# Patient Record
Sex: MALE | Race: WHITE | NOT HISPANIC OR LATINO | Employment: OTHER | URBAN - METROPOLITAN AREA
[De-identification: names, ages, dates, MRNs, and addresses within clinical notes are randomized per-mention and may not be internally consistent; named-entity substitution may affect disease eponyms.]

---

## 2018-03-30 ENCOUNTER — CONVERSION ENCOUNTER (OUTPATIENT)
Dept: CARDIOLOGY | Facility: CLINIC | Age: 68
End: 2018-03-30

## 2018-03-30 ENCOUNTER — OFFICE VISIT CONVERTED (OUTPATIENT)
Dept: CARDIOLOGY | Facility: CLINIC | Age: 68
End: 2018-03-30
Attending: INTERNAL MEDICINE

## 2018-07-23 ENCOUNTER — CONVERSION ENCOUNTER (OUTPATIENT)
Dept: SURGERY | Facility: CLINIC | Age: 68
End: 2018-07-23

## 2018-07-23 ENCOUNTER — OFFICE VISIT CONVERTED (OUTPATIENT)
Dept: UROLOGY | Facility: CLINIC | Age: 68
End: 2018-07-23
Attending: UROLOGY

## 2018-10-19 ENCOUNTER — OFFICE VISIT CONVERTED (OUTPATIENT)
Dept: CARDIOLOGY | Facility: CLINIC | Age: 68
End: 2018-10-19
Attending: INTERNAL MEDICINE

## 2019-01-09 ENCOUNTER — OFFICE VISIT CONVERTED (OUTPATIENT)
Dept: OTHER | Facility: HOSPITAL | Age: 69
End: 2019-01-09
Attending: NURSE PRACTITIONER

## 2019-01-10 ENCOUNTER — HOSPITAL ENCOUNTER (OUTPATIENT)
Dept: LAB | Facility: HOSPITAL | Age: 69
Discharge: HOME | End: 2019-01-10
Attending: NURSE PRACTITIONER

## 2019-01-10 LAB
ALBUMIN SERPL-MCNC: 4.6 G/DL (ref 3.5–5)
ALBUMIN/GLOB SERPL: 1.8 {RATIO} (ref 1.4–2.6)
ALP SERPL-CCNC: 115 U/L (ref 56–155)
ALT SERPL-CCNC: 18 U/L (ref 10–40)
ANION GAP SERPL CALC-SCNC: 17 MMOL/L (ref 8–19)
AST SERPL-CCNC: 20 U/L (ref 15–50)
BASOPHILS # BLD AUTO: 0.01 10*3/UL (ref 0–0.2)
BASOPHILS NFR BLD AUTO: 0.24 % (ref 0–3)
BILIRUB SERPL-MCNC: 0.57 MG/DL (ref 0.2–1.3)
BUN SERPL-MCNC: 16 MG/DL (ref 5–25)
BUN/CREAT SERPL: 15 {RATIO} (ref 6–20)
CALCIUM SERPL-MCNC: 9.4 MG/DL (ref 8.7–10.4)
CHLORIDE SERPL-SCNC: 103 MMOL/L (ref 99–111)
CHOLEST SERPL-MCNC: 135 MG/DL (ref 107–200)
CHOLEST/HDLC SERPL: 2.4 {RATIO} (ref 3–6)
CONV CO2: 24 MMOL/L (ref 22–32)
CONV TOTAL PROTEIN: 7.2 G/DL (ref 6.3–8.2)
CREAT UR-MCNC: 1.08 MG/DL (ref 0.7–1.2)
EOSINOPHIL # BLD AUTO: 0.12 10*3/UL (ref 0–0.7)
EOSINOPHIL # BLD AUTO: 2.34 % (ref 0–7)
ERYTHROCYTE [DISTWIDTH] IN BLOOD BY AUTOMATED COUNT: 12.8 % (ref 11.5–14.5)
GFR SERPLBLD BASED ON 1.73 SQ M-ARVRAT: >60 ML/MIN/{1.73_M2}
GLOBULIN UR ELPH-MCNC: 2.6 G/DL (ref 2–3.5)
GLUCOSE SERPL-MCNC: 98 MG/DL (ref 70–99)
HBA1C MFR BLD: 13.2 G/DL (ref 14–18)
HCT VFR BLD AUTO: 39 % (ref 42–52)
HDLC SERPL-MCNC: 57 MG/DL (ref 40–60)
LDLC SERPL CALC-MCNC: 57 MG/DL (ref 70–100)
LYMPHOCYTES # BLD AUTO: 1.09 10*3/UL (ref 1–5)
MCH RBC QN AUTO: 31.3 PG (ref 27–31)
MCHC RBC AUTO-ENTMCNC: 33.7 G/DL (ref 33–37)
MCV RBC AUTO: 92.9 FL (ref 80–96)
MONOCYTES # BLD AUTO: 0.49 10*3/UL (ref 0.2–1.2)
MONOCYTES NFR BLD AUTO: 9.77 % (ref 3–10)
NEUTROPHILS # BLD AUTO: 3.27 10*3/UL (ref 2–8)
NEUTROPHILS NFR BLD AUTO: 65.7 % (ref 30–85)
NRBC BLD AUTO-RTO: 0 % (ref 0–0.01)
OSMOLALITY SERPL CALC.SUM OF ELEC: 289 MOSM/KG (ref 273–304)
PLATELET # BLD AUTO: 258 10*3/UL (ref 130–400)
PMV BLD AUTO: 7.2 FL (ref 7.4–10.4)
POTASSIUM SERPL-SCNC: 4.6 MMOL/L (ref 3.5–5.3)
RBC # BLD AUTO: 4.2 10*6/UL (ref 4.7–6.1)
SODIUM SERPL-SCNC: 139 MMOL/L (ref 135–147)
TRIGL SERPL-MCNC: 106 MG/DL (ref 40–150)
TSH SERPL-ACNC: 2.87 M[IU]/L (ref 0.27–4.2)
VARIANT LYMPHS NFR BLD MANUAL: 22 % (ref 20–45)
VLDLC SERPL-MCNC: 21 MG/DL (ref 5–37)
WBC # BLD AUTO: 4.98 10*3/UL (ref 4.8–10.8)

## 2019-02-06 ENCOUNTER — OFFICE VISIT CONVERTED (OUTPATIENT)
Dept: NEUROSURGERY | Facility: CLINIC | Age: 69
End: 2019-02-06
Attending: NEUROLOGICAL SURGERY

## 2019-03-07 ENCOUNTER — OFFICE VISIT CONVERTED (OUTPATIENT)
Dept: OTHER | Facility: HOSPITAL | Age: 69
End: 2019-03-07
Attending: NURSE PRACTITIONER

## 2019-03-18 ENCOUNTER — OFFICE VISIT CONVERTED (OUTPATIENT)
Dept: UROLOGY | Facility: CLINIC | Age: 69
End: 2019-03-18
Attending: UROLOGY

## 2019-03-18 ENCOUNTER — CONVERSION ENCOUNTER (OUTPATIENT)
Dept: SURGERY | Facility: CLINIC | Age: 69
End: 2019-03-18

## 2019-04-16 ENCOUNTER — CONVERSION ENCOUNTER (OUTPATIENT)
Dept: OTHER | Facility: HOSPITAL | Age: 69
End: 2019-04-16

## 2019-04-16 ENCOUNTER — OFFICE VISIT CONVERTED (OUTPATIENT)
Dept: OTHER | Facility: HOSPITAL | Age: 69
End: 2019-04-16
Attending: NURSE PRACTITIONER

## 2019-04-25 ENCOUNTER — OFFICE VISIT CONVERTED (OUTPATIENT)
Dept: CARDIOLOGY | Facility: CLINIC | Age: 69
End: 2019-04-25
Attending: INTERNAL MEDICINE

## 2019-05-03 ENCOUNTER — CONVERSION ENCOUNTER (OUTPATIENT)
Dept: SURGERY | Facility: CLINIC | Age: 69
End: 2019-05-03

## 2019-05-03 ENCOUNTER — OFFICE VISIT CONVERTED (OUTPATIENT)
Dept: SURGERY | Facility: CLINIC | Age: 69
End: 2019-05-03
Attending: SURGERY

## 2019-05-06 ENCOUNTER — HOSPITAL ENCOUNTER (OUTPATIENT)
Dept: OTHER | Facility: HOSPITAL | Age: 69
Discharge: HOME | End: 2019-05-06
Attending: NURSE PRACTITIONER

## 2019-05-06 ENCOUNTER — CONVERSION ENCOUNTER (OUTPATIENT)
Dept: OTHER | Facility: HOSPITAL | Age: 69
End: 2019-05-06

## 2019-05-06 ENCOUNTER — OFFICE VISIT CONVERTED (OUTPATIENT)
Dept: OTHER | Facility: HOSPITAL | Age: 69
End: 2019-05-06
Attending: NURSE PRACTITIONER

## 2019-05-06 LAB
ALBUMIN SERPL-MCNC: 4.6 G/DL (ref 3.5–5)
ALBUMIN/GLOB SERPL: 1.6 {RATIO} (ref 1.4–2.6)
ALP SERPL-CCNC: 104 U/L (ref 56–155)
ALT SERPL-CCNC: 19 U/L (ref 10–40)
AMYLASE SERPL-CCNC: 40 U/L (ref 30–150)
ANION GAP SERPL CALC-SCNC: 15 MMOL/L (ref 8–19)
AST SERPL-CCNC: 17 U/L (ref 15–50)
BILIRUB SERPL-MCNC: 0.61 MG/DL (ref 0.2–1.3)
BUN SERPL-MCNC: 13 MG/DL (ref 5–25)
BUN/CREAT SERPL: 13 {RATIO} (ref 6–20)
CALCIUM SERPL-MCNC: 9.2 MG/DL (ref 8.7–10.4)
CHLORIDE SERPL-SCNC: 102 MMOL/L (ref 99–111)
CONV CO2: 24 MMOL/L (ref 22–32)
CONV TOTAL PROTEIN: 7.4 G/DL (ref 6.3–8.2)
CREAT UR-MCNC: 1.03 MG/DL (ref 0.7–1.2)
GFR SERPLBLD BASED ON 1.73 SQ M-ARVRAT: >60 ML/MIN/{1.73_M2}
GLOBULIN UR ELPH-MCNC: 2.8 G/DL (ref 2–3.5)
GLUCOSE SERPL-MCNC: 99 MG/DL (ref 70–99)
LIPASE SERPL-CCNC: 19 U/L (ref 5–51)
OSMOLALITY SERPL CALC.SUM OF ELEC: 284 MOSM/KG (ref 273–304)
POTASSIUM SERPL-SCNC: 4.1 MMOL/L (ref 3.5–5.3)
SODIUM SERPL-SCNC: 137 MMOL/L (ref 135–147)

## 2019-05-07 LAB — H PYLORI IGM SER-ACNC: <9 UNITS (ref 0–8.9)

## 2019-05-09 ENCOUNTER — HOSPITAL ENCOUNTER (OUTPATIENT)
Dept: PERIOP | Facility: HOSPITAL | Age: 69
Setting detail: HOSPITAL OUTPATIENT SURGERY
Discharge: HOME | End: 2019-05-09
Attending: SURGERY

## 2019-05-15 ENCOUNTER — CONVERSION ENCOUNTER (OUTPATIENT)
Dept: OTHER | Facility: HOSPITAL | Age: 69
End: 2019-05-15

## 2019-05-15 ENCOUNTER — OFFICE VISIT CONVERTED (OUTPATIENT)
Dept: OTHER | Facility: HOSPITAL | Age: 69
End: 2019-05-15
Attending: NURSE PRACTITIONER

## 2019-05-24 ENCOUNTER — CONVERSION ENCOUNTER (OUTPATIENT)
Dept: SURGERY | Facility: CLINIC | Age: 69
End: 2019-05-24

## 2019-05-24 ENCOUNTER — OFFICE VISIT CONVERTED (OUTPATIENT)
Dept: SURGERY | Facility: CLINIC | Age: 69
End: 2019-05-24
Attending: SURGERY

## 2019-05-29 ENCOUNTER — OFFICE VISIT CONVERTED (OUTPATIENT)
Dept: OTHER | Facility: HOSPITAL | Age: 69
End: 2019-05-29
Attending: NURSE PRACTITIONER

## 2019-05-29 ENCOUNTER — CONVERSION ENCOUNTER (OUTPATIENT)
Dept: OTHER | Facility: HOSPITAL | Age: 69
End: 2019-05-29

## 2019-06-06 ENCOUNTER — CONVERSION ENCOUNTER (OUTPATIENT)
Dept: SURGERY | Facility: CLINIC | Age: 69
End: 2019-06-06

## 2019-06-18 ENCOUNTER — CONVERSION ENCOUNTER (OUTPATIENT)
Dept: GASTROENTEROLOGY | Facility: CLINIC | Age: 69
End: 2019-06-18

## 2019-06-18 ENCOUNTER — OFFICE VISIT CONVERTED (OUTPATIENT)
Dept: GASTROENTEROLOGY | Facility: CLINIC | Age: 69
End: 2019-06-18
Attending: PHYSICIAN ASSISTANT

## 2019-06-21 ENCOUNTER — OFFICE VISIT CONVERTED (OUTPATIENT)
Dept: SURGERY | Facility: CLINIC | Age: 69
End: 2019-06-21
Attending: SURGERY

## 2019-07-03 ENCOUNTER — HOSPITAL ENCOUNTER (OUTPATIENT)
Dept: GASTROENTEROLOGY | Facility: HOSPITAL | Age: 69
Setting detail: HOSPITAL OUTPATIENT SURGERY
Discharge: HOME | End: 2019-07-03
Attending: INTERNAL MEDICINE

## 2019-07-17 ENCOUNTER — HOSPITAL ENCOUNTER (OUTPATIENT)
Dept: PREADMISSION TESTING | Facility: HOSPITAL | Age: 69
Discharge: HOME | End: 2019-07-17
Attending: SURGERY

## 2019-07-24 ENCOUNTER — HOSPITAL ENCOUNTER (OUTPATIENT)
Dept: PERIOP | Facility: HOSPITAL | Age: 69
Setting detail: HOSPITAL OUTPATIENT SURGERY
Discharge: HOME | End: 2019-07-24
Attending: SURGERY

## 2019-08-01 ENCOUNTER — OFFICE VISIT CONVERTED (OUTPATIENT)
Dept: OTHER | Facility: HOSPITAL | Age: 69
End: 2019-08-01
Attending: NURSE PRACTITIONER

## 2019-08-01 ENCOUNTER — CONVERSION ENCOUNTER (OUTPATIENT)
Dept: OTHER | Facility: HOSPITAL | Age: 69
End: 2019-08-01

## 2019-08-06 ENCOUNTER — CONVERSION ENCOUNTER (OUTPATIENT)
Dept: SURGERY | Facility: CLINIC | Age: 69
End: 2019-08-06

## 2019-08-06 ENCOUNTER — OFFICE VISIT CONVERTED (OUTPATIENT)
Dept: SURGERY | Facility: CLINIC | Age: 69
End: 2019-08-06
Attending: SURGERY

## 2019-08-20 ENCOUNTER — OFFICE VISIT CONVERTED (OUTPATIENT)
Dept: SURGERY | Facility: CLINIC | Age: 69
End: 2019-08-20
Attending: SURGERY

## 2019-09-18 ENCOUNTER — OFFICE VISIT CONVERTED (OUTPATIENT)
Dept: UROLOGY | Facility: CLINIC | Age: 69
End: 2019-09-18
Attending: UROLOGY

## 2019-09-23 ENCOUNTER — HOSPITAL ENCOUNTER (OUTPATIENT)
Dept: LAB | Facility: HOSPITAL | Age: 69
Discharge: HOME | End: 2019-09-23
Attending: UROLOGY

## 2019-09-23 LAB — PSA SERPL-MCNC: 3.54 NG/ML (ref 0–4)

## 2019-10-23 ENCOUNTER — HOSPITAL ENCOUNTER (OUTPATIENT)
Dept: URGENT CARE | Facility: CLINIC | Age: 69
Discharge: HOME | End: 2019-10-23

## 2019-10-31 ENCOUNTER — HOSPITAL ENCOUNTER (OUTPATIENT)
Dept: LAB | Facility: HOSPITAL | Age: 69
Discharge: HOME | End: 2019-10-31
Attending: INTERNAL MEDICINE

## 2019-10-31 LAB
ALBUMIN SERPL-MCNC: 4.4 G/DL (ref 3.5–5)
ALBUMIN/GLOB SERPL: 1.6 {RATIO} (ref 1.4–2.6)
ALP SERPL-CCNC: 103 U/L (ref 56–155)
ALT SERPL-CCNC: 16 U/L (ref 10–40)
ANION GAP SERPL CALC-SCNC: 18 MMOL/L (ref 8–19)
AST SERPL-CCNC: 18 U/L (ref 15–50)
BILIRUB SERPL-MCNC: 0.43 MG/DL (ref 0.2–1.3)
BUN SERPL-MCNC: 15 MG/DL (ref 5–25)
BUN/CREAT SERPL: 16 {RATIO} (ref 6–20)
CALCIUM SERPL-MCNC: 9.7 MG/DL (ref 8.7–10.4)
CHLORIDE SERPL-SCNC: 105 MMOL/L (ref 99–111)
CHOLEST SERPL-MCNC: 123 MG/DL (ref 107–200)
CHOLEST/HDLC SERPL: 2.5 {RATIO} (ref 3–6)
CONV CO2: 23 MMOL/L (ref 22–32)
CONV TOTAL PROTEIN: 7.1 G/DL (ref 6.3–8.2)
CREAT UR-MCNC: 0.96 MG/DL (ref 0.7–1.2)
GFR SERPLBLD BASED ON 1.73 SQ M-ARVRAT: >60 ML/MIN/{1.73_M2}
GLOBULIN UR ELPH-MCNC: 2.7 G/DL (ref 2–3.5)
GLUCOSE SERPL-MCNC: 94 MG/DL (ref 70–99)
HDLC SERPL-MCNC: 50 MG/DL (ref 40–60)
LDLC SERPL CALC-MCNC: 56 MG/DL (ref 70–100)
OSMOLALITY SERPL CALC.SUM OF ELEC: 293 MOSM/KG (ref 273–304)
POTASSIUM SERPL-SCNC: 4.6 MMOL/L (ref 3.5–5.3)
SODIUM SERPL-SCNC: 141 MMOL/L (ref 135–147)
TRIGL SERPL-MCNC: 85 MG/DL (ref 40–150)
VLDLC SERPL-MCNC: 17 MG/DL (ref 5–37)

## 2019-11-13 ENCOUNTER — OFFICE VISIT CONVERTED (OUTPATIENT)
Dept: CARDIOLOGY | Facility: CLINIC | Age: 69
End: 2019-11-13
Attending: INTERNAL MEDICINE

## 2019-11-18 ENCOUNTER — CONVERSION ENCOUNTER (OUTPATIENT)
Dept: SURGERY | Facility: CLINIC | Age: 69
End: 2019-11-18

## 2019-11-18 ENCOUNTER — OFFICE VISIT CONVERTED (OUTPATIENT)
Dept: UROLOGY | Facility: CLINIC | Age: 69
End: 2019-11-18
Attending: UROLOGY

## 2019-12-18 ENCOUNTER — HOSPITAL ENCOUNTER (OUTPATIENT)
Dept: PREADMISSION TESTING | Facility: HOSPITAL | Age: 69
Discharge: HOME | End: 2019-12-18
Attending: UROLOGY

## 2020-01-09 ENCOUNTER — HOSPITAL ENCOUNTER (OUTPATIENT)
Dept: PREADMISSION TESTING | Facility: HOSPITAL | Age: 70
Discharge: HOME | End: 2020-01-09
Attending: UROLOGY

## 2020-01-15 ENCOUNTER — OFFICE VISIT CONVERTED (OUTPATIENT)
Dept: NEUROSURGERY | Facility: CLINIC | Age: 70
End: 2020-01-15
Attending: NEUROLOGICAL SURGERY

## 2020-01-15 ENCOUNTER — CONVERSION ENCOUNTER (OUTPATIENT)
Dept: NEUROLOGY | Facility: CLINIC | Age: 70
End: 2020-01-15

## 2020-01-16 ENCOUNTER — HOSPITAL ENCOUNTER (OUTPATIENT)
Dept: PERIOP | Facility: HOSPITAL | Age: 70
Setting detail: HOSPITAL OUTPATIENT SURGERY
Discharge: HOME | End: 2020-01-17
Attending: UROLOGY

## 2020-01-29 ENCOUNTER — CONVERSION ENCOUNTER (OUTPATIENT)
Dept: OTHER | Facility: HOSPITAL | Age: 70
End: 2020-01-29

## 2020-01-29 ENCOUNTER — HOSPITAL ENCOUNTER (OUTPATIENT)
Dept: OTHER | Facility: HOSPITAL | Age: 70
Discharge: HOME | End: 2020-01-29
Attending: NURSE PRACTITIONER

## 2020-01-29 ENCOUNTER — CONVERSION ENCOUNTER (OUTPATIENT)
Dept: SURGERY | Facility: CLINIC | Age: 70
End: 2020-01-29

## 2020-01-29 ENCOUNTER — OFFICE VISIT CONVERTED (OUTPATIENT)
Dept: OTHER | Facility: HOSPITAL | Age: 70
End: 2020-01-29
Attending: NURSE PRACTITIONER

## 2020-01-29 ENCOUNTER — OFFICE VISIT CONVERTED (OUTPATIENT)
Dept: UROLOGY | Facility: CLINIC | Age: 70
End: 2020-01-29
Attending: UROLOGY

## 2020-01-29 LAB
ALBUMIN SERPL-MCNC: 4.3 G/DL (ref 3.5–5)
ALBUMIN/GLOB SERPL: 1.6 {RATIO} (ref 1.4–2.6)
ALP SERPL-CCNC: 121 U/L (ref 56–155)
ALT SERPL-CCNC: 17 U/L (ref 10–40)
ANION GAP SERPL CALC-SCNC: 18 MMOL/L (ref 8–19)
AST SERPL-CCNC: 16 U/L (ref 15–50)
BASOPHILS # BLD AUTO: 0 10*3/UL (ref 0–0.2)
BASOPHILS NFR BLD AUTO: 0 % (ref 0–3)
BILIRUB SERPL-MCNC: 0.34 MG/DL (ref 0.2–1.3)
BUN SERPL-MCNC: 17 MG/DL (ref 5–25)
BUN/CREAT SERPL: 14 {RATIO} (ref 6–20)
CALCIUM SERPL-MCNC: 9.3 MG/DL (ref 8.7–10.4)
CHLORIDE SERPL-SCNC: 103 MMOL/L (ref 99–111)
CHOLEST SERPL-MCNC: 114 MG/DL (ref 107–200)
CHOLEST/HDLC SERPL: 2.7 {RATIO} (ref 3–6)
CONV ABS IMM GRAN: 0.02 10*3/UL (ref 0–0.2)
CONV CO2: 24 MMOL/L (ref 22–32)
CONV IMMATURE GRAN: 0.3 % (ref 0–1.8)
CONV TOTAL PROTEIN: 7 G/DL (ref 6.3–8.2)
CREAT UR-MCNC: 1.19 MG/DL (ref 0.7–1.2)
DEPRECATED RDW RBC AUTO: 49.7 FL (ref 35.1–43.9)
EOSINOPHIL # BLD AUTO: 0.12 10*3/UL (ref 0–0.7)
EOSINOPHIL # BLD AUTO: 2.1 % (ref 0–7)
ERYTHROCYTE [DISTWIDTH] IN BLOOD BY AUTOMATED COUNT: 14.2 % (ref 11.6–14.4)
GFR SERPLBLD BASED ON 1.73 SQ M-ARVRAT: >60 ML/MIN/{1.73_M2}
GLOBULIN UR ELPH-MCNC: 2.7 G/DL (ref 2–3.5)
GLUCOSE SERPL-MCNC: 107 MG/DL (ref 70–99)
HCT VFR BLD AUTO: 35.8 % (ref 42–52)
HDLC SERPL-MCNC: 43 MG/DL (ref 40–60)
HGB BLD-MCNC: 11.7 G/DL (ref 14–18)
LDLC SERPL CALC-MCNC: 49 MG/DL (ref 70–100)
LYMPHOCYTES # BLD AUTO: 0.95 10*3/UL (ref 1–5)
LYMPHOCYTES NFR BLD AUTO: 16.4 % (ref 20–45)
MCH RBC QN AUTO: 30.9 PG (ref 27–31)
MCHC RBC AUTO-ENTMCNC: 32.7 G/DL (ref 33–37)
MCV RBC AUTO: 94.5 FL (ref 80–96)
MONOCYTES # BLD AUTO: 0.52 10*3/UL (ref 0.2–1.2)
MONOCYTES NFR BLD AUTO: 9 % (ref 3–10)
NEUTROPHILS # BLD AUTO: 4.18 10*3/UL (ref 2–8)
NEUTROPHILS NFR BLD AUTO: 72.2 % (ref 30–85)
NRBC CBCN: 0 % (ref 0–0.7)
OSMOLALITY SERPL CALC.SUM OF ELEC: 292 MOSM/KG (ref 273–304)
PLATELET # BLD AUTO: 247 10*3/UL (ref 130–400)
PMV BLD AUTO: 9.9 FL (ref 9.4–12.4)
POTASSIUM SERPL-SCNC: 4.5 MMOL/L (ref 3.5–5.3)
RBC # BLD AUTO: 3.79 10*6/UL (ref 4.7–6.1)
SODIUM SERPL-SCNC: 140 MMOL/L (ref 135–147)
TRIGL SERPL-MCNC: 109 MG/DL (ref 40–150)
TSH SERPL-ACNC: 1.91 M[IU]/L (ref 0.27–4.2)
VLDLC SERPL-MCNC: 22 MG/DL (ref 5–37)
WBC # BLD AUTO: 5.79 10*3/UL (ref 4.8–10.8)

## 2020-01-31 LAB
CONV HEPATITIS C AB WITH REFLEX TO CONFIRMATION: 0.1 S/CO RATIO (ref 0–0.9)
CONV HEPATITIS COMMENT: NORMAL

## 2020-02-10 ENCOUNTER — HOSPITAL ENCOUNTER (OUTPATIENT)
Dept: LAB | Facility: HOSPITAL | Age: 70
Discharge: HOME | End: 2020-02-10
Attending: INTERNAL MEDICINE

## 2020-02-10 LAB
ALBUMIN SERPL-MCNC: 4.1 G/DL (ref 3.5–5)
ALBUMIN/GLOB SERPL: 1.5 {RATIO} (ref 1.4–2.6)
ALP SERPL-CCNC: 130 U/L (ref 56–155)
ALT SERPL-CCNC: 43 U/L (ref 10–40)
ANION GAP SERPL CALC-SCNC: 16 MMOL/L (ref 8–19)
AST SERPL-CCNC: 24 U/L (ref 15–50)
BILIRUB SERPL-MCNC: 0.46 MG/DL (ref 0.2–1.3)
BUN SERPL-MCNC: 15 MG/DL (ref 5–25)
BUN/CREAT SERPL: 13 {RATIO} (ref 6–20)
CALCIUM SERPL-MCNC: 9 MG/DL (ref 8.7–10.4)
CHLORIDE SERPL-SCNC: 100 MMOL/L (ref 99–111)
CONV CO2: 26 MMOL/L (ref 22–32)
CONV TOTAL PROTEIN: 6.8 G/DL (ref 6.3–8.2)
CREAT UR-MCNC: 1.17 MG/DL (ref 0.7–1.2)
GFR SERPLBLD BASED ON 1.73 SQ M-ARVRAT: >60 ML/MIN/{1.73_M2}
GLOBULIN UR ELPH-MCNC: 2.7 G/DL (ref 2–3.5)
GLUCOSE SERPL-MCNC: 94 MG/DL (ref 70–99)
OSMOLALITY SERPL CALC.SUM OF ELEC: 287 MOSM/KG (ref 273–304)
POTASSIUM SERPL-SCNC: 4.4 MMOL/L (ref 3.5–5.3)
SODIUM SERPL-SCNC: 138 MMOL/L (ref 135–147)

## 2020-02-17 ENCOUNTER — HOSPITAL ENCOUNTER (OUTPATIENT)
Dept: SURGERY | Facility: CLINIC | Age: 70
Discharge: HOME | End: 2020-02-17
Attending: UROLOGY

## 2020-02-17 ENCOUNTER — CONVERSION ENCOUNTER (OUTPATIENT)
Dept: SURGERY | Facility: CLINIC | Age: 70
End: 2020-02-17

## 2020-02-17 ENCOUNTER — OFFICE VISIT CONVERTED (OUTPATIENT)
Dept: UROLOGY | Facility: CLINIC | Age: 70
End: 2020-02-17
Attending: UROLOGY

## 2020-02-19 ENCOUNTER — CONVERSION ENCOUNTER (OUTPATIENT)
Dept: OTHER | Facility: HOSPITAL | Age: 70
End: 2020-02-19

## 2020-02-19 ENCOUNTER — OFFICE VISIT CONVERTED (OUTPATIENT)
Dept: OTHER | Facility: HOSPITAL | Age: 70
End: 2020-02-19
Attending: NURSE PRACTITIONER

## 2020-02-19 LAB — BACTERIA UR CULT: NORMAL

## 2020-03-05 ENCOUNTER — OFFICE VISIT CONVERTED (OUTPATIENT)
Dept: GASTROENTEROLOGY | Facility: CLINIC | Age: 70
End: 2020-03-05
Attending: NURSE PRACTITIONER

## 2020-04-29 ENCOUNTER — TELEPHONE CONVERTED (OUTPATIENT)
Dept: UROLOGY | Facility: CLINIC | Age: 70
End: 2020-04-29
Attending: UROLOGY

## 2020-05-18 ENCOUNTER — TELEPHONE CONVERTED (OUTPATIENT)
Dept: OTHER | Facility: HOSPITAL | Age: 70
End: 2020-05-18
Attending: NURSE PRACTITIONER

## 2020-06-04 ENCOUNTER — OFFICE VISIT CONVERTED (OUTPATIENT)
Dept: CARDIOLOGY | Facility: CLINIC | Age: 70
End: 2020-06-04
Attending: INTERNAL MEDICINE

## 2020-09-29 ENCOUNTER — HOSPITAL ENCOUNTER (OUTPATIENT)
Dept: LAB | Facility: HOSPITAL | Age: 70
Discharge: HOME | End: 2020-09-29
Attending: UROLOGY

## 2020-09-29 LAB — PSA SERPL-MCNC: 3.41 NG/ML (ref 0–4)

## 2020-10-12 ENCOUNTER — OFFICE VISIT CONVERTED (OUTPATIENT)
Dept: UROLOGY | Facility: CLINIC | Age: 70
End: 2020-10-12
Attending: UROLOGY

## 2020-10-12 ENCOUNTER — HOSPITAL ENCOUNTER (OUTPATIENT)
Dept: SURGERY | Facility: CLINIC | Age: 70
Discharge: HOME | End: 2020-10-12
Attending: UROLOGY

## 2020-10-12 ENCOUNTER — CONVERSION ENCOUNTER (OUTPATIENT)
Dept: SURGERY | Facility: CLINIC | Age: 70
End: 2020-10-12

## 2020-10-14 LAB — BACTERIA UR CULT: NORMAL

## 2020-12-04 ENCOUNTER — OFFICE VISIT CONVERTED (OUTPATIENT)
Dept: NEUROSURGERY | Facility: CLINIC | Age: 70
End: 2020-12-04
Attending: PHYSICIAN ASSISTANT

## 2020-12-04 ENCOUNTER — CONVERSION ENCOUNTER (OUTPATIENT)
Dept: NEUROLOGY | Facility: CLINIC | Age: 70
End: 2020-12-04

## 2020-12-23 ENCOUNTER — HOSPITAL ENCOUNTER (OUTPATIENT)
Dept: MRI IMAGING | Facility: HOSPITAL | Age: 70
Discharge: HOME | End: 2020-12-23
Attending: PHYSICIAN ASSISTANT

## 2021-01-09 ENCOUNTER — HOSPITAL ENCOUNTER (OUTPATIENT)
Dept: OTHER | Facility: HOSPITAL | Age: 71
Discharge: HOME | End: 2021-01-09
Attending: INTERNAL MEDICINE

## 2021-01-09 LAB
ALBUMIN SERPL-MCNC: 4.2 G/DL (ref 3.5–5)
ALBUMIN/GLOB SERPL: 1.8 {RATIO} (ref 1.4–2.6)
ALP SERPL-CCNC: 98 U/L (ref 56–155)
ALT SERPL-CCNC: 21 U/L (ref 10–40)
ANION GAP SERPL CALC-SCNC: 11 MMOL/L (ref 8–19)
AST SERPL-CCNC: 20 U/L (ref 15–50)
BILIRUB SERPL-MCNC: 0.97 MG/DL (ref 0.2–1.3)
BUN SERPL-MCNC: 14 MG/DL (ref 5–25)
BUN/CREAT SERPL: 13 {RATIO} (ref 6–20)
CALCIUM SERPL-MCNC: 9.2 MG/DL (ref 8.7–10.4)
CHLORIDE SERPL-SCNC: 104 MMOL/L (ref 99–111)
CHOLEST SERPL-MCNC: 117 MG/DL (ref 107–200)
CHOLEST/HDLC SERPL: 2 {RATIO} (ref 3–6)
CONV CO2: 27 MMOL/L (ref 22–32)
CONV TOTAL PROTEIN: 6.6 G/DL (ref 6.3–8.2)
CREAT UR-MCNC: 1.08 MG/DL (ref 0.7–1.2)
GFR SERPLBLD BASED ON 1.73 SQ M-ARVRAT: >60 ML/MIN/{1.73_M2}
GLOBULIN UR ELPH-MCNC: 2.4 G/DL (ref 2–3.5)
GLUCOSE SERPL-MCNC: 96 MG/DL (ref 70–99)
HDLC SERPL-MCNC: 60 MG/DL (ref 40–60)
LDLC SERPL CALC-MCNC: 39 MG/DL (ref 70–100)
OSMOLALITY SERPL CALC.SUM OF ELEC: 284 MOSM/KG (ref 273–304)
POTASSIUM SERPL-SCNC: 4.6 MMOL/L (ref 3.5–5.3)
SODIUM SERPL-SCNC: 137 MMOL/L (ref 135–147)
TRIGL SERPL-MCNC: 88 MG/DL (ref 40–150)
VLDLC SERPL-MCNC: 18 MG/DL (ref 5–37)

## 2021-01-13 ENCOUNTER — OFFICE VISIT CONVERTED (OUTPATIENT)
Dept: CARDIOLOGY | Facility: CLINIC | Age: 71
End: 2021-01-13
Attending: INTERNAL MEDICINE

## 2021-01-14 ENCOUNTER — OFFICE VISIT CONVERTED (OUTPATIENT)
Dept: GASTROENTEROLOGY | Facility: CLINIC | Age: 71
End: 2021-01-14
Attending: INTERNAL MEDICINE

## 2021-01-19 ENCOUNTER — OFFICE VISIT CONVERTED (OUTPATIENT)
Dept: NEUROSURGERY | Facility: CLINIC | Age: 71
End: 2021-01-19
Attending: PHYSICIAN ASSISTANT

## 2021-01-30 ENCOUNTER — HOSPITAL ENCOUNTER (OUTPATIENT)
Dept: URGENT CARE | Facility: CLINIC | Age: 71
Discharge: HOME | End: 2021-01-30
Attending: FAMILY MEDICINE

## 2021-02-24 ENCOUNTER — HOSPITAL ENCOUNTER (OUTPATIENT)
Dept: GASTROENTEROLOGY | Facility: HOSPITAL | Age: 71
Setting detail: HOSPITAL OUTPATIENT SURGERY
Discharge: HOME | End: 2021-02-24
Attending: INTERNAL MEDICINE

## 2021-04-12 ENCOUNTER — CONVERSION ENCOUNTER (OUTPATIENT)
Dept: SURGERY | Facility: CLINIC | Age: 71
End: 2021-04-12

## 2021-04-12 ENCOUNTER — OFFICE VISIT CONVERTED (OUTPATIENT)
Dept: UROLOGY | Facility: CLINIC | Age: 71
End: 2021-04-12
Attending: UROLOGY

## 2021-04-12 LAB
BILIRUB UR QL STRIP: NORMAL
COLOR UR: YELLOW
CONV BACTERIA IN URINE MICRO: 0
CONV CALCIUM OXALATE CRYSTALS /HPF IN URINE SEDIMENT BY MICROSCOPY: 0
CONV CLARITY OF URINE: CLEAR
CONV PROTEIN IN URINE BY AUTOMATED TEST STRIP: 30
CONV UROBILINOGEN IN URINE BY AUTOMATED TEST STRIP: 1
GLUCOSE UR QL: NEGATIVE
HGB UR QL STRIP: NORMAL
KETONES UR QL STRIP: 15
LEUKOCYTE ESTERASE UR QL STRIP: NEGATIVE
NITRITE UR QL STRIP: NEGATIVE
PH UR STRIP.AUTO: 6.5 [PH]
RBC #/AREA URNS HPF: 0 /[HPF]
RENAL EPI CELLS #/AREA URNS HPF: 0 /[HPF]
SP GR UR: 1.02
SQUAMOUS SPT QL MICRO: 0
WBC #/AREA URNS HPF: 0 /[HPF]

## 2021-05-12 NOTE — PROGRESS NOTES
Quick Note      Patient Name: Ari Olea   Patient ID: [id number]   Sex: Male   YOB: 1950    Primary Care Provider: Edith DEMARCO   Referring Provider: Edith DEMARCO    Visit Date: April 29, 2020    Provider: Mallorie Matt MD   Location: [facility]   Location Address: [address] [place] [address]  [id number]   Location Phone: [phone]          History Of Present Illness  TELEHEALTH TELEPHONE VISIT  Chief Complaint: BPH with obstruction   Ari Olea is a 69 year old /White male who is presenting for evaluation via telehealth telephone visit. Verbal consent obtained before beginning visit.   Provider spent 8 minutes with the patient during telehealth visit.   The following staff were present during this visit: Itzel Dalton and Mallorie Matt MD   Past Medical History/Overview of Patient Symptoms     The patient is a very pleasant 69-year-old male that has several urological problems.      He has had some problems with BPH symptoms. He is currently on Flomax and is stable with that.  He stopped finasteride in the past due to problems with erections. He does not want to start it back.      He has nocturia 3 times a night and a slower stream that now starts and stops.  He does not feel he empties all the way.      He underwent a button TURP on 1/16/20.  He has his Boyd catheter out and is doing well.  His stream is good.  Hematuria has resolved.      His PVR today is 0cc.  His urine still appears infected today.  He recently completed a course of Levaquin.      He is using sildenafil and is doing well on it. He takes 60mg and it works well for him.      His PSA in July 2018 was 2.4.  That was stable for him.  It was 3.5 in September 2019.           Assessment  · Benign enlargement of prostate     600.00/N40.0  · Decreased sexual ability     302.72/R68.82  · Other microscopic hematuria     599.72/R31.29  · Prostate Cancer  Screening     V76.44/Z12.5  · Cystitis     595.9/N30.90      Plan  · Orders  o Prostate specific antigen (PSA); total (Do Not use for Annual Screening) (36782) - 599.72/R31.29, 600.00/N40.0 - 10/01/2020  o Physican Telephone evaluation, 5-10 min (22661) - V76.44/Z12.5, 600.00/N40.0 - 04/29/2020  · Medications  o sildenafil (antihypertensive) 20 mg oral tablet   SIG: TAKE 3 TABLETS BY MOUTH AS NEEDED AS DIRECTED BY PHYSICIAN   DISP: (40) Tablet with 4 refills  Refilled on 04/29/2020     · Instructions  o Follow Up in 6 months  o PSA is now 3.5. PSA last year was 2.4. We will continue to monitor. Recheck in October 2020.   o He will continue on his sildenafil. It is working well.   o Plan Of Care:   o Chronic conditions reviewed and taken into consideration for today's treatment plan.  o Patient instructed to seek medical attention urgently for new or worsening symptoms.  o Patient was educated/instructed on their diagnosis, treatment and medications prior to discharge from the clinic today.  o Discussed Covid-19 precautions including, but not limited to, social distancing, avoid touching your face, and hand washing.             Electronically Signed by: Mallorie Matt MD -Author on April 29, 2020 01:32:06 PM

## 2021-05-13 NOTE — PROGRESS NOTES
Quick Note      Patient Name: Ari Olea   Patient ID: [id number]   Sex: Male   YOB: 1950    Primary Care Provider: Edith DEMARCO   Referring Provider: Edith DEMARCO    Visit Date: May 18, 2020    Provider: DAV Laurent   Location: [facility]   Location Address: [address] [place] [address]  [id number]   Location Phone: [phone]          History Of Present Illness  TELEHEALTH TELEPHONE VISIT  Chief Complaint: follow up   Ari Olea is a 69 year old /White male who is presenting for evaluation via telehealth telephone visit. Verbal consent obtained before beginning visit.   Provider spent 6 minutes with the patient during telehealth visit.   The following staff were present during this visit: DAV Jerome and Rosa Eisenberg CMA   Past Medical History/Overview of Patient Symptoms     *Patient consented to consult via telephone    Follow up on Anxiety, CAD, Hypotension, Depression. Patient on a daily baby aspirin, and Plavix for coronary artery disease.  He is on lisinopril for hypertension. Blood pressure has been good. Denies chest pain or SOA. He is on duloxetine for depression and anxiety, and it also does help with some of his chronic pain.  Symptoms are under good control.  He is on Protonix for acid reflux.     He is following with urology for BPH and ED.    Complains of chronic back pain, currently following with [facility] pain management. Is due for some more shots in his back.     Complains of left abdomen pain thinks related to recent hernia surgery, scheduled for colonoscopy next month.    Time In: 0941  Time Out: 0947           Assessment  · Abdominal pain     789.00/R10.9  · CAD (coronary artery disease)     414.00/I25.10  · Depression     296.31  · Hypertension     401.9/I10  · Low back pain     724.2/M54.5    Problems Reconciled  Plan  · Orders  o Physican Telephone evaluation, 5-10 min (35227) - -  05/18/2020  · Medications  o duloxetine 60 mg oral capsule,delayed release(DR/EC)   SIG: take 1 capsule (60 mg) by oral route once daily for 30 days   DISP: (30) capsules with 5 refills  Refilled on 05/18/2020     o lisinopril 5 mg oral tablet   SIG: Take 1 tablet by mouth every day   DISP: (30) tablets with 5 refills  Refilled on 05/18/2020     o Protonix 40 mg oral tablet,delayed release (DR/EC)   SIG: take 1 tablet by oral route daily for 30 days   DISP: (30) tablets with 5 refills  Refilled on 05/18/2020     o Medications have been Reconciled  o Transition of Care or Provider Policy  · Instructions  o Plan Of Care: Continue current plan of care, we will see the patient back in 3 months for routine lab work and follow-up. Advised to keep his appointment with pain management and general surgery to evaluate his back pain and lower quadrant pain. Patient voiced understanding and all questions answered.  o Chronic conditions reviewed and taken into consideration for today's treatment plan.  o Patient instructed to seek medical attention urgently for new or worsening symptoms.  o Patient was educated/instructed on their diagnosis, treatment and medications prior to discharge from the clinic today.  o Patient is taking medications as prescribed and doing well.   o Call the office with any concerns or questions.  o Discussed Covid-19 precautions including, but not limited to, social distancing, avoid touching your face, and hand washing.   · Disposition  o Follow Up in 3 months     We will see Ari wei in 3 months for routine follow-up.  If he has any issues are concerns encouraged to follow-up sooner.    EMR dragon/transcription disclaimer: Much of this encounter note is an electronic transcription/translation of spoken language to printed text.  Electronic translation of spoken language may permit erroneous, or at times nonsensical words or phrases to be inadvertently transcribed; although I have reviewed the note  for such errors, some may still exist.             Electronically Signed by: DAV Laurent -Author on May 18, 2020 10:51:23 AM

## 2021-05-13 NOTE — PROGRESS NOTES
Progress Note      Patient Name: Ari Olea   Patient ID: [id number]   Sex: Male   YOB: 1950    Primary Care Provider: Edith DEMARCO   Referring Provider: Edith DEMARCO    Visit Date: October 12, 2020    Provider: Ahsan Harmon MD   Location: [facility]   Location Address: [address] [place] [address]  [id number]   Location Phone: [phone]          Chief Complaint  · Follow Up Office Visit      History Of Present Illness     Mr. Olea came in today for evaluation. He is doing well but is occasionally having some bilateral groin pain. He recently was in the ER and he had a CT scan of the abdomen and pelvis that showed no evidence of hernia recurrence. Otherwise, his scans were normal.       Past Medical History  Anxiety; Arthritis; Bladder Disorder; BPH; CAD (coronary artery disease); Cervical spondylosis without myelopathy; Cervicalgia; Chest pain; Colitis; Degenerative Disc Disease ; Depression; Diverticulitis; Diverticulosis Of Colon; GERD (gastroesophageal reflux disease); Heart Attack; Heart Disease; Hemorrhoids; Hernia; High cholesterol; Hypertension; Irritable bowel syndrome; Mood disorder; Muscle cramps; Myocardial Infarction; Osteoarthritis; Prostate Disorder; S/P lumpectomy, right breast         Past Surgical History  Appendectomy; cardiac stents; Cholecystecomy; Colonoscopy; EGD; Excision of breast mass; Gallbladder; Hemorrhoidectomy; Inguinal Hernia Repair; TURP; umbilical hernia repair         Medication List  Aspir-81 81 mg oral tablet,delayed release (DR/EC); Bactrim -160 mg oral tablet; duloxetine 60 mg oral capsule,delayed release(DR/EC); lisinopril 5 mg oral tablet; Plavix 75 mg oral tablet; Protonix 40 mg oral tablet,delayed release (DR/EC); sildenafil (pulm.hypertension) 20 mg oral tablet; tamsulosin 0.4 mg oral capsule; tramadol 50 mg oral tablet; triamcinolone acetonide 0.5 % topical cream         Allergy List  NO KNOWN DRUG  "ALLERGIES         Family Medical History  Congestive Heart Failure; Family history of colon cancer; Kidney stones; Bladder calculus; Family history of Arthritis; Family history of stroke; Family history of heart disease         Social History  Alcohol (Never); Caffeine (Unknown); lives with spouse; ; Second hand smoke exposure (Never); Tobacco (Never); Working         Review of Systems  · Cardiovascular  o Denies  o : chest pain, irregular heart beats, rapid heart rate, chest pain on exertion, shortness of breath, lower extremity swelling  · Respiratory  o Denies  o : shortness of breath, wheezing, cough, wheezing, chronic cough, coughing up blood  · Gastrointestinal  o Denies  o : nausea, vomiting, diarrhea, chronic abdominal pain, reflux symptoms      Vitals  Date Time BP Position Site L\R Cuff Size HR RR TEMP (F) WT  HT  BMI kg/m2 BSA m2 O2 Sat FR L/min FiO2 HC       10/12/2020 01:13 PM       14  207lbs 0oz 6'  1\" 27.31 2.2             Physical Examination     Today on physical exam, he appears well. His umbilical hernia repair and both inguinal hernia repairs feel intact.           Assessment  · Postoperative Exam Following Surgery     V67.00       Intermittent groin pain in a gentleman who has had a bilateral robotic inguinal hernia. Those repairs appear intact on scan and on physical exam.       Plan  · Medications  o Medications have been Reconciled  o Transition of Care or Provider Policy     I have told Mr. Olea that, at this point, there does not appear to be anything that is going to require any type of additional surgery for. I have told him that he may intermittently have some discomfort in those areas but, at this point, he does not look to have a recurrence. I am going to see him back on an as needed basis.             Electronically Signed by: Edith Jennings-, -Author on October 13, 2020 01:07:00 PM  Electronically Co-signed by: Ahsan Harmon MD -Reviewer on October 19, 2020 " 04:28:10 PM

## 2021-05-13 NOTE — PROGRESS NOTES
"   Progress Note      Patient Name: Ari Olea   Patient ID: [id number]   Sex: Male   YOB: 1950    Primary Care Provider: Edith DEMARCO   Referring Provider: Edith DEMARCO    Visit Date: June 4, 2020    Provider: Mayank Sheehan MD   Location: [facility]   Location Address: [address] [place] [address], [facility]   [place], [place]  [id number]   Location Phone: [phone]          Chief Complaint  · Coronary artery disease       History Of Present Illness  REFERRING CARE PROVIDER: Edith DEMARCO   Ari Olea is a 69-year-old gentleman with a known history of coronary artery disease, prior stent, hypertension, and dyslipidemia who has been doing well. No chest pain, shortness of breath, or other complaints.   PAST MEDICAL HISTORY: Coronary artery disease with prior stent; hypertension; dyslipidemia.   FAMILY HISTORY: Positive for hypertension and heart disease. Negative for diabetes.   PSYCHOSOCIAL HISTORY: Positive for history of mood change or depression. The patient does not drink alcohol and does not use tobacco.   CURRENT MEDICATIONS: include Atorvastatin 80 mg daily; Tamsulosin 0.4 mg daily; Plavix 75 mg daily; Lisinopril 5 mg daily; Tramadol 50 mg p.r.n.; Sildenafil 20 mg p.r.n.; Pantoprazole 40 mg daily; Duloxetine 60 mg daily. The dosage and frequency of the medications were reviewed with the patient.       Review of Systems  · Cardiovascular  o Denies  o : palpitations (fast, fluttering, or skipping beats), swelling (feet, ankles, hands), shortness of breath while walking or lying flat, chest pain or angina pectoris   · Respiratory  o Denies  o : chronic or frequent cough, asthma or wheezing      Vitals  Date Time BP Position Site L\R Cuff Size HR RR TEMP (F) WT  HT  BMI kg/m2 BSA m2 O2 Sat        06/04/2020 10:55 /68 Sitting    62 - R   203lbs 0oz 6'  1\" 26.78 2.18           Physical Examination  · Constitutional  o Appearance  o : Awake, alert, in " no acute distress.   · Eyes  o Conjunctivae  o : Normal.  · Ears, Nose, Mouth and Throat  o Oral Cavity  o :   § Oral Mucosa  § : Normal.  · Neck  o Inspection/Palpation  o : No JVD. Good carotid upstroke. No thyromegaly.  · Respiratory  o Respiratory  o : Good respiratory effort. Clear to percussion and auscultation.  · Cardiovascular  o Heart  o :   § Auscultation of Heart  § : S1, S2 normal. Regular rate and rhythm without murmurs, gallops, or rubs.  o Peripheral Vascular System  o :   § Extremities  § : Good femoral and pedal pulses. No pedal edema.  · Gastrointestinal  o Abdominal Examination  o : Soft. No tenderness or masses felt. No hepatosplenomegaly. Abdominal aorta is not palpable.  · EKG  o Indications  o : Coronary artery disease.   o Results  o : Sinus rhythm, intraventricular conduction delay, borderline ST elevations.   o Comparison  o : No change from prior EKGs.           Assessment     ASSESSMENT AND PLAN:  1.  Coronary artery disease, previous stenting.  On chronic Plavix 75 mg once a day.   2.  Hyperlipidemia, on statin. Goal LDL of less than 70.   3.  Hypertension, controlled.       MD NOHEMY Ely/elena    This note was transcribed by Domi Chery.  elena/nohemy  The above service was transcribed by Domi Chery, and I attest to the accuracy of the note.  NOHEMY             Electronically Signed by: Kera Chery-, Other -Author on Nhi 10, 2020 02:00:29 PM  Electronically Co-signed by: Mayank Sheehan MD -Reviewer on June 18, 2020 05:04:53 PM

## 2021-05-13 NOTE — PROGRESS NOTES
Progress Note      Patient Name: Ari Olea   Patient ID: [id number]   Sex: Male   YOB: 1950    Primary Care Provider: Edith DEMARCO   Referring Provider: Edtih DEMARCO    Visit Date: December 4, 2020    Provider: Echo Hairston PA-C   Location: [facility]   Location Address: [address] [place] [address]  [id number]   Location Phone: [phone]          Chief Complaint     Patient is being seen today for neck and low back pain. No recent imaging.       History Of Present Illness     Here for worsening lbp and pain into the legs down to the feet at times.  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  Takes tramadol and LESB are no longer helping. MRI from two years ago showed advanced facet arthropathy with synovial cyst at L4/5 with moderate central canal stenosis.       Past Medical History  Anxiety; Arthritis; Bladder Disorder; BPH; CAD (coronary artery disease); Cervical spondylosis without myelopathy; Cervicalgia; Chest pain; Colitis; Degenerative Disc Disease ; Depression; Diverticulitis; Diverticulosis Of Colon; GERD (gastroesophageal reflux disease); Heart Attack; Heart Disease; Hemorrhoids; Hernia; High cholesterol; Hypertension; Irritable bowel syndrome; Mood disorder; Muscle cramps; Myocardial Infarction; Osteoarthritis; Prostate Disorder; S/P lumpectomy, right breast         Past Surgical History  Appendectomy; cardiac stents; Cholecystecomy; Colonoscopy; EGD; Excision of breast mass; Gallbladder; Hemorrhoidectomy; Inguinal Hernia Repair; TURP; umbilical hernia repair         Medication List  atorvastatin 80 mg oral tablet; hydroxyzine HCl 25 mg oral tablet; lisinopril 5 mg oral tablet; pantoprazole 40 mg oral tablet,delayed release (DR/EC); Plavix 75 mg oral tablet; sildenafil (pulm.hypertension) 20 mg oral tablet; tamsulosin 0.4 mg oral capsule; tramadol 50 mg oral tablet; triamcinolone acetonide  0.5 % topical cream         Allergy List  NO KNOWN DRUG ALLERGIES       Allergies Reconciled  Family Medical History  Congestive Heart Failure; Family history of colon cancer; Kidney stones; Bladder calculus; Family history of Arthritis; Family history of stroke; Family history of heart disease         Social History  Alcohol (Never); Caffeine (Unknown); lives with spouse; ; Second hand smoke exposure (Never); Tobacco (Never); Working         Immunizations  Name Date Admin   Influenza 11/01/2019   Influenza 10/01/2018   Qhibmnpib94 01/15/2015   Prevnar 13 09/01/2016         Review of Systems  · Constitutional  o Admits  o : chills  o Denies  o : excessive sweating, fatigue, fever, sycope/passing out, weight gain, weight loss  · Eyes  o Denies  o : changes in vision, blurry vision, double vision  · HENT  o Denies  o : loss of hearing, ringing in the ears, ear aches, sore throat, nasal congestion, sinus pain, nose bleeds, seasonal allergies  · Cardiovascular  o Denies  o : blood clots, swollen legs, anemia, easy burising or bleeding, transfusions  · Respiratory  o Denies  o : shortness of breath, dry cough, productive cough, pneumonia, COPD  · Gastrointestinal  o Denies  o : difficulty swallowing, reflux  · Genitourinary  o Denies  o : incontinence  · Neurologic  o Admits  o : loss of balance  o Denies  o : headache, seizure, stroke, tremor, falls, dizziness/vertigo, difficulty with sleep, numbness/tingling/paresthesia , difficulty with coordination, difficulty with dexterity, weakness  · Musculoskeletal  o Admits  o : neck stiffness/pain, joint pain, low back pain  o Denies  o : swollen lymph nodes, muscle aches, weakness, spasms, sciatica, pain radiating in arm, pain radiating in leg  · Endocrine  o Denies  o : diabetes, thyroid disorder  · Psychiatric  o Admits  o : anxiety, depression  · All Others Negative      Vitals  Date Time BP Position Site L\R Cuff Size HR RR TEMP (F) WT  HT  BMI kg/m2 BSA m2 O2 Sat  "FR L/min FiO2 HC       12/04/2020 03:40 PM        98 209lbs 7oz 6'  1\" 27.63 2.21             Physical Examination  · Constitutional  o Appearance  o : well-nourished, well developed, alert, in no acute distress  · Respiratory  o Respiratory Effort  o : breathing unlabored  · Cardiovascular  o Peripheral Vascular System  o :   § Extremities  § : no cyanosis, clubbing or edema; less than 2 second refill noted  · Neurologic  o Mental Status Examination  o :   § Orientation  § : grossly oriented to person, place and time  o Motor Examination  o :   § RLE Strength  § : strength normal  § RLE Motor Function  § : tone normal, no atrophy, no abnormal movements noted  § LLE Strength  § : strength normal  § LLE Motor Function  § : tone normal, no atrophy, no abnormal movements noted  o Reflexes  o :   § RLE  § : 1/4  § LLE  § : 1/4  o Sensation  o :   § Light Touch  § : sensation intact to light touch in extremities  · Psychiatric  o Mood and Affect  o : mood normal, affect appropriate          Assessment  · Cervicalgia     723.1/M54.2  · Cervical spondylosis without myelopathy     721.0/M47.812  · Lumbar spondylosis     721.3/M47.816  · Low back pain     724.2/M54.5  · Synovial cyst of lumbar facet joint     727.40/M71.38  L4/5 with stenosis but without neurogenic claudication      Plan  · Orders  o MRI lumbar spine wo contrast (85829) - 721.3/M47.816, 724.2/M54.5, 727.40/M71.38 - 12/04/2020  o XR lumbar spine, 2-3 bending views (65621) - 721.3/M47.816, 724.2/M54.5 - 12/04/2020  · Medications  o Medications have been Reconciled  o Transition of Care or Provider Policy  · Instructions  o I will order a MRI lumbar spine and f/u to discuss results along with bending xrays.             Electronically Signed by: GINO Chew-C -Author on December 4, 2020 04:04:56 PM  "

## 2021-05-14 VITALS — HEIGHT: 73 IN | TEMPERATURE: 98 F | BODY MASS INDEX: 27.76 KG/M2 | WEIGHT: 209.44 LBS

## 2021-05-14 VITALS
BODY MASS INDEX: 27.37 KG/M2 | DIASTOLIC BLOOD PRESSURE: 80 MMHG | SYSTOLIC BLOOD PRESSURE: 143 MMHG | WEIGHT: 206.5 LBS | HEIGHT: 73 IN

## 2021-05-14 VITALS
TEMPERATURE: 97.8 F | HEIGHT: 73 IN | SYSTOLIC BLOOD PRESSURE: 142 MMHG | WEIGHT: 210.44 LBS | BODY MASS INDEX: 27.89 KG/M2 | HEART RATE: 62 BPM | DIASTOLIC BLOOD PRESSURE: 68 MMHG

## 2021-05-14 VITALS
SYSTOLIC BLOOD PRESSURE: 132 MMHG | HEART RATE: 77 BPM | HEIGHT: 73 IN | OXYGEN SATURATION: 98 % | WEIGHT: 207.44 LBS | BODY MASS INDEX: 27.49 KG/M2 | RESPIRATION RATE: 12 BRPM | DIASTOLIC BLOOD PRESSURE: 62 MMHG

## 2021-05-14 VITALS — RESPIRATION RATE: 14 BRPM | WEIGHT: 207 LBS | HEIGHT: 73 IN | BODY MASS INDEX: 27.43 KG/M2

## 2021-05-14 VITALS
WEIGHT: 208 LBS | HEART RATE: 72 BPM | BODY MASS INDEX: 27.57 KG/M2 | DIASTOLIC BLOOD PRESSURE: 74 MMHG | SYSTOLIC BLOOD PRESSURE: 142 MMHG | HEIGHT: 73 IN

## 2021-05-14 VITALS
DIASTOLIC BLOOD PRESSURE: 55 MMHG | SYSTOLIC BLOOD PRESSURE: 114 MMHG | HEIGHT: 73 IN | WEIGHT: 204 LBS | BODY MASS INDEX: 27.04 KG/M2

## 2021-05-14 NOTE — PROGRESS NOTES
Progress Note      Patient Name: Ari Olea   Patient ID: [id number]   Sex: Male   YOB: 1950    Primary Care Provider: Edith DEMARCO    Visit Date: January 19, 2021    Provider: Echo Hairston PA-C   Location[place] [facility]   Location Address: [address] [place] [address]  [id number]   Location Phone: [phone]          Chief Complaint     Follow up with MRI/x-ray Lumbar spine done at [facility].       History Of Present Illness     MRI lumbar spine showed moderate to severe central canal stenosis at L4/5 secondary to facet hypertrophy and spondylolisthesis at L4/5 that is stable on lumbar bending xrays.  He has primarily lbp vs leg pain.  Some paresthesias in the feet.  He has moderate to severe central canal stenosis at C4/5 on MRI cervical spine from 2019 MRI.       Past Medical History  Anxiety; Arthritis; Bladder Disorder; BPH; CAD (coronary artery disease); Cervical spondylosis without myelopathy; Cervicalgia; Chest pain; Colitis; Degenerative Disc Disease ; Depression; Diverticulitis; Diverticulosis Of Colon; GERD (gastroesophageal reflux disease); Heart Attack; Heart Disease; Hemorrhoids; Hernia; High cholesterol; Hypertension; Irritable bowel syndrome; Mood disorder; Muscle cramps; Myocardial Infarction; Osteoarthritis; Prostate Disorder; S/P lumpectomy, right breast         Past Surgical History  Appendectomy; Cardiac Catherization; cardiac stents; Cholecystecomy; Colonoscopy; EGD; Excision of breast mass; Hemorrhoidectomy; Inguinal Hernia Repair; TURP; umbilical hernia repair         Medication List  atorvastatin 80 mg oral tablet; hydroxyzine HCl 25 mg oral tablet; lisinopril 5 mg oral tablet; pantoprazole 40 mg oral tablet,delayed release (DR/EC); Plavix 75 mg oral tablet; sildenafil (pulm.hypertension) 20 mg oral tablet; Suprep Bowel Prep Kit 17.5-3.13-1.6 gram oral recon soln; tamsulosin 0.4 mg oral capsule; tramadol 50 mg oral tablet;  triamcinolone acetonide 0.5 % topical cream         Allergy List  NO KNOWN DRUG ALLERGIES       Allergies Reconciled  Family Medical History  Congestive Heart Failure; Family history of colon cancer; Kidney stones; Bladder calculus; Family history of Arthritis; Family history of stroke; Family history of heart disease         Social History  Alcohol (Never); Caffeine (Unknown); lives with spouse; ; Second hand smoke exposure (Never); Tobacco (Never); Working         Immunizations  Name Date Admin   Influenza 11/01/2019   Influenza 10/01/2018   [id number] 01/15/2015   Prevnar 13 09/01/2016         Review of Systems  · Constitutional  o Denies  o : chills, excessive sweating, fatigue, fever, sycope/passing out, weight gain, weight loss  · Eyes  o Denies  o : changes in vision, blurry vision, double vision  · HENT  o Denies  o : loss of hearing, ringing in the ears, ear aches, sore throat, nasal congestion, sinus pain, nose bleeds, seasonal allergies  · Cardiovascular  o Denies  o : blood clots, swollen legs, anemia, easy burising or bleeding, transfusions  · Respiratory  o Denies  o : shortness of breath, dry cough, productive cough, pneumonia, COPD  · Gastrointestinal  o Denies  o : difficulty swallowing, reflux  · Genitourinary  o Denies  o : incontinence  · Neurologic  o Denies  o : headache, seizure, stroke, tremor, loss of balance, falls, dizziness/vertigo, difficulty with sleep, numbness/tingling/paresthesia , difficulty with coordination, difficulty with dexterity, weakness  · Musculoskeletal  o Admits  o : neck stiffness/pain, joint pain, low back pain  o Denies  o : swollen lymph nodes, muscle aches, weakness, spasms, sciatica, pain radiating in arm, pain radiating in leg  · Endocrine  o Denies  o : diabetes, thyroid disorder  · Psychiatric  o Admits  o : anxiety, depression  · All Others Negative      Vitals  Date Time BP Position Site L\R Cuff Size HR RR TEMP (F) WT  HT  BMI kg/m2 BSA m2 O2 Sat FR  "L/min FiO2 HC       01/19/2021 12:55 /68 Sitting    62 - R  97.8 210lbs 7oz 6'  1\" 27.76 2.22             Physical Examination     Gait and station are wnl.           Assessment  · Cervicalgia     723.1/M54.2  · Cervical spondylosis without myelopathy     721.0/M47.812  · Low back pain     724.2/M54.5  · Lumbar stenosis     724.02/M48.061      Plan  · Medications  o Medications have been Reconciled  o Transition of Care or Provider Policy  · Instructions  o Could consider lumbar RFA. On Plavix so cannot take NSAIDS. He does not want new MRI cervical spine at this point. If leg pain progresses could consider lumbar laminectomy because this surgery is not likely to reduce his chronic low back pain.             Electronically Signed by: Echo Hairston PA-C -Author on January 19, 2021 01:34:38 PM  "

## 2021-05-14 NOTE — PROGRESS NOTES
"   Progress Note      Patient Name: Ari Olea   Patient ID: [id number]   Sex: Male   YOB: 1950    Primary Care Provider: Edith DEMARCO    Visit Date: January 13, 2021    Provider: Mayank Sheehan MD   Location[place] [facility]   Location Address: [address] [place] [address], [address]   [place]  [id number]   Location Phone: [phone]          Chief Complaint     CAD.       History Of Present Illness  Ari Olea is a 70 year old /White male with a history of known coronary artery disease with prior stenting, hypertension and dyslipidemia. He has been doing well. He denies any ongoing chest pain or shortness of breath issues.   PAST MEDICAL HISTORY: Coronary artery disease with prior stent; hypertension; dyslipidemia.   PSYCHOSOCIAL HISTORY: The patient does not drink alcohol and does not use tobacco.   CURRENT MEDICATIONS: Pantoprazole 40 mg daily; Duloxetine 60 mg daily; Lisinopril 5 mg daily; hydroxyzine 10 mg daily; clopidogrel 75 mg daily; tamsulosin 4 mg daily; atorvastatin 80 mg daily.The dosage and frequency of the medications were reviewed with the patient.      ALLERGIES: No known drug allergies.       Review of Systems  · Cardiovascular  o Denies  o : palpitations (fast, fluttering, or skipping beats), swelling (feet, ankles, hands), shortness of breath while walking or lying flat, chest pain or angina pectoris   · Respiratory  o Denies  o : chronic or frequent cough      Vitals  Date Time BP Position Site L\R Cuff Size HR RR TEMP (F) WT  HT  BMI kg/m2 BSA m2 O2 Sat FR L/min FiO2 HC       01/13/2021 11:06 /74 Sitting    72 - R   208lbs 0oz 6'  1\" 27.44 2.2             Physical Examination  · Constitutional  o Appearance  o : Awake, alert, in no acute distress.   · Eyes  o Conjunctivae  o : Normal.  · Ears, Nose, Mouth and Throat  o Oral Cavity  o :   § Oral Mucosa  § : Normal.  · Neck  o Inspection/Palpation  o : No JVD. Good carotid upstroke. No " thyromegaly.  · Respiratory  o Respiratory  o : Good respiratory effort. Clear to percussion and auscultation.  · Cardiovascular  o Heart  o :   § Auscultation of Heart  § : S1, S2 normal. Regular rate and rhythm without murmurs, gallops, or rubs.  o Peripheral Vascular System  o :   § Extremities  § : Good femoral and pedal pulses. No pedal edema.  · Gastrointestinal  o Abdominal Examination  o : Soft. No tenderness or masses felt. No hepatosplenomegaly. Abdominal aorta is not palpable.  · Labs  o Labs  o : Creatinine is 1.08, LDL cholesterol was 39, HDL was 60, ALT was 21, AST 20.          Assessment     ASSESSMENT AND PLAN:   1.  Coronary artery disease with prior stenting, no angina on chronic Plavix 75 mg daily.  2.  Hypertension, controlled.  3.  Dyslipidemia, on statin, LDL goal.               Electronically Signed by: Karen Walker-, OT -Author on January 14, 2021 08:55:04 AM  Electronically Co-signed by: Mayank Sheehan MD -Reviewer on January 14, 2021 07:00:59 PM

## 2021-05-14 NOTE — PROGRESS NOTES
Progress Note      Patient Name: Ari Olea   Patient ID: [id number]   Sex: Male   YOB: 1950    Primary Care Provider: Edith DEMARCO    Visit Date: January 14, 2021    Provider: Nigel Haas MD   Location: [facility] [place]   Location Address: [address] [place] [address]  [id number]   Location Phone: [phone]          Chief Complaint     Follow-up abdominal pain       History Of Present Illness     70-year-old male who had inguinal hernia repair per Dr. Harmon about a year ago presents now with persistent left lower quadrant pain this pain has been ongoing and he attributes it to possible surgery however he was evaluated by Dr. Harmon and not felt to be surgically related.  The pain is described as a mild dull pain intermittently in the left lower quadrant.  He denies any change in bowel habits.  He has had CT scans on 3 occasions in the past year that showed no acute findings.  He has not had a colonoscopy in over 6 years.  He has a grandfather with colon cancer.  He was scheduled to undergo colonoscopy in March of last year but never had it performed.       Past Medical History  Anxiety; Arthritis; Bladder Disorder; BPH; CAD (coronary artery disease); Cervical spondylosis without myelopathy; Cervicalgia; Chest pain; Colitis; Degenerative Disc Disease ; Depression; Diverticulitis; Diverticulosis Of Colon; GERD (gastroesophageal reflux disease); Heart Attack; Heart Disease; Hemorrhoids; Hernia; High cholesterol; Hypertension; Irritable bowel syndrome; Mood disorder; Muscle cramps; Myocardial Infarction; Osteoarthritis; Prostate Disorder; S/P lumpectomy, right breast         Past Surgical History  Appendectomy; Cardiac Catherization; cardiac stents; Cholecystecomy; Colonoscopy; EGD; Excision of breast mass; Hemorrhoidectomy; Inguinal Hernia Repair; TURP; umbilical hernia repair         Medication List  atorvastatin 80 mg oral tablet; hydroxyzine HCl 25 mg  "oral tablet; lisinopril 5 mg oral tablet; pantoprazole 40 mg oral tablet,delayed release (DR/EC); Plavix 75 mg oral tablet; sildenafil (pulm.hypertension) 20 mg oral tablet; tamsulosin 0.4 mg oral capsule; tramadol 50 mg oral tablet; triamcinolone acetonide 0.5 % topical cream         Allergy List  NO KNOWN DRUG ALLERGIES       Allergies Reconciled  Family Medical History  Congestive Heart Failure; Family history of colon cancer; Kidney stones; Bladder calculus; Family history of Arthritis; Family history of stroke; Family history of heart disease         Social History  Alcohol (Never); Caffeine (Unknown); lives with spouse; ; Second hand smoke exposure (Never); Tobacco (Never); Working         Immunizations  Name Date Admin   Influenza 11/01/2019   Influenza 10/01/2018   [id number] 01/15/2015   Prevnar 13 09/01/2016         Review of Systems  · Constitutional  o Denies  o : chills, fever  · Cardiovascular  o Denies  o : exertional chest pain  · Respiratory  o Denies  o : shortness of breath  · Gastrointestinal  o Denies  o : nausea, vomiting, dysphagia  · Endocrine  o Denies  o : weight gain, weight loss      Vitals  Date Time BP Position Site L\R Cuff Size HR RR TEMP (F) WT  HT  BMI kg/m2 BSA m2 O2 Sat FR L/min FiO2 HC       01/14/2021 02:34 /62 Sitting    77 - R 12  207lbs 7oz 6'  1\" 27.37 2.2 98 %            Physical Examination  · Constitutional  o Appearance  o : Healthy-appearing, awake and alert in no acute distress  · Head and Face  o Head  o : Normocephalic with no worriesome skin lesions  · Eyes  o Vision  o :   § Visual Fields  § : eyes move symmetrical in all directions  o Sclerae  o : sclerae anicteric  · Neck  o Inspection/Palpation  o : Trachea is midline, no adenopathy  · Respiratory  o Respiratory Effort  o : Breathing is unlabored.  o Inspection of Chest  o : normal appearance  o Auscultation of Lungs  o : Chest is clear to auscultation bilaterally.  · Cardiovascular  o Heart  o : "   § Auscultation of Heart  § : no murmurs, rubs, or gallops  o Peripheral Vascular System  o :   § Extremities  § : no cyanosis, clubbing or edema;   · Gastrointestinal  o Abdominal Examination  o : Abdomen is soft, nontender to palpation, with normal active bowel sounds, no appreciable hepatosplenomegaly.          Assessment  · Abdominal Pain, LLQ     789.04/R10.32      Plan  · Orders  o Flexible Colonoscopy -Possible risks/complications, benefits, and alternatives to surgical or invasive procedure have been explained to patient and/or legal gaurdian. -Patient has been evaluated and can tolerate anethesia and/or sedation. Risk, benefits, and alternatives to anethesia and/or sedation have been explained to patient and/or legal gaurdian. (81875) - - 01/14/2021  o e-prescribe - at least one () - - 01/14/2021  · Medications  o Medications have been Reconciled  o Transition of Care or Provider Policy  · Instructions  o The patient has had a persistent left lower quadrant pain after having inguinal hernia repair about a year ago. CT imaging on 3 occasions has been unremarkable. He has not had a colonoscopy in over 6 years and therefore this will be scheduled in the next few weeks. I discussed risk and benefits and he is willing to proceed.            Electronically Signed by: Nigel Haas MD -Author on January 14, 2021 03:21:32 PM

## 2021-05-14 NOTE — PROGRESS NOTES
Progress Note      Patient Name: Ari Olea   Patient ID: [id number]   Sex: Male   YOB: 1950    Primary Care Provider: Edith DEMARCO    Visit Date: April 12, 2021    Provider: Mallorie Matt MD   [place] [facility]   Location Address: [address] [place] [address]  [id number]   Location Phone: [phone]          Chief Complaint  · Patient here for urological concerns      History Of Present Illness     The patient is a very pleasant 69-year-old male that has several urological problems.      He has also had some problems with BPH symptoms. He is currently on Flomax and is stable with that.  He stopped finasteride in the past due to problems with erections. He does not want to start it back.      He underwent a button TURP on 1/16/20.  His stream is good.  He gets up once at night.     His PVR today is 0cc.  His urine still appears infected today.  He recently completed a course of Levaquin.      He is using sildenafil and is doing well on it. He takes 60mg and it works well for him.      His PSA in July 2018 was 2.4.  That was stable for him.  It was 3.5 in September 2019.      He is not due for a PSA for about 6 months.       Past Medical History  Anxiety; Arthritis; Bladder disorder; BPH; CAD (coronary artery disease); Cervical spondylosis without myelopathy; Cervicalgia; Chest pain; Colitis; Degenerative Disc Disease ; Depression; Diverticulitis; Diverticulosis Of Colon; GERD (gastroesophageal reflux disease); Heart Attack; Heart Disease; Hemorrhoids; Hernia; High cholesterol; Hypertension; Irritable bowel syndrome; Mood disorder; Muscle cramps; Myocardial Infarction; Osteoarthritis; Prostate Disorder; S/P lumpectomy, right breast         Past Surgical History  Appendectomy; Cardiac Catherization; cardiac stents; Cholecystecomy; Colonoscopy; EGD; Excision of breast mass; Hemorrhoidectomy; Inguinal Hernia Repair; TURP; umbilical hernia repair  "        Medication List  atorvastatin 80 mg oral tablet; hydroxyzine HCl 25 mg oral tablet; lisinopril 5 mg oral tablet; pantoprazole 40 mg oral tablet,delayed release (DR/EC); Plavix 75 mg oral tablet; sildenafil (pulm.hypertension) 20 mg oral tablet; tamsulosin 0.4 mg oral capsule; tramadol 50 mg oral tablet; triamcinolone acetonide 0.5 % topical cream         Allergy List  NO KNOWN DRUG ALLERGIES         Family Medical History  Congestive Heart Failure; Family history of colon cancer; Kidney Stones; Bladder calculus; Family history of Arthritis; Family history of stroke; Family history of heart disease         Social History  Alcohol (Never); Caffeine (Unknown); lives with spouse; ; Second hand smoke exposure (Never); Tobacco (Never); Working         Immunizations  Name Date Admin   Influenza 11/01/2019   Influenza 10/01/2018   [id number] 01/15/2015   Prevnar 13 09/01/2016         Review of Systems  · Constitutional  o Denies  o : fatigue, fever, chills, night sweats      Vitals  Date Time BP Position Site L\R Cuff Size HR RR TEMP (F) WT  HT  BMI kg/m2 BSA m2 O2 Sat FR L/min FiO2 HC       04/12/2021 09:11 /80 Sitting       206lbs 8oz 6'  1\" 27.24 2.2             Physical Examination  · Constitutional  o Appearance  o : well-nourished, well developed, alert, in no acute distress  · Head and Face  o Head  o :   § Inspection  § : atraumatic, normocephalic  o Face  o :   § Inspection  § : no facial lesions  · Eyes  o Sclerae  o : sclerae white  · Ears, Nose, Mouth and Throat  o Ears  o :   § External Ears  § : appearance within normal limits, no lesions present  o Nose  o :   § External Nose  § : appearance normal  · Neck  o Inspection/Palpation  o : normal appearance, no masses or tenderness, trachea midline  · Respiratory  o Respiratory Effort  o : breathing unlabored  · Skin and Subcutaneous Tissue  o General Inspection  o : no rashes or lesions present, no lesions present, no areas of " discoloration  · Neurologic  o Mental Status Examination  o :   § Orientation  § : grossly oriented to person, place and time  § Speech/Language  § : communication ability within normal limits  o Gait and Station  o : normal gait, able to stand without difficulty  · Psychiatric  o Judgement and Insight  o : judgment and insight intact, judgement for everyday activities and social situations within normal limits, insight intact  o Mood and Affect  o : mood normal, affect appropriate          Results  · In-Office Procedures  o Lab procedure  § Automated dipstick urinalysis with microscopy (77705)   § Color Ur: Yellow   § Clarity Ur: Clear   § Glucose Ur Ql Strip: Negative   § Bilirub Ur Ql Strip: Small   § Ketones Ur Ql Strip: 15   § Sp Gr Ur Qn: 1.025   § Hgb Ur Ql Strip: Trace-intact   § pH Ur-LsCnc: 6.5   § Prot Ur Ql Strip: 30   § Urobilinogen Ur Strip-mCnc: 1.0   § Nitrite Ur Ql Strip: Negative   § WBC Est Ur Ql Strip: Negative   § RBC UrnS Qn HPF: 0   § WBC UrnS Qn HPF: 0   § Bacteria UrnS Qn HPF: 0   § Crystals UrnS Qn HPF: 0   § Epithelial Cells (non renal): 0 /HPF  § Epithelial Cells (renal): 0       Assessment  · Benign enlargement of prostate     600.00/N40.0  · Decreased sexual ability     302.72/R68.82  · Other microscopic hematuria     599.72/R31.29  · Prostate Cancer Screening     V76.44/Z12.5  · Cystitis     595.9/N30.90    Problems Reconciled  Plan  · Medications  o sildenafil 100 mg oral tablet   SIG: take 1 tablet (100 mg) by oral route once daily as needed approximately 1 hour before sexual activity   DISP: (30) Tablet with 5 refills  Adjusted on 04/12/2021     o sildenafil (pulm.hypertension) 20 mg oral tablet   SIG: TAKE 3 TABLETS BY MOUTH AS NEEDED AS DIRECTED BY PHYSICIAN   DISP: (40) Tablet with 4 refills  Discontinued on 04/12/2021     o Medications have been Reconciled  o Transition of Care or Provider Policy  · Instructions  o Continue Flomax for BPH.   o PSA Screening: PSA is now 3.5. PSA  last year was 2.4. We will continue to monitor. Recheck in 6 months.  o ED: He will continue on his sildenafil. It is working well. I gave him a script today for 100mg.             Electronically Signed by: Mallorie Matt MD -Author on April 12, 2021 10:18:46 AM

## 2021-05-15 VITALS — BODY MASS INDEX: 26.37 KG/M2 | WEIGHT: 199 LBS | RESPIRATION RATE: 14 BRPM | HEIGHT: 73 IN

## 2021-05-15 VITALS
HEART RATE: 58 BPM | SYSTOLIC BLOOD PRESSURE: 92 MMHG | OXYGEN SATURATION: 100 % | RESPIRATION RATE: 18 BRPM | DIASTOLIC BLOOD PRESSURE: 62 MMHG | HEIGHT: 73 IN | TEMPERATURE: 97.7 F | WEIGHT: 204 LBS | BODY MASS INDEX: 27.04 KG/M2

## 2021-05-15 VITALS
OXYGEN SATURATION: 99 % | HEART RATE: 67 BPM | TEMPERATURE: 97.9 F | BODY MASS INDEX: 25.58 KG/M2 | SYSTOLIC BLOOD PRESSURE: 104 MMHG | DIASTOLIC BLOOD PRESSURE: 62 MMHG | RESPIRATION RATE: 16 BRPM | WEIGHT: 193 LBS | HEIGHT: 73 IN

## 2021-05-15 VITALS
BODY MASS INDEX: 27.63 KG/M2 | SYSTOLIC BLOOD PRESSURE: 126 MMHG | DIASTOLIC BLOOD PRESSURE: 84 MMHG | HEIGHT: 73 IN | WEIGHT: 208.5 LBS

## 2021-05-15 VITALS
OXYGEN SATURATION: 99 % | HEIGHT: 73 IN | DIASTOLIC BLOOD PRESSURE: 72 MMHG | WEIGHT: 208.5 LBS | TEMPERATURE: 97.6 F | HEART RATE: 72 BPM | RESPIRATION RATE: 18 BRPM | BODY MASS INDEX: 27.63 KG/M2 | SYSTOLIC BLOOD PRESSURE: 118 MMHG

## 2021-05-15 VITALS
HEART RATE: 76 BPM | WEIGHT: 200.5 LBS | SYSTOLIC BLOOD PRESSURE: 90 MMHG | OXYGEN SATURATION: 99 % | DIASTOLIC BLOOD PRESSURE: 58 MMHG | HEIGHT: 73 IN | TEMPERATURE: 98.4 F | RESPIRATION RATE: 16 BRPM | BODY MASS INDEX: 26.57 KG/M2

## 2021-05-15 VITALS — RESPIRATION RATE: 14 BRPM | WEIGHT: 200 LBS | HEIGHT: 73 IN | BODY MASS INDEX: 26.51 KG/M2

## 2021-05-15 VITALS
OXYGEN SATURATION: 99 % | SYSTOLIC BLOOD PRESSURE: 102 MMHG | DIASTOLIC BLOOD PRESSURE: 54 MMHG | RESPIRATION RATE: 12 BRPM | HEIGHT: 73 IN | WEIGHT: 200.5 LBS | BODY MASS INDEX: 26.57 KG/M2 | HEART RATE: 65 BPM

## 2021-05-15 VITALS
WEIGHT: 212.25 LBS | BODY MASS INDEX: 28.13 KG/M2 | DIASTOLIC BLOOD PRESSURE: 72 MMHG | HEIGHT: 73 IN | SYSTOLIC BLOOD PRESSURE: 136 MMHG

## 2021-05-15 VITALS — RESPIRATION RATE: 16 BRPM | HEIGHT: 73 IN | WEIGHT: 200 LBS | BODY MASS INDEX: 26.51 KG/M2

## 2021-05-15 VITALS — RESPIRATION RATE: 16 BRPM | HEIGHT: 73 IN | BODY MASS INDEX: 25.63 KG/M2 | WEIGHT: 193.37 LBS

## 2021-05-15 VITALS — RESPIRATION RATE: 16 BRPM | BODY MASS INDEX: 26.37 KG/M2 | HEIGHT: 73 IN | WEIGHT: 199 LBS

## 2021-05-15 VITALS
WEIGHT: 203 LBS | DIASTOLIC BLOOD PRESSURE: 68 MMHG | HEART RATE: 62 BPM | SYSTOLIC BLOOD PRESSURE: 116 MMHG | HEIGHT: 73 IN | BODY MASS INDEX: 26.9 KG/M2

## 2021-05-15 VITALS
TEMPERATURE: 97.8 F | OXYGEN SATURATION: 98 % | HEIGHT: 73 IN | SYSTOLIC BLOOD PRESSURE: 130 MMHG | HEART RATE: 74 BPM | DIASTOLIC BLOOD PRESSURE: 72 MMHG | WEIGHT: 200 LBS | BODY MASS INDEX: 26.51 KG/M2 | RESPIRATION RATE: 18 BRPM

## 2021-05-15 VITALS
BODY MASS INDEX: 27.72 KG/M2 | TEMPERATURE: 98.2 F | HEIGHT: 73 IN | HEART RATE: 81 BPM | SYSTOLIC BLOOD PRESSURE: 88 MMHG | RESPIRATION RATE: 18 BRPM | WEIGHT: 209.12 LBS | OXYGEN SATURATION: 98 % | DIASTOLIC BLOOD PRESSURE: 62 MMHG

## 2021-05-15 VITALS
HEIGHT: 73 IN | BODY MASS INDEX: 27.36 KG/M2 | DIASTOLIC BLOOD PRESSURE: 72 MMHG | SYSTOLIC BLOOD PRESSURE: 114 MMHG | WEIGHT: 206.44 LBS

## 2021-05-15 VITALS
HEIGHT: 73 IN | BODY MASS INDEX: 27.23 KG/M2 | WEIGHT: 205.5 LBS | SYSTOLIC BLOOD PRESSURE: 110 MMHG | DIASTOLIC BLOOD PRESSURE: 64 MMHG

## 2021-05-15 VITALS
DIASTOLIC BLOOD PRESSURE: 81 MMHG | HEIGHT: 73 IN | SYSTOLIC BLOOD PRESSURE: 131 MMHG | BODY MASS INDEX: 27.17 KG/M2 | WEIGHT: 205 LBS

## 2021-05-15 VITALS
BODY MASS INDEX: 26.9 KG/M2 | DIASTOLIC BLOOD PRESSURE: 76 MMHG | SYSTOLIC BLOOD PRESSURE: 120 MMHG | HEIGHT: 73 IN | WEIGHT: 203 LBS | HEART RATE: 58 BPM

## 2021-05-15 VITALS
HEIGHT: 73 IN | SYSTOLIC BLOOD PRESSURE: 122 MMHG | BODY MASS INDEX: 27.43 KG/M2 | WEIGHT: 207 LBS | DIASTOLIC BLOOD PRESSURE: 52 MMHG

## 2021-05-15 VITALS
WEIGHT: 209.25 LBS | BODY MASS INDEX: 27.73 KG/M2 | HEIGHT: 73 IN | SYSTOLIC BLOOD PRESSURE: 102 MMHG | DIASTOLIC BLOOD PRESSURE: 60 MMHG

## 2021-05-15 VITALS
OXYGEN SATURATION: 98 % | DIASTOLIC BLOOD PRESSURE: 68 MMHG | SYSTOLIC BLOOD PRESSURE: 122 MMHG | WEIGHT: 200 LBS | HEIGHT: 73 IN | HEART RATE: 60 BPM | BODY MASS INDEX: 26.51 KG/M2 | TEMPERATURE: 97.9 F

## 2021-05-15 VITALS — RESPIRATION RATE: 14 BRPM | BODY MASS INDEX: 27.04 KG/M2 | WEIGHT: 204 LBS | HEIGHT: 73 IN

## 2021-05-15 VITALS
DIASTOLIC BLOOD PRESSURE: 70 MMHG | SYSTOLIC BLOOD PRESSURE: 118 MMHG | HEART RATE: 64 BPM | BODY MASS INDEX: 28.23 KG/M2 | WEIGHT: 213 LBS | HEIGHT: 73 IN

## 2021-05-16 VITALS
SYSTOLIC BLOOD PRESSURE: 138 MMHG | BODY MASS INDEX: 27.73 KG/M2 | HEIGHT: 73 IN | WEIGHT: 209.25 LBS | DIASTOLIC BLOOD PRESSURE: 72 MMHG

## 2021-05-16 VITALS
SYSTOLIC BLOOD PRESSURE: 122 MMHG | DIASTOLIC BLOOD PRESSURE: 80 MMHG | HEIGHT: 73 IN | RESPIRATION RATE: 18 BRPM | BODY MASS INDEX: 27.83 KG/M2 | WEIGHT: 210 LBS | TEMPERATURE: 97.7 F | OXYGEN SATURATION: 97 % | HEART RATE: 66 BPM

## 2021-05-16 VITALS
HEART RATE: 64 BPM | BODY MASS INDEX: 28.63 KG/M2 | SYSTOLIC BLOOD PRESSURE: 140 MMHG | HEIGHT: 73 IN | DIASTOLIC BLOOD PRESSURE: 82 MMHG | WEIGHT: 216 LBS

## 2021-05-16 VITALS
BODY MASS INDEX: 28.23 KG/M2 | WEIGHT: 213 LBS | HEIGHT: 73 IN | DIASTOLIC BLOOD PRESSURE: 84 MMHG | SYSTOLIC BLOOD PRESSURE: 140 MMHG | HEART RATE: 66 BPM

## 2021-05-16 VITALS
WEIGHT: 214 LBS | RESPIRATION RATE: 16 BRPM | HEART RATE: 67 BPM | SYSTOLIC BLOOD PRESSURE: 140 MMHG | OXYGEN SATURATION: 99 % | DIASTOLIC BLOOD PRESSURE: 82 MMHG | HEIGHT: 73 IN | BODY MASS INDEX: 28.36 KG/M2 | TEMPERATURE: 97.8 F

## 2021-05-16 VITALS
SYSTOLIC BLOOD PRESSURE: 127 MMHG | WEIGHT: 207.31 LBS | HEIGHT: 73 IN | DIASTOLIC BLOOD PRESSURE: 78 MMHG | BODY MASS INDEX: 27.47 KG/M2

## 2021-07-08 ENCOUNTER — APPOINTMENT (OUTPATIENT)
Dept: GENERAL RADIOLOGY | Facility: HOSPITAL | Age: 71
End: 2021-07-08

## 2021-07-08 ENCOUNTER — HOSPITAL ENCOUNTER (EMERGENCY)
Facility: HOSPITAL | Age: 71
Discharge: HOME | End: 2021-07-08
Attending: EMERGENCY MEDICINE | Admitting: EMERGENCY MEDICINE

## 2021-07-08 VITALS
RESPIRATION RATE: 18 BRPM | WEIGHT: 201.94 LBS | BODY MASS INDEX: 26.76 KG/M2 | DIASTOLIC BLOOD PRESSURE: 75 MMHG | OXYGEN SATURATION: 97 % | SYSTOLIC BLOOD PRESSURE: 141 MMHG | HEART RATE: 54 BPM | TEMPERATURE: 97.7 F | HEIGHT: 73 IN

## 2021-07-08 DIAGNOSIS — I25.2 HISTORY OF MI (MYOCARDIAL INFARCTION): ICD-10-CM

## 2021-07-08 DIAGNOSIS — R07.89 ATYPICAL CHEST PAIN: Primary | ICD-10-CM

## 2021-07-08 LAB
ALBUMIN SERPL-MCNC: 4.5 G/DL (ref 3.5–5.2)
ALBUMIN/GLOB SERPL: 2 G/DL
ALP SERPL-CCNC: 109 U/L (ref 39–117)
ALT SERPL W P-5'-P-CCNC: 26 U/L (ref 1–41)
ANION GAP SERPL CALCULATED.3IONS-SCNC: 10.6 MMOL/L (ref 5–15)
AST SERPL-CCNC: 22 U/L (ref 1–40)
BASOPHILS # BLD AUTO: 0 10*3/MM3 (ref 0–0.2)
BASOPHILS NFR BLD AUTO: 0 % (ref 0–1.5)
BILIRUB SERPL-MCNC: 0.8 MG/DL (ref 0–1.2)
BUN SERPL-MCNC: 16 MG/DL (ref 8–23)
BUN/CREAT SERPL: 15.7 (ref 7–25)
CALCIUM SPEC-SCNC: 9.5 MG/DL (ref 8.6–10.5)
CHLORIDE SERPL-SCNC: 103 MMOL/L (ref 98–107)
CK MB SERPL-CCNC: 5.12 NG/ML
CK SERPL-CCNC: 178 U/L (ref 20–200)
CO2 SERPL-SCNC: 22.4 MMOL/L (ref 22–29)
CREAT SERPL-MCNC: 1.02 MG/DL (ref 0.76–1.27)
DEPRECATED RDW RBC AUTO: 47.9 FL (ref 37–54)
EOSINOPHIL # BLD AUTO: 0.06 10*3/MM3 (ref 0–0.4)
EOSINOPHIL NFR BLD AUTO: 1.4 % (ref 0.3–6.2)
ERYTHROCYTE [DISTWIDTH] IN BLOOD BY AUTOMATED COUNT: 14 % (ref 12.3–15.4)
GFR SERPL CREATININE-BSD FRML MDRD: 72 ML/MIN/1.73
GLOBULIN UR ELPH-MCNC: 2.3 GM/DL
GLUCOSE SERPL-MCNC: 114 MG/DL (ref 65–99)
HCT VFR BLD AUTO: 36.6 % (ref 37.5–51)
HGB BLD-MCNC: 12 G/DL (ref 13–17.7)
HOLD SPECIMEN: NORMAL
IMM GRANULOCYTES # BLD AUTO: 0.01 10*3/MM3 (ref 0–0.05)
IMM GRANULOCYTES NFR BLD AUTO: 0.2 % (ref 0–0.5)
LIPASE SERPL-CCNC: 17 U/L (ref 13–60)
LYMPHOCYTES # BLD AUTO: 0.66 10*3/MM3 (ref 0.7–3.1)
LYMPHOCYTES NFR BLD AUTO: 15.6 % (ref 19.6–45.3)
MAGNESIUM SERPL-MCNC: 1.8 MG/DL (ref 1.6–2.4)
MCH RBC QN AUTO: 30.6 PG (ref 26.6–33)
MCHC RBC AUTO-ENTMCNC: 32.8 G/DL (ref 31.5–35.7)
MCV RBC AUTO: 93.4 FL (ref 79–97)
MONOCYTES # BLD AUTO: 0.41 10*3/MM3 (ref 0.1–0.9)
MONOCYTES NFR BLD AUTO: 9.7 % (ref 5–12)
NEUTROPHILS NFR BLD AUTO: 3.1 10*3/MM3 (ref 1.7–7)
NEUTROPHILS NFR BLD AUTO: 73.1 % (ref 42.7–76)
NRBC BLD AUTO-RTO: 0 /100 WBC (ref 0–0.2)
NT-PROBNP SERPL-MCNC: 96.5 PG/ML (ref 0–900)
PLATELET # BLD AUTO: 230 10*3/MM3 (ref 140–450)
PMV BLD AUTO: 9.7 FL (ref 6–12)
POTASSIUM SERPL-SCNC: 4.2 MMOL/L (ref 3.5–5.2)
PROT SERPL-MCNC: 6.8 G/DL (ref 6–8.5)
QT INTERVAL: 429 MS
RBC # BLD AUTO: 3.92 10*6/MM3 (ref 4.14–5.8)
SODIUM SERPL-SCNC: 136 MMOL/L (ref 136–145)
TROPONIN I SERPL-MCNC: 0 NG/ML (ref 0–0.6)
TROPONIN I SERPL-MCNC: 0 NG/ML (ref 0–0.6)
WBC # BLD AUTO: 4.24 10*3/MM3 (ref 3.4–10.8)
WHOLE BLOOD HOLD SPECIMEN: NORMAL

## 2021-07-08 PROCEDURE — 83880 ASSAY OF NATRIURETIC PEPTIDE: CPT | Performed by: EMERGENCY MEDICINE

## 2021-07-08 PROCEDURE — 83735 ASSAY OF MAGNESIUM: CPT | Performed by: EMERGENCY MEDICINE

## 2021-07-08 PROCEDURE — 71045 X-RAY EXAM CHEST 1 VIEW: CPT

## 2021-07-08 PROCEDURE — 93010 ELECTROCARDIOGRAM REPORT: CPT | Performed by: INTERNAL MEDICINE

## 2021-07-08 PROCEDURE — 85025 COMPLETE CBC W/AUTO DIFF WBC: CPT | Performed by: EMERGENCY MEDICINE

## 2021-07-08 PROCEDURE — 82553 CREATINE MB FRACTION: CPT | Performed by: EMERGENCY MEDICINE

## 2021-07-08 PROCEDURE — 83690 ASSAY OF LIPASE: CPT | Performed by: EMERGENCY MEDICINE

## 2021-07-08 PROCEDURE — 82550 ASSAY OF CK (CPK): CPT | Performed by: EMERGENCY MEDICINE

## 2021-07-08 PROCEDURE — 84484 ASSAY OF TROPONIN QUANT: CPT

## 2021-07-08 PROCEDURE — 80053 COMPREHEN METABOLIC PANEL: CPT | Performed by: EMERGENCY MEDICINE

## 2021-07-08 PROCEDURE — 99284 EMERGENCY DEPT VISIT MOD MDM: CPT

## 2021-07-08 PROCEDURE — 93005 ELECTROCARDIOGRAM TRACING: CPT

## 2021-07-08 PROCEDURE — 93005 ELECTROCARDIOGRAM TRACING: CPT | Performed by: EMERGENCY MEDICINE

## 2021-07-08 RX ORDER — SODIUM CHLORIDE 0.9 % (FLUSH) 0.9 %
10 SYRINGE (ML) INJECTION AS NEEDED
Status: DISCONTINUED | OUTPATIENT
Start: 2021-07-08 | End: 2021-07-08 | Stop reason: HOSPADM

## 2021-07-08 RX ORDER — ASPIRIN 81 MG/1
324 TABLET, CHEWABLE ORAL ONCE
Status: COMPLETED | OUTPATIENT
Start: 2021-07-08 | End: 2021-07-08

## 2021-07-08 RX ADMIN — ASPIRIN 81 MG CHEWABLE TABLET 324 MG: 81 TABLET CHEWABLE at 10:33

## 2021-07-08 NOTE — ED PROVIDER NOTES
"Cory Olea is a 70 y.o. male with a hx of CAD, GERD, and MI who presents to the emergency department today with complaints of left sided chest pain over the past three hours. Pt describes the pain as a \"sharp stabbing\" sensation that does not radiate. Pt denies drug use or alcohol use but does admit to smoking daily. Pt follows with Edith Marcelo for his primary care needs. He denies nausea, emesis, cough, SOB, fever, neck pain, jaw pain, arm pain, chills, diaphoresis, dysuria, hematuria, rash, or any other pertinent sx or concerns.             Review of Systems   Constitutional: Negative for chills and fever.   HENT: Negative for nosebleeds.    Eyes: Negative for redness.   Respiratory: Negative for cough and shortness of breath.    Cardiovascular: Positive for chest pain.   Gastrointestinal: Negative for diarrhea and vomiting.   Genitourinary: Negative for dysuria and frequency.   Musculoskeletal: Negative for back pain and neck pain.   Skin: Negative for rash.   Neurological: Negative for seizures.       Past Medical History:   Diagnosis Date   • Anxiety    • Arthritis    • Bladder disorder    • BPH (benign prostatic hyperplasia)    • CAD (coronary artery disease)    • Cervical spondylosis without myelopathy 01/15/2020   • Cervicalgia    • Chest pain    • Colitis    • Condition not found HERNIA   • Depression    • Diverticulitis    • Diverticulosis of colon    • GERD (gastroesophageal reflux disease)    • H/O degenerative disc disease    • Heart attack (CMS/HCC)    • Heart disease    • Hemorrhoids    • High cholesterol    • Hypertension    • IBS (irritable bowel syndrome)    • Mood disorder (CMS/HCC)    • Muscle cramps    • Myocardial infarction (CMS/HCC)    • Osteoarthritis    • Prostate disorder    • S/P lumpectomy, right breast     CYST 2016       No Known Allergies    Past Surgical History:   Procedure Laterality Date   • APPENDECTOMY     • BREAST MASS EXCISION  2016   • CARDIAC " CATHETERIZATION  2016   • CHOLECYSTECTOMY     • COLONOSCOPY  2008,2014,2021   • CORONARY ANGIOPLASTY WITH STENT PLACEMENT  2010   • ENDOSCOPY  2010,2019   • HEMORRHOIDECTOMY  2019   • INGUINAL HERNIA REPAIR  2019   • TURP / TRANSURETHRAL INCISION / DRAINAGE PROSTATE  01/16/2020    BUTTON TURP W/ DR TAM   • UMBILICAL HERNIA REPAIR  2019       Family History   Problem Relation Age of Onset   • Other Mother         bladder stones   • Arthritis Mother    • Heart disease Mother    • Heart failure Father    • Stroke Father    • Colon cancer Paternal Grandfather         60's       Social History     Socioeconomic History   • Marital status:      Spouse name: Not on file   • Number of children: Not on file   • Years of education: Not on file   • Highest education level: Not on file   Tobacco Use   • Smoking status: Current Every Day Smoker     Types: Cigarettes   Substance and Sexual Activity   • Alcohol use: Not Currently         Objective   Physical Exam  Vitals and nursing note reviewed.   Constitutional:       General: He is not in acute distress.     Comments: No acute distress   HENT:      Head: Normocephalic and atraumatic.      Nose: Nose normal.      Mouth/Throat:      Mouth: Mucous membranes are moist.   Eyes:      General: No scleral icterus.  Cardiovascular:      Rate and Rhythm: Normal rate and regular rhythm.      Pulses:           Radial pulses are 2+ on the right side and 2+ on the left side.      Heart sounds: Normal heart sounds. No murmur heard.     Pulmonary:      Effort: No respiratory distress.      Breath sounds: Normal breath sounds.   Chest:      Comments: No tenderness with palpation to chest wall  Abdominal:      Palpations: Abdomen is soft.      Tenderness: There is no abdominal tenderness.      Hernia: No hernia is present.   Musculoskeletal:         General: No tenderness. Normal range of motion.      Cervical back: Normal range of motion and neck supple. No tenderness.      Right  lower leg: No edema.      Left lower leg: No edema.   Skin:     General: Skin is warm and dry.   Neurological:      Mental Status: He is alert. Mental status is at baseline.      Sensory: No sensory deficit.      Motor: No weakness.   Psychiatric:         Behavior: Behavior normal.         Procedures         ED Course     Labs Reviewed   COMPREHENSIVE METABOLIC PANEL - Abnormal; Notable for the following components:       Result Value    Glucose 114 (*)     All other components within normal limits    Narrative:     GFR Normal >60  Chronic Kidney Disease <60  Kidney Failure <15     CBC WITH AUTO DIFFERENTIAL - Abnormal; Notable for the following components:    RBC 3.92 (*)     Hemoglobin 12.0 (*)     Hematocrit 36.6 (*)     Lymphocyte % 15.6 (*)     Lymphocytes, Absolute 0.66 (*)     All other components within normal limits   LIPASE - Normal   BNP (IN-HOUSE) - Normal    Narrative:     Among patients with dyspnea, NT-proBNP is highly sensitive for the detection of acute congestive heart failure. In addition NT-proBNP of <300 pg/ml effectively rules out acute congestive heart failure with 99% negative predictive value.    Results may be falsely decreased if patient taking Biotin.     MAGNESIUM - Normal   CK TOTAL AND CKMB - Normal    Narrative:     CKMB results may be falsely decreased if patient taking Biotin.   POCT TROPONIN I - Normal   POCT TROPONIN I - Normal   RAINBOW DRAW    Narrative:     The following orders were created for panel order Randall Draw.  Procedure                               Abnormality         Status                     ---------                               -----------         ------                     Green Top (Gel)[[id number]]                                  Final result               Lavender Top[[id number]]                                     Final result               Gold Top - SST[[id number]]                                   Final result                 Please view results for  these tests on the individual orders.   POCT TROPONIN I   POCT TROPONIN I   POCT TROPONIN-I WITH HOLD TUBE    Narrative:     The following orders were created for panel order POC Troponin I with Hold Tube.  Procedure                               Abnormality         Status                     ---------                               -----------         ------                     POC Troponin I[[id number]]                                                              HOLD Troponin-I Tube[[id number]]                             Final result                 Please view results for these tests on the individual orders.   POCT TROPONIN-I WITH HOLD TUBE    Narrative:     The following orders were created for panel order POC Troponin I with Hold Tube.  Procedure                               Abnormality         Status                     ---------                               -----------         ------                     POC Troponin I[[id number]]                                                              HOLD Troponin-I Tube[[id number]]                             Final result                 Please view results for these tests on the individual orders.   CBC AND DIFFERENTIAL    Narrative:     The following orders were created for panel order CBC & Differential.  Procedure                               Abnormality         Status                     ---------                               -----------         ------                     CBC Auto Differential[[id number]]        Abnormal            Final result                 Please view results for these tests on the individual orders.   GREEN TOP   LAVENDER TOP   GOLD TOP - SST   HOLD ISTAT TROPONIN-I TUBE   HOLD ISTAT TROPONIN-I TUBE     EKG:    Sinus rhythm 66  Normal P waves  Right bundle branch block  ST segments normal  No acute ischemia   EKG from October 2020 unchanged     Interpreted by me    XR Chest 1 View    Result Date: 7/8/2021  PROCEDURE: XR CHEST 1 VW   COMPARISON: [facility], CR, CHEST AP/PA 1 VIEW, 10/22/2020, 23:25.  INDICATIONS: LEFT SIDED CHEST PAIN WITH SHORTNESS OF BREATH  FINDINGS:  Lungs are clear bilaterally.  No evidence of pneumothorax is identified.  Cardiac and mediastinal contours are within normal limits.  Changes of DISH within the thoracic spine.  CONCLUSION:  1. No acute cardiopulmonary disease.       JESSICA SANTOS MD       Electronically Signed and Approved By: JESSICA SANTOS MD on 7/08/2021 at 10:08                                                This patient is a pleasant 70-year-old male with previous history of MI who presents with some left lateral sharp chest pain that is not pleuritic in nature and has been going on intermittently for more than a week now.    His EKG showed no acute ischemia and I did 2 sets of cardiac enzymes that both came back negative.    I conclude most likely this is musculoskeletal in nature given the location and quality of the pain.    Lung fields are clear and he is oxygenating well and looks stable for discharge home, and I will have him follow-up with his cardiologist for further evaluation as needed.    MDM    For my differential diagnosis of this patient's chest pain I considered acute coronary syndrome, pulmonary embolism, musculoskeletal chest wall pain, acid reflux with esophagitis, thoracic muscle strain, or anxiety induced chest pain.    Final diagnoses:   Atypical chest pain   History of MI (myocardial infarction)       Documentation assistance provided by henry Baca.  Information recorded by the scribe was done at my direction and has been verified and validated by me.     Aruna Baca  07/08/21 [id number]       Castro Matos MD  07/08/21 [id number]

## 2021-07-08 NOTE — DISCHARGE INSTRUCTIONS
Both sets of your heart enzymes came back negative for any heart attack, and your chest x-ray and EKG looked okay today.    Your pain is most likely due from a strained intercostal muscle within the rib cage itself, and does not appear to be due to your heart or lungs or anything serious.    However, given your history of heart problems, you should call Dr. Sheehan for a follow-up appointment to see if you need to undergo any further testing.

## 2021-08-04 RX ORDER — CLOPIDOGREL BISULFATE 75 MG/1
TABLET ORAL
Qty: 30 TABLET | Refills: 1 | Status: SHIPPED | OUTPATIENT
Start: 2021-08-04 | End: 2021-10-07 | Stop reason: SDUPTHER

## 2021-08-23 PROBLEM — E78.00 HIGH CHOLESTEROL: Status: ACTIVE | Noted: 2021-08-23

## 2021-08-23 PROBLEM — Z98.61 CAD S/P PERCUTANEOUS CORONARY ANGIOPLASTY: Status: ACTIVE | Noted: 2021-08-23

## 2021-08-23 PROBLEM — I25.10 ATHEROSCLEROSIS OF CORONARY ARTERY: Status: ACTIVE | Noted: 2021-08-23

## 2021-08-23 PROBLEM — I10 HYPERTENSION: Status: ACTIVE | Noted: 2021-08-23

## 2021-08-23 NOTE — PROGRESS NOTES
Chief Complaint  No chief complaint on file.    Subjective    Patient has been doing well denies any chest pain or shortness of breath issues or myalgias symptoms    Past Medical History:   Diagnosis Date   • Anxiety    • Arthritis    • Bladder disorder    • BPH (benign prostatic hyperplasia)    • CAD (coronary artery disease)    • Cervical spondylosis without myelopathy 01/15/2020   • Cervicalgia    • Chest pain    • Colitis    • Condition not found HERNIA   • Depression    • Diverticulitis    • Diverticulosis of colon    • GERD (gastroesophageal reflux disease)    • H/O degenerative disc disease    • Heart attack (CMS/HCC)    • Heart disease    • Hemorrhoids    • High cholesterol    • Hypertension    • IBS (irritable bowel syndrome)    • Mood disorder (CMS/HCC)    • Muscle cramps    • Myocardial infarction (CMS/HCC)    • Osteoarthritis    • Prostate disorder    • S/P lumpectomy, right breast     CYST 2016         Current Outpatient Medications:   •  atorvastatin (LIPITOR) 80 MG tablet, , Disp: , Rfl:   •  clopidogrel (PLAVIX) 75 MG tablet, Take 1 tablet by mouth every day, Disp: 30 tablet, Rfl: 1  •  clopidogrel (PLAVIX) 75 MG tablet, clopidogrel 75 mg tablet, Disp: , Rfl:   •  Dexilant 60 MG capsule, TAKE ONE CAPSULE BY MOUTH EVERY DAY FOR ACID REFLUX, Disp: , Rfl:   •  lisinopril (PRINIVIL,ZESTRIL) 5 MG tablet, Take 5 mg by mouth Daily., Disp: , Rfl:   •  pantoprazole (PROTONIX) 40 MG EC tablet, pantoprazole 40 mg tablet,delayed release  TAKE ONE TABLET BY MOUTH EVERY DAY, Disp: , Rfl:   •  tamsulosin (FLOMAX) 0.4 MG capsule 24 hr capsule, TAKE ONE CAPSULE BY MOUTH EVERY DAY ONE-HALF HOUR FOLLOWING THE SAME MEAL EACH DAY, Disp: , Rfl:     There are no discontinued medications.  No Known Allergies     Social History     Tobacco Use   • Smoking status: Never Smoker   • Smokeless tobacco: Never Used   Vaping Use   • Vaping Use: Never used   Substance Use Topics   • Alcohol use: Not Currently   • Drug use: Never  "      Family History   Problem Relation Age of Onset   • Other Mother         bladder stones   • Arthritis Mother    • Heart disease Mother    • Heart failure Father    • Stroke Father    • Colon cancer Paternal Grandfather         60's        Objective     /69 (BP Location: Right arm, Patient Position: Sitting, Cuff Size: Adult)   Pulse 59   Ht 185.4 cm (73\")   Wt 88 kg (194 lb)   BMI 25.60 kg/m²       Physical Exam    General Appearance:   · no acute distress  · Alert and oriented x3  HENT:   · lips not cyanotic  · Atraumatic  Neck:  · No jvd   · supple  Respiratory:  · no respiratory distress  · normal breath sounds  · no rales  Cardiovascular:  · The rate and rhythm  · no S3, no S4   · no murmur  · no rub  Extremities  · No cyanosis  · lower extremity edema: none    Skin:   · warm, dry  · No rashes      Result Review :     proBNP   Date Value Ref Range Status   07/08/2021 96.5 0.0 - 900.0 pg/mL Final     CMP    CMP 1/9/21 1/31/21 7/8/21   Glucose   114 (A)   Glucose 96 113 (A)    BUN 14 12 16   Creatinine 1.08 1.08 1.02   eGFR Non African Am   72   Sodium 137 137 136   Potassium 4.6 3.6 4.2   Chloride 104 102 103   Calcium 9.2 9.3 9.5   Albumin 4.2 4.4 4.50   Total Bilirubin 0.97 0.70 0.8   Alkaline Phosphatase 98 111 109   AST (SGOT) 20 26 22   ALT (SGPT) 21 28 26   (A) Abnormal value            CBC w/diff    CBC w/Diff 12/24/20 1/31/21 7/8/21   WBC 5.38 7.30 4.24   RBC 4.26 (A) 4.15 (A) 3.92 (A)   Hemoglobin 12.9 (A) 12.8 (A) 12.0 (A)   Hematocrit 40.1 (A) 38.3 (A) 36.6 (A)   MCV 94.1 92.3 93.4   MCH 30.3 30.8 30.6   MCHC 32.2 (A) 33.4 32.8   RDW 14.3 13.7 14.0   Platelets 260 235 230   Neutrophil Rel % 68.7 76.5 73.1   Immature Granulocyte Rel %   0.2   Lymphocyte Rel % 19.1 (A) 11.9 (A) 15.6 (A)   Monocyte Rel % 9.9 10.0 9.7   Eosinophil Rel % 1.9 1.2 1.4   Basophil Rel % 0.0 0.1 0.0   (A) Abnormal value             Lab Results   Component Value Date    TSH 1.910 01/29/2020      No results found " for: FREET4   No results found for: DDIMERQUANT  Magnesium   Date Value Ref Range Status   07/08/2021 1.8 1.6 - 2.4 mg/dL Final      No results found for: DIGOXIN   Lab Results   Component Value Date    TROPONINT <0.01 01/31/2021           Lipid Panel    Lipid Panel 1/9/21   Total Cholesterol 117   Triglycerides 88   HDL Cholesterol 60   VLDL Cholesterol 18   LDL Cholesterol  39 (A)   (A) Abnormal value       Comments are available for some flowsheets but are not being displayed.           Lab Results   Component Value Date    POCTROP 0.00 07/08/2021            ECG 12 Lead    Date/Time: 8/26/2021 9:14 AM  Performed by: Mayank Sheehan MD  Authorized by: Mayank Sheehan MD   Comparison: compared with previous ECG   Similar to previous ECG  Rhythm: sinus rhythm  Conduction: non-specific intraventricular conduction delay                   Diagnoses and all orders for this visit:    1. CAD S/P percutaneous coronary angioplasty (Primary)  Assessment & Plan:  Patient with no angina continue with chronic blood study 5 mg once a day      2. Essential hypertension  Assessment & Plan:  Controlled continue with lisinopril 5 mg daily      3. High cholesterol  Assessment & Plan:  Patient with LDL goal below 70 no myalgias symptoms continue with Lipitor 80 mg daily            Follow Up     Return in about 6 months (around 2/26/2022).          Patient was given instructions and counseling regarding his condition or for health maintenance advice. Please see specific information pulled into the AVS if appropriate.

## 2021-08-26 ENCOUNTER — OFFICE VISIT (OUTPATIENT)
Dept: CARDIOLOGY | Facility: CLINIC | Age: 71
End: 2021-08-26

## 2021-08-26 VITALS
HEART RATE: 59 BPM | DIASTOLIC BLOOD PRESSURE: 69 MMHG | HEIGHT: 73 IN | BODY MASS INDEX: 25.71 KG/M2 | WEIGHT: 194 LBS | SYSTOLIC BLOOD PRESSURE: 116 MMHG

## 2021-08-26 DIAGNOSIS — I25.10 CAD S/P PERCUTANEOUS CORONARY ANGIOPLASTY: Primary | ICD-10-CM

## 2021-08-26 DIAGNOSIS — E78.00 HIGH CHOLESTEROL: ICD-10-CM

## 2021-08-26 DIAGNOSIS — I10 ESSENTIAL HYPERTENSION: ICD-10-CM

## 2021-08-26 DIAGNOSIS — Z98.61 CAD S/P PERCUTANEOUS CORONARY ANGIOPLASTY: Primary | ICD-10-CM

## 2021-08-26 PROCEDURE — 99214 OFFICE O/P EST MOD 30 MIN: CPT | Performed by: INTERNAL MEDICINE

## 2021-08-26 PROCEDURE — 93000 ELECTROCARDIOGRAM COMPLETE: CPT | Performed by: INTERNAL MEDICINE

## 2021-08-26 RX ORDER — PANTOPRAZOLE SODIUM 40 MG/1
TABLET, DELAYED RELEASE ORAL
COMMUNITY
End: 2022-06-10

## 2021-08-26 RX ORDER — CLOPIDOGREL BISULFATE 75 MG/1
TABLET ORAL
COMMUNITY
End: 2021-11-11

## 2021-08-26 RX ORDER — LISINOPRIL 5 MG/1
5 TABLET ORAL DAILY
COMMUNITY
Start: 2021-06-18

## 2021-08-26 RX ORDER — TAMSULOSIN HYDROCHLORIDE 0.4 MG/1
CAPSULE ORAL
COMMUNITY
Start: 2021-07-26 | End: 2021-12-29

## 2021-08-26 RX ORDER — ATORVASTATIN CALCIUM 80 MG/1
80 TABLET, FILM COATED ORAL NIGHTLY
COMMUNITY
Start: 2021-07-29 | End: 2022-03-24

## 2021-08-26 RX ORDER — DEXLANSOPRAZOLE 60 MG/1
CAPSULE, DELAYED RELEASE ORAL
COMMUNITY
Start: 2021-06-02 | End: 2021-10-11 | Stop reason: ALTCHOICE

## 2021-10-07 RX ORDER — NITROGLYCERIN 0.4 MG/1
0.4 TABLET SUBLINGUAL
COMMUNITY
End: 2021-12-10 | Stop reason: SDUPTHER

## 2021-10-07 RX ORDER — CELECOXIB 100 MG/1
CAPSULE ORAL
COMMUNITY
End: 2021-12-10 | Stop reason: ALTCHOICE

## 2021-10-07 RX ORDER — ASPIRIN 81 MG/1
TABLET ORAL
COMMUNITY
End: 2021-12-10 | Stop reason: ALTCHOICE

## 2021-10-07 RX ORDER — SILDENAFIL CITRATE 20 MG/1
TABLET ORAL
COMMUNITY
End: 2021-12-10 | Stop reason: ALTCHOICE

## 2021-10-07 RX ORDER — HYDROXYZINE HYDROCHLORIDE 25 MG/1
TABLET, FILM COATED ORAL
COMMUNITY
End: 2022-06-10

## 2021-10-07 RX ORDER — SERTRALINE HYDROCHLORIDE 100 MG/1
TABLET, FILM COATED ORAL DAILY
COMMUNITY
End: 2022-06-10

## 2021-10-07 RX ORDER — TRIAMCINOLONE ACETONIDE 5 MG/G
1 CREAM TOPICAL 2 TIMES DAILY
COMMUNITY

## 2021-10-07 RX ORDER — TRAMADOL HYDROCHLORIDE 50 MG/1
TABLET ORAL
COMMUNITY
End: 2022-04-20

## 2021-10-07 RX ORDER — DULOXETIN HYDROCHLORIDE 60 MG/1
60 CAPSULE, DELAYED RELEASE ORAL DAILY
COMMUNITY
Start: 2021-08-02 | End: 2022-01-12

## 2021-10-07 NOTE — PROGRESS NOTES
"Chief Complaint  Benign Prostatic Hypertrophy (6mo f/u)    Subjective          Ari Olea presents to [facility]  The patient is a very pleasant 71-year-old male that has several urological problems.      He has also had some problems with BPH symptoms. He is currently on Flomax and is stable with that.  He stopped finasteride in the past due to problems with erections. He does not want to start it back.      He had a button TURP in January 2020.  He states his stream was really really good for a while but over the last month or 2 has gotten worse.    He has nocturia up to 7 times a night and a slower stream that now starts and stops.  He does not feel he empties all the way.      He is using sildenafil and is doing well on it. He takes 60mg and it works well for him.      His PSA in July 2018 was 2.4.  He has not had another one recently.      Objective   Vital Signs:   /61   Ht 185.4 cm (73\")   Wt 89.8 kg (198 lb)   BMI 26.12 kg/m²     Physical Exam  Vitals and nursing note reviewed.   Constitutional:       Appearance: Normal appearance. He is well-developed.   Pulmonary:      Effort: Pulmonary effort is normal.      Breath sounds: Normal air entry.   Neurological:      Mental Status: He is alert and oriented to person, place, and time.      Motor: Motor function is intact.   Psychiatric:         Mood and Affect: Mood normal.         Behavior: Behavior normal.        Result Review :                  Results for orders placed or performed in visit on 10/11/21   Bladder Scan   Result Value Ref Range    Volume: 34ml    POC Urinalysis Dipstick, Automated    Specimen: Urine   Result Value Ref Range    Color Yellow Yellow, Straw, Dark Yellow, Nano    Clarity, UA Clear Clear    Specific Gravity  1.020 1.005 - 1.030    pH, Urine 6.5 5.0 - 8.0    Leukocytes Negative Negative    Nitrite, UA Negative Negative    Protein, POC Negative Negative mg/dL    Glucose, UA Negative Negative, " 1000 mg/dL (3+) mg/dL    Ketones, UA Negative Negative    Urobilinogen, UA Normal Normal    Bilirubin Negative Negative    Blood, UA Negative Negative    Lot Number [id number]     Expiration Date 9/30/22        Bladder Scan interpretation 10/11/2021    Estimation of residual urine via BVI 3000 [place] Bladder Scan  Residual Urine: 34 ml  Indication: Screening PSA (prostate specific antigen)    BPH associated with nocturia   Position: Supine  Examination: Incremental scanning of the suprapubic area using 2.0 MHz transducer using copious amounts of acoustic gel.   Findings: An anechoic area was demonstrated which represented the bladder, with measurement of residual urine as noted. I inspected this myself. In that the residual urine was stable or insignificant, refer to plan for treatment and plan necessary at this time.       Assessment and Plan    Diagnoses and all orders for this visit:    1. Screening PSA (prostate specific antigen) (Primary)  Assessment & Plan:  To have his PSA done now.  I will call him if that is elevated.    Orders:  -     PSA Screen; Future    2. BPH associated with nocturia  Assessment & Plan:  We will start him on tadalafil 5 mg once a day.  He will continue his Flomax.  I will see him in 3 months to see if that is effective.    Orders:  -     POC Urinalysis Dipstick, Automated  -     Bladder Scan  -     tadalafil (CIALIS) 5 MG tablet; Take 1 tablet by mouth Daily for 360 days.  Dispense: 90 tablet; Refill: 3      Follow Up   Return in about 3 months (around 1/11/2022) for PVR check, Next scheduled follow up.  Patient was given instructions and counseling regarding his condition or for health maintenance advice. Please see specific information pulled into the AVS if appropriate.

## 2021-10-11 ENCOUNTER — LAB (OUTPATIENT)
Dept: LAB | Facility: HOSPITAL | Age: 71
End: 2021-10-11

## 2021-10-11 ENCOUNTER — OFFICE VISIT (OUTPATIENT)
Dept: UROLOGY | Facility: CLINIC | Age: 71
End: 2021-10-11

## 2021-10-11 VITALS
BODY MASS INDEX: 26.24 KG/M2 | DIASTOLIC BLOOD PRESSURE: 61 MMHG | WEIGHT: 198 LBS | SYSTOLIC BLOOD PRESSURE: 117 MMHG | HEIGHT: 73 IN

## 2021-10-11 DIAGNOSIS — N40.1 BPH ASSOCIATED WITH NOCTURIA: ICD-10-CM

## 2021-10-11 DIAGNOSIS — Z12.5 SCREENING PSA (PROSTATE SPECIFIC ANTIGEN): ICD-10-CM

## 2021-10-11 DIAGNOSIS — Z12.5 SCREENING PSA (PROSTATE SPECIFIC ANTIGEN): Primary | ICD-10-CM

## 2021-10-11 DIAGNOSIS — R35.1 BPH ASSOCIATED WITH NOCTURIA: ICD-10-CM

## 2021-10-11 LAB
BILIRUB BLD-MCNC: NEGATIVE MG/DL
CLARITY, POC: CLEAR
COLOR UR: YELLOW
EXPIRATION DATE: NORMAL
GLUCOSE UR STRIP-MCNC: NEGATIVE MG/DL
KETONES UR QL: NEGATIVE
LEUKOCYTE EST, POC: NEGATIVE
Lab: NORMAL
NITRITE UR-MCNC: NEGATIVE MG/ML
PH UR: 6.5 [PH] (ref 5–8)
PROT UR STRIP-MCNC: NEGATIVE MG/DL
PSA SERPL-MCNC: 3.08 NG/ML (ref 0–4)
RBC # UR STRIP: NEGATIVE /UL
SP GR UR: 1.02 (ref 1–1.03)
SPECIMEN VOL SMN: NORMAL ML
UROBILINOGEN UR QL: NORMAL

## 2021-10-11 PROCEDURE — 81003 URINALYSIS AUTO W/O SCOPE: CPT | Performed by: UROLOGY

## 2021-10-11 PROCEDURE — G0103 PSA SCREENING: HCPCS

## 2021-10-11 PROCEDURE — 51798 US URINE CAPACITY MEASURE: CPT | Performed by: UROLOGY

## 2021-10-11 PROCEDURE — 99214 OFFICE O/P EST MOD 30 MIN: CPT | Performed by: UROLOGY

## 2021-10-11 PROCEDURE — 36415 COLL VENOUS BLD VENIPUNCTURE: CPT

## 2021-10-11 RX ORDER — TADALAFIL 5 MG/1
5 TABLET ORAL DAILY
Qty: 90 TABLET | Refills: 3 | Status: SHIPPED | OUTPATIENT
Start: 2021-10-11 | End: 2022-10-12 | Stop reason: SDUPTHER

## 2021-10-11 NOTE — ASSESSMENT & PLAN NOTE
We will start him on tadalafil 5 mg once a day.  He will continue his Flomax.  I will see him in 3 months to see if that is effective.

## 2021-10-29 ENCOUNTER — APPOINTMENT (OUTPATIENT)
Dept: CT IMAGING | Facility: HOSPITAL | Age: 71
End: 2021-10-29

## 2021-10-29 ENCOUNTER — HOSPITAL ENCOUNTER (EMERGENCY)
Facility: HOSPITAL | Age: 71
Discharge: HOME | End: 2021-10-29
Attending: EMERGENCY MEDICINE | Admitting: EMERGENCY MEDICINE

## 2021-10-29 VITALS
TEMPERATURE: 98.2 F | HEART RATE: 76 BPM | BODY MASS INDEX: 26.68 KG/M2 | OXYGEN SATURATION: 98 % | WEIGHT: 201.28 LBS | RESPIRATION RATE: 18 BRPM | SYSTOLIC BLOOD PRESSURE: 154 MMHG | HEIGHT: 73 IN | DIASTOLIC BLOOD PRESSURE: 80 MMHG

## 2021-10-29 DIAGNOSIS — R31.29 OTHER MICROSCOPIC HEMATURIA: ICD-10-CM

## 2021-10-29 DIAGNOSIS — R35.0 URINARY FREQUENCY: Primary | ICD-10-CM

## 2021-10-29 DIAGNOSIS — R10.2 SUPRAPUBIC DISCOMFORT: ICD-10-CM

## 2021-10-29 LAB
ALBUMIN SERPL-MCNC: 4.4 G/DL (ref 3.5–5.2)
ALBUMIN/GLOB SERPL: 1.8 G/DL
ALP SERPL-CCNC: 128 U/L (ref 39–117)
ALT SERPL W P-5'-P-CCNC: 26 U/L (ref 1–41)
ANION GAP SERPL CALCULATED.3IONS-SCNC: 11.1 MMOL/L (ref 5–15)
AST SERPL-CCNC: 27 U/L (ref 1–40)
BACTERIA UR QL AUTO: ABNORMAL /HPF
BASOPHILS # BLD AUTO: 0.01 10*3/MM3 (ref 0–0.2)
BASOPHILS NFR BLD AUTO: 0.2 % (ref 0–1.5)
BILIRUB SERPL-MCNC: 0.5 MG/DL (ref 0–1.2)
BILIRUB UR QL STRIP: NEGATIVE
BUN SERPL-MCNC: 15 MG/DL (ref 8–23)
BUN/CREAT SERPL: 15.3 (ref 7–25)
CALCIUM SPEC-SCNC: 9.3 MG/DL (ref 8.6–10.5)
CHLORIDE SERPL-SCNC: 104 MMOL/L (ref 98–107)
CLARITY UR: CLEAR
CO2 SERPL-SCNC: 23.9 MMOL/L (ref 22–29)
COLOR UR: YELLOW
CREAT SERPL-MCNC: 0.98 MG/DL (ref 0.76–1.27)
DEPRECATED RDW RBC AUTO: 48.5 FL (ref 37–54)
EOSINOPHIL # BLD AUTO: 0.05 10*3/MM3 (ref 0–0.4)
EOSINOPHIL NFR BLD AUTO: 0.8 % (ref 0.3–6.2)
ERYTHROCYTE [DISTWIDTH] IN BLOOD BY AUTOMATED COUNT: 14.1 % (ref 12.3–15.4)
GFR SERPL CREATININE-BSD FRML MDRD: 75 ML/MIN/1.73
GLOBULIN UR ELPH-MCNC: 2.4 GM/DL
GLUCOSE SERPL-MCNC: 125 MG/DL (ref 65–99)
GLUCOSE UR STRIP-MCNC: NEGATIVE MG/DL
HCT VFR BLD AUTO: 35.4 % (ref 37.5–51)
HGB BLD-MCNC: 11.6 G/DL (ref 13–17.7)
HGB UR QL STRIP.AUTO: ABNORMAL
HOLD SPECIMEN: NORMAL
HOLD SPECIMEN: NORMAL
HYALINE CASTS UR QL AUTO: ABNORMAL /LPF
IMM GRANULOCYTES # BLD AUTO: 0.04 10*3/MM3 (ref 0–0.05)
IMM GRANULOCYTES NFR BLD AUTO: 0.6 % (ref 0–0.5)
KETONES UR QL STRIP: NEGATIVE
LEUKOCYTE ESTERASE UR QL STRIP.AUTO: NEGATIVE
LIPASE SERPL-CCNC: 22 U/L (ref 13–60)
LYMPHOCYTES # BLD AUTO: 1.15 10*3/MM3 (ref 0.7–3.1)
LYMPHOCYTES NFR BLD AUTO: 17.4 % (ref 19.6–45.3)
MCH RBC QN AUTO: 30.6 PG (ref 26.6–33)
MCHC RBC AUTO-ENTMCNC: 32.8 G/DL (ref 31.5–35.7)
MCV RBC AUTO: 93.4 FL (ref 79–97)
MONOCYTES # BLD AUTO: 0.55 10*3/MM3 (ref 0.1–0.9)
MONOCYTES NFR BLD AUTO: 8.3 % (ref 5–12)
NEUTROPHILS NFR BLD AUTO: 4.81 10*3/MM3 (ref 1.7–7)
NEUTROPHILS NFR BLD AUTO: 72.7 % (ref 42.7–76)
NITRITE UR QL STRIP: NEGATIVE
NRBC BLD AUTO-RTO: 0 /100 WBC (ref 0–0.2)
PH UR STRIP.AUTO: 7 [PH] (ref 5–8)
PLATELET # BLD AUTO: 245 10*3/MM3 (ref 140–450)
PMV BLD AUTO: 9.1 FL (ref 6–12)
POTASSIUM SERPL-SCNC: 3.8 MMOL/L (ref 3.5–5.2)
PROT SERPL-MCNC: 6.8 G/DL (ref 6–8.5)
PROT UR QL STRIP: NEGATIVE
RBC # BLD AUTO: 3.79 10*6/MM3 (ref 4.14–5.8)
RBC # UR: ABNORMAL /HPF
REF LAB TEST METHOD: ABNORMAL
SODIUM SERPL-SCNC: 139 MMOL/L (ref 136–145)
SP GR UR STRIP: <=1.005 (ref 1–1.03)
SQUAMOUS #/AREA URNS HPF: ABNORMAL /HPF
UROBILINOGEN UR QL STRIP: ABNORMAL
WBC # BLD AUTO: 6.61 10*3/MM3 (ref 3.4–10.8)
WBC UR QL AUTO: ABNORMAL /HPF
WHOLE BLOOD HOLD SPECIMEN: NORMAL
WHOLE BLOOD HOLD SPECIMEN: NORMAL

## 2021-10-29 PROCEDURE — 0 IOPAMIDOL PER 1 ML: Performed by: EMERGENCY MEDICINE

## 2021-10-29 PROCEDURE — 85025 COMPLETE CBC W/AUTO DIFF WBC: CPT

## 2021-10-29 PROCEDURE — 99283 EMERGENCY DEPT VISIT LOW MDM: CPT

## 2021-10-29 PROCEDURE — 81001 URINALYSIS AUTO W/SCOPE: CPT

## 2021-10-29 PROCEDURE — 83690 ASSAY OF LIPASE: CPT

## 2021-10-29 PROCEDURE — 80053 COMPREHEN METABOLIC PANEL: CPT

## 2021-10-29 PROCEDURE — 36415 COLL VENOUS BLD VENIPUNCTURE: CPT

## 2021-10-29 PROCEDURE — 74177 CT ABD & PELVIS W/CONTRAST: CPT

## 2021-10-29 RX ORDER — SODIUM CHLORIDE 0.9 % (FLUSH) 0.9 %
10 SYRINGE (ML) INJECTION AS NEEDED
Status: DISCONTINUED | OUTPATIENT
Start: 2021-10-29 | End: 2021-10-29 | Stop reason: HOSPADM

## 2021-10-29 RX ADMIN — IOPAMIDOL 100 ML: 755 INJECTION, SOLUTION INTRAVENOUS at 21:09

## 2021-11-11 RX ORDER — CLOPIDOGREL BISULFATE 75 MG/1
TABLET ORAL
Qty: 90 TABLET | Refills: 2 | Status: SHIPPED | OUTPATIENT
Start: 2021-11-11 | End: 2022-11-21

## 2021-12-02 ENCOUNTER — APPOINTMENT (OUTPATIENT)
Dept: MRI IMAGING | Facility: HOSPITAL | Age: 71
End: 2021-12-02

## 2021-12-02 ENCOUNTER — HOSPITAL ENCOUNTER (EMERGENCY)
Facility: HOSPITAL | Age: 71
Discharge: HOME | End: 2021-12-02
Attending: EMERGENCY MEDICINE | Admitting: EMERGENCY MEDICINE

## 2021-12-02 ENCOUNTER — APPOINTMENT (OUTPATIENT)
Dept: GENERAL RADIOLOGY | Facility: HOSPITAL | Age: 71
End: 2021-12-02

## 2021-12-02 ENCOUNTER — APPOINTMENT (OUTPATIENT)
Dept: CT IMAGING | Facility: HOSPITAL | Age: 71
End: 2021-12-02

## 2021-12-02 VITALS
DIASTOLIC BLOOD PRESSURE: 77 MMHG | BODY MASS INDEX: 28.67 KG/M2 | SYSTOLIC BLOOD PRESSURE: 115 MMHG | HEART RATE: 63 BPM | HEIGHT: 71 IN | TEMPERATURE: 98.1 F | OXYGEN SATURATION: 96 % | WEIGHT: 204.81 LBS | RESPIRATION RATE: 18 BRPM

## 2021-12-02 DIAGNOSIS — R42 DIZZINESS: ICD-10-CM

## 2021-12-02 DIAGNOSIS — I48.91 NEW ONSET ATRIAL FIBRILLATION (HCC): Primary | ICD-10-CM

## 2021-12-02 LAB
ALBUMIN SERPL-MCNC: 4.6 G/DL (ref 3.5–5.2)
ALBUMIN/GLOB SERPL: 1.8 G/DL
ALP SERPL-CCNC: 102 U/L (ref 39–117)
ALT SERPL W P-5'-P-CCNC: 33 U/L (ref 1–41)
ANION GAP SERPL CALCULATED.3IONS-SCNC: 9.7 MMOL/L (ref 5–15)
AST SERPL-CCNC: 29 U/L (ref 1–40)
BASOPHILS # BLD AUTO: 0 10*3/MM3 (ref 0–0.2)
BASOPHILS NFR BLD AUTO: 0 % (ref 0–1.5)
BILIRUB SERPL-MCNC: 1 MG/DL (ref 0–1.2)
BILIRUB UR QL STRIP: NEGATIVE
BUN SERPL-MCNC: 14 MG/DL (ref 8–23)
BUN/CREAT SERPL: 12.3 (ref 7–25)
CALCIUM SPEC-SCNC: 10 MG/DL (ref 8.6–10.5)
CHLORIDE SERPL-SCNC: 104 MMOL/L (ref 98–107)
CLARITY UR: CLEAR
CO2 SERPL-SCNC: 24.3 MMOL/L (ref 22–29)
COLOR UR: YELLOW
CREAT SERPL-MCNC: 1.14 MG/DL (ref 0.76–1.27)
DEPRECATED RDW RBC AUTO: 49.3 FL (ref 37–54)
EOSINOPHIL # BLD AUTO: 0.09 10*3/MM3 (ref 0–0.4)
EOSINOPHIL NFR BLD AUTO: 2 % (ref 0.3–6.2)
ERYTHROCYTE [DISTWIDTH] IN BLOOD BY AUTOMATED COUNT: 14.4 % (ref 12.3–15.4)
GFR SERPL CREATININE-BSD FRML MDRD: 63 ML/MIN/1.73
GLOBULIN UR ELPH-MCNC: 2.5 GM/DL
GLUCOSE BLDC GLUCOMTR-MCNC: 91 MG/DL (ref 70–99)
GLUCOSE SERPL-MCNC: 113 MG/DL (ref 65–99)
GLUCOSE UR STRIP-MCNC: NEGATIVE MG/DL
HCT VFR BLD AUTO: 37.8 % (ref 37.5–51)
HGB BLD-MCNC: 12.8 G/DL (ref 13–17.7)
HGB UR QL STRIP.AUTO: NEGATIVE
HOLD SPECIMEN: NORMAL
HOLD SPECIMEN: NORMAL
IMM GRANULOCYTES # BLD AUTO: 0.01 10*3/MM3 (ref 0–0.05)
IMM GRANULOCYTES NFR BLD AUTO: 0.2 % (ref 0–0.5)
KETONES UR QL STRIP: NEGATIVE
LEUKOCYTE ESTERASE UR QL STRIP.AUTO: NEGATIVE
LYMPHOCYTES # BLD AUTO: 0.84 10*3/MM3 (ref 0.7–3.1)
LYMPHOCYTES NFR BLD AUTO: 18.5 % (ref 19.6–45.3)
MAGNESIUM SERPL-MCNC: 2 MG/DL (ref 1.6–2.4)
MCH RBC QN AUTO: 31.5 PG (ref 26.6–33)
MCHC RBC AUTO-ENTMCNC: 33.9 G/DL (ref 31.5–35.7)
MCV RBC AUTO: 93.1 FL (ref 79–97)
MONOCYTES # BLD AUTO: 0.43 10*3/MM3 (ref 0.1–0.9)
MONOCYTES NFR BLD AUTO: 9.5 % (ref 5–12)
NEUTROPHILS NFR BLD AUTO: 3.17 10*3/MM3 (ref 1.7–7)
NEUTROPHILS NFR BLD AUTO: 69.8 % (ref 42.7–76)
NITRITE UR QL STRIP: NEGATIVE
NRBC BLD AUTO-RTO: 0 /100 WBC (ref 0–0.2)
PH UR STRIP.AUTO: 8 [PH] (ref 5–8)
PLATELET # BLD AUTO: 236 10*3/MM3 (ref 140–450)
PMV BLD AUTO: 9.4 FL (ref 6–12)
POTASSIUM SERPL-SCNC: 4.3 MMOL/L (ref 3.5–5.2)
PROT SERPL-MCNC: 7.1 G/DL (ref 6–8.5)
PROT UR QL STRIP: NEGATIVE
RBC # BLD AUTO: 4.06 10*6/MM3 (ref 4.14–5.8)
SODIUM SERPL-SCNC: 138 MMOL/L (ref 136–145)
SP GR UR STRIP: 1.01 (ref 1–1.03)
TROPONIN T SERPL-MCNC: <0.01 NG/ML (ref 0–0.03)
UROBILINOGEN UR QL STRIP: NORMAL
WBC NRBC COR # BLD: 4.54 10*3/MM3 (ref 3.4–10.8)
WHOLE BLOOD HOLD SPECIMEN: NORMAL
WHOLE BLOOD HOLD SPECIMEN: NORMAL

## 2021-12-02 PROCEDURE — 82962 GLUCOSE BLOOD TEST: CPT

## 2021-12-02 PROCEDURE — 80053 COMPREHEN METABOLIC PANEL: CPT | Performed by: EMERGENCY MEDICINE

## 2021-12-02 PROCEDURE — 93005 ELECTROCARDIOGRAM TRACING: CPT | Performed by: EMERGENCY MEDICINE

## 2021-12-02 PROCEDURE — 70450 CT HEAD/BRAIN W/O DYE: CPT

## 2021-12-02 PROCEDURE — 81003 URINALYSIS AUTO W/O SCOPE: CPT | Performed by: EMERGENCY MEDICINE

## 2021-12-02 PROCEDURE — 70551 MRI BRAIN STEM W/O DYE: CPT

## 2021-12-02 PROCEDURE — 83735 ASSAY OF MAGNESIUM: CPT | Performed by: EMERGENCY MEDICINE

## 2021-12-02 PROCEDURE — 71045 X-RAY EXAM CHEST 1 VIEW: CPT

## 2021-12-02 PROCEDURE — 70544 MR ANGIOGRAPHY HEAD W/O DYE: CPT

## 2021-12-02 PROCEDURE — 93005 ELECTROCARDIOGRAM TRACING: CPT

## 2021-12-02 PROCEDURE — 84484 ASSAY OF TROPONIN QUANT: CPT | Performed by: EMERGENCY MEDICINE

## 2021-12-02 PROCEDURE — 85025 COMPLETE CBC W/AUTO DIFF WBC: CPT

## 2021-12-02 PROCEDURE — 93010 ELECTROCARDIOGRAM REPORT: CPT | Performed by: INTERNAL MEDICINE

## 2021-12-02 PROCEDURE — 99284 EMERGENCY DEPT VISIT MOD MDM: CPT

## 2021-12-02 RX ORDER — METOPROLOL SUCCINATE 25 MG/1
25 TABLET, EXTENDED RELEASE ORAL
Status: DISCONTINUED | OUTPATIENT
Start: 2021-12-02 | End: 2021-12-02 | Stop reason: HOSPADM

## 2021-12-02 RX ORDER — METOPROLOL SUCCINATE 25 MG/1
25 TABLET, EXTENDED RELEASE ORAL DAILY
Qty: 60 TABLET | Refills: 0 | Status: SHIPPED | OUTPATIENT
Start: 2021-12-02 | End: 2021-12-10 | Stop reason: SDUPTHER

## 2021-12-02 RX ORDER — SODIUM CHLORIDE 0.9 % (FLUSH) 0.9 %
10 SYRINGE (ML) INJECTION AS NEEDED
Status: DISCONTINUED | OUTPATIENT
Start: 2021-12-02 | End: 2021-12-02 | Stop reason: HOSPADM

## 2021-12-02 RX ADMIN — METOPROLOL SUCCINATE 25 MG: 25 TABLET, EXTENDED RELEASE ORAL at 15:07

## 2021-12-02 RX ADMIN — APIXABAN 5 MG: 5 TABLET, FILM COATED ORAL at 15:07

## 2021-12-02 NOTE — ED PROVIDER NOTES
Subjective   Patient presents complaining of dizziness with which he awoke earlier this morning.  He went to bed feeling well last night.  He describes the dizziness as severe at the time of onset and with associated headache and without associated chest pain.  He states he upon rising from bed fell to the floor as a result of the dizziness but denies loss of consciousness or head trauma.  He states his dizziness presently is mild.  He denies exacerbating or alleviating factors.          Review of Systems   Constitutional: Negative for chills and fever.   HENT: Positive for postnasal drip. Negative for congestion, ear pain and sore throat.    Eyes: Negative for pain.   Respiratory: Negative for cough, chest tightness and shortness of breath.    Cardiovascular: Negative for chest pain.   Gastrointestinal: Negative for abdominal pain, diarrhea, nausea and vomiting.   Genitourinary: Negative for flank pain and hematuria.   Musculoskeletal: Negative for joint swelling.   Skin: Negative for pallor.   Neurological: Negative for seizures and headaches.   All other systems reviewed and are negative.      Past Medical History:   Diagnosis Date   • Anxiety    • Arthritis    • Bladder disorder    • BPH (benign prostatic hyperplasia)    • CAD (coronary artery disease)    • Cervical spondylosis without myelopathy 01/15/2020   • Cervicalgia    • Chest pain    • Colitis    • Condition not found HERNIA   • Depression    • Diverticulitis    • Diverticulosis of colon    • GERD (gastroesophageal reflux disease)    • H/O degenerative disc disease    • Heart attack (HCC)    • Heart disease    • Hemorrhoids    • High cholesterol    • Hypertension    • IBS (irritable bowel syndrome)    • Mood disorder (HCC)    • Muscle cramps    • Myocardial infarction (HCC)    • Osteoarthritis    • Prostate disorder    • S/P lumpectomy, right breast     CYST 2016       No Known Allergies    Past Surgical History:   Procedure Laterality Date   •  APPENDECTOMY     • BREAST MASS EXCISION  2016   • CARDIAC CATHETERIZATION  2016   • CHOLECYSTECTOMY     • COLONOSCOPY  2008,2014,2021   • CORONARY ANGIOPLASTY WITH STENT PLACEMENT  2010   • ENDOSCOPY  2010,2019   • HEMORRHOIDECTOMY  2019   • INGUINAL HERNIA REPAIR  2019   • TURP / TRANSURETHRAL INCISION / DRAINAGE PROSTATE  01/16/2020    BUTTON TURP W/ DR TAM   • UMBILICAL HERNIA REPAIR  2019       Family History   Problem Relation Age of Onset   • Other Mother         bladder stones   • Arthritis Mother    • Heart disease Mother    • Heart failure Father    • Stroke Father    • Colon cancer Paternal Grandfather         60's       Social History     Socioeconomic History   • Marital status:    Tobacco Use   • Smoking status: Never Smoker   • Smokeless tobacco: Never Used   Vaping Use   • Vaping Use: Never used   Substance and Sexual Activity   • Alcohol use: Not Currently   • Drug use: Never   • Sexual activity: Defer           Objective   Physical Exam  Neurological:      Comments: Horizontal nystagmus is noted.         Procedures           ED Course  ED Course as of 12/02/21 1336   Thu Dec 02, 2021   0916 Rate 62: Atrial fibrillation, IVCD, nonspecific ST segment changes, normal QT interval, change from July 8 of 21. [RW]      ED Course User Index  [RW] Sohail Hazel MD                                                 MDM  Number of Diagnoses or Management Options  Dizziness  New onset atrial fibrillation (HCC)  Diagnosis management comments: Patient presents complaining of dizziness.  Old records reviewed and is at prior visits related to coronary artery disease.  Differential diagnostic considerations include but not limited to vertigo versus cerebellar stroke versus dysrhythmia or electrolyte abnormality.  Chemistries unremarkable CBC demonstrates hemoglobin 12.8.  Urinalysis unremarkable.  MRI MRI of the brain does not demonstrate infarction or aneurysm.  Cardiology consultation was obtained and the  patient's discharge stable after being initiated on anticoagulant therapy as well as beta-blockers with most likely source of his symptoms being paroxysmal atrial fibrillation, new onset.  He stable on final assessment.       Amount and/or Complexity of Data Reviewed  Clinical lab tests: reviewed  Tests in the radiology section of CPT®: reviewed  Tests in the medicine section of CPT®: reviewed    Risk of Complications, Morbidity, and/or Mortality  Presenting problems: high  Management options: low    Patient Progress  Patient progress: improved      Final diagnoses:   New onset atrial fibrillation (HCC)   Dizziness       ED Disposition  ED Disposition     ED Disposition Condition Comment    Discharge Stable           Mayank Sheehan MD  [address] [place] [address]  [place] [address]  [phone]    Schedule an appointment as soon as possible for a visit            Medication List      New Prescriptions    apixaban 5 MG tablet tablet  Commonly known as: ELIQUIS  Take 1 tablet by mouth Every 12 (Twelve) Hours.     metoprolol succinate XL 25 MG 24 hr tablet  Commonly known as: TOPROL-XL  Take 1 tablet by mouth Daily.           Where to Get Your Medications      These medications were sent to [facility] [place] [address] - [phone] [facility] [phone] FX  [address], [place] [address]    Phone: [phone]   · apixaban 5 MG tablet tablet  · metoprolol succinate XL 25 MG 24 hr tablet          Sohail Hazel MD  12/02/21 3942

## 2021-12-03 LAB — QT INTERVAL: 437 MS

## 2021-12-04 PROBLEM — I48.19 ATRIAL FIBRILLATION, PERSISTENT (HCC): Status: ACTIVE | Noted: 2021-12-04

## 2021-12-05 ENCOUNTER — HOSPITAL ENCOUNTER (EMERGENCY)
Facility: HOSPITAL | Age: 71
Discharge: HOME | End: 2021-12-05
Attending: EMERGENCY MEDICINE | Admitting: EMERGENCY MEDICINE

## 2021-12-05 ENCOUNTER — APPOINTMENT (OUTPATIENT)
Dept: GENERAL RADIOLOGY | Facility: HOSPITAL | Age: 71
End: 2021-12-05

## 2021-12-05 VITALS
RESPIRATION RATE: 18 BRPM | BODY MASS INDEX: 26.79 KG/M2 | HEART RATE: 51 BPM | TEMPERATURE: 97.8 F | OXYGEN SATURATION: 98 % | HEIGHT: 73 IN | SYSTOLIC BLOOD PRESSURE: 141 MMHG | WEIGHT: 202.16 LBS | DIASTOLIC BLOOD PRESSURE: 83 MMHG

## 2021-12-05 DIAGNOSIS — Z86.79 ATRIAL FIBRILLATION, CURRENTLY IN SINUS RHYTHM: Primary | ICD-10-CM

## 2021-12-05 LAB
ALBUMIN SERPL-MCNC: 4.6 G/DL (ref 3.5–5.2)
ALBUMIN/GLOB SERPL: 1.9 G/DL
ALP SERPL-CCNC: 98 U/L (ref 39–117)
ALT SERPL W P-5'-P-CCNC: 29 U/L (ref 1–41)
ANION GAP SERPL CALCULATED.3IONS-SCNC: 10.6 MMOL/L (ref 5–15)
AST SERPL-CCNC: 27 U/L (ref 1–40)
BASOPHILS # BLD AUTO: 0.01 10*3/MM3 (ref 0–0.2)
BASOPHILS NFR BLD AUTO: 0.2 % (ref 0–1.5)
BILIRUB SERPL-MCNC: 1.1 MG/DL (ref 0–1.2)
BUN SERPL-MCNC: 15 MG/DL (ref 8–23)
BUN/CREAT SERPL: 13.4 (ref 7–25)
CALCIUM SPEC-SCNC: 9.8 MG/DL (ref 8.6–10.5)
CHLORIDE SERPL-SCNC: 103 MMOL/L (ref 98–107)
CK MB SERPL-CCNC: 4.67 NG/ML
CK SERPL-CCNC: 206 U/L (ref 20–200)
CO2 SERPL-SCNC: 23.4 MMOL/L (ref 22–29)
CREAT SERPL-MCNC: 1.12 MG/DL (ref 0.76–1.27)
DEPRECATED RDW RBC AUTO: 47.2 FL (ref 37–54)
EOSINOPHIL # BLD AUTO: 0.08 10*3/MM3 (ref 0–0.4)
EOSINOPHIL NFR BLD AUTO: 1.7 % (ref 0.3–6.2)
ERYTHROCYTE [DISTWIDTH] IN BLOOD BY AUTOMATED COUNT: 14.1 % (ref 12.3–15.4)
GFR SERPL CREATININE-BSD FRML MDRD: 64 ML/MIN/1.73
GLOBULIN UR ELPH-MCNC: 2.4 GM/DL
GLUCOSE SERPL-MCNC: 108 MG/DL (ref 65–99)
HCT VFR BLD AUTO: 34.9 % (ref 37.5–51)
HGB BLD-MCNC: 12.2 G/DL (ref 13–17.7)
HOLD SPECIMEN: NORMAL
IMM GRANULOCYTES # BLD AUTO: 0.01 10*3/MM3 (ref 0–0.05)
IMM GRANULOCYTES NFR BLD AUTO: 0.2 % (ref 0–0.5)
LIPASE SERPL-CCNC: 17 U/L (ref 13–60)
LYMPHOCYTES # BLD AUTO: 0.99 10*3/MM3 (ref 0.7–3.1)
LYMPHOCYTES NFR BLD AUTO: 21 % (ref 19.6–45.3)
MAGNESIUM SERPL-MCNC: 1.8 MG/DL (ref 1.6–2.4)
MCH RBC QN AUTO: 31.7 PG (ref 26.6–33)
MCHC RBC AUTO-ENTMCNC: 35 G/DL (ref 31.5–35.7)
MCV RBC AUTO: 90.6 FL (ref 79–97)
MONOCYTES # BLD AUTO: 0.48 10*3/MM3 (ref 0.1–0.9)
MONOCYTES NFR BLD AUTO: 10.2 % (ref 5–12)
NEUTROPHILS NFR BLD AUTO: 3.15 10*3/MM3 (ref 1.7–7)
NEUTROPHILS NFR BLD AUTO: 66.7 % (ref 42.7–76)
NRBC BLD AUTO-RTO: 0 /100 WBC (ref 0–0.2)
NT-PROBNP SERPL-MCNC: 78.2 PG/ML (ref 0–900)
PLATELET # BLD AUTO: 220 10*3/MM3 (ref 140–450)
PMV BLD AUTO: 9.4 FL (ref 6–12)
POTASSIUM SERPL-SCNC: 4.1 MMOL/L (ref 3.5–5.2)
PROT SERPL-MCNC: 7 G/DL (ref 6–8.5)
RBC # BLD AUTO: 3.85 10*6/MM3 (ref 4.14–5.8)
SODIUM SERPL-SCNC: 137 MMOL/L (ref 136–145)
TROPONIN I SERPL-MCNC: 0 NG/ML (ref 0–0.6)
TROPONIN I SERPL-MCNC: 0 NG/ML (ref 0–0.6)
WBC NRBC COR # BLD: 4.72 10*3/MM3 (ref 3.4–10.8)
WHOLE BLOOD HOLD SPECIMEN: NORMAL
WHOLE BLOOD HOLD SPECIMEN: NORMAL

## 2021-12-05 PROCEDURE — 80053 COMPREHEN METABOLIC PANEL: CPT | Performed by: EMERGENCY MEDICINE

## 2021-12-05 PROCEDURE — 71045 X-RAY EXAM CHEST 1 VIEW: CPT

## 2021-12-05 PROCEDURE — 85025 COMPLETE CBC W/AUTO DIFF WBC: CPT

## 2021-12-05 PROCEDURE — 83690 ASSAY OF LIPASE: CPT | Performed by: EMERGENCY MEDICINE

## 2021-12-05 PROCEDURE — 84484 ASSAY OF TROPONIN QUANT: CPT

## 2021-12-05 PROCEDURE — 93005 ELECTROCARDIOGRAM TRACING: CPT | Performed by: EMERGENCY MEDICINE

## 2021-12-05 PROCEDURE — 83880 ASSAY OF NATRIURETIC PEPTIDE: CPT | Performed by: EMERGENCY MEDICINE

## 2021-12-05 PROCEDURE — 82553 CREATINE MB FRACTION: CPT | Performed by: EMERGENCY MEDICINE

## 2021-12-05 PROCEDURE — 82550 ASSAY OF CK (CPK): CPT | Performed by: EMERGENCY MEDICINE

## 2021-12-05 PROCEDURE — 93005 ELECTROCARDIOGRAM TRACING: CPT

## 2021-12-05 PROCEDURE — 83735 ASSAY OF MAGNESIUM: CPT | Performed by: EMERGENCY MEDICINE

## 2021-12-05 PROCEDURE — 36415 COLL VENOUS BLD VENIPUNCTURE: CPT

## 2021-12-05 PROCEDURE — 99283 EMERGENCY DEPT VISIT LOW MDM: CPT

## 2021-12-05 PROCEDURE — 93010 ELECTROCARDIOGRAM REPORT: CPT | Performed by: INTERNAL MEDICINE

## 2021-12-05 RX ORDER — SODIUM CHLORIDE 0.9 % (FLUSH) 0.9 %
10 SYRINGE (ML) INJECTION AS NEEDED
Status: DISCONTINUED | OUTPATIENT
Start: 2021-12-05 | End: 2021-12-05 | Stop reason: HOSPADM

## 2021-12-05 NOTE — ED PROVIDER NOTES
Time: 3:53 PM EST  Arrived by: private car  Chief Complaint: PALPITATIONS   History provided by: pt  History is limited by: N/A     History of Present Illness:  Patient is a 71 y.o. year old male that presents to the emergency department with PALPITATIONS that started abruptly about an hour ago. The palpitations are described as rapid and irregular HR, noting a HR in the 180s. Symptoms have resolved and lasted about 45 minutes. Nothing improves or worsens symptoms. It is moderate in severity.      Pt also reports burning left sided CP and tingling in his hands and feet. No SOB. He had about 5 episodes of diarrhea yesterday. He denies emesis.     Pt has hx of A-fib and is on 5 mg of Eliquis.     History provided by:  Patient      Similar Symptoms Previously: yes  Recently seen: Pt was seen in this ED on 12/2/21 for dizziness.       Patient Care Team  Primary Care Provider: Edith Marcelo APRN    Past Medical History:     No Known Allergies  Past Medical History:   Diagnosis Date   • Anxiety    • Arthritis    • Bladder disorder    • BPH (benign prostatic hyperplasia)    • CAD (coronary artery disease)    • Cervical spondylosis without myelopathy 01/15/2020   • Cervicalgia    • Chest pain    • Colitis    • Condition not found HERNIA   • Depression    • Diverticulitis    • Diverticulosis of colon    • GERD (gastroesophageal reflux disease)    • H/O degenerative disc disease    • Heart attack (HCC)    • Heart disease    • Hemorrhoids    • High cholesterol    • Hypertension    • IBS (irritable bowel syndrome)    • Mood disorder (HCC)    • Muscle cramps    • Myocardial infarction (HCC)    • Osteoarthritis    • Prostate disorder    • S/P lumpectomy, right breast     CYST 2016     Past Surgical History:   Procedure Laterality Date   • APPENDECTOMY     • BREAST MASS EXCISION  2016   • CARDIAC CATHETERIZATION  2016   • CHOLECYSTECTOMY     • COLONOSCOPY  2008,2014,2021   • CORONARY ANGIOPLASTY WITH STENT PLACEMENT  2010    • ENDOSCOPY  2010,2019   • HEMORRHOIDECTOMY  2019   • INGUINAL HERNIA REPAIR  2019   • TURP / TRANSURETHRAL INCISION / DRAINAGE PROSTATE  01/16/2020    BUTTON TURP W/ DR TAM   • UMBILICAL HERNIA REPAIR  2019     Family History   Problem Relation Age of Onset   • Other Mother         bladder stones   • Arthritis Mother    • Heart disease Mother    • Heart failure Father    • Stroke Father    • Colon cancer Paternal Grandfather         60's       Home Medications:  Prior to Admission medications    Medication Sig Start Date End Date Taking? Authorizing Provider   apixaban (ELIQUIS) 5 MG tablet tablet Take 1 tablet by mouth Every 12 (Twelve) Hours. 12/2/21   Sohail Hazel MD   aspirin (aspirin) 81 MG EC tablet Aspir-81 81 mg oral tablet,delayed release (DR/EC) take 1 tablet (81 mg) by oral route once daily   Suspended    Elian Merrill MD   atorvastatin (LIPITOR) 80 MG tablet  7/29/21   Elian Merrill MD   celecoxib (CeleBREX) 100 MG capsule     Elian Merrill MD   clopidogrel (PLAVIX) 75 MG tablet Take 1 tablet by mouth every day 11/11/21   Mayank Sheehan MD   DULoxetine (CYMBALTA) 60 MG capsule Take 60 mg by mouth Daily. 8/2/21   Elian Merrill MD   hydrOXYzine (ATARAX) 25 MG tablet hydroxyzine HCl 25 mg oral tablet take 1 tablet (25 mg) by oral route 3 times per day   Active    Elian Merrill MD   lisinopril (PRINIVIL,ZESTRIL) 5 MG tablet Take 5 mg by mouth Daily. 6/18/21   Elian Merrill MD   metoprolol succinate XL (TOPROL-XL) 25 MG 24 hr tablet Take 1 tablet by mouth Daily. 12/2/21   Sohail Hazel MD   nitroglycerin (NITROSTAT) 0.4 MG SL tablet     Elian Merrill MD   pantoprazole (PROTONIX) 40 MG EC tablet pantoprazole 40 mg tablet,delayed release   TAKE ONE TABLET BY MOUTH EVERY DAY    Elian Merrill MD   sertraline (ZOLOFT) 100 MG tablet sertraline 100 mg tablet    Elian Merrill MD   sildenafil (REVATIO) 20 MG tablet sildenafil (pulmonary  "hypertension) 20 mg tablet   TAKE 3 TABLETS BY MOUTH AS NEEDED AS DIRECTED BY PHYSICIAN    ProviderElian MD   tadalafil (CIALIS) 5 MG tablet Take 1 tablet by mouth Daily for 360 days. 10/11/21 10/6/22  Mallorie Matt MD   tamsulosin (FLOMAX) 0.4 MG capsule 24 hr capsule TAKE ONE CAPSULE BY MOUTH EVERY DAY ONE-HALF HOUR FOLLOWING THE SAME MEAL EACH DAY 7/26/21   ProviderElian MD   traMADol (ULTRAM) 50 MG tablet tramadol 50 mg oral tablet take 1 tablet (50 mg) by oral route every 4-6 hours as needed   Active    ProviderElian MD   triamcinolone (KENALOG) 0.5 % cream triamcinolone acetonide 0.5 % topical cream    ProviderElian MD        Social History:   Social History     Tobacco Use   • Smoking status: Never Smoker   • Smokeless tobacco: Never Used   Vaping Use   • Vaping Use: Never used   Substance Use Topics   • Alcohol use: Not Currently   • Drug use: Never     Recent travel: no     Review of Systems:  Review of Systems   Constitutional: Negative for chills, diaphoresis and fever.   HENT: Negative for ear discharge and nosebleeds.    Eyes: Negative for photophobia.   Respiratory: Negative for shortness of breath.    Cardiovascular: Positive for chest pain and palpitations.   Gastrointestinal: Positive for diarrhea (yesterday). Negative for nausea and vomiting.   Genitourinary: Negative for dysuria.   Musculoskeletal: Negative for back pain and neck pain.   Skin: Negative for rash.   Neurological: Negative for headaches.        Physical Exam:  /83   Pulse 51   Temp 97.8 °F (36.6 °C) (Oral)   Resp 18   Ht 185.4 cm (73\")   Wt 91.7 kg (202 lb 2.6 oz)   SpO2 98%   BMI 26.67 kg/m²     Physical Exam  Vitals and nursing note reviewed.   Constitutional:       General: He is not in acute distress.     Appearance: Normal appearance.   HENT:      Head: Normocephalic and atraumatic.      Nose: Nose normal.   Eyes:      General: No scleral icterus.  Cardiovascular:      Rate and " Rhythm: Normal rate and regular rhythm.      Heart sounds: Normal heart sounds.      Comments: HR in the 60s.   Pulmonary:      Effort: Pulmonary effort is normal. No respiratory distress.      Breath sounds: Normal breath sounds.   Abdominal:      Palpations: Abdomen is soft.      Tenderness: There is no abdominal tenderness.   Musculoskeletal:         General: Normal range of motion.      Cervical back: Neck supple.      Right lower leg: No edema.      Left lower leg: No edema.   Skin:     General: Skin is warm and dry.   Neurological:      General: No focal deficit present.      Mental Status: He is alert and oriented to person, place, and time.      Sensory: No sensory deficit.      Motor: No weakness.                Medications in the Emergency Department:  Medications - No data to display     Labs  Lab Results (last 24 hours)     Procedure Component Value Units Date/Time    POC Troponin I with Hold Tube [[id number]] Collected: 12/05/21 1531    Specimen: Blood Updated: 12/05/21 1724    Narrative:      The following orders were created for panel order POC Troponin I with Hold Tube.  Procedure                               Abnormality         Status                     ---------                               -----------         ------                     POC Troponin I[[id number]]                                                              HOLD Troponin-I Tube[[id number]]                             Final result                 Please view results for these tests on the individual orders.    CBC & Differential [[id number]]  (Abnormal) Collected: 12/05/21 1531    Specimen: Blood Updated: 12/05/21 1538    Narrative:      The following orders were created for panel order CBC & Differential.  Procedure                               Abnormality         Status                     ---------                               -----------         ------                     CBC Auto Differential[[id number]]        Abnormal             Final result                 Please view results for these tests on the individual orders.    Comprehensive Metabolic Panel [[id number]]  (Abnormal) Collected: 12/05/21 1531    Specimen: Blood Updated: 12/05/21 1612     Glucose 108 mg/dL      BUN 15 mg/dL      Creatinine 1.12 mg/dL      Sodium 137 mmol/L      Potassium 4.1 mmol/L      Chloride 103 mmol/L      CO2 23.4 mmol/L      Calcium 9.8 mg/dL      Total Protein 7.0 g/dL      Albumin 4.60 g/dL      ALT (SGPT) 29 U/L      AST (SGOT) 27 U/L      Alkaline Phosphatase 98 U/L      Total Bilirubin 1.1 mg/dL      eGFR Non African Amer 64 mL/min/1.73      Globulin 2.4 gm/dL      A/G Ratio 1.9 g/dL      BUN/Creatinine Ratio 13.4     Anion Gap 10.6 mmol/L     Narrative:      GFR Normal >60  Chronic Kidney Disease <60  Kidney Failure <15      Lipase [[id number]]  (Normal) Collected: 12/05/21 1531    Specimen: Blood Updated: 12/05/21 1612     Lipase 17 U/L     BNP [[id number]]  (Normal) Collected: 12/05/21 1531    Specimen: Blood Updated: 12/05/21 1608     proBNP 78.2 pg/mL     Narrative:      Among patients with dyspnea, NT-proBNP is highly sensitive for the detection of acute congestive heart failure. In addition NT-proBNP of <300 pg/ml effectively rules out acute congestive heart failure with 99% negative predictive value.    Results may be falsely decreased if patient taking Biotin.      Magnesium [[id number]]  (Normal) Collected: 12/05/21 1531    Specimen: Blood Updated: 12/05/21 1612     Magnesium 1.8 mg/dL     CK Total & CKMB [[id number]]  (Abnormal) Collected: 12/05/21 1531    Specimen: Blood Updated: 12/05/21 1612     CKMB 4.67 ng/mL      Creatine Kinase 206 U/L     Narrative:      CKMB results may be falsely decreased if patient taking Biotin.    CBC Auto Differential [[id number]]  (Abnormal) Collected: 12/05/21 1531    Specimen: Blood Updated: 12/05/21 1538     WBC 4.72 10*3/mm3      RBC 3.85 10*6/mm3      Hemoglobin 12.2 g/dL      Hematocrit 34.9 %      MCV 90.6 fL       MCH 31.7 pg      MCHC 35.0 g/dL      RDW 14.1 %      RDW-SD 47.2 fl      MPV 9.4 fL      Platelets 220 10*3/mm3      Neutrophil % 66.7 %      Lymphocyte % 21.0 %      Monocyte % 10.2 %      Eosinophil % 1.7 %      Basophil % 0.2 %      Immature Grans % 0.2 %      Neutrophils, Absolute 3.15 10*3/mm3      Lymphocytes, Absolute 0.99 10*3/mm3      Monocytes, Absolute 0.48 10*3/mm3      Eosinophils, Absolute 0.08 10*3/mm3      Basophils, Absolute 0.01 10*3/mm3      Immature Grans, Absolute 0.01 10*3/mm3      nRBC 0.0 /100 WBC     POC Troponin I [[id number]]  (Normal) Collected: 12/05/21 1532    Specimen: Blood Updated: 12/05/21 1545     Troponin I 0.00 ng/mL      Comment: Serial Number: [id number]:  [id number]       POC Troponin I [[id number]]  (Normal) Collected: 12/05/21 1706    Specimen: Blood Updated: 12/05/21 1719     Troponin I 0.00 ng/mL      Comment: Serial Number: [id number]:  [id number]              Imaging:  XR Chest 1 View    Result Date: 12/5/2021  PROCEDURE: XR CHEST 1 VW  COMPARISON: [facility], , XR CHEST 1 VW, 12/02/2021, 7:57.  INDICATIONS: Hypertension; Rapid Heart Rate; Chest Pain  FINDINGS:  LUNGS: Normal.  No significant pulmonary parenchymal abnormalities.  VASCULATURE: Normal.  Unremarkable pulmonary vasculature.  CARDIAC: Normal.  No cardiac silhouette abnormality or cardiomegaly.  MEDIASTINUM: Normal.  No visible mass or adenopathy.  PLEURA: Normal.  No effusion or pleural thickening.  BONES: Normal.  No fracture or visible bony lesion.  OTHER: Negative.   CONCLUSION: No acute disease.  No significant change has occurred.        DAMIR HAMILTON MD       Electronically Signed and Approved By: DAMIR HAMILTON MD on 12/05/2021 at 16:08               Procedures:  Procedures    Progress  ED Course as of 12/05/21 2234   Sun Dec 05, 2021   1545 EKG interpretation: Normal sinus rhythm, heart rate 55, incomplete right bundle branch block, normal IN interval, normal QT interval, nonspecific ST  segment changes with no acute ischemia. [RP]   1900 Currently asymptomatic on reevaluation [RP]      ED Course User Index  [RP] Isai Mills MD                            Medical Decision Making:  MDM  Number of Diagnoses or Management Options     Amount and/or Complexity of Data Reviewed  Clinical lab tests: reviewed  Tests in the radiology section of CPT®: reviewed  Tests in the medicine section of CPT®: reviewed  Decide to obtain previous medical records or to obtain history from someone other than the patient: yes    Risk of Complications, Morbidity, and/or Mortality  Presenting problems: moderate  Management options: low         Final diagnoses:   Atrial fibrillation, currently in sinus rhythm        Disposition:  ED Disposition     ED Disposition Condition Comment    Discharge Stable         Documentation assistance provided by Sujatha Fonseca acting as scribe for Isai Mills MD. Information recorded by the scribe was done at my direction and has been verified and validated by me.        Sujatha Fonseca  12/05/21 7517       Isai Mills MD  12/05/21 7751

## 2021-12-10 ENCOUNTER — OFFICE VISIT (OUTPATIENT)
Dept: CARDIOLOGY | Facility: CLINIC | Age: 71
End: 2021-12-10

## 2021-12-10 VITALS
SYSTOLIC BLOOD PRESSURE: 123 MMHG | DIASTOLIC BLOOD PRESSURE: 73 MMHG | HEIGHT: 73 IN | BODY MASS INDEX: 26.11 KG/M2 | HEART RATE: 58 BPM | WEIGHT: 197 LBS

## 2021-12-10 DIAGNOSIS — Z98.61 CAD S/P PERCUTANEOUS CORONARY ANGIOPLASTY: ICD-10-CM

## 2021-12-10 DIAGNOSIS — I48.19 ATRIAL FIBRILLATION, PERSISTENT (HCC): Primary | ICD-10-CM

## 2021-12-10 DIAGNOSIS — E78.00 HIGH CHOLESTEROL: ICD-10-CM

## 2021-12-10 DIAGNOSIS — I10 ESSENTIAL HYPERTENSION: ICD-10-CM

## 2021-12-10 DIAGNOSIS — I25.10 CAD S/P PERCUTANEOUS CORONARY ANGIOPLASTY: ICD-10-CM

## 2021-12-10 PROCEDURE — 99214 OFFICE O/P EST MOD 30 MIN: CPT | Performed by: INTERNAL MEDICINE

## 2021-12-10 RX ORDER — METOPROLOL SUCCINATE 25 MG/1
25 TABLET, EXTENDED RELEASE ORAL DAILY
Qty: 30 TABLET | Refills: 11 | Status: SHIPPED | OUTPATIENT
Start: 2021-12-10 | End: 2022-06-23

## 2021-12-10 RX ORDER — NITROGLYCERIN 0.4 MG/1
0.4 TABLET SUBLINGUAL
Qty: 25 TABLET | Refills: 2 | Status: SHIPPED | OUTPATIENT
Start: 2021-12-10

## 2021-12-10 RX ORDER — MULTIPLE VITAMINS W/ MINERALS TAB 9MG-400MCG
1 TAB ORAL DAILY
COMMUNITY

## 2021-12-10 NOTE — PROGRESS NOTES
Chief Complaint  Atrial Fibrillation (recent ER visit), Coronary Artery Disease, and Hypertension    Subjective    Patient with an episode last Thursday in which she got a bad with sensation of feeling funny heart rate was in the 180s went to the emergency room was found to be in atrial fibrillation there was rate controlled.      Past Medical History:   Diagnosis Date   • Abnormal ECG    • Anxiety    • Arrhythmia    • Arthritis    • Atrial fibrillation (HCC)    • Bladder disorder    • BPH (benign prostatic hyperplasia)    • CAD (coronary artery disease)    • Cervical spondylosis without myelopathy 01/15/2020   • Cervicalgia    • Chest pain    • Colitis    • Condition not found HERNIA   • Depression    • Diverticulitis    • Diverticulosis of colon    • GERD (gastroesophageal reflux disease)    • H/O degenerative disc disease    • Heart attack (HCC)    • Heart disease    • Hemorrhoids    • High cholesterol    • Hypertension    • IBS (irritable bowel syndrome)    • Mood disorder (HCC)    • Muscle cramps    • Myocardial infarction (HCC)    • Osteoarthritis    • Prostate disorder    • S/P lumpectomy, right breast     CYST 2016         Current Outpatient Medications:   •  apixaban (ELIQUIS) 5 MG tablet tablet, Take 1 tablet by mouth Every 12 (Twelve) Hours., Disp: 60 tablet, Rfl: 11  •  atorvastatin (LIPITOR) 80 MG tablet, Take 80 mg by mouth Every Night., Disp: , Rfl:   •  clopidogrel (PLAVIX) 75 MG tablet, Take 1 tablet by mouth every day, Disp: 90 tablet, Rfl: 2  •  DULoxetine (CYMBALTA) 60 MG capsule, Take 60 mg by mouth Daily., Disp: , Rfl:   •  hydrOXYzine (ATARAX) 25 MG tablet, hydroxyzine HCl 25 mg oral tablet take 1 tablet (25 mg) by oral route 3 times per day   Active, Disp: , Rfl:   •  lisinopril (PRINIVIL,ZESTRIL) 5 MG tablet, Take 5 mg by mouth Daily., Disp: , Rfl:   •  metoprolol succinate XL (TOPROL-XL) 25 MG 24 hr tablet, Take 1 tablet by mouth Daily., Disp: 30 tablet, Rfl: 11  •  multivitamin with  "minerals tablet tablet, Take 1 tablet by mouth Daily., Disp: , Rfl:   •  nitroglycerin (NITROSTAT) 0.4 MG SL tablet, Place 1 tablet under the tongue Every 5 (Five) Minutes As Needed for Chest Pain (no more than 3 doses in 15 minutes)., Disp: 25 tablet, Rfl: 2  •  pantoprazole (PROTONIX) 40 MG EC tablet, pantoprazole 40 mg tablet,delayed release  TAKE ONE TABLET BY MOUTH EVERY DAY, Disp: , Rfl:   •  sertraline (ZOLOFT) 100 MG tablet, Daily., Disp: , Rfl:   •  tadalafil (CIALIS) 5 MG tablet, Take 1 tablet by mouth Daily for 360 days., Disp: 90 tablet, Rfl: 3  •  tamsulosin (FLOMAX) 0.4 MG capsule 24 hr capsule, TAKE ONE CAPSULE BY MOUTH EVERY DAY ONE-HALF HOUR FOLLOWING THE SAME MEAL EACH DAY, Disp: , Rfl:   •  traMADol (ULTRAM) 50 MG tablet, tramadol 50 mg oral tablet take 1 tablet (50 mg) by oral route every 4-6 hours as needed   Active, Disp: , Rfl:   •  triamcinolone (KENALOG) 0.5 % cream, triamcinolone acetonide 0.5 % topical cream, Disp: , Rfl:     Medications Discontinued During This Encounter   Medication Reason   • sildenafil (REVATIO) 20 MG tablet Alternate therapy   • celecoxib (CeleBREX) 100 MG capsule Discontinued by another clinician   • aspirin (aspirin) 81 MG EC tablet Discontinued by another clinician   • nitroglycerin (NITROSTAT) 0.4 MG SL tablet Reorder   • metoprolol succinate XL (TOPROL-XL) 25 MG 24 hr tablet Reorder   • apixaban (ELIQUIS) 5 MG tablet tablet Reorder     No Known Allergies     Social History     Tobacco Use   • Smoking status: Never Smoker   • Smokeless tobacco: Never Used   Vaping Use   • Vaping Use: Never used   Substance Use Topics   • Alcohol use: Not Currently   • Drug use: Never       Family History   Problem Relation Age of Onset   • Other Mother         bladder stones   • Arthritis Mother    • Heart disease Mother    • Heart failure Father    • Stroke Father    • Colon cancer Paternal Grandfather         60's        Objective     /73   Pulse 58   Ht 185.4 cm (73\")  "  Wt 89.4 kg (197 lb)   BMI 25.99 kg/m²       Physical Exam    General Appearance:   · no acute distress  · Alert and oriented x3  HENT:   · lips not cyanotic  · Atraumatic  Neck:  · No jvd   · supple  Respiratory:  · no respiratory distress  · normal breath sounds  · no rales  Cardiovascular:  · Regular rate and rhythm  · no S3, no S4   · no murmur  · no rub  Extremities  · No cyanosis  · lower extremity edema: none    Skin:   · warm, dry  · No rashes      Result Review :     proBNP   Date Value Ref Range Status   12/05/2021 78.2 0.0 - 900.0 pg/mL Final     CMP    CMP 10/29/21 12/2/21 12/5/21   Glucose 125 (A) 113 (A) 108 (A)   BUN 15 14 15   Creatinine 0.98 1.14 1.12   eGFR Non African Am 75 63 64   Sodium 139 138 137   Potassium 3.8 4.3 4.1   Chloride 104 104 103   Calcium 9.3 10.0 9.8   Albumin 4.40 4.60 4.60   Total Bilirubin 0.5 1.0 1.1   Alkaline Phosphatase 128 (A) 102 98   AST (SGOT) 27 29 27   ALT (SGPT) 26 33 29   (A) Abnormal value            CBC w/diff    CBC w/Diff 10/29/21 12/2/21 12/5/21   WBC 6.61 4.54 4.72   RBC 3.79 (A) 4.06 (A) 3.85 (A)   Hemoglobin 11.6 (A) 12.8 (A) 12.2 (A)   Hematocrit 35.4 (A) 37.8 34.9 (A)   MCV 93.4 93.1 90.6   MCH 30.6 31.5 31.7   MCHC 32.8 33.9 35.0   RDW 14.1 14.4 14.1   Platelets 245 236 220   Neutrophil Rel % 72.7 69.8 66.7   Immature Granulocyte Rel % 0.6 (A) 0.2 0.2   Lymphocyte Rel % 17.4 (A) 18.5 (A) 21.0   Monocyte Rel % 8.3 9.5 10.2   Eosinophil Rel % 0.8 2.0 1.7   Basophil Rel % 0.2 0.0 0.2   (A) Abnormal value             Lab Results   Component Value Date    TSH 1.910 01/29/2020      No results found for: FREET4   No results found for: DDIMERQUANT  Magnesium   Date Value Ref Range Status   12/05/2021 1.8 1.6 - 2.4 mg/dL Final      No results found for: DIGOXIN   Lab Results   Component Value Date    TROPONINT <0.010 12/02/2021           Lipid Panel    Lipid Panel 1/9/21   Total Cholesterol 117   Triglycerides 88   HDL Cholesterol 60   VLDL Cholesterol 18    LDL Cholesterol  39 (A)   (A) Abnormal value       Comments are available for some flowsheets but are not being displayed.           Lab Results   Component Value Date    POCTROP 0.00 12/05/2021     Echocardiogram 2/20  CONCLUSIONS:    1.  Normal left ventricular chamber size with mild left ventricular        hypertrophy and normal left ventricular systolic function.    2.  Grade 1 left ventricular diastolic dysfunction.    3.  Trace to mild mitral valve regurgitation.    4.  Tricuspid regurgitation with normal estimated pulmonary artery systolic        pressure.                             Diagnoses and all orders for this visit:    1. Atrial fibrillation, persistent (HCC) (Primary)  Assessment & Plan:  Patient with suspected A. fib by EKG in the emergency room however the official read shows normal sinus rhythm just artifact.  The patient's symptoms do seem consistent with A. fib given the heart rate when he woke and possible that he converted back to normal sinus rhythm.  The patient does have risk factors for A. fib as well will recommend checking 2-week event monitor as well as echocardiogram to see if diagnosis of A. fib can be made or not.  In the meantime we will continue with short-term anticoagulation with Eliquis and Toprol 25 mg for rate control if no confirmed A. fib episodes occur will likely go back to just aspirin 81 mg once a day    Orders:  -     Adult Transthoracic Echo Complete W/ Cont if Necessary Per Protocol; Future  -     Holter Monitor - 72 Hour Up To 15 Days; Future    2. CAD S/P percutaneous coronary angioplasty  Assessment & Plan:  No anginal symptoms since patient is on Eliquis now will discontinue aspirin therapy with Plavix 75 once a day.      3. Essential hypertension    4. High cholesterol    Other orders  -     metoprolol succinate XL (TOPROL-XL) 25 MG 24 hr tablet; Take 1 tablet by mouth Daily.  Dispense: 30 tablet; Refill: 11  -     apixaban (ELIQUIS) 5 MG tablet tablet; Take 1  tablet by mouth Every 12 (Twelve) Hours.  Dispense: 60 tablet; Refill: 11  -     nitroglycerin (NITROSTAT) 0.4 MG SL tablet; Place 1 tablet under the tongue Every 5 (Five) Minutes As Needed for Chest Pain (no more than 3 doses in 15 minutes).  Dispense: 25 tablet; Refill: 2          Follow Up     No follow-ups on file.          Patient was given instructions and counseling regarding his condition or for health maintenance advice. Please see specific information pulled into the AVS if appropriate.

## 2021-12-10 NOTE — ASSESSMENT & PLAN NOTE
No anginal symptoms since patient is on Eliquis now will discontinue aspirin therapy with Plavix 75 once a day.

## 2021-12-10 NOTE — PATIENT INSTRUCTIONS
Atrial Fibrillation    Atrial fibrillation is a type of heartbeat that is irregular or fast. If you have this condition, your heart beats without any order. This makes it hard for your heart to pump blood in a normal way.  Atrial fibrillation may come and go, or it may become a long-lasting problem. If this condition is not treated, it can put you at higher risk for stroke, heart failure, and other heart problems.  What are the causes?  This condition may be caused by diseases that damage the heart. They include:  · High blood pressure.  · Heart failure.  · Heart valve disease.  · Heart surgery.  Other causes include:  · Diabetes.  · Thyroid disease.  · Being overweight.  · Kidney disease.  Sometimes the cause is not known.  What increases the risk?  You are more likely to develop this condition if:  · You are older.  · You smoke.  · You exercise often and very hard.  · You have a family history of this condition.  · You are a man.  · You use drugs.  · You drink a lot of alcohol.  · You have lung conditions, such as emphysema, pneumonia, or COPD.  · You have sleep apnea.  What are the signs or symptoms?  Common symptoms of this condition include:  · A feeling that your heart is beating very fast.  · Chest pain or discomfort.  · Feeling short of breath.  · Suddenly feeling light-headed or weak.  · Getting tired easily during activity.  · Fainting.  · Sweating.  In some cases, there are no symptoms.  How is this treated?  Treatment for this condition depends on underlying conditions and how you feel when you have atrial fibrillation. They include:  · Medicines to:  ? Prevent blood clots.  ? Treat heart rate or heart rhythm problems.  · Using devices, such as a pacemaker, to correct heart rhythm problems.  · Doing surgery to remove the part of the heart that sends bad signals.  · Closing an area where clots can form in the heart (left atrial appendage).  In some cases, your doctor will treat other underlying  conditions.  Follow these instructions at home:  Medicines  · Take over-the-counter and prescription medicines only as told by your doctor.  · Do not take any new medicines without first talking to your doctor.  · If you are taking blood thinners:  ? Talk with your doctor before you take any medicines that have aspirin or NSAIDs, such as ibuprofen, in them.  ? Take your medicine exactly as told by your doctor. Take it at the same time each day.  ? Avoid activities that could hurt or bruise you. Follow instructions about how to prevent falls.  ? Wear a bracelet that says you are taking blood thinners. Or, carry a card that lists what medicines you take.  Lifestyle         · Do not use any products that have nicotine or tobacco in them. These include cigarettes, e-cigarettes, and chewing tobacco. If you need help quitting, ask your doctor.  · Eat heart-healthy foods. Talk with your doctor about the right eating plan for you.  · Exercise regularly as told by your doctor.  · Do not drink alcohol.  · Lose weight if you are overweight.  · Do not use drugs, including cannabis.  General instructions  · If you have a condition that causes breathing to stop for a short period of time (apnea), treat it as told by your doctor.  · Keep a healthy weight. Do not use diet pills unless your doctor says they are safe for you. Diet pills may make heart problems worse.  · Keep all follow-up visits as told by your doctor. This is important.  Contact a doctor if:  · You notice a change in the speed, rhythm, or strength of your heartbeat.  · You are taking a blood-thinning medicine and you get more bruising.  · You get tired more easily when you move or exercise.  · You have a sudden change in weight.  Get help right away if:    · You have pain in your chest or your belly (abdomen).  · You have trouble breathing.  · You have side effects of blood thinners, such as blood in your vomit, poop (stool), or pee (urine), or bleeding that cannot  "stop.  · You have any signs of a stroke. \"BE FAST\" is an easy way to remember the main warning signs:  ? B - Balance. Signs are dizziness, sudden trouble walking, or loss of balance.  ? E - Eyes. Signs are trouble seeing or a change in how you see.  ? F - Face. Signs are sudden weakness or loss of feeling in the face, or the face or eyelid drooping on one side.  ? A - Arms. Signs are weakness or loss of feeling in an arm. This happens suddenly and usually on one side of the body.  ? S - Speech. Signs are sudden trouble speaking, slurred speech, or trouble understanding what people say.  ? T - Time. Time to call emergency services. Write down what time symptoms started.  · You have other signs of a stroke, such as:  ? A sudden, very bad headache with no known cause.  ? Feeling like you may vomit (nausea).  ? Vomiting.  ? A seizure.  These symptoms may be an emergency. Do not wait to see if the symptoms will go away. Get medical help right away. Call your local emergency services (911 in the [place] Do not drive yourself to the hospital.  Summary  · Atrial fibrillation is a type of heartbeat that is irregular or fast.  · You are at higher risk of this condition if you smoke, are older, have diabetes, or are overweight.  · Follow your doctor's instructions about medicines, diet, exercise, and follow-up visits.  · Get help right away if you have signs or symptoms of a stroke.  · Get help right away if you cannot catch your breath, or you have chest pain or discomfort.  This information is not intended to replace advice given to you by your health care provider. Make sure you discuss any questions you have with your health care provider.  Document Revised: 06/10/2020 Document Reviewed: 06/10/2020  [employer]vier Patient Education © 2021 [employer]      "

## 2021-12-10 NOTE — ASSESSMENT & PLAN NOTE
Patient with suspected A. fib by EKG in the emergency room however the official read shows normal sinus rhythm just artifact.  The patient's symptoms do seem consistent with A. fib given the heart rate when he woke and possible that he converted back to normal sinus rhythm.  The patient does have risk factors for A. fib as well will recommend checking 2-week event monitor as well as echocardiogram to see if diagnosis of A. fib can be made or not.  In the meantime we will continue with short-term anticoagulation with Eliquis and Toprol 25 mg for rate control if no confirmed A. fib episodes occur will likely go back to just aspirin 81 mg once a day

## 2021-12-24 ENCOUNTER — APPOINTMENT (OUTPATIENT)
Dept: GENERAL RADIOLOGY | Facility: HOSPITAL | Age: 71
End: 2021-12-24

## 2021-12-24 ENCOUNTER — HOSPITAL ENCOUNTER (EMERGENCY)
Facility: HOSPITAL | Age: 71
Discharge: HOME | End: 2021-12-24
Attending: EMERGENCY MEDICINE | Admitting: EMERGENCY MEDICINE

## 2021-12-24 VITALS
HEART RATE: 71 BPM | BODY MASS INDEX: 27.29 KG/M2 | RESPIRATION RATE: 18 BRPM | WEIGHT: 205.91 LBS | DIASTOLIC BLOOD PRESSURE: 76 MMHG | HEIGHT: 73 IN | SYSTOLIC BLOOD PRESSURE: 159 MMHG | OXYGEN SATURATION: 99 % | TEMPERATURE: 98.6 F

## 2021-12-24 DIAGNOSIS — F41.0 GENERALIZED ANXIETY DISORDER WITH PANIC ATTACKS: Primary | ICD-10-CM

## 2021-12-24 DIAGNOSIS — F41.1 GENERALIZED ANXIETY DISORDER WITH PANIC ATTACKS: Primary | ICD-10-CM

## 2021-12-24 LAB
ALBUMIN SERPL-MCNC: 4.5 G/DL (ref 3.5–5.2)
ALBUMIN/GLOB SERPL: 1.9 G/DL
ALP SERPL-CCNC: 117 U/L (ref 39–117)
ALT SERPL W P-5'-P-CCNC: 21 U/L (ref 1–41)
ANION GAP SERPL CALCULATED.3IONS-SCNC: 10.1 MMOL/L (ref 5–15)
AST SERPL-CCNC: 24 U/L (ref 1–40)
BACTERIA UR QL AUTO: ABNORMAL /HPF
BASOPHILS # BLD AUTO: 0.01 10*3/MM3 (ref 0–0.2)
BASOPHILS NFR BLD AUTO: 0.2 % (ref 0–1.5)
BILIRUB SERPL-MCNC: 0.6 MG/DL (ref 0–1.2)
BILIRUB UR QL STRIP: NEGATIVE
BUN SERPL-MCNC: 16 MG/DL (ref 8–23)
BUN/CREAT SERPL: 14.2 (ref 7–25)
CALCIUM SPEC-SCNC: 9.4 MG/DL (ref 8.6–10.5)
CHLORIDE SERPL-SCNC: 108 MMOL/L (ref 98–107)
CLARITY UR: CLEAR
CO2 SERPL-SCNC: 23.9 MMOL/L (ref 22–29)
COLOR UR: YELLOW
CREAT SERPL-MCNC: 1.13 MG/DL (ref 0.76–1.27)
DEPRECATED RDW RBC AUTO: 45.7 FL (ref 37–54)
EOSINOPHIL # BLD AUTO: 0.06 10*3/MM3 (ref 0–0.4)
EOSINOPHIL NFR BLD AUTO: 1.2 % (ref 0.3–6.2)
ERYTHROCYTE [DISTWIDTH] IN BLOOD BY AUTOMATED COUNT: 13.9 % (ref 12.3–15.4)
GFR SERPL CREATININE-BSD FRML MDRD: 64 ML/MIN/1.73
GLOBULIN UR ELPH-MCNC: 2.4 GM/DL
GLUCOSE SERPL-MCNC: 86 MG/DL (ref 65–99)
GLUCOSE UR STRIP-MCNC: NEGATIVE MG/DL
HCT VFR BLD AUTO: 33 % (ref 37.5–51)
HGB BLD-MCNC: 11.4 G/DL (ref 13–17.7)
HGB UR QL STRIP.AUTO: ABNORMAL
HOLD SPECIMEN: NORMAL
HOLD SPECIMEN: NORMAL
HYALINE CASTS UR QL AUTO: ABNORMAL /LPF
IMM GRANULOCYTES # BLD AUTO: 0.02 10*3/MM3 (ref 0–0.05)
IMM GRANULOCYTES NFR BLD AUTO: 0.4 % (ref 0–0.5)
KETONES UR QL STRIP: NEGATIVE
LEUKOCYTE ESTERASE UR QL STRIP.AUTO: NEGATIVE
LYMPHOCYTES # BLD AUTO: 1.08 10*3/MM3 (ref 0.7–3.1)
LYMPHOCYTES NFR BLD AUTO: 21.8 % (ref 19.6–45.3)
MAGNESIUM SERPL-MCNC: 1.8 MG/DL (ref 1.6–2.4)
MCH RBC QN AUTO: 31 PG (ref 26.6–33)
MCHC RBC AUTO-ENTMCNC: 34.5 G/DL (ref 31.5–35.7)
MCV RBC AUTO: 89.7 FL (ref 79–97)
MONOCYTES # BLD AUTO: 0.45 10*3/MM3 (ref 0.1–0.9)
MONOCYTES NFR BLD AUTO: 9.1 % (ref 5–12)
NEUTROPHILS NFR BLD AUTO: 3.34 10*3/MM3 (ref 1.7–7)
NEUTROPHILS NFR BLD AUTO: 67.3 % (ref 42.7–76)
NITRITE UR QL STRIP: NEGATIVE
NRBC BLD AUTO-RTO: 0 /100 WBC (ref 0–0.2)
PH UR STRIP.AUTO: 7.5 [PH] (ref 5–8)
PLATELET # BLD AUTO: 190 10*3/MM3 (ref 140–450)
PMV BLD AUTO: 9.2 FL (ref 6–12)
POTASSIUM SERPL-SCNC: 3.8 MMOL/L (ref 3.5–5.2)
PROT SERPL-MCNC: 6.9 G/DL (ref 6–8.5)
PROT UR QL STRIP: NEGATIVE
RBC # BLD AUTO: 3.68 10*6/MM3 (ref 4.14–5.8)
RBC # UR STRIP: ABNORMAL /HPF
REF LAB TEST METHOD: ABNORMAL
SODIUM SERPL-SCNC: 142 MMOL/L (ref 136–145)
SP GR UR STRIP: 1.01 (ref 1–1.03)
SQUAMOUS #/AREA URNS HPF: ABNORMAL /HPF
TROPONIN T SERPL-MCNC: <0.01 NG/ML (ref 0–0.03)
UROBILINOGEN UR QL STRIP: ABNORMAL
WBC # UR STRIP: ABNORMAL /HPF
WBC NRBC COR # BLD: 4.96 10*3/MM3 (ref 3.4–10.8)
WHOLE BLOOD HOLD SPECIMEN: NORMAL
WHOLE BLOOD HOLD SPECIMEN: NORMAL

## 2021-12-24 PROCEDURE — 36415 COLL VENOUS BLD VENIPUNCTURE: CPT

## 2021-12-24 PROCEDURE — 93005 ELECTROCARDIOGRAM TRACING: CPT | Performed by: EMERGENCY MEDICINE

## 2021-12-24 PROCEDURE — 93005 ELECTROCARDIOGRAM TRACING: CPT

## 2021-12-24 PROCEDURE — 85025 COMPLETE CBC W/AUTO DIFF WBC: CPT

## 2021-12-24 PROCEDURE — 80053 COMPREHEN METABOLIC PANEL: CPT | Performed by: EMERGENCY MEDICINE

## 2021-12-24 PROCEDURE — 71045 X-RAY EXAM CHEST 1 VIEW: CPT

## 2021-12-24 PROCEDURE — 83735 ASSAY OF MAGNESIUM: CPT | Performed by: EMERGENCY MEDICINE

## 2021-12-24 PROCEDURE — 81001 URINALYSIS AUTO W/SCOPE: CPT

## 2021-12-24 PROCEDURE — 84484 ASSAY OF TROPONIN QUANT: CPT | Performed by: EMERGENCY MEDICINE

## 2021-12-24 PROCEDURE — 99283 EMERGENCY DEPT VISIT LOW MDM: CPT

## 2021-12-24 RX ORDER — LORAZEPAM 1 MG/1
1 TABLET ORAL 2 TIMES DAILY PRN
Qty: 12 TABLET | Refills: 0 | Status: SHIPPED | OUTPATIENT
Start: 2021-12-24 | End: 2022-06-10

## 2021-12-24 RX ORDER — SODIUM CHLORIDE 0.9 % (FLUSH) 0.9 %
10 SYRINGE (ML) INJECTION AS NEEDED
Status: DISCONTINUED | OUTPATIENT
Start: 2021-12-24 | End: 2021-12-24 | Stop reason: HOSPADM

## 2021-12-24 NOTE — DISCHARGE INSTRUCTIONS
Your EKG and all your blood work looked okay today.    No signs of a heart attack or anything serious or life-threatening were found.    Your blood pressure was normal measuring 124/76 with a normal regular heart rate.    It sounds mostly like you are having anxiety and some panic attacks at home.    Keep taking all of your usual prescribed medications, and only use the Ativan (lorazepam) as needed for breakthrough panic attacks.

## 2021-12-24 NOTE — ED PROVIDER NOTES
Time: 1735  Arrived by: Private vehicle  Chief Complaint: High blood pressure and heart rate, anxiety attack  History provided by: Patient    History of Present Illness:  Patient is a 71 y.o. year old male that presents to the emergency department with history of hypertension as well as history of anxiety, previously on Ativan, now presenting with episodic periods of high blood pressure in the 200s over 90s and racing heart rate in the setting of anxiety and hyperventilation at home.    During these episodes he develops numbness and tingling and pins-and-needles sensation in the hands and feet and occasionally spasms.    He has now prescribed hydroxyzine and Cymbalta, which does not seem to be helping much.    He has been compliant with his Toprol-XL and lisinopril for his blood pressure.        Similar Symptoms Previously: Yes  Recently seen: No      Patient Care Team  Primary Care Provider: Yanelis Marcelo    Past Medical History:   History of A. fib, anxiety, hypertension      Social History     Socioeconomic History   • Marital status:    Tobacco Use   • Smoking status: Never Smoker   • Smokeless tobacco: Never Used   Vaping Use   • Vaping Use: Never used   Substance and Sexual Activity   • Alcohol use: Not Currently   • Drug use: Never   • Sexual activity: Defer         No Known Allergies  Past Medical History:   Diagnosis Date   • Abnormal ECG    • Anxiety    • Arrhythmia    • Arthritis    • Atrial fibrillation (HCC)    • Bladder disorder    • BPH (benign prostatic hyperplasia)    • CAD (coronary artery disease)    • Cervical spondylosis without myelopathy 01/15/2020   • Cervicalgia    • Chest pain    • Colitis    • Condition not found HERNIA   • Depression    • Diverticulitis    • Diverticulosis of colon    • GERD (gastroesophageal reflux disease)    • H/O degenerative disc disease    • Heart attack (HCC)    • Heart disease    • Hemorrhoids    • High cholesterol    • Hypertension    • IBS (irritable  bowel syndrome)    • Mood disorder (HCC)    • Muscle cramps    • Myocardial infarction (HCC)    • Osteoarthritis    • Prostate disorder    • S/P lumpectomy, right breast     CYST 2016     Past Surgical History:   Procedure Laterality Date   • APPENDECTOMY     • BREAST MASS EXCISION  2016   • CARDIAC CATHETERIZATION  2016   • CHOLECYSTECTOMY     • COLONOSCOPY  2008,2014,2021   • CORONARY ANGIOPLASTY WITH STENT PLACEMENT  2010   • ENDOSCOPY  2010,2019   • HEMORRHOIDECTOMY  2019   • INGUINAL HERNIA REPAIR  2019   • TURP / TRANSURETHRAL INCISION / DRAINAGE PROSTATE  01/16/2020    BUTTON TURP W/ DR TAM   • UMBILICAL HERNIA REPAIR  2019     Family History   Problem Relation Age of Onset   • Other Mother         bladder stones   • Arthritis Mother    • Heart disease Mother    • Heart failure Father    • Stroke Father    • Colon cancer Paternal Grandfather         60's       Home Medications:  Prior to Admission medications    Medication Sig Start Date End Date Taking? Authorizing Provider   apixaban (ELIQUIS) 5 MG tablet tablet Take 1 tablet by mouth Every 12 (Twelve) Hours. 12/10/21   Mayank Sheehan MD   atorvastatin (LIPITOR) 80 MG tablet Take 80 mg by mouth Every Night. 7/29/21   Elian Merrill MD   clopidogrel (PLAVIX) 75 MG tablet Take 1 tablet by mouth every day 11/11/21   Mayank Sheehan MD   DULoxetine (CYMBALTA) 60 MG capsule Take 60 mg by mouth Daily. 8/2/21   Elian Merrill MD   hydrOXYzine (ATARAX) 25 MG tablet hydroxyzine HCl 25 mg oral tablet take 1 tablet (25 mg) by oral route 3 times per day   Active    Elian Merrill MD   lisinopril (PRINIVIL,ZESTRIL) 5 MG tablet Take 5 mg by mouth Daily. 6/18/21   Elian Merrlil MD   LORazepam (ATIVAN) 1 MG tablet Take 1 tablet by mouth 2 (Two) Times a Day As Needed for Anxiety. 12/24/21   Castro Matos MD   metoprolol succinate XL (TOPROL-XL) 25 MG 24 hr tablet Take 1 tablet by mouth Daily. 12/10/21   Mayank Sheehan MD   multivitamin with  "minerals tablet tablet Take 1 tablet by mouth Daily.    ProviderElian MD   nitroglycerin (NITROSTAT) 0.4 MG SL tablet Place 1 tablet under the tongue Every 5 (Five) Minutes As Needed for Chest Pain (no more than 3 doses in 15 minutes). 12/10/21   Mayank Sheehan MD   pantoprazole (PROTONIX) 40 MG EC tablet pantoprazole 40 mg tablet,delayed release   TAKE ONE TABLET BY MOUTH EVERY DAY    Elian Merrill MD   sertraline (ZOLOFT) 100 MG tablet Daily.    Elian Merrill MD   tadalafil (CIALIS) 5 MG tablet Take 1 tablet by mouth Daily for 360 days. 10/11/21 10/6/22  Mallorie Matt MD   tamsulosin (FLOMAX) 0.4 MG capsule 24 hr capsule TAKE ONE CAPSULE BY MOUTH EVERY DAY ONE-HALF HOUR FOLLOWING THE SAME MEAL EACH DAY 7/26/21   ProviderElian MD   traMADol (ULTRAM) 50 MG tablet tramadol 50 mg oral tablet take 1 tablet (50 mg) by oral route every 4-6 hours as needed   Active    ProviderElian MD   triamcinolone (KENALOG) 0.5 % cream triamcinolone acetonide 0.5 % topical cream    ProviderElian MD        Record Review:  I have reviewed the patient's records in [employer].     Review of Systems:  Review of Systems   I performed a 10 point review of systems that was all negative for fevers or chills, cough and congestion, vomiting and diarrhea.  It was positive for intermittent chest tightness and shortness of breath and numbness and tingling and muscle cramps and anxiety.    Physical Exam:  /76 (Patient Position: Sitting)   Pulse 71   Temp 98.6 °F (37 °C) (Oral)   Resp 18   Ht 185.4 cm (73\")   Wt 93.4 kg (205 lb 14.6 oz)   SpO2 99%   BMI 27.17 kg/m²     Physical Exam   General: Awake alert and in no obvious distress    HEENT: Head normocephalic atraumatic, eyes PERRLA EOMI, nose normal, oropharynx normal.    Neck: Supple full range of motion, no meningismus, no lymphadenopathy    Heart: Regular rate and rhythm, no murmurs or rubs, 2+ radial pulses bilaterally    Lungs: Clear to " auscultation bilaterally without wheezes or crackles, no respiratory distress    Abdomen: Soft, nontender, nondistended, no rebound or guarding    Skin: Warm, dry, no rash    Musculoskeletal: Normal range of motion, no lower extremity edema    Neurologic: Oriented x3, no motor deficits no sensory deficits    Psychiatric: Mood appears stable, no psychosis        Medications in the Emergency Department:  Medications   sodium chloride 0.9 % flush 10 mL (has no administration in time range)        Labs  Lab Results (last 24 hours)     Procedure Component Value Units Date/Time    CBC & Differential [[id number]]  (Abnormal) Collected: 12/24/21 1659    Specimen: Blood Updated: 12/24/21 1706    Narrative:      The following orders were created for panel order CBC & Differential.  Procedure                               Abnormality         Status                     ---------                               -----------         ------                     CBC Auto Differential[[id number]]        Abnormal            Final result                 Please view results for these tests on the individual orders.    Comprehensive Metabolic Panel [[id number]]  (Abnormal) Collected: 12/24/21 1659    Specimen: Blood Updated: 12/24/21 1733     Glucose 86 mg/dL      BUN 16 mg/dL      Creatinine 1.13 mg/dL      Sodium 142 mmol/L      Potassium 3.8 mmol/L      Chloride 108 mmol/L      CO2 23.9 mmol/L      Calcium 9.4 mg/dL      Total Protein 6.9 g/dL      Albumin 4.50 g/dL      ALT (SGPT) 21 U/L      AST (SGOT) 24 U/L      Alkaline Phosphatase 117 U/L      Total Bilirubin 0.6 mg/dL      eGFR Non African Amer 64 mL/min/1.73      Globulin 2.4 gm/dL      A/G Ratio 1.9 g/dL      BUN/Creatinine Ratio 14.2     Anion Gap 10.1 mmol/L     Narrative:      GFR Normal >60  Chronic Kidney Disease <60  Kidney Failure <15      Troponin [[id number]]  (Normal) Collected: 12/24/21 1659    Specimen: Blood Updated: 12/24/21 1733     Troponin T <0.010 ng/mL      Narrative:      Troponin T Reference Range:  <= 0.03 ng/mL-   Negative for AMI  >0.03 ng/mL-     Abnormal for myocardial necrosis.  Clinicians would have to utilize clinical acumen, EKG, Troponin and serial changes to determine if it is an Acute Myocardial Infarction or myocardial injury due to an underlying chronic condition.       Results may be falsely decreased if patient taking Biotin.      Magnesium [[id number]]  (Normal) Collected: 12/24/21 1659    Specimen: Blood Updated: 12/24/21 1733     Magnesium 1.8 mg/dL     CBC Auto Differential [[id number]]  (Abnormal) Collected: 12/24/21 1659    Specimen: Blood Updated: 12/24/21 1706     WBC 4.96 10*3/mm3      RBC 3.68 10*6/mm3      Hemoglobin 11.4 g/dL      Hematocrit 33.0 %      MCV 89.7 fL      MCH 31.0 pg      MCHC 34.5 g/dL      RDW 13.9 %      RDW-SD 45.7 fl      MPV 9.2 fL      Platelets 190 10*3/mm3      Neutrophil % 67.3 %      Lymphocyte % 21.8 %      Monocyte % 9.1 %      Eosinophil % 1.2 %      Basophil % 0.2 %      Immature Grans % 0.4 %      Neutrophils, Absolute 3.34 10*3/mm3      Lymphocytes, Absolute 1.08 10*3/mm3      Monocytes, Absolute 0.45 10*3/mm3      Eosinophils, Absolute 0.06 10*3/mm3      Basophils, Absolute 0.01 10*3/mm3      Immature Grans, Absolute 0.02 10*3/mm3      nRBC 0.0 /100 WBC     Urinalysis With Microscopic If Indicated (No Culture) - Urine, Clean Catch [[id number]]  (Abnormal) Collected: 12/24/21 1708    Specimen: Urine, Clean Catch Updated: 12/24/21 1728     Color, UA Yellow     Appearance, UA Clear     pH, UA 7.5     Specific Gravity, UA 1.007     Glucose, UA Negative     Ketones, UA Negative     Bilirubin, UA Negative     Blood, UA Trace     Protein, UA Negative     Leuk Esterase, UA Negative     Nitrite, UA Negative     Urobilinogen, UA 0.2 E.U./dL    Urinalysis, Microscopic Only - Urine, Clean Catch [[id number]]  (Abnormal) Collected: 12/24/21 1708    Specimen: Urine, Clean Catch Updated: 12/24/21 1728     RBC, UA 3-5 /HPF       WBC, UA 0-2 /HPF      Bacteria, UA None Seen /HPF      Squamous Epithelial Cells, UA 0-2 /HPF      Hyaline Casts, UA None Seen /LPF      Methodology Automated Microscopy           Imaging:  No Radiology Exams Resulted Within Past 24 Hours     Procedures:  Procedures    Progress                            Medical Decision Making:  MDM     This patient is a pleasant 71-year-old male with history of hypertension and anxiety, now presenting for episode of shortness of breath and hyperventilating and pins-and-needles in tingling sensation in the extremities, that he states is consistent with one of his panic attacks.    During these episodes, his blood pressure and heart rate are both quite elevated, and he presents to the ED to see if he needs any further medication for this.    His EKG is a normal sinus rhythm and unchanged from old EKG today.    All of his blood work here is at his baseline, and heart enzymes are negative.    I believe his symptoms are anxiety induced, as his blood pressure is completely normal here when he is calm.    I will give him a small prescription of Ativan to use sparingly as needed for severe panic attacks.      Final diagnoses:   Generalized anxiety disorder with panic attacks        Disposition:  ED Disposition     ED Disposition Condition Comment    Discharge Stable           This medical record created using voice recognition software and may contain unintended errors.        Castro Matos MD  12/24/21 [id number]

## 2021-12-25 LAB
QT INTERVAL: 448 MS
QT INTERVAL: 459 MS

## 2021-12-29 DIAGNOSIS — R35.1 BPH ASSOCIATED WITH NOCTURIA: Primary | ICD-10-CM

## 2021-12-29 DIAGNOSIS — N40.1 BPH ASSOCIATED WITH NOCTURIA: Primary | ICD-10-CM

## 2021-12-29 RX ORDER — TAMSULOSIN HYDROCHLORIDE 0.4 MG/1
CAPSULE ORAL
Qty: 30 CAPSULE | Refills: 12 | Status: SHIPPED | OUTPATIENT
Start: 2021-12-29 | End: 2022-10-12 | Stop reason: SDUPTHER

## 2022-01-04 ENCOUNTER — TELEPHONE (OUTPATIENT)
Dept: CARDIOLOGY | Facility: CLINIC | Age: 72
End: 2022-01-04

## 2022-01-04 DIAGNOSIS — I77.810 MILD ASCENDING AORTA DILATION: Primary | ICD-10-CM

## 2022-01-04 NOTE — TELEPHONE ENCOUNTER
----- Message from DAV Luong sent at 1/4/2022 12:40 PM EST -----  Notify pt echo result: Left ventricular ejection fraction appears to be 51 - 55%. Left ventricular systolic function is low normal. Left ventricular diastolic function was normal. Mild mitral valve regurgitation is present. Monitor with repeat echo in one year.   Ascending aorta = 3.9 cm, check CTA Chest yearly

## 2022-01-06 ENCOUNTER — APPOINTMENT (OUTPATIENT)
Dept: GENERAL RADIOLOGY | Facility: HOSPITAL | Age: 72
End: 2022-01-06

## 2022-01-06 ENCOUNTER — HOSPITAL ENCOUNTER (EMERGENCY)
Facility: HOSPITAL | Age: 72
Discharge: HOME | End: 2022-01-06
Attending: EMERGENCY MEDICINE | Admitting: EMERGENCY MEDICINE

## 2022-01-06 ENCOUNTER — TELEPHONE (OUTPATIENT)
Dept: CARDIOLOGY | Facility: CLINIC | Age: 72
End: 2022-01-06

## 2022-01-06 VITALS
OXYGEN SATURATION: 98 % | BODY MASS INDEX: 26.85 KG/M2 | RESPIRATION RATE: 18 BRPM | HEART RATE: 57 BPM | TEMPERATURE: 98.1 F | HEIGHT: 73 IN | SYSTOLIC BLOOD PRESSURE: 134 MMHG | DIASTOLIC BLOOD PRESSURE: 75 MMHG | WEIGHT: 202.6 LBS

## 2022-01-06 DIAGNOSIS — R06.00 DYSPNEA, UNSPECIFIED TYPE: Primary | ICD-10-CM

## 2022-01-06 DIAGNOSIS — R07.89 CHEST DISCOMFORT: ICD-10-CM

## 2022-01-06 LAB
ALBUMIN SERPL-MCNC: 4.6 G/DL (ref 3.5–5.2)
ALBUMIN/GLOB SERPL: 1.9 G/DL
ALP SERPL-CCNC: 96 U/L (ref 39–117)
ALT SERPL W P-5'-P-CCNC: 22 U/L (ref 1–41)
ANION GAP SERPL CALCULATED.3IONS-SCNC: 11.3 MMOL/L (ref 5–15)
AST SERPL-CCNC: 23 U/L (ref 1–40)
BASOPHILS # BLD AUTO: 0 10*3/MM3 (ref 0–0.2)
BASOPHILS NFR BLD AUTO: 0 % (ref 0–1.5)
BILIRUB SERPL-MCNC: 1.1 MG/DL (ref 0–1.2)
BUN SERPL-MCNC: 19 MG/DL (ref 8–23)
BUN/CREAT SERPL: 17.1 (ref 7–25)
CALCIUM SPEC-SCNC: 9.7 MG/DL (ref 8.6–10.5)
CHLORIDE SERPL-SCNC: 106 MMOL/L (ref 98–107)
CO2 SERPL-SCNC: 23.7 MMOL/L (ref 22–29)
CREAT SERPL-MCNC: 1.11 MG/DL (ref 0.76–1.27)
DEPRECATED RDW RBC AUTO: 48.8 FL (ref 37–54)
EOSINOPHIL # BLD AUTO: 0.03 10*3/MM3 (ref 0–0.4)
EOSINOPHIL NFR BLD AUTO: 0.5 % (ref 0.3–6.2)
ERYTHROCYTE [DISTWIDTH] IN BLOOD BY AUTOMATED COUNT: 14.3 % (ref 12.3–15.4)
GFR SERPL CREATININE-BSD FRML MDRD: 65 ML/MIN/1.73
GLOBULIN UR ELPH-MCNC: 2.4 GM/DL
GLUCOSE SERPL-MCNC: 113 MG/DL (ref 65–99)
HCT VFR BLD AUTO: 35.5 % (ref 37.5–51)
HGB BLD-MCNC: 11.9 G/DL (ref 13–17.7)
HOLD SPECIMEN: NORMAL
HOLD SPECIMEN: NORMAL
IMM GRANULOCYTES # BLD AUTO: 0.02 10*3/MM3 (ref 0–0.05)
IMM GRANULOCYTES NFR BLD AUTO: 0.4 % (ref 0–0.5)
LYMPHOCYTES # BLD AUTO: 0.8 10*3/MM3 (ref 0.7–3.1)
LYMPHOCYTES NFR BLD AUTO: 14.4 % (ref 19.6–45.3)
MCH RBC QN AUTO: 31.2 PG (ref 26.6–33)
MCHC RBC AUTO-ENTMCNC: 33.5 G/DL (ref 31.5–35.7)
MCV RBC AUTO: 92.9 FL (ref 79–97)
MONOCYTES # BLD AUTO: 0.5 10*3/MM3 (ref 0.1–0.9)
MONOCYTES NFR BLD AUTO: 9 % (ref 5–12)
NEUTROPHILS NFR BLD AUTO: 4.21 10*3/MM3 (ref 1.7–7)
NEUTROPHILS NFR BLD AUTO: 75.7 % (ref 42.7–76)
NRBC BLD AUTO-RTO: 0 /100 WBC (ref 0–0.2)
NT-PROBNP SERPL-MCNC: 88.1 PG/ML (ref 0–900)
PLATELET # BLD AUTO: 214 10*3/MM3 (ref 140–450)
PMV BLD AUTO: 9.5 FL (ref 6–12)
POTASSIUM SERPL-SCNC: 3.9 MMOL/L (ref 3.5–5.2)
PROT SERPL-MCNC: 7 G/DL (ref 6–8.5)
RBC # BLD AUTO: 3.82 10*6/MM3 (ref 4.14–5.8)
SODIUM SERPL-SCNC: 141 MMOL/L (ref 136–145)
TROPONIN T SERPL-MCNC: <0.01 NG/ML (ref 0–0.03)
WBC NRBC COR # BLD: 5.56 10*3/MM3 (ref 3.4–10.8)
WHOLE BLOOD HOLD SPECIMEN: NORMAL
WHOLE BLOOD HOLD SPECIMEN: NORMAL

## 2022-01-06 PROCEDURE — 71045 X-RAY EXAM CHEST 1 VIEW: CPT

## 2022-01-06 PROCEDURE — 83880 ASSAY OF NATRIURETIC PEPTIDE: CPT

## 2022-01-06 PROCEDURE — 36415 COLL VENOUS BLD VENIPUNCTURE: CPT

## 2022-01-06 PROCEDURE — 99284 EMERGENCY DEPT VISIT MOD MDM: CPT

## 2022-01-06 PROCEDURE — 80053 COMPREHEN METABOLIC PANEL: CPT

## 2022-01-06 PROCEDURE — 93010 ELECTROCARDIOGRAM REPORT: CPT | Performed by: INTERNAL MEDICINE

## 2022-01-06 PROCEDURE — 93005 ELECTROCARDIOGRAM TRACING: CPT | Performed by: EMERGENCY MEDICINE

## 2022-01-06 PROCEDURE — 85025 COMPLETE CBC W/AUTO DIFF WBC: CPT

## 2022-01-06 PROCEDURE — 93005 ELECTROCARDIOGRAM TRACING: CPT

## 2022-01-06 PROCEDURE — 84484 ASSAY OF TROPONIN QUANT: CPT

## 2022-01-06 RX ORDER — SODIUM CHLORIDE 0.9 % (FLUSH) 0.9 %
10 SYRINGE (ML) INJECTION AS NEEDED
Status: DISCONTINUED | OUTPATIENT
Start: 2022-01-06 | End: 2022-01-07 | Stop reason: HOSPADM

## 2022-01-06 NOTE — TELEPHONE ENCOUNTER
Received VM from patient regarding when his CT will be scheduled    SW patient. Advised hospital will call with appointment. Voiced understanding.

## 2022-01-07 LAB — QT INTERVAL: 444 MS

## 2022-01-07 NOTE — DISCHARGE INSTRUCTIONS
Avoid stress activities.  Take home medications as prescribed.  Follow-up with Dr. Sheehan in the next 3 to 5 days.  Call the office tomorrow for next available appointment.  Return to the ER for any change or worsening symptoms.

## 2022-01-07 NOTE — ED PROVIDER NOTES
Time: 10:07 PM EST  Arrived by: EMS  Chief Complaint: Smothering sensation  History provided by: Patient  History is limited by: N/A     History of Present Illness:  Patient is a 71 y.o.  male that presents to the emergency department with complaints of sensation of feeling he is being smothered.  Patient reports symptoms began 5 to 6 days ago.  Patient states he has also had prior episodes even before this.  Patient reports its worse at nighttime.  He also complains of burning sensation in his chest.  Patient describes as a sensation that he cannot get a hold breath and.  He denies any fevers or chills.  He denies a cough.  He denies any abdominal pain or nausea and vomiting.  Patient is unable to give any exacerbating or alleviating factors.    HPI    Patient Care Team  Primary Care Provider: Edith Marcelo APRN    Past Medical History:     No Known Allergies  Past Medical History:   Diagnosis Date   • Abnormal ECG    • Anxiety    • Arrhythmia    • Arthritis    • Atrial fibrillation (HCC)    • Bladder disorder    • BPH (benign prostatic hyperplasia)    • CAD (coronary artery disease)    • Cervical spondylosis without myelopathy 01/15/2020   • Cervicalgia    • Chest pain    • Colitis    • Condition not found HERNIA   • Depression    • Diverticulitis    • Diverticulosis of colon    • GERD (gastroesophageal reflux disease)    • H/O degenerative disc disease    • Heart attack (HCC)    • Heart disease    • Hemorrhoids    • High cholesterol    • Hypertension    • IBS (irritable bowel syndrome)    • Mood disorder (HCC)    • Muscle cramps    • Myocardial infarction (HCC)    • Osteoarthritis    • Prostate disorder    • S/P lumpectomy, right breast     CYST 2016     Past Surgical History:   Procedure Laterality Date   • APPENDECTOMY     • BREAST MASS EXCISION  2016   • CARDIAC CATHETERIZATION  2016   • CHOLECYSTECTOMY     • COLONOSCOPY  2008,2014,2021   • CORONARY ANGIOPLASTY WITH STENT PLACEMENT  2010   • ENDOSCOPY   2010,2019   • HEMORRHOIDECTOMY  2019   • INGUINAL HERNIA REPAIR  2019   • TURP / TRANSURETHRAL INCISION / DRAINAGE PROSTATE  01/16/2020    BUTTON TURP W/ DR TAM   • UMBILICAL HERNIA REPAIR  2019     Family History   Problem Relation Age of Onset   • Other Mother         bladder stones   • Arthritis Mother    • Heart disease Mother    • Heart failure Father    • Stroke Father    • Colon cancer Paternal Grandfather         60's       Home Medications:  Prior to Admission medications    Medication Sig Start Date End Date Taking? Authorizing Provider   apixaban (ELIQUIS) 5 MG tablet tablet Take 1 tablet by mouth Every 12 (Twelve) Hours. 12/10/21   Mayank Sheehan MD   atorvastatin (LIPITOR) 80 MG tablet Take 80 mg by mouth Every Night. 7/29/21   Elian Merrill MD   clopidogrel (PLAVIX) 75 MG tablet Take 1 tablet by mouth every day 11/11/21   Mayank Sheehan MD   DULoxetine (CYMBALTA) 60 MG capsule Take 60 mg by mouth Daily. 8/2/21   Eilan Merrill MD   hydrOXYzine (ATARAX) 25 MG tablet hydroxyzine HCl 25 mg oral tablet take 1 tablet (25 mg) by oral route 3 times per day   Active    ProviderElian MD   lisinopril (PRINIVIL,ZESTRIL) 5 MG tablet Take 5 mg by mouth Daily. 6/18/21   Elian Merrill MD   LORazepam (ATIVAN) 1 MG tablet Take 1 tablet by mouth 2 (Two) Times a Day As Needed for Anxiety. 12/24/21   Castro Matos MD   metoprolol succinate XL (TOPROL-XL) 25 MG 24 hr tablet Take 1 tablet by mouth Daily. 12/10/21   Mayank Sheehan MD   multivitamin with minerals tablet tablet Take 1 tablet by mouth Daily.    ProviderElian MD   nitroglycerin (NITROSTAT) 0.4 MG SL tablet Place 1 tablet under the tongue Every 5 (Five) Minutes As Needed for Chest Pain (no more than 3 doses in 15 minutes). 12/10/21   Mayank Sheehan MD   pantoprazole (PROTONIX) 40 MG EC tablet pantoprazole 40 mg tablet,delayed release   TAKE ONE TABLET BY MOUTH EVERY DAY    Elian Merrill MD   sertraline (ZOLOFT) 100  "MG tablet Daily.    Provider, MD Elian   tadalafil (CIALIS) 5 MG tablet Take 1 tablet by mouth Daily for 360 days. 10/11/21 10/6/22  Mallorie Matt MD   tamsulosin (FLOMAX) 0.4 MG capsule 24 hr capsule TAKE ONE CAPSULE BY MOUTH EVERY DAY ONE-HALF HOUR FOLLOWING THE SAME MEAL EACH DAY 12/29/21   Mallorie Matt MD   traMADol (ULTRAM) 50 MG tablet tramadol 50 mg oral tablet take 1 tablet (50 mg) by oral route every 4-6 hours as needed   Active    Provider, MD Elian   triamcinolone (KENALOG) 0.5 % cream triamcinolone acetonide 0.5 % topical cream    Provider, MD Elian        Social History:   Social History     Tobacco Use   • Smoking status: Never Smoker   • Smokeless tobacco: Never Used   Vaping Use   • Vaping Use: Never used   Substance Use Topics   • Alcohol use: Not Currently   • Drug use: Never       Review of Systems:  Review of Systems   Constitutional: Negative for chills and fever.   HENT: Negative for congestion, ear pain and sore throat.    Eyes: Negative for pain.   Respiratory: Positive for shortness of breath. Negative for cough and chest tightness.    Cardiovascular: Positive for chest pain.   Gastrointestinal: Negative for abdominal pain, diarrhea, nausea and vomiting.   Genitourinary: Negative for flank pain and hematuria.   Musculoskeletal: Negative for joint swelling.   Skin: Negative for pallor.   Neurological: Negative for seizures and headaches.   All other systems reviewed and are negative.         Physical Exam:  /70   Pulse 57   Temp 98.1 °F (36.7 °C) (Oral)   Resp 15   Ht 185.4 cm (73\")   Wt 91.9 kg (202 lb 9.6 oz)   SpO2 97%   BMI 26.73 kg/m²     Physical Exam Vital signs were reviewed under triage note.  General appearance - Patient appears well-developed and well-nourished.  Patient is in no acute distress.  Head - Normocephalic, atraumatic.  Pupils - Equal, round, reactive to light.  Extraocular muscles are intact.  Conjunctive is clear.  Nasal - " Normal inspection.  No evidence of trauma or epistaxis.  Tympanic membranes - Gray, intact without erythema or retractions.  Oral mucosa - Pink and moist without lesions or erythema.  Uvula is midline.  Chest wall - Atraumatic.  Chest wall is nontender.  There is no vesicular rashes noted.  Neck - Supple.  Trachea was midline.  There is no palpable lymphadenopathy or thyromegaly.  There are no meningeal signs  Lungs - Clear to auscultation and percussion bilaterally.  Heart - Regular rate and rhythm without any murmurs, clicks, or gallops.  Abdomen - Soft.  Bowel sounds are present.  There is no palpable tenderness.  There is no rebound, guarding, or rigidity.  There are no palpable masses.  There are no pulsatile masses.  Back - Spine is straight and midline.  There is no CVA tenderness.  Extremities - Intact x4 with full range of motion.  There is no palpable edema.  Pulses are intact x4 and equal.  Neurologic - Patient is awake, alert, and oriented x3.  Cranial nerves II through XII are grossly intact.  Motor and sensory functions grossly intact.  Cerebellar function was normal.  Integument - There are no rashes.  There are no petechia or purpura lesions noted.  There are no vesicular lesions noted.            Medications in the Emergency Department:  Medications   sodium chloride 0.9 % flush 10 mL (has no administration in time range)        Labs  Lab Results (last 24 hours)     Procedure Component Value Units Date/Time    CBC & Differential [[id number]]  (Abnormal) Collected: 01/06/22 1708    Specimen: Blood Updated: 01/06/22 1721    Narrative:      The following orders were created for panel order CBC & Differential.  Procedure                               Abnormality         Status                     ---------                               -----------         ------                     CBC Auto Differential[[id number]]        Abnormal            Final result                 Please view results for these tests  on the individual orders.    Comprehensive Metabolic Panel [[id number]]  (Abnormal) Collected: 01/06/22 1708    Specimen: Blood Updated: 01/06/22 1745     Glucose 113 mg/dL      BUN 19 mg/dL      Creatinine 1.11 mg/dL      Sodium 141 mmol/L      Potassium 3.9 mmol/L      Chloride 106 mmol/L      CO2 23.7 mmol/L      Calcium 9.7 mg/dL      Total Protein 7.0 g/dL      Albumin 4.60 g/dL      ALT (SGPT) 22 U/L      AST (SGOT) 23 U/L      Alkaline Phosphatase 96 U/L      Total Bilirubin 1.1 mg/dL      eGFR Non African Amer 65 mL/min/1.73      Globulin 2.4 gm/dL      A/G Ratio 1.9 g/dL      BUN/Creatinine Ratio 17.1     Anion Gap 11.3 mmol/L     Narrative:      GFR Normal >60  Chronic Kidney Disease <60  Kidney Failure <15      BNP [[id number]]  (Normal) Collected: 01/06/22 1708    Specimen: Blood Updated: 01/06/22 1743     proBNP 88.1 pg/mL     Narrative:      Among patients with dyspnea, NT-proBNP is highly sensitive for the detection of acute congestive heart failure. In addition NT-proBNP of <300 pg/ml effectively rules out acute congestive heart failure with 99% negative predictive value.    Results may be falsely decreased if patient taking Biotin.      Troponin [[id number]]  (Normal) Collected: 01/06/22 1708    Specimen: Blood Updated: 01/06/22 1745     Troponin T <0.010 ng/mL     Narrative:      Troponin T Reference Range:  <= 0.03 ng/mL-   Negative for AMI  >0.03 ng/mL-     Abnormal for myocardial necrosis.  Clinicians would have to utilize clinical acumen, EKG, Troponin and serial changes to determine if it is an Acute Myocardial Infarction or myocardial injury due to an underlying chronic condition.       Results may be falsely decreased if patient taking Biotin.      CBC Auto Differential [[id number]]  (Abnormal) Collected: 01/06/22 1708    Specimen: Blood Updated: 01/06/22 1721     WBC 5.56 10*3/mm3      RBC 3.82 10*6/mm3      Hemoglobin 11.9 g/dL      Hematocrit 35.5 %      MCV 92.9 fL      MCH 31.2 pg      MCHC  33.5 g/dL      RDW 14.3 %      RDW-SD 48.8 fl      MPV 9.5 fL      Platelets 214 10*3/mm3      Neutrophil % 75.7 %      Lymphocyte % 14.4 %      Monocyte % 9.0 %      Eosinophil % 0.5 %      Basophil % 0.0 %      Immature Grans % 0.4 %      Neutrophils, Absolute 4.21 10*3/mm3      Lymphocytes, Absolute 0.80 10*3/mm3      Monocytes, Absolute 0.50 10*3/mm3      Eosinophils, Absolute 0.03 10*3/mm3      Basophils, Absolute 0.00 10*3/mm3      Immature Grans, Absolute 0.02 10*3/mm3      nRBC 0.0 /100 WBC            Imaging:  XR Chest 1 View    Result Date: 1/6/2022  PROCEDURE: XR CHEST 1 VW  COMPARISON: [facility], , XR CHEST 1 VW, 12/24/2021, 17:38.  INDICATIONS: SOB, ANXIETY, BURNING OF THE CHEST, EDEMA X2-3 MONTHS.  FINDINGS:  Heart size and pulmonary vessels are within normal limits.  Lungs are clear bilaterally.  There are no pleural effusions.  Degenerative changes are noted of the spine.  No acute bony abnormality        1. No acute cardiopulmonary disease.       JESSICA ANGELO MD       Electronically Signed and Approved By: JESSICA ANGELO MD on 1/06/2022 at 18:02                EKG:  EKG performed at 1716 was interpreted by me to show a sinus bradycardia with a ventricular rate 56 beats minute.  Patient had a shortened LA interval of a 67 ms.  P waves are normal.  QRS interval was 127 ms with a right bundle branch block.  Axis was leftward -30 degrees.  There is no acute ischemic ST or T wave change identified.  QT corrected was 420 ms.    Procedures:  Procedures    Progress           HEART Score: 3             The patient was seen evaluated ED by me.  The above history and physical examination was performed as document.  The diagnostic data was obtained peer results reviewed.  Patient's cardiac work-up was negative.  Patient's chest x-ray also did not show any acute cardiopulmonary processes.  Patient is stable for discharge home with close outpatient follow-up with Dr. Sheehan.  Patient was  instructed to call his office in the a.m. for follow-up within the next week.  Patient verbalizes agreement of this treatment plan.      Medical Decision Making:  MDM     Final diagnoses:   Dyspnea, unspecified type   Chest discomfort        Disposition:  ED Disposition     ED Disposition Condition Comment    Discharge Stable            Ahsan Williamson DO  01/06/22 9640

## 2022-01-12 ENCOUNTER — HOSPITAL ENCOUNTER (EMERGENCY)
Facility: HOSPITAL | Age: 72
Discharge: PSYCHIATRIC OR SUBSTANCE USE FACILITY | End: 2022-01-13
Attending: EMERGENCY MEDICINE | Admitting: EMERGENCY MEDICINE

## 2022-01-12 ENCOUNTER — OFFICE VISIT (OUTPATIENT)
Dept: UROLOGY | Facility: CLINIC | Age: 72
End: 2022-01-12

## 2022-01-12 VITALS
BODY MASS INDEX: 26.62 KG/M2 | HEIGHT: 73 IN | DIASTOLIC BLOOD PRESSURE: 73 MMHG | OXYGEN SATURATION: 100 % | TEMPERATURE: 97.7 F | SYSTOLIC BLOOD PRESSURE: 149 MMHG | WEIGHT: 200.84 LBS | RESPIRATION RATE: 16 BRPM | HEART RATE: 59 BPM

## 2022-01-12 VITALS
HEIGHT: 73 IN | RESPIRATION RATE: 16 BRPM | WEIGHT: 204.2 LBS | DIASTOLIC BLOOD PRESSURE: 64 MMHG | BODY MASS INDEX: 27.06 KG/M2 | SYSTOLIC BLOOD PRESSURE: 119 MMHG | HEART RATE: 61 BPM

## 2022-01-12 DIAGNOSIS — R45.851 SUICIDAL IDEATIONS: Primary | ICD-10-CM

## 2022-01-12 DIAGNOSIS — N40.0 BENIGN PROSTATIC HYPERPLASIA, UNSPECIFIED WHETHER LOWER URINARY TRACT SYMPTOMS PRESENT: Primary | ICD-10-CM

## 2022-01-12 LAB
ALBUMIN SERPL-MCNC: 4.7 G/DL (ref 3.5–5.2)
ALBUMIN/GLOB SERPL: 2 G/DL
ALP SERPL-CCNC: 117 U/L (ref 39–117)
ALT SERPL W P-5'-P-CCNC: 22 U/L (ref 1–41)
AMPHET+METHAMPHET UR QL: NEGATIVE
ANION GAP SERPL CALCULATED.3IONS-SCNC: 9.9 MMOL/L (ref 5–15)
APAP SERPL-MCNC: <5 MCG/ML (ref 0–30)
AST SERPL-CCNC: 22 U/L (ref 1–40)
BARBITURATES UR QL SCN: NEGATIVE
BASOPHILS # BLD AUTO: 0.01 10*3/MM3 (ref 0–0.2)
BASOPHILS NFR BLD AUTO: 0.2 % (ref 0–1.5)
BENZODIAZ UR QL SCN: NEGATIVE
BILIRUB BLD-MCNC: NEGATIVE MG/DL
BILIRUB SERPL-MCNC: 0.4 MG/DL (ref 0–1.2)
BUN SERPL-MCNC: 21 MG/DL (ref 8–23)
BUN/CREAT SERPL: 18.9 (ref 7–25)
CALCIUM SPEC-SCNC: 9.8 MG/DL (ref 8.6–10.5)
CANNABINOIDS SERPL QL: NEGATIVE
CHLORIDE SERPL-SCNC: 100 MMOL/L (ref 98–107)
CLARITY, POC: CLEAR
CO2 SERPL-SCNC: 25.1 MMOL/L (ref 22–29)
COCAINE UR QL: NEGATIVE
COLOR UR: YELLOW
CREAT SERPL-MCNC: 1.11 MG/DL (ref 0.76–1.27)
DEPRECATED RDW RBC AUTO: 48.3 FL (ref 37–54)
EOSINOPHIL # BLD AUTO: 0.09 10*3/MM3 (ref 0–0.4)
EOSINOPHIL NFR BLD AUTO: 1.7 % (ref 0.3–6.2)
ERYTHROCYTE [DISTWIDTH] IN BLOOD BY AUTOMATED COUNT: 14.2 % (ref 12.3–15.4)
ETHANOL BLD-MCNC: <10 MG/DL (ref 0–10)
ETHANOL UR QL: <0.01 %
EXPIRATION DATE: ABNORMAL
GFR SERPL CREATININE-BSD FRML MDRD: 65 ML/MIN/1.73
GLOBULIN UR ELPH-MCNC: 2.4 GM/DL
GLUCOSE BLDC GLUCOMTR-MCNC: 96 MG/DL (ref 70–99)
GLUCOSE SERPL-MCNC: 107 MG/DL (ref 65–99)
GLUCOSE UR STRIP-MCNC: NEGATIVE MG/DL
HCT VFR BLD AUTO: 36.6 % (ref 37.5–51)
HGB BLD-MCNC: 12.3 G/DL (ref 13–17.7)
HOLD SPECIMEN: NORMAL
HOLD SPECIMEN: NORMAL
IMM GRANULOCYTES # BLD AUTO: 0.01 10*3/MM3 (ref 0–0.05)
IMM GRANULOCYTES NFR BLD AUTO: 0.2 % (ref 0–0.5)
KETONES UR QL: NEGATIVE
LEUKOCYTE EST, POC: NEGATIVE
LYMPHOCYTES # BLD AUTO: 1.3 10*3/MM3 (ref 0.7–3.1)
LYMPHOCYTES NFR BLD AUTO: 24.2 % (ref 19.6–45.3)
Lab: ABNORMAL
MCH RBC QN AUTO: 31.3 PG (ref 26.6–33)
MCHC RBC AUTO-ENTMCNC: 33.6 G/DL (ref 31.5–35.7)
MCV RBC AUTO: 93.1 FL (ref 79–97)
METHADONE UR QL SCN: NEGATIVE
MONOCYTES # BLD AUTO: 0.62 10*3/MM3 (ref 0.1–0.9)
MONOCYTES NFR BLD AUTO: 11.5 % (ref 5–12)
NEUTROPHILS NFR BLD AUTO: 3.35 10*3/MM3 (ref 1.7–7)
NEUTROPHILS NFR BLD AUTO: 62.2 % (ref 42.7–76)
NITRITE UR-MCNC: NEGATIVE MG/ML
NRBC BLD AUTO-RTO: 0 /100 WBC (ref 0–0.2)
OPIATES UR QL: NEGATIVE
OXYCODONE UR QL SCN: NEGATIVE
PH UR: 6 [PH] (ref 5–8)
PLATELET # BLD AUTO: 223 10*3/MM3 (ref 140–450)
PMV BLD AUTO: 9.7 FL (ref 6–12)
POTASSIUM SERPL-SCNC: 3.9 MMOL/L (ref 3.5–5.2)
PROT SERPL-MCNC: 7.1 G/DL (ref 6–8.5)
PROT UR STRIP-MCNC: NEGATIVE MG/DL
RBC # BLD AUTO: 3.93 10*6/MM3 (ref 4.14–5.8)
RBC # UR STRIP: ABNORMAL /UL
SALICYLATES SERPL-MCNC: <0.3 MG/DL
SARS-COV-2 RNA RESP QL NAA+PROBE: NOT DETECTED
SODIUM SERPL-SCNC: 135 MMOL/L (ref 136–145)
SP GR UR: 1.02 (ref 1–1.03)
T4 FREE SERPL-MCNC: 1.34 NG/DL (ref 0.93–1.7)
TSH SERPL DL<=0.05 MIU/L-ACNC: 4.56 UIU/ML (ref 0.27–4.2)
URINE VOLUME: 0
UROBILINOGEN UR QL: NORMAL
WBC NRBC COR # BLD: 5.38 10*3/MM3 (ref 3.4–10.8)
WHOLE BLOOD HOLD SPECIMEN: NORMAL
WHOLE BLOOD HOLD SPECIMEN: NORMAL

## 2022-01-12 PROCEDURE — 99212 OFFICE O/P EST SF 10 MIN: CPT | Performed by: UROLOGY

## 2022-01-12 PROCEDURE — U0005 INFEC AGEN DETEC AMPLI PROBE: HCPCS

## 2022-01-12 PROCEDURE — 82077 ASSAY SPEC XCP UR&BREATH IA: CPT | Performed by: EMERGENCY MEDICINE

## 2022-01-12 PROCEDURE — 80307 DRUG TEST PRSMV CHEM ANLYZR: CPT | Performed by: EMERGENCY MEDICINE

## 2022-01-12 PROCEDURE — 80179 DRUG ASSAY SALICYLATE: CPT | Performed by: EMERGENCY MEDICINE

## 2022-01-12 PROCEDURE — 80143 DRUG ASSAY ACETAMINOPHEN: CPT | Performed by: EMERGENCY MEDICINE

## 2022-01-12 PROCEDURE — 99283 EMERGENCY DEPT VISIT LOW MDM: CPT

## 2022-01-12 PROCEDURE — 84439 ASSAY OF FREE THYROXINE: CPT | Performed by: EMERGENCY MEDICINE

## 2022-01-12 PROCEDURE — 80053 COMPREHEN METABOLIC PANEL: CPT | Performed by: EMERGENCY MEDICINE

## 2022-01-12 PROCEDURE — C9803 HOPD COVID-19 SPEC COLLECT: HCPCS

## 2022-01-12 PROCEDURE — 84443 ASSAY THYROID STIM HORMONE: CPT | Performed by: EMERGENCY MEDICINE

## 2022-01-12 PROCEDURE — 85025 COMPLETE CBC W/AUTO DIFF WBC: CPT | Performed by: EMERGENCY MEDICINE

## 2022-01-12 PROCEDURE — U0004 COV-19 TEST NON-CDC HGH THRU: HCPCS

## 2022-01-12 PROCEDURE — 51798 US URINE CAPACITY MEASURE: CPT | Performed by: UROLOGY

## 2022-01-12 PROCEDURE — 82962 GLUCOSE BLOOD TEST: CPT

## 2022-01-12 PROCEDURE — 36415 COLL VENOUS BLD VENIPUNCTURE: CPT

## 2022-01-12 PROCEDURE — 81003 URINALYSIS AUTO W/O SCOPE: CPT | Performed by: UROLOGY

## 2022-01-12 RX ORDER — DIPHENOXYLATE HYDROCHLORIDE AND ATROPINE SULFATE 2.5; .025 MG/1; MG/1
1 TABLET ORAL DAILY
COMMUNITY
End: 2022-06-10

## 2022-01-12 RX ORDER — SODIUM CHLORIDE 0.9 % (FLUSH) 0.9 %
10 SYRINGE (ML) INJECTION AS NEEDED
Status: DISCONTINUED | OUTPATIENT
Start: 2022-01-12 | End: 2022-01-13 | Stop reason: HOSPADM

## 2022-01-12 RX ORDER — DESVENLAFAXINE SUCCINATE 50 MG/1
50 TABLET, EXTENDED RELEASE ORAL DAILY
COMMUNITY
Start: 2022-01-11 | End: 2022-06-10

## 2022-01-12 RX ORDER — DESVENLAFAXINE SUCCINATE 50 MG/1
TABLET, EXTENDED RELEASE ORAL
COMMUNITY
Start: 2022-01-11 | End: 2022-06-10

## 2022-01-12 NOTE — PROGRESS NOTES
"Chief Complaint  No chief complaint on file.    Subjective          Ari Olea presents to [facility]  The patient is a very pleasant 71-year-old male that has several urological problems.      He has also had some problems with BPH symptoms. He is currently on Flomax and is stable with that.  He stopped finasteride in the past due to problems with erections. He does not want to start it back.      He had a button TURP in January 2020.  He states his stream was really really good for a while but over the last month or 2 has gotten worse.    He has nocturia up to 7 times a night and a slower stream that now starts and stops.  He does not feel he empties all the way.      He is using sildenafil and is doing well on it. He takes 60mg and it works well for him.      Since I saw him last he has been on tadalafil 5mg once a day and it is working really well.  He has no new complaints. PSA 3 months ago was 3.    His PSA in July 2018 was 2.4.       Objective   Vital Signs:   /64   Pulse 61   Resp 16   Ht 185.4 cm (73\")   Wt 92.6 kg (204 lb 3.2 oz)   BMI 26.94 kg/m²     Physical Exam  Constitutional:       Appearance: Normal appearance.   Cardiovascular:      Rate and Rhythm: Normal rate and regular rhythm.   Pulmonary:      Effort: Pulmonary effort is normal.      Breath sounds: Normal breath sounds.   Neurological:      Mental Status: He is alert. Mental status is at baseline.   Psychiatric:         Mood and Affect: Mood and affect normal.         Speech: Speech normal.         Judgment: Judgment normal.        Result Review :   The following data was reviewed by: Mallorie Matt MD on 01/12/2022:  PSA    PSA 10/11/21   PSA 3.080                     Results for orders placed or performed in visit on 01/12/22   Bladder Scan   Result Value Ref Range    Urine Volume 0    POC Urinalysis Dipstick, Automated    Specimen: Urine   Result Value Ref Range    Color Yellow Yellow, Straw, " Dark Yellow, Nano    Clarity, UA Clear Clear    Specific Gravity  1.025 1.005 - 1.030    pH, Urine 6.0 5.0 - 8.0    Leukocytes Negative Negative    Nitrite, UA Negative Negative    Protein, POC Negative Negative mg/dL    Glucose, UA Negative Negative, 1000 mg/dL (3+) mg/dL    Ketones, UA Negative Negative    Urobilinogen, UA Normal Normal    Bilirubin Negative Negative    Blood, UA Trace (A) Negative    Lot Number [id number]     Expiration Date 2,023/2        Assessment and Plan    Diagnoses and all orders for this visit:    1. Benign prostatic hyperplasia, unspecified whether lower urinary tract symptoms present (Primary)  Assessment & Plan:  Continue tadalafil 5mg once a day and flomax 0.4mg once a day.  I will see him in 9 months.     Orders:  -     POC Urinalysis Dipstick, Automated  -     Bladder Scan      Follow Up   Return in about 9 months (around 10/12/2022).  Patient was given instructions and counseling regarding his condition or for health maintenance advice. Please see specific information pulled into the AVS if appropriate.

## 2022-01-13 NOTE — ED TRIAGE NOTES
"Patient came into the ED today for anxiety. Patient states, \"I've been fighting my nerves for the past couple of weeks. I thought it was time to get help. I've been working for the past couple of weeks and I thought I was going to die. My blood pressure was getting so high I thought I was going to have a stroke. I've been panicked and depressed and I just can't function. I got to the point that I was having thoughts of hurting my self. I'm just overwhelmed and have no quality of life.\"  "

## 2022-01-13 NOTE — ED NOTES
Called [facility] to check for geriatric psychiatric bed placement. [facility] stated that they had some openings, but a referral would need to be faxed over. Referral faxed and Mallorie DEMARCO made aware.      Angélica Hubbard, RN  01/13/22 0132       Angélica Hubbard, RN  01/13/22 0134

## 2022-01-13 NOTE — ED NOTES
Spoke will [facility] to follow up on referral. Stated that referral was received and reviewing it now. They will be sending it to the doctor to review and will call us back in 30 minutes to an hour to let us know what was determined. Patient and Mallorie DEMARCO made aware.     Angélica Hubbard, RN  01/13/22 0143

## 2022-01-13 NOTE — ED NOTES
Spoke with [facility] to check for geriatric psychiatric bed placement for patient. Stated that beds are available, but referral will need to be sent. Referral faxed and APRN made aware.      Angélica Hubbard RN  01/13/22 2408

## 2022-01-13 NOTE — ED PROVIDER NOTES
"Subjective   Patient is a 71-year-old male that presents to the emergency department today via POV depression, anxiety, suicidal ideations.  Patient states he has struggled with mental illness before, but for approximately 1 month he has developed worsening panic, anxiety, and \" nerves.\"  Patient states he recently had to put his mom in a nursing home, he was recently diagnosed with some cardiac issues, that recently his life has been \" one burden after another.\"  Patient states him and his wife also had a argument prior to him coming here tonight.  He feels as if she does not understand him and cannot handle him.  He states he does have 2 pistols at home by his bedside.  Is any HI, hallucinations both auditory or visual, and denies any substance abuse.      History provided by:  Patient   used: No        Review of Systems   Constitutional: Negative for chills and fever.   HENT: Negative for congestion, ear pain and sore throat.    Eyes: Negative for pain.   Respiratory: Negative for cough, chest tightness and shortness of breath.    Cardiovascular: Negative for chest pain.   Gastrointestinal: Negative for abdominal pain, diarrhea, nausea and vomiting.   Genitourinary: Negative for flank pain and hematuria.   Musculoskeletal: Negative for joint swelling.   Skin: Negative for pallor.   Neurological: Negative for seizures and headaches.   Psychiatric/Behavioral: Positive for suicidal ideas.        Patient states he has had mental illness in the past including anxiety, depression, and suicidal ideations.  When asked directly if he wants to hurt himself he states he did before he got here, but now he does not know.  He denies HI.   All other systems reviewed and are negative.      Past Medical History:   Diagnosis Date   • Abnormal ECG    • Anxiety    • Arrhythmia    • Arthritis    • Atrial fibrillation (HCC)    • Bladder disorder    • BPH (benign prostatic hyperplasia)    • CAD (coronary artery " disease)    • Cervical spondylosis without myelopathy 01/15/2020   • Cervicalgia    • Chest pain    • Colitis    • Condition not found HERNIA   • Depression    • Diverticulitis    • Diverticulosis of colon    • GERD (gastroesophageal reflux disease)    • H/O degenerative disc disease    • Heart attack (HCC)    • Heart disease    • Hemorrhoids    • High cholesterol    • Hypertension    • IBS (irritable bowel syndrome)    • Mood disorder ([facility])    • Muscle cramps    • Myocardial infarction ([facility])    • Osteoarthritis    • Prostate disorder    • S/P lumpectomy, right breast     CYST 2016       No Known Allergies    Past Surgical History:   Procedure Laterality Date   • APPENDECTOMY     • BREAST MASS EXCISION  2016   • CARDIAC CATHETERIZATION  2016   • CHOLECYSTECTOMY     • COLONOSCOPY  2008,2014,2021   • CORONARY ANGIOPLASTY WITH STENT PLACEMENT  2010   • ENDOSCOPY  2010,2019   • HEMORRHOIDECTOMY  2019   • INGUINAL HERNIA REPAIR  2019   • TURP / TRANSURETHRAL INCISION / DRAINAGE PROSTATE  01/16/2020    BUTTON TURP W/ DR TAM   • UMBILICAL HERNIA REPAIR  2019       Family History   Problem Relation Age of Onset   • Other Mother         bladder stones   • Arthritis Mother    • Heart disease Mother    • Heart failure Father    • Stroke Father    • Colon cancer Paternal Grandfather         60's       Social History     Socioeconomic History   • Marital status:    Tobacco Use   • Smoking status: Never Smoker   • Smokeless tobacco: Never Used   Vaping Use   • Vaping Use: Never used   Substance and Sexual Activity   • Alcohol use: Not Currently   • Drug use: Never   • Sexual activity: Defer           Objective   Physical Exam  Vitals and nursing note reviewed.   Constitutional:       General: He is not in acute distress.     Appearance: Normal appearance. He is not toxic-appearing.   HENT:      Head: Normocephalic and atraumatic.      Mouth/Throat:      Mouth: Mucous membranes are moist.   Eyes:      General: No scleral  icterus.  Cardiovascular:      Rate and Rhythm: Normal rate and regular rhythm.      Pulses: Normal pulses.      Heart sounds: Normal heart sounds.   Pulmonary:      Effort: Pulmonary effort is normal. No respiratory distress.      Breath sounds: Normal breath sounds.   Abdominal:      General: Abdomen is flat.      Palpations: Abdomen is soft.      Tenderness: There is no abdominal tenderness.   Musculoskeletal:         General: Normal range of motion.      Cervical back: Normal range of motion and neck supple.   Skin:     General: Skin is warm and dry.   Neurological:      Mental Status: He is alert and oriented to person, place, and time. Mental status is at baseline.   Psychiatric:         Behavior: Behavior normal.         Thought Content: Thought content normal.         Judgment: Judgment normal.         Procedures           ED Course  ED Course as of 01/13/22 0432   Wed Jan 12, 2022   2100 Patient was offered something to drink and states he is okay at this time.  He denies any pain.  Patient is awake alert and oriented in no acute distress.  Patient to be evaluated by representative from [facility]. [MS]   2111 Report given to Jose with [facility] - he will be evaluating pt for psych eval.  [MS]   2200  with [facility] recommends patient receiving inpatient therapy.  The patient admitted to Jose with [facility] that did have 2 pistols at home and his intention is to act on it.  He also told him that he would probably be dead by Sunday if he did not go somewhere and receive help. [MS]   Thu Jan 13, 2022   0202 Primary RN continues to work on finding bed placement for patient.  She is waiting to hear back from [facility] in [place] on acceptance.  And the facility [facility] states they will have to reevaluate patient in the morning when the appropriate staff get there. [MS]   0203 Report given to nurse practitioner Bartolo Law. [MS]   0402 Nurse reports obtaining bed at  [facility] accepting Dr. Luz.  [CM]      ED Course User Index  [CM] Bartolo Law, DAV  [MS] Kya Russell, DAV                                                 MDM  Number of Diagnoses or Management Options     Amount and/or Complexity of Data Reviewed  Clinical lab tests: reviewed  Decide to obtain previous medical records or to obtain history from someone other than the patient: yes        Final diagnoses:   None       ED Disposition  ED Disposition     None          No follow-up provider specified.       Medication List      No changes were made to your prescriptions during this visit.          Bartolo Law, DAV  01/14/22 0315

## 2022-01-13 NOTE — ED NOTES
Spoke with [facility] to make sure they received the referral. [facility] stated that they did receive the referral but they won't have an update if they will be accepting the patient until around 8-9am once the doctors are there to evaluate the referral. Mallorie DEMARCO and patient made aware.     Angélica Hubbard RN  01/13/22 0136

## 2022-01-20 ENCOUNTER — HOSPITAL ENCOUNTER (OUTPATIENT)
Dept: CT IMAGING | Facility: HOSPITAL | Age: 72
Discharge: HOME | End: 2022-01-20
Admitting: NURSE PRACTITIONER

## 2022-01-20 DIAGNOSIS — I77.810 MILD ASCENDING AORTA DILATION: ICD-10-CM

## 2022-01-20 PROCEDURE — 0 IOPAMIDOL PER 1 ML: Performed by: NURSE PRACTITIONER

## 2022-01-20 PROCEDURE — 71275 CT ANGIOGRAPHY CHEST: CPT

## 2022-01-20 RX ADMIN — IOPAMIDOL 100 ML: 755 INJECTION, SOLUTION INTRAVENOUS at 18:21

## 2022-01-21 ENCOUNTER — TELEPHONE (OUTPATIENT)
Dept: CARDIOLOGY | Facility: CLINIC | Age: 72
End: 2022-01-21

## 2022-01-21 NOTE — TELEPHONE ENCOUNTER
----- Message from DAV Luong sent at 1/21/2022  8:11 AM EST -----  Notify pt CT Chest result: Dilation of the ascending aorta, 4 cm maximal diameter.  This is unchanged over the   past 2 years.  No aneurysm elsewhere.

## 2022-01-24 ENCOUNTER — TELEPHONE (OUTPATIENT)
Dept: CARDIOLOGY | Facility: CLINIC | Age: 72
End: 2022-01-24

## 2022-01-24 NOTE — TELEPHONE ENCOUNTER
----- Message from DAV Luong sent at 1/21/2022 12:50 PM EST -----  Notify pt holter result: Normal sinus rhythm with an average heart rate 64 bpm  Maximal heart 125 bpm/Minimal heart rate 47 bpm  PACs 20 (rare)  1 SVT episode lasted 7 beats at rate of about 110, continue current dose of metoprolol. Keep June follow up

## 2022-01-26 ENCOUNTER — HOSPITAL ENCOUNTER (EMERGENCY)
Facility: HOSPITAL | Age: 72
Discharge: LEFT BEFORE DISCHARGE | End: 2022-01-26

## 2022-01-26 VITALS
DIASTOLIC BLOOD PRESSURE: 69 MMHG | HEART RATE: 55 BPM | HEIGHT: 73 IN | TEMPERATURE: 98.1 F | OXYGEN SATURATION: 96 % | BODY MASS INDEX: 27.99 KG/M2 | SYSTOLIC BLOOD PRESSURE: 106 MMHG | WEIGHT: 211.2 LBS | RESPIRATION RATE: 20 BRPM

## 2022-01-26 PROCEDURE — 99211 OFF/OP EST MAY X REQ PHY/QHP: CPT

## 2022-01-30 ENCOUNTER — HOSPITAL ENCOUNTER (EMERGENCY)
Facility: HOSPITAL | Age: 72
Discharge: HOME | End: 2022-01-30
Attending: EMERGENCY MEDICINE | Admitting: EMERGENCY MEDICINE

## 2022-01-30 ENCOUNTER — APPOINTMENT (OUTPATIENT)
Dept: GENERAL RADIOLOGY | Facility: HOSPITAL | Age: 72
End: 2022-01-30

## 2022-01-30 VITALS
BODY MASS INDEX: 27.04 KG/M2 | RESPIRATION RATE: 22 BRPM | DIASTOLIC BLOOD PRESSURE: 58 MMHG | TEMPERATURE: 98.1 F | WEIGHT: 204 LBS | OXYGEN SATURATION: 90 % | HEIGHT: 73 IN | HEART RATE: 61 BPM | SYSTOLIC BLOOD PRESSURE: 107 MMHG

## 2022-01-30 DIAGNOSIS — R10.13 EPIGASTRIC ABDOMINAL PAIN: Primary | ICD-10-CM

## 2022-01-30 LAB
ALBUMIN SERPL-MCNC: 4.9 G/DL (ref 3.5–5.2)
ALBUMIN/GLOB SERPL: 1.9 G/DL
ALP SERPL-CCNC: 97 U/L (ref 39–117)
ALT SERPL W P-5'-P-CCNC: 64 U/L (ref 1–41)
ANION GAP SERPL CALCULATED.3IONS-SCNC: 15.2 MMOL/L (ref 5–15)
AST SERPL-CCNC: 38 U/L (ref 1–40)
BASOPHILS # BLD AUTO: 0.01 10*3/MM3 (ref 0–0.2)
BASOPHILS NFR BLD AUTO: 0.2 % (ref 0–1.5)
BILIRUB SERPL-MCNC: 0.8 MG/DL (ref 0–1.2)
BUN SERPL-MCNC: 20 MG/DL (ref 8–23)
BUN/CREAT SERPL: 15.9 (ref 7–25)
CALCIUM SPEC-SCNC: 10 MG/DL (ref 8.6–10.5)
CHLORIDE SERPL-SCNC: 99 MMOL/L (ref 98–107)
CK MB SERPL-CCNC: 3.78 NG/ML
CK SERPL-CCNC: 141 U/L (ref 20–200)
CO2 SERPL-SCNC: 21.8 MMOL/L (ref 22–29)
CREAT SERPL-MCNC: 1.26 MG/DL (ref 0.76–1.27)
DEPRECATED RDW RBC AUTO: 47.8 FL (ref 37–54)
EOSINOPHIL # BLD AUTO: 0.05 10*3/MM3 (ref 0–0.4)
EOSINOPHIL NFR BLD AUTO: 0.8 % (ref 0.3–6.2)
ERYTHROCYTE [DISTWIDTH] IN BLOOD BY AUTOMATED COUNT: 14.2 % (ref 12.3–15.4)
GFR SERPL CREATININE-BSD FRML MDRD: 56 ML/MIN/1.73
GLOBULIN UR ELPH-MCNC: 2.6 GM/DL
GLUCOSE SERPL-MCNC: 182 MG/DL (ref 65–99)
HCT VFR BLD AUTO: 37.9 % (ref 37.5–51)
HGB BLD-MCNC: 12.8 G/DL (ref 13–17.7)
HOLD SPECIMEN: NORMAL
IMM GRANULOCYTES # BLD AUTO: 0.02 10*3/MM3 (ref 0–0.05)
IMM GRANULOCYTES NFR BLD AUTO: 0.3 % (ref 0–0.5)
LIPASE SERPL-CCNC: 20 U/L (ref 13–60)
LYMPHOCYTES # BLD AUTO: 1.52 10*3/MM3 (ref 0.7–3.1)
LYMPHOCYTES NFR BLD AUTO: 23.8 % (ref 19.6–45.3)
MAGNESIUM SERPL-MCNC: 2.1 MG/DL (ref 1.6–2.4)
MCH RBC QN AUTO: 31.1 PG (ref 26.6–33)
MCHC RBC AUTO-ENTMCNC: 33.8 G/DL (ref 31.5–35.7)
MCV RBC AUTO: 92.2 FL (ref 79–97)
MONOCYTES # BLD AUTO: 0.59 10*3/MM3 (ref 0.1–0.9)
MONOCYTES NFR BLD AUTO: 9.2 % (ref 5–12)
NEUTROPHILS NFR BLD AUTO: 4.21 10*3/MM3 (ref 1.7–7)
NEUTROPHILS NFR BLD AUTO: 65.7 % (ref 42.7–76)
NRBC BLD AUTO-RTO: 0 /100 WBC (ref 0–0.2)
NT-PROBNP SERPL-MCNC: 52.8 PG/ML (ref 0–900)
PLATELET # BLD AUTO: 250 10*3/MM3 (ref 140–450)
PMV BLD AUTO: 9.4 FL (ref 6–12)
POTASSIUM SERPL-SCNC: 3.8 MMOL/L (ref 3.5–5.2)
PROT SERPL-MCNC: 7.5 G/DL (ref 6–8.5)
RBC # BLD AUTO: 4.11 10*6/MM3 (ref 4.14–5.8)
SODIUM SERPL-SCNC: 136 MMOL/L (ref 136–145)
TROPONIN I SERPL-MCNC: 0 NG/ML (ref 0–0.6)
TROPONIN I SERPL-MCNC: 0 NG/ML (ref 0–0.6)
WBC NRBC COR # BLD: 6.4 10*3/MM3 (ref 3.4–10.8)
WHOLE BLOOD HOLD SPECIMEN: NORMAL
WHOLE BLOOD HOLD SPECIMEN: NORMAL

## 2022-01-30 PROCEDURE — 82550 ASSAY OF CK (CPK): CPT

## 2022-01-30 PROCEDURE — 84484 ASSAY OF TROPONIN QUANT: CPT

## 2022-01-30 PROCEDURE — 71045 X-RAY EXAM CHEST 1 VIEW: CPT

## 2022-01-30 PROCEDURE — U0004 COV-19 TEST NON-CDC HGH THRU: HCPCS | Performed by: EMERGENCY MEDICINE

## 2022-01-30 PROCEDURE — U0005 INFEC AGEN DETEC AMPLI PROBE: HCPCS | Performed by: EMERGENCY MEDICINE

## 2022-01-30 PROCEDURE — 93005 ELECTROCARDIOGRAM TRACING: CPT | Performed by: EMERGENCY MEDICINE

## 2022-01-30 PROCEDURE — 83880 ASSAY OF NATRIURETIC PEPTIDE: CPT

## 2022-01-30 PROCEDURE — 80053 COMPREHEN METABOLIC PANEL: CPT

## 2022-01-30 PROCEDURE — 93005 ELECTROCARDIOGRAM TRACING: CPT

## 2022-01-30 PROCEDURE — 85025 COMPLETE CBC W/AUTO DIFF WBC: CPT

## 2022-01-30 PROCEDURE — 83735 ASSAY OF MAGNESIUM: CPT

## 2022-01-30 PROCEDURE — 93010 ELECTROCARDIOGRAM REPORT: CPT | Performed by: INTERNAL MEDICINE

## 2022-01-30 PROCEDURE — 36415 COLL VENOUS BLD VENIPUNCTURE: CPT

## 2022-01-30 PROCEDURE — 83690 ASSAY OF LIPASE: CPT

## 2022-01-30 PROCEDURE — 96374 THER/PROPH/DIAG INJ IV PUSH: CPT

## 2022-01-30 PROCEDURE — 82553 CREATINE MB FRACTION: CPT

## 2022-01-30 PROCEDURE — 99283 EMERGENCY DEPT VISIT LOW MDM: CPT

## 2022-01-30 RX ORDER — MIRTAZAPINE 30 MG/1
30 TABLET, FILM COATED ORAL
COMMUNITY
End: 2022-12-27 | Stop reason: ALTCHOICE

## 2022-01-30 RX ORDER — ASPIRIN 81 MG/1
324 TABLET, CHEWABLE ORAL ONCE
Status: DISCONTINUED | OUTPATIENT
Start: 2022-01-30 | End: 2022-01-31 | Stop reason: HOSPADM

## 2022-01-30 RX ORDER — PANTOPRAZOLE SODIUM 40 MG/10ML
40 INJECTION, POWDER, LYOPHILIZED, FOR SOLUTION INTRAVENOUS ONCE
Status: COMPLETED | OUTPATIENT
Start: 2022-01-30 | End: 2022-01-30

## 2022-01-30 RX ORDER — FAMOTIDINE 40 MG/1
40 TABLET, FILM COATED ORAL
COMMUNITY
Start: 2022-01-28 | End: 2022-05-07

## 2022-01-30 RX ORDER — LIDOCAINE HYDROCHLORIDE 20 MG/ML
15 SOLUTION OROPHARYNGEAL ONCE
Status: COMPLETED | OUTPATIENT
Start: 2022-01-30 | End: 2022-01-30

## 2022-01-30 RX ORDER — ALUMINA, MAGNESIA, AND SIMETHICONE 2400; 2400; 240 MG/30ML; MG/30ML; MG/30ML
15 SUSPENSION ORAL ONCE
Status: COMPLETED | OUTPATIENT
Start: 2022-01-30 | End: 2022-01-30

## 2022-01-30 RX ORDER — SODIUM CHLORIDE 0.9 % (FLUSH) 0.9 %
10 SYRINGE (ML) INJECTION AS NEEDED
Status: DISCONTINUED | OUTPATIENT
Start: 2022-01-30 | End: 2022-01-31 | Stop reason: HOSPADM

## 2022-01-30 RX ORDER — SUCRALFATE 1 G/1
1 TABLET ORAL 4 TIMES DAILY
Qty: 120 TABLET | Refills: 0 | Status: SHIPPED | OUTPATIENT
Start: 2022-01-30 | End: 2022-06-10 | Stop reason: SDUPTHER

## 2022-01-30 RX ADMIN — ALUMINUM HYDROXIDE, MAGNESIUM HYDROXIDE, AND DIMETHICONE 15 ML: 400; 400; 40 SUSPENSION ORAL at 19:31

## 2022-01-30 RX ADMIN — LIDOCAINE HYDROCHLORIDE 15 ML: 20 SOLUTION ORAL; TOPICAL at 19:31

## 2022-01-30 RX ADMIN — PANTOPRAZOLE SODIUM 40 MG: 40 INJECTION, POWDER, FOR SOLUTION INTRAVENOUS at 19:31

## 2022-01-30 RX ADMIN — SODIUM CHLORIDE 1000 ML: 9 INJECTION, SOLUTION INTRAVENOUS at 19:31

## 2022-01-31 LAB
QT INTERVAL: 367 MS
SARS-COV-2 RNA PNL SPEC NAA+PROBE: NOT DETECTED

## 2022-02-01 ENCOUNTER — HOSPITAL ENCOUNTER (EMERGENCY)
Facility: HOSPITAL | Age: 72
Discharge: PSYCHIATRIC OR SUBSTANCE USE FACILITY | End: 2022-02-02
Attending: EMERGENCY MEDICINE | Admitting: EMERGENCY MEDICINE

## 2022-02-01 VITALS
HEIGHT: 73 IN | SYSTOLIC BLOOD PRESSURE: 150 MMHG | BODY MASS INDEX: 26.73 KG/M2 | HEART RATE: 63 BPM | TEMPERATURE: 99.1 F | WEIGHT: 201.72 LBS | RESPIRATION RATE: 18 BRPM | OXYGEN SATURATION: 100 % | DIASTOLIC BLOOD PRESSURE: 92 MMHG

## 2022-02-01 DIAGNOSIS — R45.851 SUICIDE IDEATION: ICD-10-CM

## 2022-02-01 DIAGNOSIS — F32.A DEPRESSION, UNSPECIFIED DEPRESSION TYPE: Primary | ICD-10-CM

## 2022-02-01 LAB
ALBUMIN SERPL-MCNC: 4.7 G/DL (ref 3.5–5.2)
ALBUMIN/GLOB SERPL: 1.9 G/DL
ALP SERPL-CCNC: 95 U/L (ref 39–117)
ALT SERPL W P-5'-P-CCNC: 47 U/L (ref 1–41)
AMPHET+METHAMPHET UR QL: NEGATIVE
ANION GAP SERPL CALCULATED.3IONS-SCNC: 11 MMOL/L (ref 5–15)
APAP SERPL-MCNC: <5 MCG/ML (ref 0–30)
AST SERPL-CCNC: 29 U/L (ref 1–40)
BACTERIA UR QL AUTO: ABNORMAL /HPF
BARBITURATES UR QL SCN: NEGATIVE
BASOPHILS # BLD AUTO: 0 10*3/MM3 (ref 0–0.2)
BASOPHILS NFR BLD AUTO: 0 % (ref 0–1.5)
BENZODIAZ UR QL SCN: NEGATIVE
BILIRUB SERPL-MCNC: 0.8 MG/DL (ref 0–1.2)
BILIRUB UR QL STRIP: NEGATIVE
BUN SERPL-MCNC: 16 MG/DL (ref 8–23)
BUN/CREAT SERPL: 15.4 (ref 7–25)
CALCIUM SPEC-SCNC: 9.7 MG/DL (ref 8.6–10.5)
CANNABINOIDS SERPL QL: NEGATIVE
CHLORIDE SERPL-SCNC: 104 MMOL/L (ref 98–107)
CLARITY UR: CLEAR
CO2 SERPL-SCNC: 24 MMOL/L (ref 22–29)
COCAINE UR QL: NEGATIVE
COLOR UR: YELLOW
CREAT SERPL-MCNC: 1.04 MG/DL (ref 0.76–1.27)
DEPRECATED RDW RBC AUTO: 45.2 FL (ref 37–54)
EOSINOPHIL # BLD AUTO: 0.06 10*3/MM3 (ref 0–0.4)
EOSINOPHIL NFR BLD AUTO: 1.3 % (ref 0.3–6.2)
ERYTHROCYTE [DISTWIDTH] IN BLOOD BY AUTOMATED COUNT: 14 % (ref 12.3–15.4)
ETHANOL BLD-MCNC: <10 MG/DL (ref 0–10)
ETHANOL UR QL: <0.01 %
GFR SERPL CREATININE-BSD FRML MDRD: 70 ML/MIN/1.73
GLOBULIN UR ELPH-MCNC: 2.5 GM/DL
GLUCOSE SERPL-MCNC: 112 MG/DL (ref 65–99)
GLUCOSE UR STRIP-MCNC: NEGATIVE MG/DL
HCT VFR BLD AUTO: 34.4 % (ref 37.5–51)
HGB BLD-MCNC: 11.9 G/DL (ref 13–17.7)
HGB UR QL STRIP.AUTO: ABNORMAL
HOLD SPECIMEN: NORMAL
HOLD SPECIMEN: NORMAL
HYALINE CASTS UR QL AUTO: ABNORMAL /LPF
IMM GRANULOCYTES # BLD AUTO: 0.01 10*3/MM3 (ref 0–0.05)
IMM GRANULOCYTES NFR BLD AUTO: 0.2 % (ref 0–0.5)
KETONES UR QL STRIP: NEGATIVE
LEUKOCYTE ESTERASE UR QL STRIP.AUTO: NEGATIVE
LYMPHOCYTES # BLD AUTO: 0.89 10*3/MM3 (ref 0.7–3.1)
LYMPHOCYTES NFR BLD AUTO: 19.2 % (ref 19.6–45.3)
MCH RBC QN AUTO: 30.5 PG (ref 26.6–33)
MCHC RBC AUTO-ENTMCNC: 34.6 G/DL (ref 31.5–35.7)
MCV RBC AUTO: 88.2 FL (ref 79–97)
METHADONE UR QL SCN: NEGATIVE
MONOCYTES # BLD AUTO: 0.52 10*3/MM3 (ref 0.1–0.9)
MONOCYTES NFR BLD AUTO: 11.2 % (ref 5–12)
NEUTROPHILS NFR BLD AUTO: 3.16 10*3/MM3 (ref 1.7–7)
NEUTROPHILS NFR BLD AUTO: 68.1 % (ref 42.7–76)
NITRITE UR QL STRIP: NEGATIVE
NRBC BLD AUTO-RTO: 0 /100 WBC (ref 0–0.2)
OPIATES UR QL: NEGATIVE
OXYCODONE UR QL SCN: NEGATIVE
PH UR STRIP.AUTO: 7.5 [PH] (ref 5–8)
PLATELET # BLD AUTO: 237 10*3/MM3 (ref 140–450)
PMV BLD AUTO: 9.1 FL (ref 6–12)
POTASSIUM SERPL-SCNC: 3.9 MMOL/L (ref 3.5–5.2)
PROT SERPL-MCNC: 7.2 G/DL (ref 6–8.5)
PROT UR QL STRIP: NEGATIVE
RBC # BLD AUTO: 3.9 10*6/MM3 (ref 4.14–5.8)
RBC # UR STRIP: ABNORMAL /HPF
REF LAB TEST METHOD: ABNORMAL
SALICYLATES SERPL-MCNC: <0.3 MG/DL
SARS-COV-2 RNA RESP QL NAA+PROBE: NOT DETECTED
SODIUM SERPL-SCNC: 139 MMOL/L (ref 136–145)
SP GR UR STRIP: 1.01 (ref 1–1.03)
SQUAMOUS #/AREA URNS HPF: ABNORMAL /HPF
UROBILINOGEN UR QL STRIP: ABNORMAL
WBC # UR STRIP: ABNORMAL /HPF
WBC NRBC COR # BLD: 4.64 10*3/MM3 (ref 3.4–10.8)
WHOLE BLOOD HOLD SPECIMEN: NORMAL
WHOLE BLOOD HOLD SPECIMEN: NORMAL

## 2022-02-01 PROCEDURE — 36415 COLL VENOUS BLD VENIPUNCTURE: CPT

## 2022-02-01 PROCEDURE — U0005 INFEC AGEN DETEC AMPLI PROBE: HCPCS | Performed by: EMERGENCY MEDICINE

## 2022-02-01 PROCEDURE — 80307 DRUG TEST PRSMV CHEM ANLYZR: CPT | Performed by: EMERGENCY MEDICINE

## 2022-02-01 PROCEDURE — U0003 INFECTIOUS AGENT DETECTION BY NUCLEIC ACID (DNA OR RNA); SEVERE ACUTE RESPIRATORY SYNDROME CORONAVIRUS 2 (SARS-COV-2) (CORONAVIRUS DISEASE [COVID-19]), AMPLIFIED PROBE TECHNIQUE, MAKING USE OF HIGH THROUGHPUT TECHNOLOGIES AS DESCRIBED BY CMS-2020-01-R: HCPCS | Performed by: EMERGENCY MEDICINE

## 2022-02-01 PROCEDURE — 80143 DRUG ASSAY ACETAMINOPHEN: CPT | Performed by: EMERGENCY MEDICINE

## 2022-02-01 PROCEDURE — 81001 URINALYSIS AUTO W/SCOPE: CPT | Performed by: EMERGENCY MEDICINE

## 2022-02-01 PROCEDURE — 99283 EMERGENCY DEPT VISIT LOW MDM: CPT

## 2022-02-01 PROCEDURE — 85025 COMPLETE CBC W/AUTO DIFF WBC: CPT | Performed by: EMERGENCY MEDICINE

## 2022-02-01 PROCEDURE — C9803 HOPD COVID-19 SPEC COLLECT: HCPCS

## 2022-02-01 PROCEDURE — 80179 DRUG ASSAY SALICYLATE: CPT | Performed by: EMERGENCY MEDICINE

## 2022-02-01 PROCEDURE — 82077 ASSAY SPEC XCP UR&BREATH IA: CPT | Performed by: EMERGENCY MEDICINE

## 2022-02-01 PROCEDURE — 80053 COMPREHEN METABOLIC PANEL: CPT | Performed by: EMERGENCY MEDICINE

## 2022-02-01 RX ORDER — SODIUM CHLORIDE 0.9 % (FLUSH) 0.9 %
10 SYRINGE (ML) INJECTION AS NEEDED
Status: DISCONTINUED | OUTPATIENT
Start: 2022-02-01 | End: 2022-02-02 | Stop reason: HOSPADM

## 2022-02-02 NOTE — ED PROVIDER NOTES
Time: 10:12 PM EST  Arrived by: private car  Chief Complaint: Depression  History provided by: corby  History is limited by: N/A     History of Present Illness:  Patient presents emergency department with anxiety depression and suicidal ideation.  Patient states he has been dealing with depression and anxiety for a long time.  He states today it got worse.  He denies any inciting event.  He reports suicidal ideation.  He denies any specific plans.  He denies any homicidal ideation.  He denies any auditory or visual hallucinations.  He is not currently seeing a psychiatrist/psychologist.  He denies any fever, chills, chest pain, shortness of breath, abdominal pain, diarrhea, chills.  He reports he has had a Covid vaccine.  Denies any known exposure.      Depression  Presenting symptoms: depression and suicidal thoughts    Presenting symptoms: no delusions, no disorganized speech, no hallucinations, no homicidal ideas, no self-mutilation and no suicide attempt    Degree of incapacity (severity):  Moderate  Onset quality:  Gradual  Timing:  Constant  Progression:  Worsening  Chronicity:  Chronic  Context: not alcohol use, not drug abuse, not recent medication change and not stressful life event    Treatment compliance:  Untreated  Relieved by:  Nothing  Worsened by:  Nothing  Ineffective treatments:  Antidepressants and benzodiazepines  Associated symptoms: anxiety    Associated symptoms: no abdominal pain, no chest pain, no fatigue, no headaches and no weight change    Risk factors: no hx of suicide attempts        Similar Symptoms Previously: yes  Recently seen: no      Patient Care Team  Primary Care Provider: Edith Marcelo APRN    Past Medical History:   No Known Allergies  Past Medical History:   Diagnosis Date   • Abnormal ECG    • Anxiety    • Arrhythmia    • Arthritis    • Atrial fibrillation (HCC)    • Bladder disorder    • BPH (benign prostatic hyperplasia)    • CAD (coronary artery disease)    •  Cervical spondylosis without myelopathy 01/15/2020   • Cervicalgia    • Chest pain    • Colitis    • Condition not found HERNIA   • Depression    • Diverticulitis    • Diverticulosis of colon    • GERD (gastroesophageal reflux disease)    • H/O degenerative disc disease    • Heart attack (HCC)    • Heart disease    • Hemorrhoids    • High cholesterol    • Hypertension    • IBS (irritable bowel syndrome)    • Mood disorder (HCC)    • Muscle cramps    • Myocardial infarction (HCC)    • Osteoarthritis    • Prostate disorder    • S/P lumpectomy, right breast     CYST 2016     Past Surgical History:   Procedure Laterality Date   • APPENDECTOMY     • BREAST MASS EXCISION  2016   • CARDIAC CATHETERIZATION  2016   • CHOLECYSTECTOMY     • COLONOSCOPY  2008,2014,2021   • CORONARY ANGIOPLASTY WITH STENT PLACEMENT  2010   • ENDOSCOPY  2010,2019   • HEMORRHOIDECTOMY  2019   • INGUINAL HERNIA REPAIR  2019   • TURP / TRANSURETHRAL INCISION / DRAINAGE PROSTATE  01/16/2020    BUTTON TURP W/ DR TAM   • UMBILICAL HERNIA REPAIR  2019     Family History   Problem Relation Age of Onset   • Other Mother         bladder stones   • Arthritis Mother    • Heart disease Mother    • Heart failure Father    • Stroke Father    • Colon cancer Paternal Grandfather         60's       Home Medications:  Prior to Admission medications    Medication Sig Start Date End Date Taking? Authorizing Provider   apixaban (ELIQUIS) 5 MG tablet tablet Take 1 tablet by mouth Every 12 (Twelve) Hours. 12/10/21   Mayank Sheehan MD   atorvastatin (LIPITOR) 80 MG tablet Take 80 mg by mouth Every Night. 7/29/21   Elian Merrill MD   clopidogrel (PLAVIX) 75 MG tablet Take 1 tablet by mouth every day 11/11/21   Mayank Sheehan MD   desvenlafaxine (PRISTIQ) 50 MG 24 hr tablet Take 50 mg by mouth Daily. 1/11/22 1/12/23  Elian Merrill MD   desvenlafaxine (PRISTIQ) 50 MG 24 hr tablet  1/11/22   Elian Merrill MD   famotidine (PEPCID) 40 MG tablet Take 40  mg by mouth. 1/28/22 1/29/23  Elian Merrill MD   hydrOXYzine (ATARAX) 25 MG tablet hydroxyzine HCl 25 mg oral tablet take 1 tablet (25 mg) by oral route 3 times per day   Active    Elian Merrill MD   lisinopril (PRINIVIL,ZESTRIL) 5 MG tablet Take 5 mg by mouth Daily. 6/18/21   Elian Merrill MD   LORazepam (ATIVAN) 1 MG tablet Take 1 tablet by mouth 2 (Two) Times a Day As Needed for Anxiety. 12/24/21   Castro Matos MD   metoprolol succinate XL (TOPROL-XL) 25 MG 24 hr tablet Take 1 tablet by mouth Daily. 12/10/21   Mayank Sheeahn MD   mirtazapine (REMERON) 30 MG tablet Take 30 mg by mouth.    Elian Merrill MD   multivitamin (THERAGRAN) tablet tablet Take 1 tablet by mouth Daily.    Elian Merrill MD   multivitamin with minerals tablet tablet Take 1 tablet by mouth Daily.    Elian Merrill MD   nitroglycerin (NITROSTAT) 0.4 MG SL tablet Place 1 tablet under the tongue Every 5 (Five) Minutes As Needed for Chest Pain (no more than 3 doses in 15 minutes). 12/10/21   Mayank Sheehan MD   pantoprazole (PROTONIX) 40 MG EC tablet pantoprazole 40 mg tablet,delayed release   TAKE ONE TABLET BY MOUTH EVERY DAY    Elian Merrill MD   sertraline (ZOLOFT) 100 MG tablet Daily.    Elian Merrill MD   sucralfate (CARAFATE) 1 g tablet Take 1 tablet by mouth 4 (Four) Times a Day. 1/30/22   Mayank Heck DO   tadalafil (CIALIS) 5 MG tablet Take 1 tablet by mouth Daily for 360 days. 10/11/21 10/6/22  Mallorie Matt MD   tamsulosin (FLOMAX) 0.4 MG capsule 24 hr capsule TAKE ONE CAPSULE BY MOUTH EVERY DAY ONE-HALF HOUR FOLLOWING THE SAME MEAL EACH DAY 12/29/21   Mallorie Matt MD   traMADol (ULTRAM) 50 MG tablet tramadol 50 mg oral tablet take 1 tablet (50 mg) by oral route every 4-6 hours as needed   Active    Elian Merrill MD   triamcinolone (KENALOG) 0.5 % cream triamcinolone acetonide 0.5 % topical cream    Provider, Historical, MD        Social History:  "  Social History     Tobacco Use   • Smoking status: Never Smoker   • Smokeless tobacco: Never Used   Vaping Use   • Vaping Use: Never used   Substance Use Topics   • Alcohol use: Not Currently   • Drug use: Never       Review of Systems:  Review of Systems   Constitutional: Negative for chills, fatigue and fever.   HENT: Negative for congestion, ear pain and sore throat.    Eyes: Negative for pain.   Respiratory: Negative for cough, chest tightness and shortness of breath.    Cardiovascular: Negative for chest pain.   Gastrointestinal: Negative for abdominal pain, diarrhea, nausea and vomiting.   Genitourinary: Negative for flank pain and hematuria.   Musculoskeletal: Negative for joint swelling.   Skin: Negative for pallor.   Neurological: Negative for seizures and headaches.   Psychiatric/Behavioral: Positive for suicidal ideas. Negative for hallucinations, homicidal ideas and self-injury. The patient is nervous/anxious.    All other systems reviewed and are negative.       Physical Exam:   /92 (Patient Position: Sitting)   Pulse 63   Temp 99.1 °F (37.3 °C) (Oral)   Resp 18   Ht 185.4 cm (73\")   Wt 91.5 kg (201 lb 11.5 oz)   SpO2 100%   BMI 26.61 kg/m²     Physical Exam  Constitutional:       Appearance: Normal appearance.   HENT:      Head: Normocephalic and atraumatic.      Nose: Nose normal.      Mouth/Throat:      Mouth: Mucous membranes are moist.   Eyes:      Extraocular Movements: Extraocular movements intact.      Conjunctiva/sclera: Conjunctivae normal.      Pupils: Pupils are equal, round, and reactive to light.   Cardiovascular:      Rate and Rhythm: Normal rate and regular rhythm.      Pulses: Normal pulses.      Heart sounds: Normal heart sounds.   Pulmonary:      Effort: Pulmonary effort is normal.      Breath sounds: Normal breath sounds.   Abdominal:      General: There is no distension.      Palpations: Abdomen is soft.      Tenderness: There is no abdominal tenderness. "   Musculoskeletal:         General: Normal range of motion.      Cervical back: Normal range of motion.   Skin:     General: Skin is warm and dry.      Capillary Refill: Capillary refill takes less than 2 seconds.   Neurological:      General: No focal deficit present.      Mental Status: He is alert and oriented to person, place, and time. Mental status is at baseline.   Psychiatric:         Mood and Affect: Mood normal.         Behavior: Behavior normal.                Medications in the Emergency Department:  Medications   sodium chloride 0.9 % flush 10 mL (has no administration in time range)        Labs  Lab Results (last 24 hours)     Procedure Component Value Units Date/Time    CBC & Differential [[id number]]  (Abnormal) Collected: 02/01/22 2105    Specimen: Blood from Arm, Right Updated: 02/01/22 2138    Narrative:      The following orders were created for panel order CBC & Differential.  Procedure                               Abnormality         Status                     ---------                               -----------         ------                     CBC Auto Differential[[id number]]        Abnormal            Final result                 Please view results for these tests on the individual orders.    Comprehensive Metabolic Panel [[id number]]  (Abnormal) Collected: 02/01/22 2105    Specimen: Blood from Arm, Right Updated: 02/01/22 2157     Glucose 112 mg/dL      BUN 16 mg/dL      Creatinine 1.04 mg/dL      Sodium 139 mmol/L      Potassium 3.9 mmol/L      Chloride 104 mmol/L      CO2 24.0 mmol/L      Calcium 9.7 mg/dL      Total Protein 7.2 g/dL      Albumin 4.70 g/dL      ALT (SGPT) 47 U/L      AST (SGOT) 29 U/L      Alkaline Phosphatase 95 U/L      Total Bilirubin 0.8 mg/dL      eGFR Non African Amer 70 mL/min/1.73      Globulin 2.5 gm/dL      A/G Ratio 1.9 g/dL      BUN/Creatinine Ratio 15.4     Anion Gap 11.0 mmol/L     Narrative:      GFR Normal >60  Chronic Kidney Disease <60  Kidney Failure  <15      CBC Auto Differential [[id number]]  (Abnormal) Collected: 02/01/22 2105    Specimen: Blood from Arm, Right Updated: 02/01/22 2138     WBC 4.64 10*3/mm3      RBC 3.90 10*6/mm3      Hemoglobin 11.9 g/dL      Hematocrit 34.4 %      MCV 88.2 fL      MCH 30.5 pg      MCHC 34.6 g/dL      RDW 14.0 %      RDW-SD 45.2 fl      MPV 9.1 fL      Platelets 237 10*3/mm3      Neutrophil % 68.1 %      Lymphocyte % 19.2 %      Monocyte % 11.2 %      Eosinophil % 1.3 %      Basophil % 0.0 %      Immature Grans % 0.2 %      Neutrophils, Absolute 3.16 10*3/mm3      Lymphocytes, Absolute 0.89 10*3/mm3      Monocytes, Absolute 0.52 10*3/mm3      Eosinophils, Absolute 0.06 10*3/mm3      Basophils, Absolute 0.00 10*3/mm3      Immature Grans, Absolute 0.01 10*3/mm3      nRBC 0.0 /100 WBC     Acetaminophen Level [[id number]]  (Normal) Collected: 02/01/22 2105    Specimen: Blood from Arm, Right Updated: 02/01/22 2157     Acetaminophen <5.0 mcg/mL     Ethanol [[id number]] Collected: 02/01/22 2105    Specimen: Blood from Arm, Right Updated: 02/01/22 2157     Ethanol <10 mg/dL      Ethanol % <0.010 %     Narrative:      Ethanol (Plasma)  <10 Essentially Negative    Toxic Concentrations           mg/dL    Flushing, slowing of reflexes    Impaired visual activity         Depression of CNS              >100  Possible Coma                  >300       Salicylate Level [[id number]]  (Normal) Collected: 02/01/22 2105    Specimen: Blood from Arm, Right Updated: 02/01/22 2157     Salicylate <0.3 mg/dL     Urinalysis With Microscopic If Indicated (No Culture) - Urine, Clean Catch [[id number]]  (Abnormal) Collected: 02/01/22 2123    Specimen: Urine, Clean Catch Updated: 02/01/22 2203     Color, UA Yellow     Appearance, UA Clear     pH, UA 7.5     Specific Gravity, UA 1.013     Glucose, UA Negative     Ketones, UA Negative     Bilirubin, UA Negative     Blood, UA Small (1+)     Protein, UA Negative     Leuk Esterase, UA Negative     Nitrite,  UA Negative     Urobilinogen, UA 0.2 E.U./dL    Urine Drug Screen - Urine, Clean Catch [[id number]]  (Normal) Collected: 02/01/22 2123    Specimen: Urine, Clean Catch Updated: 02/01/22 2155     Amphet/Methamphet, Screen Negative     Barbiturates Screen, Urine Negative     Benzodiazepine Screen, Urine Negative     Cocaine Screen, Urine Negative     Opiate Screen Negative     THC, Screen, Urine Negative     Methadone Screen, Urine Negative     Oxycodone Screen, Urine Negative    Narrative:      Negative Thresholds Per Drugs Screened:    Amphetamines                 500 ng/ml  Barbiturates                 200 ng/ml  Benzodiazepines              100 ng/ml  Cocaine                      300 ng/ml  Methadone                    300 ng/ml  Opiates                      300 ng/ml  Oxycodone                    100 ng/ml  THC                           50 ng/ml    The Normal Value for all drugs tested is negative. This report includes final unconfirmed screening results to be used for medical treatment purposes only. Unconfirmed results must not be used for non-medical purposes such as employment or legal testing. Clinical consideration should be applied to any drug of abuse test, particularly when unconfirmed results are used.            Urinalysis, Microscopic Only - Urine, Clean Catch [[id number]]  (Abnormal) Collected: 02/01/22 2123    Specimen: Urine, Clean Catch Updated: 02/01/22 2203     RBC, UA 6-12 /HPF      WBC, UA 0-2 /HPF      Bacteria, UA None Seen /HPF      Squamous Epithelial Cells, UA 0-2 /HPF      Hyaline Casts, UA None Seen /LPF      Methodology Automated Microscopy    COVID-19,CEPHEID/YURI,COR/EDUARDO/PAD/MYRNA IN-HOUSE(OR EMERGENT/ADD-ON),NP SWAB IN TRANSPORT MEDIA 3-4 HR TAT, RT-PCR - Swab, Nasopharynx [[id number]]  (Normal) Collected: 02/01/22 2130    Specimen: Swab from Nasopharynx Updated: 02/01/22 2211     COVID19 Not Detected    Narrative:      Fact sheet for providers: https://www.fda.gov/media/674663/download      Fact sheet for patients: https://www.fda.gov/media/311981/download  Fact sheet for providers: https://www.fda.gov/media/002935/download     Fact sheet for patients: https://www.fda.gov/media/034215/download           Imaging:  No Radiology Exams Resulted Within Past 24 Hours    Procedures:  Procedures    Progress                            Medical Decision Making:  MDM  Number of Diagnoses or Management Options  Depression, unspecified depression type  Suicide ideation  Diagnosis management comments: Patient is afebrile nontoxic-appearing.  Vital signs are stable.  Patient reports depression and anxiety.  He denies any alcohol or drug use.  He reports suicidal ideation.  Labs were obtained that showed no significant abnormality.  [facility] was consulted and evaluated patient.  They recommended in patient treatment. Patient is agreeable. He will be send to psychiatric facility for further care.       Amount and/or Complexity of Data Reviewed  Clinical lab tests: reviewed and ordered  Tests in the medicine section of CPT®: ordered and reviewed  Review and summarize past medical records: yes  Independent visualization of images, tracings, or specimens: yes    Risk of Complications, Morbidity, and/or Mortality  Presenting problems: moderate  Management options: moderate         Final diagnoses:   Depression, unspecified depression type   Suicide ideation        Disposition:  ED Disposition     ED Disposition Condition Comment    DC/Transfer to Behavioral Health Stable                  Arnoldo Colvin MD  02/02/22 0892

## 2022-02-03 LAB — QT INTERVAL: 406 MS

## 2022-03-24 RX ORDER — ATORVASTATIN CALCIUM 80 MG/1
TABLET, FILM COATED ORAL
Qty: 90 TABLET | Refills: 3 | Status: SHIPPED | OUTPATIENT
Start: 2022-03-24 | End: 2022-12-27 | Stop reason: SDUPTHER

## 2022-03-31 ENCOUNTER — APPOINTMENT (OUTPATIENT)
Dept: GENERAL RADIOLOGY | Facility: HOSPITAL | Age: 72
End: 2022-03-31

## 2022-03-31 ENCOUNTER — HOSPITAL ENCOUNTER (EMERGENCY)
Facility: HOSPITAL | Age: 72
Discharge: HOME | End: 2022-03-31
Attending: EMERGENCY MEDICINE | Admitting: EMERGENCY MEDICINE

## 2022-03-31 VITALS
HEIGHT: 73 IN | SYSTOLIC BLOOD PRESSURE: 145 MMHG | BODY MASS INDEX: 28.14 KG/M2 | WEIGHT: 212.3 LBS | RESPIRATION RATE: 14 BRPM | OXYGEN SATURATION: 99 % | HEART RATE: 55 BPM | TEMPERATURE: 98 F | DIASTOLIC BLOOD PRESSURE: 75 MMHG

## 2022-03-31 DIAGNOSIS — L03.116 CELLULITIS OF LEFT LEG: Primary | ICD-10-CM

## 2022-03-31 DIAGNOSIS — S80.812A ABRASION OF LEFT LOWER EXTREMITY, INITIAL ENCOUNTER: ICD-10-CM

## 2022-03-31 DIAGNOSIS — S80.12XA HEMATOMA OF LEFT LOWER LEG: ICD-10-CM

## 2022-03-31 LAB
ALBUMIN SERPL-MCNC: 4.5 G/DL (ref 3.5–5.2)
ALBUMIN/GLOB SERPL: 2 G/DL
ALP SERPL-CCNC: 124 U/L (ref 39–117)
ALT SERPL W P-5'-P-CCNC: 36 U/L (ref 1–41)
ANION GAP SERPL CALCULATED.3IONS-SCNC: 9.9 MMOL/L (ref 5–15)
AST SERPL-CCNC: 32 U/L (ref 1–40)
BASOPHILS # BLD AUTO: 0 10*3/MM3 (ref 0–0.2)
BASOPHILS NFR BLD AUTO: 0 % (ref 0–1.5)
BILIRUB SERPL-MCNC: 0.6 MG/DL (ref 0–1.2)
BUN SERPL-MCNC: 19 MG/DL (ref 8–23)
BUN/CREAT SERPL: 17.3 (ref 7–25)
CALCIUM SPEC-SCNC: 9.4 MG/DL (ref 8.6–10.5)
CHLORIDE SERPL-SCNC: 107 MMOL/L (ref 98–107)
CO2 SERPL-SCNC: 24.1 MMOL/L (ref 22–29)
CREAT SERPL-MCNC: 1.1 MG/DL (ref 0.76–1.27)
D-LACTATE SERPL-SCNC: 1.3 MMOL/L (ref 0.5–2)
DEPRECATED RDW RBC AUTO: 49 FL (ref 37–54)
EGFRCR SERPLBLD CKD-EPI 2021: 71.3 ML/MIN/1.73
EOSINOPHIL # BLD AUTO: 0.12 10*3/MM3 (ref 0–0.4)
EOSINOPHIL NFR BLD AUTO: 3 % (ref 0.3–6.2)
ERYTHROCYTE [DISTWIDTH] IN BLOOD BY AUTOMATED COUNT: 14.1 % (ref 12.3–15.4)
GLOBULIN UR ELPH-MCNC: 2.3 GM/DL
GLUCOSE SERPL-MCNC: 106 MG/DL (ref 65–99)
HCT VFR BLD AUTO: 35.9 % (ref 37.5–51)
HGB BLD-MCNC: 11.9 G/DL (ref 13–17.7)
HOLD SPECIMEN: NORMAL
HOLD SPECIMEN: NORMAL
IMM GRANULOCYTES # BLD AUTO: 0.01 10*3/MM3 (ref 0–0.05)
IMM GRANULOCYTES NFR BLD AUTO: 0.2 % (ref 0–0.5)
LYMPHOCYTES # BLD AUTO: 0.92 10*3/MM3 (ref 0.7–3.1)
LYMPHOCYTES NFR BLD AUTO: 22.7 % (ref 19.6–45.3)
MCH RBC QN AUTO: 31.3 PG (ref 26.6–33)
MCHC RBC AUTO-ENTMCNC: 33.1 G/DL (ref 31.5–35.7)
MCV RBC AUTO: 94.5 FL (ref 79–97)
MONOCYTES # BLD AUTO: 0.49 10*3/MM3 (ref 0.1–0.9)
MONOCYTES NFR BLD AUTO: 12.1 % (ref 5–12)
NEUTROPHILS NFR BLD AUTO: 2.52 10*3/MM3 (ref 1.7–7)
NEUTROPHILS NFR BLD AUTO: 62 % (ref 42.7–76)
NRBC BLD AUTO-RTO: 0 /100 WBC (ref 0–0.2)
PLATELET # BLD AUTO: 235 10*3/MM3 (ref 140–450)
PMV BLD AUTO: 9.6 FL (ref 6–12)
POTASSIUM SERPL-SCNC: 4.6 MMOL/L (ref 3.5–5.2)
PROT SERPL-MCNC: 6.8 G/DL (ref 6–8.5)
RBC # BLD AUTO: 3.8 10*6/MM3 (ref 4.14–5.8)
SODIUM SERPL-SCNC: 141 MMOL/L (ref 136–145)
WBC NRBC COR # BLD: 4.06 10*3/MM3 (ref 3.4–10.8)
WHOLE BLOOD HOLD SPECIMEN: NORMAL
WHOLE BLOOD HOLD SPECIMEN: NORMAL

## 2022-03-31 PROCEDURE — 85025 COMPLETE CBC W/AUTO DIFF WBC: CPT

## 2022-03-31 PROCEDURE — 36415 COLL VENOUS BLD VENIPUNCTURE: CPT

## 2022-03-31 PROCEDURE — 99283 EMERGENCY DEPT VISIT LOW MDM: CPT

## 2022-03-31 PROCEDURE — 87040 BLOOD CULTURE FOR BACTERIA: CPT

## 2022-03-31 PROCEDURE — 80053 COMPREHEN METABOLIC PANEL: CPT

## 2022-03-31 PROCEDURE — 73590 X-RAY EXAM OF LOWER LEG: CPT

## 2022-03-31 PROCEDURE — 83605 ASSAY OF LACTIC ACID: CPT

## 2022-03-31 RX ORDER — SODIUM CHLORIDE 0.9 % (FLUSH) 0.9 %
10 SYRINGE (ML) INJECTION AS NEEDED
Status: DISCONTINUED | OUTPATIENT
Start: 2022-03-31 | End: 2022-03-31 | Stop reason: HOSPADM

## 2022-03-31 RX ORDER — CEPHALEXIN 500 MG/1
500 CAPSULE ORAL 4 TIMES DAILY
Qty: 40 CAPSULE | Refills: 0 | Status: SHIPPED | OUTPATIENT
Start: 2022-03-31 | End: 2022-04-28

## 2022-04-05 LAB
BACTERIA SPEC AEROBE CULT: NORMAL
BACTERIA SPEC AEROBE CULT: NORMAL

## 2022-04-15 ENCOUNTER — APPOINTMENT (OUTPATIENT)
Dept: CT IMAGING | Facility: HOSPITAL | Age: 72
End: 2022-04-15

## 2022-04-15 ENCOUNTER — HOSPITAL ENCOUNTER (EMERGENCY)
Facility: HOSPITAL | Age: 72
Discharge: HOME | End: 2022-04-15
Attending: EMERGENCY MEDICINE | Admitting: EMERGENCY MEDICINE

## 2022-04-15 VITALS
DIASTOLIC BLOOD PRESSURE: 82 MMHG | BODY MASS INDEX: 27.87 KG/M2 | TEMPERATURE: 98.1 F | HEIGHT: 73 IN | RESPIRATION RATE: 18 BRPM | OXYGEN SATURATION: 100 % | HEART RATE: 6 BPM | WEIGHT: 210.32 LBS | SYSTOLIC BLOOD PRESSURE: 154 MMHG

## 2022-04-15 DIAGNOSIS — R10.9 ABDOMINAL PAIN, UNSPECIFIED ABDOMINAL LOCATION: Primary | ICD-10-CM

## 2022-04-15 LAB
ALBUMIN SERPL-MCNC: 4.7 G/DL (ref 3.5–5.2)
ALBUMIN/GLOB SERPL: 1.9 G/DL
ALP SERPL-CCNC: 104 U/L (ref 39–117)
ALT SERPL W P-5'-P-CCNC: 28 U/L (ref 1–41)
ANION GAP SERPL CALCULATED.3IONS-SCNC: 10.2 MMOL/L (ref 5–15)
AST SERPL-CCNC: 30 U/L (ref 1–40)
BASOPHILS # BLD AUTO: 0 10*3/MM3 (ref 0–0.2)
BASOPHILS NFR BLD AUTO: 0 % (ref 0–1.5)
BILIRUB SERPL-MCNC: 0.7 MG/DL (ref 0–1.2)
BILIRUB UR QL STRIP: NEGATIVE
BUN SERPL-MCNC: 16 MG/DL (ref 8–23)
BUN/CREAT SERPL: 13.4 (ref 7–25)
CALCIUM SPEC-SCNC: 9.7 MG/DL (ref 8.6–10.5)
CHLORIDE SERPL-SCNC: 103 MMOL/L (ref 98–107)
CLARITY UR: CLEAR
CO2 SERPL-SCNC: 23.8 MMOL/L (ref 22–29)
COLOR UR: YELLOW
CREAT SERPL-MCNC: 1.19 MG/DL (ref 0.76–1.27)
DEPRECATED RDW RBC AUTO: 46.7 FL (ref 37–54)
EGFRCR SERPLBLD CKD-EPI 2021: 64.9 ML/MIN/1.73
EOSINOPHIL # BLD AUTO: 0.04 10*3/MM3 (ref 0–0.4)
EOSINOPHIL NFR BLD AUTO: 0.8 % (ref 0.3–6.2)
ERYTHROCYTE [DISTWIDTH] IN BLOOD BY AUTOMATED COUNT: 13.6 % (ref 12.3–15.4)
GLOBULIN UR ELPH-MCNC: 2.5 GM/DL
GLUCOSE SERPL-MCNC: 101 MG/DL (ref 65–99)
GLUCOSE UR STRIP-MCNC: NEGATIVE MG/DL
HCT VFR BLD AUTO: 35.8 % (ref 37.5–51)
HGB BLD-MCNC: 11.8 G/DL (ref 13–17.7)
HGB UR QL STRIP.AUTO: NEGATIVE
HOLD SPECIMEN: NORMAL
HOLD SPECIMEN: NORMAL
IMM GRANULOCYTES # BLD AUTO: 0.01 10*3/MM3 (ref 0–0.05)
IMM GRANULOCYTES NFR BLD AUTO: 0.2 % (ref 0–0.5)
KETONES UR QL STRIP: NEGATIVE
LEUKOCYTE ESTERASE UR QL STRIP.AUTO: NEGATIVE
LIPASE SERPL-CCNC: 18 U/L (ref 13–60)
LYMPHOCYTES # BLD AUTO: 0.83 10*3/MM3 (ref 0.7–3.1)
LYMPHOCYTES NFR BLD AUTO: 15.6 % (ref 19.6–45.3)
MCH RBC QN AUTO: 30.6 PG (ref 26.6–33)
MCHC RBC AUTO-ENTMCNC: 33 G/DL (ref 31.5–35.7)
MCV RBC AUTO: 93 FL (ref 79–97)
MONOCYTES # BLD AUTO: 0.51 10*3/MM3 (ref 0.1–0.9)
MONOCYTES NFR BLD AUTO: 9.6 % (ref 5–12)
NEUTROPHILS NFR BLD AUTO: 3.94 10*3/MM3 (ref 1.7–7)
NEUTROPHILS NFR BLD AUTO: 73.8 % (ref 42.7–76)
NITRITE UR QL STRIP: NEGATIVE
NRBC BLD AUTO-RTO: 0 /100 WBC (ref 0–0.2)
PH UR STRIP.AUTO: 7.5 [PH] (ref 5–8)
PLATELET # BLD AUTO: 226 10*3/MM3 (ref 140–450)
PMV BLD AUTO: 9.3 FL (ref 6–12)
POTASSIUM SERPL-SCNC: 4 MMOL/L (ref 3.5–5.2)
PROT SERPL-MCNC: 7.2 G/DL (ref 6–8.5)
PROT UR QL STRIP: NEGATIVE
RBC # BLD AUTO: 3.85 10*6/MM3 (ref 4.14–5.8)
SODIUM SERPL-SCNC: 137 MMOL/L (ref 136–145)
SP GR UR STRIP: 1.02 (ref 1–1.03)
UROBILINOGEN UR QL STRIP: NORMAL
WBC NRBC COR # BLD: 5.33 10*3/MM3 (ref 3.4–10.8)
WHOLE BLOOD HOLD SPECIMEN: NORMAL
WHOLE BLOOD HOLD SPECIMEN: NORMAL

## 2022-04-15 PROCEDURE — 25010000002 KETOROLAC TROMETHAMINE PER 15 MG: Performed by: EMERGENCY MEDICINE

## 2022-04-15 PROCEDURE — 80053 COMPREHEN METABOLIC PANEL: CPT

## 2022-04-15 PROCEDURE — 85025 COMPLETE CBC W/AUTO DIFF WBC: CPT

## 2022-04-15 PROCEDURE — 25010000002 MORPHINE PER 10 MG: Performed by: EMERGENCY MEDICINE

## 2022-04-15 PROCEDURE — 83690 ASSAY OF LIPASE: CPT

## 2022-04-15 PROCEDURE — 96375 TX/PRO/DX INJ NEW DRUG ADDON: CPT

## 2022-04-15 PROCEDURE — 0 IOPAMIDOL PER 1 ML: Performed by: EMERGENCY MEDICINE

## 2022-04-15 PROCEDURE — 96374 THER/PROPH/DIAG INJ IV PUSH: CPT

## 2022-04-15 PROCEDURE — 74177 CT ABD & PELVIS W/CONTRAST: CPT

## 2022-04-15 PROCEDURE — 99283 EMERGENCY DEPT VISIT LOW MDM: CPT

## 2022-04-15 PROCEDURE — 36415 COLL VENOUS BLD VENIPUNCTURE: CPT

## 2022-04-15 PROCEDURE — 81003 URINALYSIS AUTO W/O SCOPE: CPT

## 2022-04-15 RX ORDER — KETOROLAC TROMETHAMINE 15 MG/ML
15 INJECTION, SOLUTION INTRAMUSCULAR; INTRAVENOUS ONCE
Status: COMPLETED | OUTPATIENT
Start: 2022-04-15 | End: 2022-04-15

## 2022-04-15 RX ORDER — DICYCLOMINE HCL 20 MG
20 TABLET ORAL EVERY 6 HOURS
Qty: 20 TABLET | Refills: 0 | OUTPATIENT
Start: 2022-04-15 | End: 2022-05-08

## 2022-04-15 RX ORDER — SODIUM CHLORIDE 0.9 % (FLUSH) 0.9 %
10 SYRINGE (ML) INJECTION AS NEEDED
Status: DISCONTINUED | OUTPATIENT
Start: 2022-04-15 | End: 2022-04-15 | Stop reason: HOSPADM

## 2022-04-15 RX ORDER — ONDANSETRON 4 MG/1
4 TABLET, ORALLY DISINTEGRATING ORAL EVERY 8 HOURS PRN
Qty: 15 TABLET | Refills: 0 | OUTPATIENT
Start: 2022-04-15 | End: 2022-04-20

## 2022-04-15 RX ORDER — DIPHENOXYLATE HYDROCHLORIDE AND ATROPINE SULFATE 2.5; .025 MG/1; MG/1
1 TABLET ORAL 4 TIMES DAILY PRN
Qty: 10 TABLET | Refills: 0 | Status: SHIPPED | OUTPATIENT
Start: 2022-04-15 | End: 2022-06-23

## 2022-04-15 RX ADMIN — MORPHINE SULFATE 4 MG: 4 INJECTION INTRAVENOUS at 16:17

## 2022-04-15 RX ADMIN — IOPAMIDOL 100 ML: 755 INJECTION, SOLUTION INTRAVENOUS at 17:35

## 2022-04-15 RX ADMIN — KETOROLAC TROMETHAMINE 15 MG: 15 INJECTION, SOLUTION INTRAMUSCULAR; INTRAVENOUS at 16:17

## 2022-04-15 NOTE — ED PROVIDER NOTES
Time: 4:03 PM EDT  Arrived by: private car  Chief Complaint: abdominal pain  History provided by: patient  History is limited by: N/A     History of Present Illness:  Patient is a 71 y.o. year old male who presents to the emergency department with abdominal pain that is gotten worse over the past 2 days.  Patient reports nausea with no vomiting.  Patient has no chest pain or shortness of breath.  Patient has no cough hemoptysis.  Patient denies dysuria urinary frequency.      Abdominal Pain  Pain location:  LLQ  Pain quality: aching    Pain radiates to:  Does not radiate  Pain severity:  Mild  Onset quality:  Sudden  Timing:  Constant  Chronicity:  New  Relieved by:  Nothing  Worsened by:  Movement  Associated symptoms: no chest pain, no chills, no cough, no diarrhea, no dysuria, no fever, no nausea, no shortness of breath, no sore throat and no vomiting    Weakness - Generalized  Associated symptoms: abdominal pain    Associated symptoms: no chest pain, no cough, no diarrhea, no dysuria, no fever, no headaches, no myalgias, no nausea, no seizures, no shortness of breath and no vomiting        Similar Symptoms Previously: no  Recently seen: no      Patient Care Team  Primary Care Provider: Edith Marcelo APRN    Past Medical History:     No Known Allergies  Past Medical History:   Diagnosis Date   • Abnormal ECG    • Anxiety    • Arrhythmia    • Arthritis    • Atrial fibrillation (HCC)    • Bladder disorder    • BPH (benign prostatic hyperplasia)    • CAD (coronary artery disease)    • Cervical spondylosis without myelopathy 01/15/2020   • Cervicalgia    • Chest pain    • Colitis    • Condition not found HERNIA   • Depression    • Diverticulitis    • Diverticulosis of colon    • GERD (gastroesophageal reflux disease)    • H/O degenerative disc disease    • Heart attack (HCC)    • Heart disease    • Hemorrhoids    • High cholesterol    • Hypertension    • IBS (irritable bowel syndrome)    • Mood disorder ([facility])     • Muscle cramps    • Myocardial infarction (HCC)    • Osteoarthritis    • Prostate disorder    • S/P lumpectomy, right breast     CYST 2016     Past Surgical History:   Procedure Laterality Date   • APPENDECTOMY     • BREAST MASS EXCISION  2016   • CARDIAC CATHETERIZATION  2016   • CHOLECYSTECTOMY     • COLONOSCOPY  2008,2014,2021   • CORONARY ANGIOPLASTY WITH STENT PLACEMENT  2010   • ENDOSCOPY  2010,2019   • HEMORRHOIDECTOMY  2019   • INGUINAL HERNIA REPAIR  2019   • TURP / TRANSURETHRAL INCISION / DRAINAGE PROSTATE  01/16/2020    BUTTON TURP W/ DR TAM   • UMBILICAL HERNIA REPAIR  2019     Family History   Problem Relation Age of Onset   • Other Mother         bladder stones   • Arthritis Mother    • Heart disease Mother    • Heart failure Father    • Stroke Father    • Colon cancer Paternal Grandfather         60's       Home Medications:  Prior to Admission medications    Medication Sig Start Date End Date Taking? Authorizing Provider   apixaban (ELIQUIS) 5 MG tablet tablet Take 1 tablet by mouth Every 12 (Twelve) Hours. 12/10/21   Mayank Sheehan MD   atorvastatin (LIPITOR) 80 MG tablet Take 1 tablet by mouth at bedtime daily 3/24/22   Mayank Sheehan MD   cephalexin (KEFLEX) 500 MG capsule Take 1 capsule by mouth 4 (Four) Times a Day. 3/31/22   Travis Gray DO   clopidogrel (PLAVIX) 75 MG tablet Take 1 tablet by mouth every day 11/11/21   Mayank Sheehan MD   desvenlafaxine (PRISTIQ) 50 MG 24 hr tablet Take 50 mg by mouth Daily. 1/11/22 1/12/23  Elian Merrill MD   desvenlafaxine (PRISTIQ) 50 MG 24 hr tablet  1/11/22   Elian Merrill MD   famotidine (PEPCID) 40 MG tablet Take 40 mg by mouth. 1/28/22 1/29/23  Elian Merrill MD   hydrOXYzine (ATARAX) 25 MG tablet hydroxyzine HCl 25 mg oral tablet take 1 tablet (25 mg) by oral route 3 times per day   Active    Elian Merrill MD   lisinopril (PRINIVIL,ZESTRIL) 5 MG tablet Take 5 mg by mouth Daily. 6/18/21   Elian Merrill MD    LORazepam (ATIVAN) 1 MG tablet Take 1 tablet by mouth 2 (Two) Times a Day As Needed for Anxiety. 12/24/21   Castro Matos MD   metoprolol succinate XL (TOPROL-XL) 25 MG 24 hr tablet Take 1 tablet by mouth Daily. 12/10/21   Mayank Sheehan MD   mirtazapine (REMERON) 30 MG tablet Take 30 mg by mouth.    Elian Merrill MD   multivitamin (THERAGRAN) tablet tablet Take 1 tablet by mouth Daily.    Elian Merrill MD   multivitamin with minerals tablet tablet Take 1 tablet by mouth Daily.    Elian Merrill MD   nitroglycerin (NITROSTAT) 0.4 MG SL tablet Place 1 tablet under the tongue Every 5 (Five) Minutes As Needed for Chest Pain (no more than 3 doses in 15 minutes). 12/10/21   Mayank Sheehan MD   pantoprazole (PROTONIX) 40 MG EC tablet pantoprazole 40 mg tablet,delayed release   TAKE ONE TABLET BY MOUTH EVERY DAY    Elian Merrill MD   sertraline (ZOLOFT) 100 MG tablet Daily.    Elian Merrill MD   sucralfate (CARAFATE) 1 g tablet Take 1 tablet by mouth 4 (Four) Times a Day. 1/30/22   Mayank Heck DO   tadalafil (CIALIS) 5 MG tablet Take 1 tablet by mouth Daily for 360 days. 10/11/21 10/6/22  Mallorie Matt MD   tamsulosin (FLOMAX) 0.4 MG capsule 24 hr capsule TAKE ONE CAPSULE BY MOUTH EVERY DAY ONE-HALF HOUR FOLLOWING THE SAME MEAL EACH DAY 12/29/21   Mallorie Matt MD   traMADol (ULTRAM) 50 MG tablet tramadol 50 mg oral tablet take 1 tablet (50 mg) by oral route every 4-6 hours as needed   Active    Elian Merrill MD   triamcinolone (KENALOG) 0.5 % cream triamcinolone acetonide 0.5 % topical cream    Elian Merrill MD        Social History:   Social History     Tobacco Use   • Smoking status: Never Smoker   • Smokeless tobacco: Never Used   Vaping Use   • Vaping Use: Never used   Substance Use Topics   • Alcohol use: Not Currently   • Drug use: Never     Recent travel: no     Review of Systems:  Review of Systems   Constitutional: Negative for chills and fever.  "  HENT: Negative for congestion, rhinorrhea and sore throat.    Eyes: Negative for pain and visual disturbance.   Respiratory: Negative for apnea, cough, chest tightness and shortness of breath.    Cardiovascular: Negative for chest pain and palpitations.   Gastrointestinal: Positive for abdominal pain. Negative for diarrhea, nausea and vomiting.   Genitourinary: Negative for difficulty urinating and dysuria.   Musculoskeletal: Negative for joint swelling and myalgias.   Skin: Negative for color change.   Neurological: Negative for seizures and headaches.   Psychiatric/Behavioral: Negative.    All other systems reviewed and are negative.       Physical Exam:  /80   Pulse 67   Temp 98.1 °F (36.7 °C) (Oral)   Resp 20   Ht 185.4 cm (73\")   Wt 95.4 kg (210 lb 5.1 oz)   SpO2 100%   BMI 27.75 kg/m²     Physical Exam  Vitals and nursing note reviewed.   Constitutional:       General: He is not in acute distress.     Appearance: Normal appearance. He is not toxic-appearing.   HENT:      Head: Normocephalic and atraumatic.      Jaw: There is normal jaw occlusion.   Eyes:      General: Lids are normal.      Extraocular Movements: Extraocular movements intact.      Conjunctiva/sclera: Conjunctivae normal.      Pupils: Pupils are equal, round, and reactive to light.   Cardiovascular:      Rate and Rhythm: Normal rate and regular rhythm.      Pulses: Normal pulses.      Heart sounds: Normal heart sounds.   Pulmonary:      Effort: Pulmonary effort is normal. No respiratory distress.      Breath sounds: Normal breath sounds. No wheezing or rhonchi.   Abdominal:      General: Abdomen is flat.      Palpations: Abdomen is soft.      Tenderness: There is abdominal tenderness in the left lower quadrant. There is no guarding or rebound.   Musculoskeletal:         General: Normal range of motion.      Cervical back: Normal range of motion and neck supple.      Right lower leg: No edema.      Left lower leg: No edema. "   Skin:     General: Skin is warm and dry.   Neurological:      Mental Status: He is alert and oriented to person, place, and time. Mental status is at baseline.   Psychiatric:         Mood and Affect: Mood normal.                Medications in the Emergency Department:  Medications   sodium chloride 0.9 % flush 10 mL (has no administration in time range)   ketorolac (TORADOL) injection 15 mg (15 mg Intravenous Given 4/15/22 1617)   morphine injection 4 mg (4 mg Intravenous Given 4/15/22 1617)   iopamidol (ISOVUE-370) 76 % injection 100 mL (100 mL Intravenous Given 4/15/22 3875)        Labs  Lab Results (last 24 hours)     Procedure Component Value Units Date/Time    CBC & Differential [[id number]]  (Abnormal) Collected: 04/15/22 1402    Specimen: Blood from Arm, Right Updated: 04/15/22 1442    Narrative:      The following orders were created for panel order CBC & Differential.  Procedure                               Abnormality         Status                     ---------                               -----------         ------                     CBC Auto Differential[[id number]]        Abnormal            Final result                 Please view results for these tests on the individual orders.    Comprehensive Metabolic Panel [[id number]]  (Abnormal) Collected: 04/15/22 1402    Specimen: Blood from Arm, Right Updated: 04/15/22 1503     Glucose 101 mg/dL      BUN 16 mg/dL      Creatinine 1.19 mg/dL      Sodium 137 mmol/L      Potassium 4.0 mmol/L      Chloride 103 mmol/L      CO2 23.8 mmol/L      Calcium 9.7 mg/dL      Total Protein 7.2 g/dL      Albumin 4.70 g/dL      ALT (SGPT) 28 U/L      AST (SGOT) 30 U/L      Alkaline Phosphatase 104 U/L      Total Bilirubin 0.7 mg/dL      Globulin 2.5 gm/dL      A/G Ratio 1.9 g/dL      BUN/Creatinine Ratio 13.4     Anion Gap 10.2 mmol/L      eGFR 64.9 mL/min/1.73      Comment: National Kidney Foundation and American Society of Nephrology (ASN) Task Force recommended  calculation based on the Chronic Kidney Disease Epidemiology Collaboration (CKD-EPI) equation refit without adjustment for race.       Narrative:      GFR Normal >60  Chronic Kidney Disease <60  Kidney Failure <15      Lipase [[id number]]  (Normal) Collected: 04/15/22 1402    Specimen: Blood from Arm, Right Updated: 04/15/22 1503     Lipase 18 U/L     CBC Auto Differential [[id number]]  (Abnormal) Collected: 04/15/22 1402    Specimen: Blood from Arm, Right Updated: 04/15/22 1442     WBC 5.33 10*3/mm3      RBC 3.85 10*6/mm3      Hemoglobin 11.8 g/dL      Hematocrit 35.8 %      MCV 93.0 fL      MCH 30.6 pg      MCHC 33.0 g/dL      RDW 13.6 %      RDW-SD 46.7 fl      MPV 9.3 fL      Platelets 226 10*3/mm3      Neutrophil % 73.8 %      Lymphocyte % 15.6 %      Monocyte % 9.6 %      Eosinophil % 0.8 %      Basophil % 0.0 %      Immature Grans % 0.2 %      Neutrophils, Absolute 3.94 10*3/mm3      Lymphocytes, Absolute 0.83 10*3/mm3      Monocytes, Absolute 0.51 10*3/mm3      Eosinophils, Absolute 0.04 10*3/mm3      Basophils, Absolute 0.00 10*3/mm3      Immature Grans, Absolute 0.01 10*3/mm3      nRBC 0.0 /100 WBC     Urinalysis With Microscopic If Indicated (No Culture) - Urine, Clean Catch [[id number]]  (Normal) Collected: 04/15/22 1406    Specimen: Urine, Clean Catch Updated: 04/15/22 1444     Color, UA Yellow     Appearance, UA Clear     pH, UA 7.5     Specific Gravity, UA 1.016     Glucose, UA Negative     Ketones, UA Negative     Bilirubin, UA Negative     Blood, UA Negative     Protein, UA Negative     Leuk Esterase, UA Negative     Nitrite, UA Negative     Urobilinogen, UA 0.2 E.U./dL    Narrative:      Urine microscopic not indicated.           Imaging:  CT Abdomen Pelvis With Contrast    Result Date: 4/15/2022  PROCEDURE: CT ABDOMEN PELVIS W CONTRAST  COMPARISON: [facility], CT, CT ABDOMEN PELVIS W CONTRAST, 10/29/2021, 21:01.  INDICATIONS: abdominal pain  PROTOCOL:   Standard imaging protocol  performed    RADIATION:   DLP: 528.5 mGy*cm   Automated exposure control was utilized to minimize radiation dose. CONTRAST: 100 cc Isovue 370 I.V.  TECHNIQUE: Axial images of the abdomen and pelvis with intravenous contrast.  ABDOMEN:  The lung bases are clear.  Prior cholecystectomy.  The liver, spleen, pancreas and adrenal glands are normal.  No evidence of solid renal mass or hydronephrosis.  PELVIS:  No evidence of bowel obstruction, perforation or abscess.  No CT evidence of colitis.  Degenerative changes are present in the lumbar spine.  The abdominal aorta has a normal caliber.  Atherosclerotic disease is present.  There is minimal colonic diverticulosis.  No CT evidence of diverticulitis.  The urinary bladder is normal.  There are minimal fat containing inguinal hernias.  IMPRESSION:  No acute findings.  KENDRICK BOWMAN MD       Electronically Signed and Approved By: KENDRICK BOWMAN MD on 4/15/2022 at 17:50               Procedures:  Procedures    Progress                            Medical Decision Making:  MDM  Number of Diagnoses or Management Options  Abdominal pain, unspecified abdominal location  Diagnosis management comments: The patient is resting comfortably and feels better, is alert and in no distress.  The patient´s CBC was reviewed and shows no abnormalities of critical concern. Of note, there is no anemia requiring a blood transfusion and the platelet count is acceptable.  The patient´s CMP was reviewed and shows no abnormalities of critical concern. Of note, the patient´s sodium and potassium are acceptable. The patient´s liver enzymes are unremarkable. The patient´s renal function (creatinine) is preserved. The patient has a normal anion gap.  Urinalysis is negative hematuria, ketonuria and bacteriuria.  CT scan abdomen pelvis negative for acute intra-abdominal pathology.  Repeat examination is unremarkable and benign; in particular, there's no discomfort at McBurney's point and there is no  pulsatile mass. The history, exam, diagnostic testing, and current condition does not suggest acute appendicitis, bowel obstruction, acute cholecystitis, bowel perforation, major gastrointestinal bleeding, severe diverticulitis, abdominal aortic aneurysm, mesenteric ischemia, volvulus, sepsis, or other significant pathology that warrants further testing, continued ED treatment, admission, for surgical evaluation at this point. The vital signs have been stable. The patient does not have uncontrollable pain, intractable vomiting, or other significant symptoms. The patient's condition is stable and appropriate for discharge from the emergency department.       Amount and/or Complexity of Data Reviewed  Clinical lab tests: reviewed  Tests in the radiology section of CPT®: reviewed  Independent visualization of images, tracings, or specimens: yes    Risk of Complications, Morbidity, and/or Mortality  Presenting problems: moderate  Management options: moderate    Patient Progress  Patient progress: stable       Final diagnoses:   Abdominal pain, unspecified abdominal location        Disposition:  ED Disposition     ED Disposition   Discharge    Condition   Stable    Comment   --             This medical record created using voice recognition software and may contain unintended errors.           Ciro Gudino MD  04/15/22 9893

## 2022-04-20 ENCOUNTER — APPOINTMENT (OUTPATIENT)
Dept: CT IMAGING | Facility: HOSPITAL | Age: 72
End: 2022-04-20

## 2022-04-20 ENCOUNTER — HOSPITAL ENCOUNTER (EMERGENCY)
Facility: HOSPITAL | Age: 72
Discharge: HOME | End: 2022-04-20
Attending: EMERGENCY MEDICINE | Admitting: EMERGENCY MEDICINE

## 2022-04-20 VITALS
BODY MASS INDEX: 27.2 KG/M2 | HEIGHT: 73 IN | DIASTOLIC BLOOD PRESSURE: 88 MMHG | SYSTOLIC BLOOD PRESSURE: 166 MMHG | TEMPERATURE: 98.6 F | WEIGHT: 205.25 LBS | OXYGEN SATURATION: 100 % | RESPIRATION RATE: 16 BRPM | HEART RATE: 63 BPM

## 2022-04-20 DIAGNOSIS — R11.0 NAUSEA: ICD-10-CM

## 2022-04-20 DIAGNOSIS — K40.20 BILATERAL INGUINAL HERNIA WITHOUT OBSTRUCTION OR GANGRENE, RECURRENCE NOT SPECIFIED: Primary | ICD-10-CM

## 2022-04-20 LAB
ALBUMIN SERPL-MCNC: 5 G/DL (ref 3.5–5.2)
ALBUMIN/GLOB SERPL: 1.9 G/DL
ALP SERPL-CCNC: 117 U/L (ref 39–117)
ALT SERPL W P-5'-P-CCNC: 30 U/L (ref 1–41)
ANION GAP SERPL CALCULATED.3IONS-SCNC: 8.8 MMOL/L (ref 5–15)
AST SERPL-CCNC: 28 U/L (ref 1–40)
BASOPHILS # BLD AUTO: 0 10*3/MM3 (ref 0–0.2)
BASOPHILS NFR BLD AUTO: 0 % (ref 0–1.5)
BILIRUB SERPL-MCNC: 0.8 MG/DL (ref 0–1.2)
BILIRUB UR QL STRIP: NEGATIVE
BUN SERPL-MCNC: 15 MG/DL (ref 8–23)
BUN/CREAT SERPL: 12.6 (ref 7–25)
CALCIUM SPEC-SCNC: 9.8 MG/DL (ref 8.6–10.5)
CHLORIDE SERPL-SCNC: 103 MMOL/L (ref 98–107)
CLARITY UR: CLEAR
CO2 SERPL-SCNC: 25.2 MMOL/L (ref 22–29)
COLOR UR: YELLOW
CREAT SERPL-MCNC: 1.19 MG/DL (ref 0.76–1.27)
DEPRECATED RDW RBC AUTO: 47.7 FL (ref 37–54)
EGFRCR SERPLBLD CKD-EPI 2021: 64.9 ML/MIN/1.73
EOSINOPHIL # BLD AUTO: 0.02 10*3/MM3 (ref 0–0.4)
EOSINOPHIL NFR BLD AUTO: 0.5 % (ref 0.3–6.2)
ERYTHROCYTE [DISTWIDTH] IN BLOOD BY AUTOMATED COUNT: 13.7 % (ref 12.3–15.4)
GLOBULIN UR ELPH-MCNC: 2.6 GM/DL
GLUCOSE SERPL-MCNC: 114 MG/DL (ref 65–99)
GLUCOSE UR STRIP-MCNC: NEGATIVE MG/DL
HCT VFR BLD AUTO: 37.1 % (ref 37.5–51)
HGB BLD-MCNC: 12.3 G/DL (ref 13–17.7)
HGB UR QL STRIP.AUTO: NEGATIVE
HOLD SPECIMEN: NORMAL
HOLD SPECIMEN: NORMAL
IMM GRANULOCYTES # BLD AUTO: 0.01 10*3/MM3 (ref 0–0.05)
IMM GRANULOCYTES NFR BLD AUTO: 0.2 % (ref 0–0.5)
KETONES UR QL STRIP: NEGATIVE
LEUKOCYTE ESTERASE UR QL STRIP.AUTO: NEGATIVE
LIPASE SERPL-CCNC: 22 U/L (ref 13–60)
LYMPHOCYTES # BLD AUTO: 0.73 10*3/MM3 (ref 0.7–3.1)
LYMPHOCYTES NFR BLD AUTO: 17.1 % (ref 19.6–45.3)
MCH RBC QN AUTO: 31.2 PG (ref 26.6–33)
MCHC RBC AUTO-ENTMCNC: 33.2 G/DL (ref 31.5–35.7)
MCV RBC AUTO: 94.2 FL (ref 79–97)
MONOCYTES # BLD AUTO: 0.4 10*3/MM3 (ref 0.1–0.9)
MONOCYTES NFR BLD AUTO: 9.4 % (ref 5–12)
NEUTROPHILS NFR BLD AUTO: 3.11 10*3/MM3 (ref 1.7–7)
NEUTROPHILS NFR BLD AUTO: 72.8 % (ref 42.7–76)
NITRITE UR QL STRIP: NEGATIVE
NRBC BLD AUTO-RTO: 0 /100 WBC (ref 0–0.2)
PH UR STRIP.AUTO: 7.5 [PH] (ref 5–8)
PLATELET # BLD AUTO: 221 10*3/MM3 (ref 140–450)
PMV BLD AUTO: 9.3 FL (ref 6–12)
POTASSIUM SERPL-SCNC: 4.6 MMOL/L (ref 3.5–5.2)
PROT SERPL-MCNC: 7.6 G/DL (ref 6–8.5)
PROT UR QL STRIP: NEGATIVE
RBC # BLD AUTO: 3.94 10*6/MM3 (ref 4.14–5.8)
SODIUM SERPL-SCNC: 137 MMOL/L (ref 136–145)
SP GR UR STRIP: <=1.005 (ref 1–1.03)
UROBILINOGEN UR QL STRIP: NORMAL
WBC NRBC COR # BLD: 4.27 10*3/MM3 (ref 3.4–10.8)
WHOLE BLOOD HOLD SPECIMEN: NORMAL
WHOLE BLOOD HOLD SPECIMEN: NORMAL

## 2022-04-20 PROCEDURE — 83690 ASSAY OF LIPASE: CPT | Performed by: EMERGENCY MEDICINE

## 2022-04-20 PROCEDURE — 74177 CT ABD & PELVIS W/CONTRAST: CPT

## 2022-04-20 PROCEDURE — 99283 EMERGENCY DEPT VISIT LOW MDM: CPT

## 2022-04-20 PROCEDURE — 0 IOPAMIDOL PER 1 ML: Performed by: EMERGENCY MEDICINE

## 2022-04-20 PROCEDURE — 81003 URINALYSIS AUTO W/O SCOPE: CPT | Performed by: EMERGENCY MEDICINE

## 2022-04-20 PROCEDURE — 36415 COLL VENOUS BLD VENIPUNCTURE: CPT

## 2022-04-20 PROCEDURE — 96375 TX/PRO/DX INJ NEW DRUG ADDON: CPT

## 2022-04-20 PROCEDURE — 25010000002 ONDANSETRON PER 1 MG: Performed by: EMERGENCY MEDICINE

## 2022-04-20 PROCEDURE — 25010000002 FENTANYL CITRATE (PF) 50 MCG/ML SOLUTION: Performed by: EMERGENCY MEDICINE

## 2022-04-20 PROCEDURE — 80053 COMPREHEN METABOLIC PANEL: CPT | Performed by: EMERGENCY MEDICINE

## 2022-04-20 PROCEDURE — 85025 COMPLETE CBC W/AUTO DIFF WBC: CPT

## 2022-04-20 PROCEDURE — 96374 THER/PROPH/DIAG INJ IV PUSH: CPT

## 2022-04-20 RX ORDER — ONDANSETRON 4 MG/1
4 TABLET, ORALLY DISINTEGRATING ORAL EVERY 8 HOURS PRN
Qty: 9 TABLET | Refills: 0 | OUTPATIENT
Start: 2022-04-20 | End: 2022-05-07

## 2022-04-20 RX ORDER — SODIUM CHLORIDE 0.9 % (FLUSH) 0.9 %
10 SYRINGE (ML) INJECTION AS NEEDED
Status: DISCONTINUED | OUTPATIENT
Start: 2022-04-20 | End: 2022-04-20 | Stop reason: HOSPADM

## 2022-04-20 RX ORDER — ONDANSETRON 2 MG/ML
4 INJECTION INTRAMUSCULAR; INTRAVENOUS ONCE
Status: COMPLETED | OUTPATIENT
Start: 2022-04-20 | End: 2022-04-20

## 2022-04-20 RX ORDER — FENTANYL CITRATE 50 UG/ML
25 INJECTION, SOLUTION INTRAMUSCULAR; INTRAVENOUS ONCE
Status: COMPLETED | OUTPATIENT
Start: 2022-04-20 | End: 2022-04-20

## 2022-04-20 RX ORDER — HYDROCODONE BITARTRATE AND ACETAMINOPHEN 5; 325 MG/1; MG/1
1 TABLET ORAL EVERY 8 HOURS PRN
Qty: 9 TABLET | Refills: 0 | OUTPATIENT
Start: 2022-04-20 | End: 2022-05-07

## 2022-04-20 RX ADMIN — FENTANYL CITRATE 25 MCG: 50 INJECTION, SOLUTION INTRAMUSCULAR; INTRAVENOUS at 14:03

## 2022-04-20 RX ADMIN — IOPAMIDOL 100 ML: 755 INJECTION, SOLUTION INTRAVENOUS at 15:39

## 2022-04-20 RX ADMIN — ONDANSETRON 4 MG: 2 INJECTION INTRAMUSCULAR; INTRAVENOUS at 14:04

## 2022-04-20 NOTE — ED PROVIDER NOTES
Subjective     Nausea  The primary symptoms include abdominal pain and nausea. Primary symptoms do not include fever, vomiting, diarrhea, melena or dysuria.   The illness does not include chills.   Abdominal Pain  Pain location:  LLQ  Pain quality: aching and sharp    Pain radiates to:  Does not radiate  Pain severity:  Mild  Onset quality:  Gradual  Duration:  3 weeks  Timing:  Constant  Progression:  Waxing and waning  Chronicity:  New  Context comment:  3 weeks ago the patient was lifting heavy objects and had a twinge of pain in his left lower quadrant.  It has been present since.  He had an initial emergency work-up including CT scan that was fairly unremarkable but symptoms have persisted.  Relieved by:  Nothing  Worsened by:  Nothing  Ineffective treatments:  None tried  Associated symptoms: nausea    Associated symptoms: no chest pain, no chills, no cough, no diarrhea, no dysuria, no fever, no melena, no shortness of breath and no vomiting    Risk factors comment:  Multiple prior abdominal surgeries.      Review of Systems   Constitutional: Negative for chills and fever.   Respiratory: Negative for cough and shortness of breath.    Cardiovascular: Negative for chest pain.   Gastrointestinal: Positive for abdominal pain and nausea. Negative for diarrhea, melena and vomiting.   Genitourinary: Negative for dysuria.   All other systems reviewed and are negative.      Past Medical History:   Diagnosis Date   • Abnormal ECG    • Anxiety    • Arrhythmia    • Arthritis    • Atrial fibrillation (HCC)    • Bladder disorder    • BPH (benign prostatic hyperplasia)    • CAD (coronary artery disease)    • Cervical spondylosis without myelopathy 01/15/2020   • Cervicalgia    • Chest pain    • Colitis    • Condition not found HERNIA   • Depression    • Diverticulitis    • Diverticulosis of colon    • GERD (gastroesophageal reflux disease)    • H/O degenerative disc disease    • Heart attack (HCC)    • Heart disease    •  Hemorrhoids    • High cholesterol    • Hypertension    • IBS (irritable bowel syndrome)    • Mood disorder (HCC)    • Muscle cramps    • Myocardial infarction (HCC)    • Osteoarthritis    • Prostate disorder    • S/P lumpectomy, right breast     CYST 2016       No Known Allergies    Past Surgical History:   Procedure Laterality Date   • APPENDECTOMY     • BREAST MASS EXCISION  2016   • CARDIAC CATHETERIZATION  2016   • CHOLECYSTECTOMY     • COLONOSCOPY  2008,2014,2021   • CORONARY ANGIOPLASTY WITH STENT PLACEMENT  2010   • ENDOSCOPY  2010,2019   • HEMORRHOIDECTOMY  2019   • INGUINAL HERNIA REPAIR  2019   • TURP / TRANSURETHRAL INCISION / DRAINAGE PROSTATE  01/16/2020    BUTTON TURP W/ DR TAM   • UMBILICAL HERNIA REPAIR  2019       Family History   Problem Relation Age of Onset   • Other Mother         bladder stones   • Arthritis Mother    • Heart disease Mother    • Heart failure Father    • Stroke Father    • Colon cancer Paternal Grandfather         60's       Social History     Socioeconomic History   • Marital status:    Tobacco Use   • Smoking status: Never Smoker   • Smokeless tobacco: Never Used   Vaping Use   • Vaping Use: Never used   Substance and Sexual Activity   • Alcohol use: Not Currently   • Drug use: Never   • Sexual activity: Defer           Objective   Physical Exam  Vitals and nursing note reviewed.   Constitutional:       Appearance: He is not ill-appearing.   HENT:      Head: Normocephalic.      Mouth/Throat:      Mouth: Mucous membranes are moist.   Cardiovascular:      Rate and Rhythm: Regular rhythm. Bradycardia present.   Pulmonary:      Effort: Pulmonary effort is normal.      Breath sounds: Normal breath sounds.   Abdominal:      General: Abdomen is flat.      Palpations: Abdomen is soft.      Comments: Abdominal scars from remote prior surgeries are present.  The patient localizes his pain to the left lower quadrant but it is not significantly worse for palpation, left lower  extremity range of motion, or performing sit up maneuver.   Skin:     General: Skin is warm and dry.   Neurological:      Mental Status: He is alert and oriented to person, place, and time.   Psychiatric:         Behavior: Behavior normal.         Procedures           ED Course                                                 MDM  Number of Diagnoses or Management Options     Amount and/or Complexity of Data Reviewed  Clinical lab tests: reviewed  Tests in the radiology section of CPT®: reviewed  Decide to obtain previous medical records or to obtain history from someone other than the patient: yes  Review and summarize past medical records: yes      Persistent left lower quadrant pain after normal emergency department work-up recently.  Given the traumatic nature of the initial incident considered abdominal wall muscle strain however there was no pain worsened with sit up maneuvers or flexion of the left hip.  The patient's pain is not peritoneal or significantly reproducible which makes complicated diverticulitis bowel obstruction seem less likely.  The patient also did not have any evidence of hydronephrosis or other sign of stone on the prior CT scan.  Given his age and uncertainty of diagnosis we will repeat CT scan and if we cannot get to the bottom of the cause of his pain he will be referred to general surgery for further evaluation and treatment.  Final diagnoses:   Bilateral inguinal hernia without obstruction or gangrene, recurrence not specified   Nausea       ED Disposition  ED Disposition     ED Disposition   Discharge    Condition   Stable    Comment   --             Edith Marcelo, APRN  [address] [facility] [address] [phone]      As needed for additional pain [facility] EMERGENCY ROOM  [address] [address] [phone]    As needed, If symptoms worsen    Ahsan Harmon MD  [address] [facility]  [address]  [phone]    Call today  For an appointment to deal with hernias and abdominal pain         Medication List      New Prescriptions    HYDROcodone-acetaminophen 5-325 MG per tablet  Commonly known as: NORCO  Take 1 tablet by mouth Every 8 (Eight) Hours As Needed for Severe Pain .        Stop    traMADol 50 MG tablet  Commonly known as: ULTRAM           Where to Get Your Medications      These medications [place] [facility] [place] [address] [facility] [address] - [phone] [facility] [phone]   [address], [place] [address]    Phone: [phone]   · HYDROcodone-acetaminophen 5-325 MG per tablet  · ondansetron ODT 4 MG disintegrating tablet          Mayank Heck DO  04/20/22 7230

## 2022-04-25 ENCOUNTER — TELEPHONE (OUTPATIENT)
Dept: UROLOGY | Facility: CLINIC | Age: 72
End: 2022-04-25

## 2022-04-25 NOTE — TELEPHONE ENCOUNTER
Pt went to [facility] yesterday for some abdominal pain. He states that they told him he had some blood in his urine and to f/u with his urologist. He denies any urological symptoms at this time. Requesting a sooner appt than his scheduled f/u in October.

## 2022-04-26 NOTE — TELEPHONE ENCOUNTER
Discussed this message with  yesterday afternoon and she reviewed patient's records from [place].   I attempted to call patient, however, patient's phone went to a voice mail box that is not set up to take messages.     IF patient calls back,  reviewed records and states his CT scan normal from a urology standpoint.   states he can be sooner than October if he would like, however, it will probably be June before she has any availability due to this not be urgent need for appointment.    Okay to give him the above message and reschedule that appointment with him when he calls back.

## 2022-04-26 NOTE — TELEPHONE ENCOUNTER
Message was given to the patient. He said that he will wait until October appt since there is no urgency.

## 2022-04-27 ENCOUNTER — HOSPITAL ENCOUNTER (EMERGENCY)
Facility: HOSPITAL | Age: 72
Discharge: HOME | End: 2022-04-28
Attending: EMERGENCY MEDICINE | Admitting: EMERGENCY MEDICINE

## 2022-04-27 ENCOUNTER — APPOINTMENT (OUTPATIENT)
Dept: GENERAL RADIOLOGY | Facility: HOSPITAL | Age: 72
End: 2022-04-27

## 2022-04-27 DIAGNOSIS — R11.0 NAUSEA: ICD-10-CM

## 2022-04-27 DIAGNOSIS — K21.9 GASTROESOPHAGEAL REFLUX DISEASE WITHOUT ESOPHAGITIS: Primary | ICD-10-CM

## 2022-04-27 DIAGNOSIS — K13.79 UVULAR SWELLING: ICD-10-CM

## 2022-04-27 LAB
ALBUMIN SERPL-MCNC: 5 G/DL (ref 3.5–5.2)
ALBUMIN/GLOB SERPL: 2 G/DL
ALP SERPL-CCNC: 116 U/L (ref 39–117)
ALT SERPL W P-5'-P-CCNC: 25 U/L (ref 1–41)
ANION GAP SERPL CALCULATED.3IONS-SCNC: 10.6 MMOL/L (ref 5–15)
AST SERPL-CCNC: 31 U/L (ref 1–40)
BACTERIA UR QL AUTO: ABNORMAL /HPF
BASOPHILS # BLD AUTO: 0 10*3/MM3 (ref 0–0.2)
BASOPHILS NFR BLD AUTO: 0 % (ref 0–1.5)
BILIRUB SERPL-MCNC: 0.7 MG/DL (ref 0–1.2)
BILIRUB UR QL STRIP: NEGATIVE
BUN SERPL-MCNC: 17 MG/DL (ref 8–23)
BUN/CREAT SERPL: 14.3 (ref 7–25)
CALCIUM SPEC-SCNC: 9.7 MG/DL (ref 8.6–10.5)
CHLORIDE SERPL-SCNC: 100 MMOL/L (ref 98–107)
CLARITY UR: CLEAR
CO2 SERPL-SCNC: 24.4 MMOL/L (ref 22–29)
COLOR UR: ABNORMAL
CREAT SERPL-MCNC: 1.19 MG/DL (ref 0.76–1.27)
DEPRECATED RDW RBC AUTO: 47 FL (ref 37–54)
EGFRCR SERPLBLD CKD-EPI 2021: 64.9 ML/MIN/1.73
EOSINOPHIL # BLD AUTO: 0.11 10*3/MM3 (ref 0–0.4)
EOSINOPHIL NFR BLD AUTO: 2.3 % (ref 0.3–6.2)
ERYTHROCYTE [DISTWIDTH] IN BLOOD BY AUTOMATED COUNT: 13.6 % (ref 12.3–15.4)
FLUAV AG NPH QL: NEGATIVE
FLUBV AG NPH QL IA: NEGATIVE
GLOBULIN UR ELPH-MCNC: 2.5 GM/DL
GLUCOSE SERPL-MCNC: 95 MG/DL (ref 65–99)
GLUCOSE UR STRIP-MCNC: NEGATIVE MG/DL
HCT VFR BLD AUTO: 34.6 % (ref 37.5–51)
HGB BLD-MCNC: 11.3 G/DL (ref 13–17.7)
HGB UR QL STRIP.AUTO: NEGATIVE
HOLD SPECIMEN: NORMAL
HOLD SPECIMEN: NORMAL
HYALINE CASTS UR QL AUTO: ABNORMAL /LPF
IMM GRANULOCYTES # BLD AUTO: 0.01 10*3/MM3 (ref 0–0.05)
IMM GRANULOCYTES NFR BLD AUTO: 0.2 % (ref 0–0.5)
KETONES UR QL STRIP: ABNORMAL
LEUKOCYTE ESTERASE UR QL STRIP.AUTO: ABNORMAL
LIPASE SERPL-CCNC: 19 U/L (ref 13–60)
LYMPHOCYTES # BLD AUTO: 0.85 10*3/MM3 (ref 0.7–3.1)
LYMPHOCYTES NFR BLD AUTO: 18.1 % (ref 19.6–45.3)
MCH RBC QN AUTO: 30.7 PG (ref 26.6–33)
MCHC RBC AUTO-ENTMCNC: 32.7 G/DL (ref 31.5–35.7)
MCV RBC AUTO: 94 FL (ref 79–97)
MONOCYTES # BLD AUTO: 0.71 10*3/MM3 (ref 0.1–0.9)
MONOCYTES NFR BLD AUTO: 15.1 % (ref 5–12)
NEUTROPHILS NFR BLD AUTO: 3.01 10*3/MM3 (ref 1.7–7)
NEUTROPHILS NFR BLD AUTO: 64.3 % (ref 42.7–76)
NITRITE UR QL STRIP: NEGATIVE
NRBC BLD AUTO-RTO: 0 /100 WBC (ref 0–0.2)
PH UR STRIP.AUTO: 5.5 [PH] (ref 5–8)
PLATELET # BLD AUTO: 226 10*3/MM3 (ref 140–450)
PMV BLD AUTO: 10 FL (ref 6–12)
POTASSIUM SERPL-SCNC: 4.3 MMOL/L (ref 3.5–5.2)
PROT SERPL-MCNC: 7.5 G/DL (ref 6–8.5)
PROT UR QL STRIP: ABNORMAL
RBC # BLD AUTO: 3.68 10*6/MM3 (ref 4.14–5.8)
RBC # UR STRIP: ABNORMAL /HPF
REF LAB TEST METHOD: ABNORMAL
S PYO AG THROAT QL: NEGATIVE
SODIUM SERPL-SCNC: 135 MMOL/L (ref 136–145)
SP GR UR STRIP: 1.02 (ref 1–1.03)
SQUAMOUS #/AREA URNS HPF: ABNORMAL /HPF
UROBILINOGEN UR QL STRIP: ABNORMAL
WBC # UR STRIP: ABNORMAL /HPF
WBC NRBC COR # BLD: 4.69 10*3/MM3 (ref 3.4–10.8)
WHOLE BLOOD HOLD SPECIMEN: NORMAL
WHOLE BLOOD HOLD SPECIMEN: NORMAL

## 2022-04-27 PROCEDURE — 99283 EMERGENCY DEPT VISIT LOW MDM: CPT

## 2022-04-27 PROCEDURE — 83690 ASSAY OF LIPASE: CPT

## 2022-04-27 PROCEDURE — 87880 STREP A ASSAY W/OPTIC: CPT

## 2022-04-27 PROCEDURE — 80053 COMPREHEN METABOLIC PANEL: CPT

## 2022-04-27 PROCEDURE — 81001 URINALYSIS AUTO W/SCOPE: CPT

## 2022-04-27 PROCEDURE — 36415 COLL VENOUS BLD VENIPUNCTURE: CPT

## 2022-04-27 PROCEDURE — U0005 INFEC AGEN DETEC AMPLI PROBE: HCPCS

## 2022-04-27 PROCEDURE — 71045 X-RAY EXAM CHEST 1 VIEW: CPT

## 2022-04-27 PROCEDURE — U0004 COV-19 TEST NON-CDC HGH THRU: HCPCS

## 2022-04-27 PROCEDURE — 85025 COMPLETE CBC W/AUTO DIFF WBC: CPT

## 2022-04-27 PROCEDURE — 87804 INFLUENZA ASSAY W/OPTIC: CPT

## 2022-04-27 PROCEDURE — 87081 CULTURE SCREEN ONLY: CPT | Performed by: EMERGENCY MEDICINE

## 2022-04-27 RX ORDER — SODIUM CHLORIDE 0.9 % (FLUSH) 0.9 %
10 SYRINGE (ML) INJECTION AS NEEDED
Status: DISCONTINUED | OUTPATIENT
Start: 2022-04-27 | End: 2022-04-28 | Stop reason: HOSPADM

## 2022-04-28 VITALS
BODY MASS INDEX: 26.97 KG/M2 | SYSTOLIC BLOOD PRESSURE: 136 MMHG | WEIGHT: 203.48 LBS | HEIGHT: 73 IN | RESPIRATION RATE: 20 BRPM | HEART RATE: 68 BPM | TEMPERATURE: 97.7 F | DIASTOLIC BLOOD PRESSURE: 75 MMHG | OXYGEN SATURATION: 96 %

## 2022-04-28 LAB — SARS-COV-2 RNA PNL SPEC NAA+PROBE: NOT DETECTED

## 2022-04-28 PROCEDURE — 63710000001 ONDANSETRON ODT 4 MG TABLET DISPERSIBLE

## 2022-04-28 RX ORDER — ACETAMINOPHEN 325 MG/1
TABLET ORAL
COMMUNITY
Start: 2022-02-08 | End: 2022-06-10

## 2022-04-28 RX ORDER — FAMOTIDINE 40 MG/1
40 TABLET, FILM COATED ORAL DAILY
Qty: 30 TABLET | Refills: 0 | Status: SHIPPED | OUTPATIENT
Start: 2022-04-28 | End: 2022-04-28 | Stop reason: SDUPTHER

## 2022-04-28 RX ORDER — LIDOCAINE HYDROCHLORIDE 20 MG/ML
15 SOLUTION OROPHARYNGEAL ONCE
Status: COMPLETED | OUTPATIENT
Start: 2022-04-28 | End: 2022-04-28

## 2022-04-28 RX ORDER — FAMOTIDINE 40 MG/1
40 TABLET, FILM COATED ORAL DAILY
Qty: 30 TABLET | Refills: 0 | OUTPATIENT
Start: 2022-04-28 | End: 2022-05-07

## 2022-04-28 RX ORDER — ONDANSETRON 4 MG/1
4 TABLET, ORALLY DISINTEGRATING ORAL ONCE
Status: COMPLETED | OUTPATIENT
Start: 2022-04-28 | End: 2022-04-28

## 2022-04-28 RX ORDER — PANTOPRAZOLE SODIUM 40 MG/1
40 TABLET, DELAYED RELEASE ORAL
COMMUNITY
Start: 2022-04-19 | End: 2022-06-10 | Stop reason: SDUPTHER

## 2022-04-28 RX ORDER — ALUMINA, MAGNESIA, AND SIMETHICONE 2400; 2400; 240 MG/30ML; MG/30ML; MG/30ML
15 SUSPENSION ORAL ONCE
Status: COMPLETED | OUTPATIENT
Start: 2022-04-28 | End: 2022-04-28

## 2022-04-28 RX ORDER — ONDANSETRON 4 MG/1
4 TABLET, ORALLY DISINTEGRATING ORAL 4 TIMES DAILY PRN
Qty: 12 TABLET | Refills: 0 | OUTPATIENT
Start: 2022-04-28 | End: 2022-05-07

## 2022-04-28 RX ORDER — CLONAZEPAM 0.5 MG/1
TABLET ORAL
COMMUNITY
Start: 2022-04-01

## 2022-04-28 RX ORDER — AMOXICILLIN AND CLAVULANATE POTASSIUM 875; 125 MG/1; MG/1
1 TABLET, FILM COATED ORAL 2 TIMES DAILY
Qty: 14 TABLET | Refills: 0 | Status: SHIPPED | OUTPATIENT
Start: 2022-04-28 | End: 2022-05-05

## 2022-04-28 RX ORDER — DESVENLAFAXINE 100 MG/1
100 TABLET, EXTENDED RELEASE ORAL DAILY
COMMUNITY
Start: 2022-04-08 | End: 2022-06-23

## 2022-04-28 RX ORDER — ONDANSETRON 4 MG/1
4 TABLET, ORALLY DISINTEGRATING ORAL 4 TIMES DAILY PRN
Qty: 12 TABLET | Refills: 0 | Status: SHIPPED | OUTPATIENT
Start: 2022-04-28 | End: 2022-04-28 | Stop reason: SDUPTHER

## 2022-04-28 RX ORDER — FAMOTIDINE 40 MG/1
1 TABLET, FILM COATED ORAL NIGHTLY
COMMUNITY
Start: 2022-01-28 | End: 2022-05-07

## 2022-04-28 RX ORDER — LORAZEPAM 0.5 MG/1
1 TABLET ORAL 3 TIMES DAILY
COMMUNITY
Start: 2022-01-17 | End: 2022-06-10

## 2022-04-28 RX ADMIN — LIDOCAINE HYDROCHLORIDE 15 ML: 20 SOLUTION ORAL at 00:16

## 2022-04-28 RX ADMIN — ALUMINUM HYDROXIDE, MAGNESIUM HYDROXIDE, AND DIMETHICONE 15 ML: 400; 400; 40 SUSPENSION ORAL at 00:16

## 2022-04-28 RX ADMIN — ONDANSETRON 4 MG: 4 TABLET, ORALLY DISINTEGRATING ORAL at 00:15

## 2022-04-29 ENCOUNTER — OFFICE VISIT (OUTPATIENT)
Dept: SURGERY | Facility: CLINIC | Age: 72
End: 2022-04-29

## 2022-04-29 VITALS — BODY MASS INDEX: 26.88 KG/M2 | WEIGHT: 202.82 LBS | RESPIRATION RATE: 14 BRPM | HEIGHT: 73 IN

## 2022-04-29 DIAGNOSIS — R10.32 LEFT LOWER QUADRANT ABDOMINAL PAIN: Primary | ICD-10-CM

## 2022-04-29 LAB — BACTERIA SPEC AEROBE CULT: NORMAL

## 2022-04-29 PROCEDURE — 99212 OFFICE O/P EST SF 10 MIN: CPT | Performed by: SURGERY

## 2022-05-07 LAB
ALBUMIN SERPL-MCNC: 4.7 G/DL (ref 3.5–5.2)
ALBUMIN/GLOB SERPL: 2 G/DL
ALP SERPL-CCNC: 108 U/L (ref 39–117)
ALT SERPL W P-5'-P-CCNC: 19 U/L (ref 1–41)
ANION GAP SERPL CALCULATED.3IONS-SCNC: 10 MMOL/L (ref 5–15)
AST SERPL-CCNC: 20 U/L (ref 1–40)
BASOPHILS # BLD AUTO: 0 10*3/MM3 (ref 0–0.2)
BASOPHILS NFR BLD AUTO: 0 % (ref 0–1.5)
BILIRUB SERPL-MCNC: 0.7 MG/DL (ref 0–1.2)
BUN SERPL-MCNC: 17 MG/DL (ref 8–23)
BUN/CREAT SERPL: 15.5 (ref 7–25)
CALCIUM SPEC-SCNC: 9.7 MG/DL (ref 8.6–10.5)
CHLORIDE SERPL-SCNC: 103 MMOL/L (ref 98–107)
CO2 SERPL-SCNC: 24 MMOL/L (ref 22–29)
CREAT SERPL-MCNC: 1.1 MG/DL (ref 0.76–1.27)
DEPRECATED RDW RBC AUTO: 45.3 FL (ref 37–54)
EGFRCR SERPLBLD CKD-EPI 2021: 71.3 ML/MIN/1.73
EOSINOPHIL # BLD AUTO: 0.06 10*3/MM3 (ref 0–0.4)
EOSINOPHIL NFR BLD AUTO: 1 % (ref 0.3–6.2)
ERYTHROCYTE [DISTWIDTH] IN BLOOD BY AUTOMATED COUNT: 13.3 % (ref 12.3–15.4)
GLOBULIN UR ELPH-MCNC: 2.4 GM/DL
GLUCOSE SERPL-MCNC: 126 MG/DL (ref 65–99)
HCT VFR BLD AUTO: 36.3 % (ref 37.5–51)
HGB BLD-MCNC: 12 G/DL (ref 13–17.7)
HOLD SPECIMEN: NORMAL
HOLD SPECIMEN: NORMAL
IMM GRANULOCYTES # BLD AUTO: 0.02 10*3/MM3 (ref 0–0.05)
IMM GRANULOCYTES NFR BLD AUTO: 0.3 % (ref 0–0.5)
LIPASE SERPL-CCNC: 23 U/L (ref 13–60)
LYMPHOCYTES # BLD AUTO: 1.29 10*3/MM3 (ref 0.7–3.1)
LYMPHOCYTES NFR BLD AUTO: 21.2 % (ref 19.6–45.3)
MCH RBC QN AUTO: 30.5 PG (ref 26.6–33)
MCHC RBC AUTO-ENTMCNC: 33.1 G/DL (ref 31.5–35.7)
MCV RBC AUTO: 92.1 FL (ref 79–97)
MONOCYTES # BLD AUTO: 0.44 10*3/MM3 (ref 0.1–0.9)
MONOCYTES NFR BLD AUTO: 7.2 % (ref 5–12)
NEUTROPHILS NFR BLD AUTO: 4.27 10*3/MM3 (ref 1.7–7)
NEUTROPHILS NFR BLD AUTO: 70.3 % (ref 42.7–76)
NRBC BLD AUTO-RTO: 0 /100 WBC (ref 0–0.2)
PLATELET # BLD AUTO: 239 10*3/MM3 (ref 140–450)
PMV BLD AUTO: 8.9 FL (ref 6–12)
POTASSIUM SERPL-SCNC: 4 MMOL/L (ref 3.5–5.2)
PROT SERPL-MCNC: 7.1 G/DL (ref 6–8.5)
RBC # BLD AUTO: 3.94 10*6/MM3 (ref 4.14–5.8)
SODIUM SERPL-SCNC: 137 MMOL/L (ref 136–145)
WBC NRBC COR # BLD: 6.08 10*3/MM3 (ref 3.4–10.8)
WHOLE BLOOD HOLD SPECIMEN: NORMAL
WHOLE BLOOD HOLD SPECIMEN: NORMAL

## 2022-05-07 PROCEDURE — 83690 ASSAY OF LIPASE: CPT

## 2022-05-07 PROCEDURE — 36415 COLL VENOUS BLD VENIPUNCTURE: CPT

## 2022-05-07 PROCEDURE — 99283 EMERGENCY DEPT VISIT LOW MDM: CPT

## 2022-05-07 PROCEDURE — 80053 COMPREHEN METABOLIC PANEL: CPT

## 2022-05-07 PROCEDURE — 96372 THER/PROPH/DIAG INJ SC/IM: CPT

## 2022-05-07 PROCEDURE — 96375 TX/PRO/DX INJ NEW DRUG ADDON: CPT

## 2022-05-07 PROCEDURE — 96374 THER/PROPH/DIAG INJ IV PUSH: CPT

## 2022-05-07 PROCEDURE — 85025 COMPLETE CBC W/AUTO DIFF WBC: CPT

## 2022-05-07 RX ORDER — SODIUM CHLORIDE 0.9 % (FLUSH) 0.9 %
10 SYRINGE (ML) INJECTION AS NEEDED
Status: DISCONTINUED | OUTPATIENT
Start: 2022-05-07 | End: 2022-05-08 | Stop reason: HOSPADM

## 2022-05-08 ENCOUNTER — HOSPITAL ENCOUNTER (EMERGENCY)
Facility: HOSPITAL | Age: 72
Discharge: HOME | End: 2022-05-08
Attending: EMERGENCY MEDICINE | Admitting: EMERGENCY MEDICINE

## 2022-05-08 VITALS
DIASTOLIC BLOOD PRESSURE: 64 MMHG | BODY MASS INDEX: 26.53 KG/M2 | TEMPERATURE: 98 F | OXYGEN SATURATION: 96 % | WEIGHT: 200.18 LBS | RESPIRATION RATE: 18 BRPM | HEART RATE: 54 BPM | SYSTOLIC BLOOD PRESSURE: 107 MMHG | HEIGHT: 73 IN

## 2022-05-08 DIAGNOSIS — R19.7 DIARRHEA, UNSPECIFIED TYPE: ICD-10-CM

## 2022-05-08 DIAGNOSIS — R10.30 LOWER ABDOMINAL PAIN: Primary | ICD-10-CM

## 2022-05-08 DIAGNOSIS — R11.0 NAUSEA: ICD-10-CM

## 2022-05-08 PROCEDURE — 25010000002 DIPHENHYDRAMINE PER 50 MG: Performed by: NURSE PRACTITIONER

## 2022-05-08 PROCEDURE — 25010000002 METOCLOPRAMIDE PER 10 MG: Performed by: NURSE PRACTITIONER

## 2022-05-08 PROCEDURE — 25010000002 ONDANSETRON PER 1 MG: Performed by: NURSE PRACTITIONER

## 2022-05-08 PROCEDURE — 25010000002 DICYCLOMINE PER 20 MG: Performed by: NURSE PRACTITIONER

## 2022-05-08 RX ORDER — DIPHENHYDRAMINE HYDROCHLORIDE 50 MG/ML
12.5 INJECTION INTRAMUSCULAR; INTRAVENOUS ONCE
Status: COMPLETED | OUTPATIENT
Start: 2022-05-08 | End: 2022-05-08

## 2022-05-08 RX ORDER — ONDANSETRON 2 MG/ML
4 INJECTION INTRAMUSCULAR; INTRAVENOUS ONCE
Status: COMPLETED | OUTPATIENT
Start: 2022-05-08 | End: 2022-05-08

## 2022-05-08 RX ORDER — DICYCLOMINE HYDROCHLORIDE 10 MG/ML
20 INJECTION INTRAMUSCULAR ONCE
Status: COMPLETED | OUTPATIENT
Start: 2022-05-08 | End: 2022-05-08

## 2022-05-08 RX ORDER — DICYCLOMINE HCL 20 MG
20 TABLET ORAL EVERY 6 HOURS
Qty: 20 TABLET | Refills: 0 | Status: SHIPPED | OUTPATIENT
Start: 2022-05-08 | End: 2022-05-22

## 2022-05-08 RX ORDER — METOCLOPRAMIDE 10 MG/1
10 TABLET ORAL
Qty: 30 TABLET | Refills: 0 | Status: SHIPPED | OUTPATIENT
Start: 2022-05-08 | End: 2022-06-10

## 2022-05-08 RX ORDER — METOCLOPRAMIDE HYDROCHLORIDE 5 MG/ML
10 INJECTION INTRAMUSCULAR; INTRAVENOUS ONCE
Status: COMPLETED | OUTPATIENT
Start: 2022-05-08 | End: 2022-05-08

## 2022-05-08 RX ORDER — ONDANSETRON 4 MG/1
4 TABLET, ORALLY DISINTEGRATING ORAL EVERY 8 HOURS PRN
Qty: 15 TABLET | Refills: 0 | OUTPATIENT
Start: 2022-05-08 | End: 2022-05-22

## 2022-05-08 RX ADMIN — DIPHENHYDRAMINE HYDROCHLORIDE 12.5 MG: 50 INJECTION, SOLUTION INTRAMUSCULAR; INTRAVENOUS at 01:56

## 2022-05-08 RX ADMIN — DICYCLOMINE HYDROCHLORIDE 20 MG: 20 INJECTION, SOLUTION INTRAMUSCULAR at 01:58

## 2022-05-08 RX ADMIN — METOCLOPRAMIDE HYDROCHLORIDE 10 MG: 5 INJECTION INTRAMUSCULAR; INTRAVENOUS at 01:58

## 2022-05-08 RX ADMIN — ONDANSETRON 4 MG: 2 INJECTION INTRAMUSCULAR; INTRAVENOUS at 01:57

## 2022-05-08 NOTE — DISCHARGE INSTRUCTIONS
Lab work is unremarkable.  Your 3 CT scans recently were all negative and did not show any acute source of infection or immediate identification of pain source.  As we discussed you likely need a colonoscopy and follow-up with Dr. Haas for further evaluation.    Take medications as prescribed for symptomatic treatment.    Call dr haas office Monday morning and see if you can get your appointment moved up.    Return for new or worsening symptoms

## 2022-05-08 NOTE — ED PROVIDER NOTES
Time: 01:34 EDT  Arrived by: Private vehicle  Chief Complaint: Nausea and abdominal pain  History provided by: Patient  History is limited by: N/A    History of Present Illness:  Patient is a 71 y.o. year old male that presents to the emergency department with nausea with abdominal pain and intermittent diarrhea or eating      Abdominal Pain  Pain location:  LLQ and RLQ  Pain quality: aching and cramping    Pain radiates to:  Does not radiate  Pain severity:  Mild  Onset quality:  Gradual  Duration:  3 weeks  Timing:  Intermittent  Progression:  Unchanged  Chronicity:  New  Context: not retching, not sick contacts, not suspicious food intake and not trauma    Context comment:  States he has a constant sense of nausea.  Does not vomit.  But every time he eats he immediately has diarrhea afterward and some low abdominal pain  Relieved by:  Nothing  Worsened by:  Eating  Ineffective treatments: Patient was prescribed Zofran but does not seem to help much.  Associated symptoms: diarrhea ( After eating) and nausea    Associated symptoms: no anorexia, no belching, no chest pain, no chills, no constipation, no cough, no dysuria, no fatigue, no fever, no flatus, no hematemesis, no hematochezia, no hematuria, no melena, no shortness of breath, no sore throat and no vomiting    Risk factors: being elderly    Nausea  The primary symptoms include abdominal pain, nausea and diarrhea ( After eating). Primary symptoms do not include fever, fatigue, vomiting, melena, hematemesis, hematochezia or dysuria.   The illness does not include chills, anorexia or constipation.       Similar Symptoms Previously: Yes patient has had diverticulitis in the past and IBS.  Sees Dr. Haas.  Has an appointment on June 23.  Last had a colonoscopy 2 years ago    Recently seen: Patient has been seen several times for this complaint in the last 3 weeks has had negative CT scans 3 times      Patient Care Team  Primary Care Provider: CHELA Marcelo    Past  Medical History:     No Known Allergies  Past Medical History:   Diagnosis Date   • Abnormal ECG    • Anxiety    • Arrhythmia    • Arthritis    • Atrial fibrillation (HCC)    • Bladder disorder    • BPH (benign prostatic hyperplasia)    • CAD (coronary artery disease)    • Cervical spondylosis without myelopathy 01/15/2020   • Cervicalgia    • Chest pain    • Colitis    • Condition not found HERNIA   • Depression    • Diverticulitis    • Diverticulosis of colon    • GERD (gastroesophageal reflux disease)    • H/O degenerative disc disease    • Heart attack (HCC)    • Heart disease    • Hemorrhoids    • High cholesterol    • Hypertension    • IBS (irritable bowel syndrome)    • Mood disorder (HCC)    • Muscle cramps    • Myocardial infarction (HCC)    • Osteoarthritis    • Prostate disorder    • S/P lumpectomy, right breast     CYST 2016     Past Surgical History:   Procedure Laterality Date   • APPENDECTOMY     • BREAST MASS EXCISION  2016   • CARDIAC CATHETERIZATION  2016   • CHOLECYSTECTOMY     • COLONOSCOPY  2008,2014,2021   • CORONARY ANGIOPLASTY WITH STENT PLACEMENT  2010   • ENDOSCOPY  2010,2019   • HEMORRHOIDECTOMY  2019   • INGUINAL HERNIA REPAIR  2019   • TURP / TRANSURETHRAL INCISION / DRAINAGE PROSTATE  01/16/2020    BUTTON TURP W/ DR TAM   • UMBILICAL HERNIA REPAIR  2019     Family History   Problem Relation Age of Onset   • Other Mother         bladder stones   • Arthritis Mother    • Heart disease Mother    • Heart failure Father    • Stroke Father    • Colon cancer Paternal Grandfather         60's       Home Medications:  Prior to Admission medications    Medication Sig Start Date End Date Taking? Authorizing Provider   acetaminophen (TYLENOL) 325 MG tablet TAKE 2 TABLETS BY MOUTH EVERY 4 HOURS AS needed FOR mild PAIN OR FEVER 2/8/22   Provider, MD Elian   apixaban (ELIQUIS) 5 MG tablet tablet Take 1 tablet by mouth Every 12 (Twelve) Hours. 12/10/21   Mayank Sheehan MD   atorvastatin  (LIPITOR) 80 MG tablet Take 1 tablet by mouth at bedtime daily 3/24/22   Mayank Sheehan MD   clonazePAM (KlonoPIN) 0.5 MG tablet Take 1 tablet by mouth every 6 to 8 hours as needed for Anxiety. 4/1/22   Elian Merrill MD   clopidogrel (PLAVIX) 75 MG tablet Take 1 tablet by mouth every day 11/11/21   Mayank Sheehan MD   desvenlafaxine (PRISTIQ) 100 MG 24 hr tablet Take 100 mg by mouth Daily. 4/8/22   Elian Merrill MD   desvenlafaxine (PRISTIQ) 50 MG 24 hr tablet Take 50 mg by mouth Daily. 1/11/22 1/12/23  Elian Merrill MD   desvenlafaxine (PRISTIQ) 50 MG 24 hr tablet  1/11/22   Elian Merrill MD   dicyclomine (BENTYL) 20 MG tablet Take 1 tablet by mouth Every 6 (Six) Hours. 4/15/22   Ciro Gudino MD   diphenoxylate-atropine (LOMOTIL) 2.5-0.025 MG per tablet Take 1 tablet by mouth 4 (Four) Times a Day As Needed for Diarrhea. 4/15/22   Ciro Gudino MD   hydrOXYzine (ATARAX) 25 MG tablet hydroxyzine HCl 25 mg oral tablet take 1 tablet (25 mg) by oral route 3 times per day   Active    Elian Merrill MD   lisinopril (PRINIVIL,ZESTRIL) 5 MG tablet Take 5 mg by mouth Daily. 6/18/21   Elian Merrill MD   LORazepam (ATIVAN) 0.5 MG tablet Take 1 tablet by mouth 3 (Three) Times a Day. 1/17/22   Elian Merrill MD   LORazepam (ATIVAN) 1 MG tablet Take 1 tablet by mouth 2 (Two) Times a Day As Needed for Anxiety. 12/24/21   Castro Matos MD   metoprolol succinate XL (TOPROL-XL) 25 MG 24 hr tablet Take 1 tablet by mouth Daily. 12/10/21   Mayank Sheehan MD   mirtazapine (REMERON) 30 MG tablet Take 30 mg by mouth.    Elian Merrill MD   multivitamin (THERAGRAN) tablet tablet Take 1 tablet by mouth Daily.    Elian Merrill MD   multivitamin with minerals tablet tablet Take 1 tablet by mouth Daily.    Provider, MD Elian   nitroglycerin (NITROSTAT) 0.4 MG SL tablet Place 1 tablet under the tongue Every 5 (Five) Minutes As Needed for Chest Pain (no more  than 3 doses in 15 minutes). 12/10/21   Mayank Sheehan MD   pantoprazole (PROTONIX) 40 MG EC tablet pantoprazole 40 mg tablet,delayed release   TAKE ONE TABLET BY MOUTH EVERY DAY    ProviderElian MD   pantoprazole (PROTONIX) 40 MG EC tablet Take 40 mg by mouth. 4/19/22 4/20/23  Elian Merrill MD   promethazine (PHENERGAN) 12.5 MG tablet Take 1 tablet by mouth Every 6 (Six) Hours As Needed for Nausea or Vomiting. 5/7/22   Cami Newby APRN   sertraline (ZOLOFT) 100 MG tablet Daily.    ProviderElian MD   sucralfate (CARAFATE) 1 g tablet Take 1 tablet by mouth 4 (Four) Times a Day. 1/30/22   Mayank Heck DO   tadalafil (CIALIS) 5 MG tablet Take 1 tablet by mouth Daily for 360 days. 10/11/21 10/6/22  Mallorie Matt MD   tamsulosin (FLOMAX) 0.4 MG capsule 24 hr capsule TAKE ONE CAPSULE BY MOUTH EVERY DAY ONE-HALF HOUR FOLLOWING THE SAME MEAL EACH DAY 12/29/21   Mallorie Matt MD   traMADol (ULTRAM) 50 MG tablet Take 50 mg by mouth Every 6 (Six) Hours As Needed for Moderate Pain .    Emergency, Nurse Epic, RN   triamcinolone (KENALOG) 0.5 % cream triamcinolone acetonide 0.5 % topical cream    Provider, MD Elian        Social History:   PT  reports that he has never smoked. He has never used smokeless tobacco. He reports previous alcohol use. He reports that he does not use drugs.    Record Review:  I have reviewed the patient's records in [facility].     Review of Systems  Review of Systems   Constitutional: Negative for chills, fatigue and fever.   HENT: Negative for congestion, ear pain and sore throat.    Eyes: Negative for pain.   Respiratory: Negative for cough, chest tightness and shortness of breath.    Cardiovascular: Negative for chest pain.   Gastrointestinal: Positive for abdominal pain, diarrhea ( After eating) and nausea. Negative for anorexia, constipation, flatus, hematemesis, hematochezia, melena and vomiting.   Genitourinary: Negative for dysuria, flank pain and  "hematuria.   Musculoskeletal: Negative for joint swelling.   Skin: Negative for pallor.   Neurological: Negative for seizures and headaches.   Hematological: Negative.    Psychiatric/Behavioral: Negative.    All other systems reviewed and are negative.       Physical Exam  /84 (BP Location: Left arm, Patient Position: Sitting)   Pulse 59   Temp 98 °F (36.7 °C) (Oral)   Resp 18   Ht 185.4 cm (73\")   Wt 90.8 kg (200 lb 2.8 oz)   SpO2 97%   BMI 26.41 kg/m²     Physical Exam  Vitals and nursing note reviewed.   Constitutional:       General: He is not in acute distress.     Appearance: Normal appearance. He is not toxic-appearing.   HENT:      Head: Normocephalic and atraumatic.      Mouth/Throat:      Mouth: Mucous membranes are moist.   Eyes:      General: No scleral icterus.  Cardiovascular:      Rate and Rhythm: Normal rate and regular rhythm.      Pulses: Normal pulses.      Heart sounds: Normal heart sounds.   Pulmonary:      Effort: Pulmonary effort is normal. No respiratory distress.      Breath sounds: Normal breath sounds.   Abdominal:      General: Abdomen is flat. Bowel sounds are normal.      Palpations: Abdomen is soft.      Tenderness: There is no abdominal tenderness. There is no right CVA tenderness or left CVA tenderness.      Hernia: No hernia is present.      Comments: Pain not reproducible with palpation   Musculoskeletal:         General: Normal range of motion.      Cervical back: Normal range of motion and neck supple.   Skin:     General: Skin is warm and dry.   Neurological:      Mental Status: He is alert and oriented to person, place, and time. Mental status is at baseline.   Psychiatric:         Mood and Affect: Mood normal.         Behavior: Behavior normal.          ED Course  /84 (BP Location: Left arm, Patient Position: Sitting)   Pulse 59   Temp 98 °F (36.7 °C) (Oral)   Resp 18   Ht 185.4 cm (73\")   Wt 90.8 kg (200 lb 2.8 oz)   SpO2 97%   BMI 26.41 kg/m² "   Results for orders placed or performed during the hospital encounter of 05/08/22   Comprehensive Metabolic Panel    Specimen: Blood   Result Value Ref Range    Glucose 126 (H) 65 - 99 mg/dL    BUN 17 8 - 23 mg/dL    Creatinine 1.10 0.76 - 1.27 mg/dL    Sodium 137 136 - 145 mmol/L    Potassium 4.0 3.5 - 5.2 mmol/L    Chloride 103 98 - 107 mmol/L    CO2 24.0 22.0 - 29.0 mmol/L    Calcium 9.7 8.6 - 10.5 mg/dL    Total Protein 7.1 6.0 - 8.5 g/dL    Albumin 4.70 3.50 - 5.20 g/dL    ALT (SGPT) 19 1 - 41 U/L    AST (SGOT) 20 1 - 40 U/L    Alkaline Phosphatase 108 39 - 117 U/L    Total Bilirubin 0.7 0.0 - 1.2 mg/dL    Globulin 2.4 gm/dL    A/G Ratio 2.0 g/dL    BUN/Creatinine Ratio 15.5 7.0 - 25.0    Anion Gap 10.0 5.0 - 15.0 mmol/L    eGFR 71.3 >60.0 mL/min/1.73   Lipase    Specimen: Blood   Result Value Ref Range    Lipase 23 13 - 60 U/L   CBC Auto Differential    Specimen: Blood   Result Value Ref Range    WBC 6.08 3.40 - 10.80 10*3/mm3    RBC 3.94 (L) 4.14 - 5.80 10*6/mm3    Hemoglobin 12.0 (L) 13.0 - 17.7 g/dL    Hematocrit 36.3 (L) 37.5 - 51.0 %    MCV 92.1 79.0 - 97.0 fL    MCH 30.5 26.6 - 33.0 pg    MCHC 33.1 31.5 - 35.7 g/dL    RDW 13.3 12.3 - 15.4 %    RDW-SD 45.3 37.0 - 54.0 fl    MPV 8.9 6.0 - 12.0 fL    Platelets 239 140 - 450 10*3/mm3    Neutrophil % 70.3 42.7 - 76.0 %    Lymphocyte % 21.2 19.6 - 45.3 %    Monocyte % 7.2 5.0 - 12.0 %    Eosinophil % 1.0 0.3 - 6.2 %    Basophil % 0.0 0.0 - 1.5 %    Immature Grans % 0.3 0.0 - 0.5 %    Neutrophils, Absolute 4.27 1.70 - 7.00 10*3/mm3    Lymphocytes, Absolute 1.29 0.70 - 3.10 10*3/mm3    Monocytes, Absolute 0.44 0.10 - 0.90 10*3/mm3    Eosinophils, Absolute 0.06 0.00 - 0.40 10*3/mm3    Basophils, Absolute 0.00 0.00 - 0.20 10*3/mm3    Immature Grans, Absolute 0.02 0.00 - 0.05 10*3/mm3    nRBC 0.0 0.0 - 0.2 /100 WBC   Green Top (Gel)   Result Value Ref Range    Extra Tube Hold for add-ons.    Lavender Top   Result Value Ref Range    Extra Tube hold for add-on     Gold Top - SST   Result Value Ref Range    Extra Tube Hold for add-ons.    Light Blue Top   Result Value Ref Range    Extra Tube hold for add-on      Medications   sodium chloride 0.9 % flush 10 mL (has no administration in time range)   metoclopramide (REGLAN) injection 10 mg (has no administration in time range)   diphenhydrAMINE (BENADRYL) injection 12.5 mg (has no administration in time range)   ondansetron (ZOFRAN) injection 4 mg (has no administration in time range)   dicyclomine (BENTYL) injection 20 mg (has no administration in time range)     CT Abdomen Pelvis With Contrast    Result Date: 4/20/2022  Narrative: PROCEDURE: CT ABDOMEN PELVIS W CONTRAST  COMPARISON: [facility], CT, CT ABDOMEN PELVIS W CONTRAST, 4/15/2022, 17:40.  INDICATIONS: LLQ PAIN after lifting, possible hernia  TECHNIQUE: After obtaining the patient's consent, CT images were created with non-ionic intravenous contrast material.   PROTOCOL:   Standard imaging protocol performed    RADIATION:   DLP: 833.7mGy*cm   Automated exposure control was utilized to minimize radiation dose. CONTRAST: 100cc Isovue 370 I.V. LABS:   eGFR: >60ml/min/1.73m2  FINDINGS:  Visualized lung bases are clear.  The liver, pancreas, and spleen are within normal limits.  Patient is status post cholecystectomy.  Bilateral adrenal glands are within normal limits.  Kidneys appear within normal limits bilaterally.  No evidence of hydronephrosis.  There are multiple parapelvic left renal cyst.  No hydronephrosis.  No abdominal or retroperitoneal adenopathy.  Upper GI tract is within normal limits.  Pelvis:  Urinary bladder is within normal limits.  GI tract is within normal limits.  There is no pelvic or inguinal adenopathy.  There is no free intraperitoneal fluid.  There are small fat containing bilateral inguinal hernias.  No associated inflammatory change or fluid.  There are degenerative changes of the spine.  No lytic or sclerotic bony lesion  identified.      Impression:   1. Small bilateral fat containing inguinal hernias.  No associated inflammatory change or fluid.  2. No acute process seen within the abdomen or pelvis.  3. Status post cholecystectomy.     JESSICA ANGELO MD       Electronically Signed and Approved By: JESSICA ANGELO MD on 4/20/2022 at 15:51             CT Abdomen Pelvis With Contrast    Result Date: 4/15/2022  Narrative: PROCEDURE: CT ABDOMEN PELVIS W CONTRAST  COMPARISON: [facility], CT, CT ABDOMEN PELVIS W CONTRAST, 10/29/2021, 21:01.  INDICATIONS: abdominal pain  PROTOCOL:   Standard imaging protocol performed    RADIATION:   DLP: 528.5 mGy*cm   Automated exposure control was utilized to minimize radiation dose. CONTRAST: 100 cc Isovue 370 I.V.  TECHNIQUE: Axial images of the abdomen and pelvis with intravenous contrast.  ABDOMEN:  The lung bases are clear.  Prior cholecystectomy.  The liver, spleen, pancreas and adrenal glands are normal.  No evidence of solid renal mass or hydronephrosis.  PELVIS:  No evidence of bowel obstruction, perforation or abscess.  No CT evidence of colitis.  Degenerative changes are present in the lumbar spine.  The abdominal aorta has a normal caliber.  Atherosclerotic disease is present.  There is minimal colonic diverticulosis.  No CT evidence of diverticulitis.  The urinary bladder is normal.  There are minimal fat containing inguinal hernias.  IMPRESSION:  No acute findings.  KENDRICK BOWMAN MD       Electronically Signed and Approved By: KENDRICK BOWMAN MD on 4/15/2022 at 17:50             XR Chest 1 View    Result Date: 4/27/2022  Narrative: PROCEDURE: XR CHEST 1 VW  COMPARISON: [facility], CR, XR CHEST 1 VW, 1/30/2022, 18:04.  INDICATIONS: cough  FINDINGS:  The lungs are adequately expanded and appear grossly clear.  No consolidation or large pleural effusion is seen.  Cardiomediastinal contours are stable.  There are degenerative changes in the thoracic spine.       Impression:  No acute cardiopulmonary abnormality is identified.       MIKAELA PATHAK MD       Electronically Signed and Approved By: MIKAELA PATHAK MD on 2022 at 23:26             XR Chest 1 View    Result Date: 2022  Narrative: STUDY:   X-RAY CHEST REASON FOR EXAM:   Male, 71 years old.  ABDOMINAL PAIN; LLQ TECHNIQUE:   Single AP portable view of the chest. COMPARISON:   2020 ___________________________________ FINDINGS: The lungs are clear and expanded.  There is no demonstrated pleural abnormality. Normal size heart.   Normal mediastinum and mercedes.  Normal visualized pulmonary arteries.  Normal visualized aortic arch and descending thoracic aorta. Normal visualized thoracic spine.  Normal visualized ribs, clavicles, and shoulders. There is no demonstrated abnormality of the visualized soft tissue structures of the upper abdomen. ___________________________________    Impression: Normal x-ray examination of the chest. Electronically Signed: Santiago Walls MD  at 15:52 CDT Reading Location ID and State: [address] / [place] Tel [phone], Service support  [phone], Fax [phone]    CT Abdomen & Pelvis W IV Contrast Without Oral Contrast    Result Date: 2022  Narrative: REVIEWING YOUR TEST RESULTS IN [facility] IS NOT A SUBSTITUTE FOR DISCUSSING THOSE RESULTS WITH YOUR HEALTH CARE PROVIDER. PLEASE CONTACT YOUR PROVIDER VIA [facility] TO DISCUSS ANY QUESTIONS OR CONCERNS YOU MAY HAVE REGARDING THESE TEST RESULTS.  RADIOLOGY REPORT FACILITY:  [facility] UNIT/AGE/GENDER: N.ED  ER      AGE:71 Y          SEX:M PATIENT NAME/:  NAPOLEON VINES LEE    1950 UNIT NUMBER:  [id number] ACCOUNT NUMBER:  [id number] ACCESSION NUMBER:  [id number] DATE: 2022 EXAMINATION:  Computed tomography of the abdomen and pelvis with intravenous contrast HISTORY: Lower abdominal pain, nausea, decreased appetite TECHNIQUE:  Transaxial computed tomographic images of the  abdomen and pelvis were obtained with intravenous contrast according to the standard protocol. Dose reduction technique performed per ALARA protocol. COMPARISON: None FINDINGS: The lung bases are clear. The gallbladder is absent. No focal liver lesions or biliary ductal dilation. The pancreas, spleen, and adrenals are unremarkable. There are left greater than right bilateral parapelvic renal cysts. Additional subcentimeter left cortical hypodense lesions too small to accurately characterize, statistically likely to represent cysts. No hydronephrosis. There is a tiny hiatal hernia. The bowel is not dilated. There is no abnormal bowel wall thickening. The appendix is not visualized and there are no pericecal inflammatory changes. No free air, free fluid, or focal fluid collection. No abdominal or pelvic lymphadenopathy. Urinary bladder is partially collapsed and grossly unremarkable. Prostate is mildly enlarged measuring 4.1 cm transverse. Small fat-containing right inguinal hernia. Left spermatic cord lipomatosis without definite inguinal hernia. Mild aortoiliac atherosclerosis without aneurysm. Lower lumbar facet osteoarthritis. No acute osseous abnormality. IMPRESSION:   1.No acute intra-abdominal findings. 2.Multiple chronic and incidental findings as described above. Dictated by: Stan Baumann D.O. Images and Report reviewed and interpreted by: Stan Baumann D.O. <PS><Electronically signed by: Stan Baumann D.O.> 04/24/2022 1637 D: 04/24/2022 1630 T: 04/24/2022 1630      Medical Decision Making:                     MDM  Number of Diagnoses or Management Options  Diarrhea, unspecified type  Lower abdominal pain  Nausea  Diagnosis management comments: I have spoken with the patient. I have explained the patient´s condition, diagnoses and treatment plan based on the information available to me at this time. I have answered the patient's questions and addressed any concerns. The patient has a good   understanding of the patient´s diagnosis, condition, and treatment plan as can be expected at this point. The vital signs have been stable. The patient´s condition is stable and appropriate for discharge from the emergency department.      The patient will pursue further outpatient evaluation with the primary care physician or other designated or consulting physician as outlined in the discharge instructions. They are agreeable to this plan of care and follow-up instructions have been explained in detail. The patient has received these instructions in written format and have expressed an understanding of the discharge instructions. The patient is aware that any significant change in condition or worsening of symptoms should prompt an immediate return to this or the closest emergency department or call to 911.         Amount and/or Complexity of Data Reviewed  Clinical lab tests: reviewed and ordered  Tests in the medicine section of CPT®: ordered and reviewed  Review and summarize past medical records: yes (Reviewed past ED visits and outpatient visits including CT scan results)    Risk of Complications, Morbidity, and/or Mortality  Presenting problems: moderate  Diagnostic procedures: low  Management options: low    Patient Progress  Patient progress: stable       Final diagnoses:   Lower abdominal pain   Diarrhea, unspecified type   Nausea        Disposition:  ED Disposition     ED Disposition   Discharge    Condition   Stable    Comment   --              Chasidy Pope, APRN  05/08/22 0134

## 2022-05-13 ENCOUNTER — HOSPITAL ENCOUNTER (EMERGENCY)
Facility: HOSPITAL | Age: 72
Discharge: HOME | End: 2022-05-13
Attending: EMERGENCY MEDICINE | Admitting: EMERGENCY MEDICINE

## 2022-05-13 ENCOUNTER — APPOINTMENT (OUTPATIENT)
Dept: GENERAL RADIOLOGY | Facility: HOSPITAL | Age: 72
End: 2022-05-13

## 2022-05-13 VITALS
OXYGEN SATURATION: 97 % | HEIGHT: 73 IN | DIASTOLIC BLOOD PRESSURE: 87 MMHG | TEMPERATURE: 98.8 F | RESPIRATION RATE: 18 BRPM | SYSTOLIC BLOOD PRESSURE: 141 MMHG | WEIGHT: 199.96 LBS | BODY MASS INDEX: 26.5 KG/M2 | HEART RATE: 75 BPM

## 2022-05-13 DIAGNOSIS — S51.812A LACERATION OF LEFT FOREARM, INITIAL ENCOUNTER: ICD-10-CM

## 2022-05-13 DIAGNOSIS — S50.12XA TRAUMATIC HEMATOMA OF LEFT FOREARM, INITIAL ENCOUNTER: Primary | ICD-10-CM

## 2022-05-13 PROCEDURE — 90715 TDAP VACCINE 7 YRS/> IM: CPT | Performed by: EMERGENCY MEDICINE

## 2022-05-13 PROCEDURE — 25010000002 TETANUS-DIPHTH-ACELL PERTUSSIS 5-2.5-18.5 LF-MCG/0.5 SUSPENSION PREFILLED SYRINGE: Performed by: EMERGENCY MEDICINE

## 2022-05-13 PROCEDURE — 99283 EMERGENCY DEPT VISIT LOW MDM: CPT

## 2022-05-13 PROCEDURE — 90471 IMMUNIZATION ADMIN: CPT | Performed by: EMERGENCY MEDICINE

## 2022-05-13 PROCEDURE — 73090 X-RAY EXAM OF FOREARM: CPT

## 2022-05-13 RX ORDER — HYDROCODONE BITARTRATE AND ACETAMINOPHEN 5; 325 MG/1; MG/1
1 TABLET ORAL ONCE
Status: COMPLETED | OUTPATIENT
Start: 2022-05-13 | End: 2022-05-13

## 2022-05-13 RX ORDER — LIDOCAINE HYDROCHLORIDE AND EPINEPHRINE 10; 10 MG/ML; UG/ML
10 INJECTION, SOLUTION INFILTRATION; PERINEURAL ONCE
Status: COMPLETED | OUTPATIENT
Start: 2022-05-13 | End: 2022-05-13

## 2022-05-13 RX ADMIN — HYDROCODONE BITARTRATE AND ACETAMINOPHEN 1 TABLET: 5; 325 TABLET ORAL at 14:45

## 2022-05-13 RX ADMIN — TETANUS TOXOID, REDUCED DIPHTHERIA TOXOID AND ACELLULAR PERTUSSIS VACCINE, ADSORBED 0.5 ML: 5; 2.5; 8; 8; 2.5 SUSPENSION INTRAMUSCULAR at 17:13

## 2022-05-13 RX ADMIN — LIDOCAINE HYDROCHLORIDE AND EPINEPHRINE 10 ML: 10; 10 INJECTION, SOLUTION INFILTRATION; PERINEURAL at 14:47

## 2022-05-13 NOTE — ED PROVIDER NOTES
"Time: 2:15 PM EDT  Arrived by: private car  Chief Complaint: ARM INJURY   History provided by: pt  History is limited by: N/A     History of Present Illness:  Patient is a 71 y.o. male that presents to the emergency department with left ARM INJURY that occurred today. He states that he was working underneath a car when a transmission fell onto his left arm. The pain is constant and is moderate in severity. He notes that there is a wound to the left arm as well that was \"squiriting blood\". No other injuries reported.     No numbness or tingling. No fever, cough, or diarrhea. He does note some \"stomach problems\" but is not concerned with these problems at this time.    Pt is on Plavix and Eliquis. He has hx of A-fib. His tetanus shot is not up to date.     History provided by:  Patient    Similar Symptoms Previously: no  Recently seen: Pt was seen in this ED on 5/8/22 for nausea and abdominal pain.     Patient Care Team  Primary Care Provider: Edith Marcelo APRN    Past Medical History:     No Known Allergies  Past Medical History:   Diagnosis Date   • Abnormal ECG    • Anxiety    • Arrhythmia    • Arthritis    • Atrial fibrillation (HCC)    • Bladder disorder    • BPH (benign prostatic hyperplasia)    • CAD (coronary artery disease)    • Cervical spondylosis without myelopathy 01/15/2020   • Cervicalgia    • Chest pain    • Colitis    • Condition not found HERNIA   • Depression    • Diverticulitis    • Diverticulosis of colon    • GERD (gastroesophageal reflux disease)    • H/O degenerative disc disease    • Heart attack (HCC)    • Heart disease    • Hemorrhoids    • High cholesterol    • Hypertension    • IBS (irritable bowel syndrome)    • Mood disorder (HCC)    • Muscle cramps    • Myocardial infarction (HCC)    • Osteoarthritis    • Prostate disorder    • S/P lumpectomy, right breast     CYST 2016     Past Surgical History:   Procedure Laterality Date   • APPENDECTOMY     • BREAST MASS EXCISION  2016   • " CARDIAC CATHETERIZATION  2016   • CHOLECYSTECTOMY     • COLONOSCOPY  2008,2014,2021   • CORONARY ANGIOPLASTY WITH STENT PLACEMENT  2010   • ENDOSCOPY  2010,2019   • HEMORRHOIDECTOMY  2019   • INGUINAL HERNIA REPAIR  2019   • TURP / TRANSURETHRAL INCISION / DRAINAGE PROSTATE  01/16/2020    BUTTON TURP W/ DR TAM   • UMBILICAL HERNIA REPAIR  2019     Family History   Problem Relation Age of Onset   • Other Mother         bladder stones   • Arthritis Mother    • Heart disease Mother    • Heart failure Father    • Stroke Father    • Colon cancer Paternal Grandfather         60's       Home Medications:  Prior to Admission medications    Medication Sig Start Date End Date Taking? Authorizing Provider   acetaminophen (TYLENOL) 325 MG tablet TAKE 2 TABLETS BY MOUTH EVERY 4 HOURS AS needed FOR mild PAIN OR FEVER 2/8/22   Elian Merrill MD   apixaban (ELIQUIS) 5 MG tablet tablet Take 1 tablet by mouth Every 12 (Twelve) Hours. 12/10/21   Mayank Sheehan MD   atorvastatin (LIPITOR) 80 MG tablet Take 1 tablet by mouth at bedtime daily 3/24/22   Mayank Sheehan MD   clonazePAM (KlonoPIN) 0.5 MG tablet Take 1 tablet by mouth every 6 to 8 hours as needed for Anxiety. 4/1/22   Elian Merrill MD   clopidogrel (PLAVIX) 75 MG tablet Take 1 tablet by mouth every day 11/11/21   Mayank Sheehan MD   desvenlafaxine (PRISTIQ) 100 MG 24 hr tablet Take 100 mg by mouth Daily. 4/8/22   Elian Merrill MD   desvenlafaxine (PRISTIQ) 50 MG 24 hr tablet Take 50 mg by mouth Daily. 1/11/22 1/12/23  Elian Merrill MD   desvenlafaxine (PRISTIQ) 50 MG 24 hr tablet  1/11/22   Elian Merrill MD   dicyclomine (BENTYL) 20 MG tablet Take 1 tablet by mouth Every 6 (Six) Hours. 5/8/22   Chasidy Pope APRN   diphenoxylate-atropine (LOMOTIL) 2.5-0.025 MG per tablet Take 1 tablet by mouth 4 (Four) Times a Day As Needed for Diarrhea. 4/15/22   Ciro Gudino MD   hydrOXYzine (ATARAX) 25 MG tablet hydroxyzine HCl 25 mg oral  tablet take 1 tablet (25 mg) by oral route 3 times per day   Active    Elian Merrill MD   lisinopril (PRINIVIL,ZESTRIL) 5 MG tablet Take 5 mg by mouth Daily. 6/18/21   Elian Merrill MD   LORazepam (ATIVAN) 0.5 MG tablet Take 1 tablet by mouth 3 (Three) Times a Day. 1/17/22   Elian Merrill MD   LORazepam (ATIVAN) 1 MG tablet Take 1 tablet by mouth 2 (Two) Times a Day As Needed for Anxiety. 12/24/21   Castro Matos MD   metoclopramide (REGLAN) 10 MG tablet Take 1 tablet by mouth 4 (Four) Times a Day Before Meals & at Bedtime. 5/8/22   Chasidy Pope APRN   metoprolol succinate XL (TOPROL-XL) 25 MG 24 hr tablet Take 1 tablet by mouth Daily. 12/10/21   Mayank Sheehan MD   mirtazapine (REMERON) 30 MG tablet Take 30 mg by mouth.    Elian Merrill MD   multivitamin (THERAGRAN) tablet tablet Take 1 tablet by mouth Daily.    Elian Merrill MD   multivitamin with minerals tablet tablet Take 1 tablet by mouth Daily.    Elian Merrill MD   nitroglycerin (NITROSTAT) 0.4 MG SL tablet Place 1 tablet under the tongue Every 5 (Five) Minutes As Needed for Chest Pain (no more than 3 doses in 15 minutes). 12/10/21   Mayank Sheehan MD   ondansetron ODT (ZOFRAN-ODT) 4 MG disintegrating tablet Place 1 tablet on the tongue Every 8 (Eight) Hours As Needed for Nausea or Vomiting. 5/8/22   Chasidy Pope APRN   pantoprazole (PROTONIX) 40 MG EC tablet pantoprazole 40 mg tablet,delayed release   TAKE ONE TABLET BY MOUTH EVERY DAY    Elian Merrill MD   pantoprazole (PROTONIX) 40 MG EC tablet Take 40 mg by mouth. 4/19/22 4/20/23  Elian Merrill MD   promethazine (PHENERGAN) 12.5 MG tablet Take 1 tablet by mouth Every 6 (Six) Hours As Needed for Nausea or Vomiting. 5/7/22   Cami Newby APRN   sertraline (ZOLOFT) 100 MG tablet Daily.    Elian Merrill MD   sucralfate (CARAFATE) 1 g tablet Take 1 tablet by mouth 4 (Four) Times a Day. 1/30/22   Mayank Heck DO tadalafil  "(CIALIS) 5 MG tablet Take 1 tablet by mouth Daily for 360 days. 10/11/21 10/6/22  Mallorie Matt MD   tamsulosin (FLOMAX) 0.4 MG capsule 24 hr capsule TAKE ONE CAPSULE BY MOUTH EVERY DAY ONE-HALF HOUR FOLLOWING THE SAME MEAL EACH DAY 12/29/21   Mallorie Matt MD   traMADol (ULTRAM) 50 MG tablet Take 50 mg by mouth Every 6 (Six) Hours As Needed for Moderate Pain .    Emergency, Nurse Epic, RN   triamcinolone (KENALOG) 0.5 % cream triamcinolone acetonide 0.5 % topical cream    Provider, MD Elian        Social History:   Social History     Tobacco Use   • Smoking status: Never Smoker   • Smokeless tobacco: Never Used   Vaping Use   • Vaping Use: Never used   Substance Use Topics   • Alcohol use: Not Currently   • Drug use: Never     Recent travel: no     Review of Systems:  Review of Systems   Constitutional: Negative for chills, diaphoresis and fever.   HENT: Negative for ear discharge and nosebleeds.    Eyes: Negative for photophobia.   Respiratory: Negative for shortness of breath.    Cardiovascular: Negative for chest pain.   Gastrointestinal: Negative for diarrhea, nausea and vomiting.   Genitourinary: Negative for dysuria.   Musculoskeletal: Negative for back pain and neck pain.   Skin: Negative for rash.   Neurological: Negative for headaches.        Physical Exam:  /87 (BP Location: Right arm, Patient Position: Sitting)   Pulse 75   Temp 98.8 °F (37.1 °C) (Oral)   Resp 18   Ht 185.4 cm (73\")   Wt 90.7 kg (199 lb 15.3 oz)   SpO2 97%   BMI 26.38 kg/m²     Physical Exam  Vitals and nursing note reviewed.   Constitutional:       General: He is not in acute distress.     Appearance: Normal appearance.   HENT:      Head: Normocephalic and atraumatic.      Nose: Nose normal.   Eyes:      General: No scleral icterus.  Cardiovascular:      Rate and Rhythm: Normal rate and regular rhythm.      Heart sounds: Normal heart sounds.   Pulmonary:      Effort: Pulmonary effort is normal. No " respiratory distress.      Breath sounds: Normal breath sounds.   Abdominal:      Palpations: Abdomen is soft.      Tenderness: There is no abdominal tenderness.   Musculoskeletal:         General: Normal range of motion.      Cervical back: Neck supple.      Right lower leg: No edema.      Left lower leg: No edema.      Comments: 10-12 x 6 cm hematoma to the left mid to distal forearm on dorsal aspect. 5-6 cm laceration in the center of the hematoma that is gaping. Currently there is just slow oozing of blood. 2+ radial pulse. NV intact distal to wound.    Skin:     General: Skin is warm and dry.   Neurological:      General: No focal deficit present.      Mental Status: He is alert and oriented to person, place, and time.      Sensory: No sensory deficit.      Motor: No weakness.                Medications in the Emergency Department:  Medications   Tetanus-Diphth-Acell Pertussis (BOOSTRIX) injection 0.5 mL (has no administration in time range)   HYDROcodone-acetaminophen (NORCO) 5-325 MG per tablet 1 tablet (1 tablet Oral Given 5/13/22 0655)   lidocaine 1% - EPINEPHrine 1:249422 (XYLOCAINE W/EPI) 1 %-1:690456 injection 10 mL (10 mL Injection Given 5/13/22 8417)        Labs  Lab Results (last 24 hours)     ** No results found for the last 24 hours. **           Imaging:  XR Forearm 2 View Left    Result Date: 5/13/2022  PROCEDURE: XR FOREARM 2 VW LEFT  COMPARISON: None  INDICATIONS: left forearm pain, posterior mid forearm laceration/swelling transmission fell on arm  FINDINGS:  BONES: Normal.  No significant arthropathy or acute abnormality.  SOFT TISSUES: There is extensive dorsal soft tissue swelling in the left forearm with associated laceration/soft tissue gas. EFFUSION: None visible.  OTHER: Radiopaque foreign bodies are identified       Extensive dorsal soft tissue swelling in the left forearm with associated laceration/soft tissue gas.      KENDRICK BOWMAN MD       Electronically Signed and Approved By:  KENDRICK BOWMAN MD on 5/13/2022 at 13:03               Procedures:  Procedures    Progress                            Medical Decision Making:  MDM  Number of Diagnoses or Management Options  Laceration of left forearm, initial encounter: new and does not require workup  Traumatic hematoma of left forearm, initial encounter: new and does not require workup     Amount and/or Complexity of Data Reviewed  Independent visualization of images, tracings, or specimens: yes    Risk of Complications, Morbidity, and/or Mortality  Presenting problems: moderate  Management options: low    Patient Progress  Patient progress: stable       Final diagnoses:   Traumatic hematoma of left forearm, initial encounter   Laceration of left forearm, initial encounter        Disposition:  ED Disposition     ED Disposition   Discharge    Condition   Stable    Comment   --             Documentation assistance provided by Sujatha Fonseca acting as scribe for Isai Mills MD. Information recorded by the scribe was done at my direction and has been verified and validated by me.        Sujatha Fonseca  05/13/22 [id number]       Isai Mills MD  05/13/22 [id number]

## 2022-05-13 NOTE — DISCHARGE INSTRUCTIONS
Keep your wound dry and covered.  Have sutures removed in 7 to 10 days.  Return to emergency department as needed for worsening of symptoms.

## 2022-05-13 NOTE — ED PROVIDER NOTES
Subjective   History of Present Illness    Review of Systems    Past Medical History:   Diagnosis Date   • Abnormal ECG    • Anxiety    • Arrhythmia    • Arthritis    • Atrial fibrillation (HCC)    • Bladder disorder    • BPH (benign prostatic hyperplasia)    • CAD (coronary artery disease)    • Cervical spondylosis without myelopathy 01/15/2020   • Cervicalgia    • Chest pain    • Colitis    • Condition not found HERNIA   • Depression    • Diverticulitis    • Diverticulosis of colon    • GERD (gastroesophageal reflux disease)    • H/O degenerative disc disease    • Heart attack (HCC)    • Heart disease    • Hemorrhoids    • High cholesterol    • Hypertension    • IBS (irritable bowel syndrome)    • Mood disorder (HCC)    • Muscle cramps    • Myocardial infarction ([facility])    • Osteoarthritis    • Prostate disorder    • S/P lumpectomy, right breast     CYST 2016       No Known Allergies    Past Surgical History:   Procedure Laterality Date   • APPENDECTOMY     • BREAST MASS EXCISION  2016   • CARDIAC CATHETERIZATION  2016   • CHOLECYSTECTOMY     • COLONOSCOPY  2008,2014,2021   • CORONARY ANGIOPLASTY WITH STENT PLACEMENT  2010   • ENDOSCOPY  2010,2019   • HEMORRHOIDECTOMY  2019   • INGUINAL HERNIA REPAIR  2019   • TURP / TRANSURETHRAL INCISION / DRAINAGE PROSTATE  01/16/2020    BUTTON TURP W/ DR TAM   • UMBILICAL HERNIA REPAIR  2019       Family History   Problem Relation Age of Onset   • Other Mother         bladder stones   • Arthritis Mother    • Heart disease Mother    • Heart failure Father    • Stroke Father    • Colon cancer Paternal Grandfather         60's       Social History     Socioeconomic History   • Marital status:    Tobacco Use   • Smoking status: Never Smoker   • Smokeless tobacco: Never Used   Vaping Use   • Vaping Use: Never used   Substance and Sexual Activity   • Alcohol use: Not Currently   • Drug use: Never   • Sexual activity: Defer           Objective   Physical Exam    Laceration  Repair    Date/Time: 5/13/2022 3:50 PM  Performed by: Bartolo Law APRN  Authorized by: Isai Mills MD     Consent:     Consent obtained:  Verbal    Consent given by:  Patient    Risks, benefits, and alternatives were discussed: yes      Risks discussed:  Infection, pain, poor cosmetic result, poor wound healing, need for additional repair, retained foreign body and vascular damage    Alternatives discussed:  Referral  [place] protocol:     Procedure explained and questions answered to patient or proxy's satisfaction: yes      Relevant documents present and verified: no      Test results available: yes      Imaging studies available: yes      Required blood products, implants, devices, and special equipment available: no      Site/side marked: no      Immediately prior to procedure, a time out was called: no      Patient identity confirmed:  Verbally with patient and arm band  Anesthesia:     Anesthesia method:  Local infiltration    Local anesthetic:  Lidocaine 1% WITH epi  Laceration details:     Location:  Shoulder/arm    Shoulder/arm location:  L lower arm    Length (cm):  6    Depth (mm):  20  Pre-procedure details:     Preparation:  Patient was prepped and draped in usual sterile fashion and imaging obtained to evaluate for foreign bodies  Exploration:     Hemostasis achieved with:  Epinephrine and direct pressure    Imaging obtained: x-ray      Imaging outcome: foreign body not noted      Wound exploration: wound explored through full range of motion and entire depth of wound visualized      Wound extent comment:  Large hematoma, large amount evacuated but pt has swelling from dorsal wrist to near elbow    Contaminated: no    Treatment:     Area cleansed with:  Povidone-iodine    Amount of cleaning:  Extensive    Irrigation solution:  Sterile saline    Irrigation volume:  30cc    Irrigation method:  Syringe  Skin repair:     Repair method:  Sutures    Suture size:  4-0    Suture material:   Nylon    Suture technique:  Simple interrupted    Number of sutures:  9  Approximation:     Approximation:  Close  Repair type:     Repair type:  Intermediate  Post-procedure details:     Dressing:  Bulky dressing    Procedure completion:  Tolerated well, no immediate complications               ED Course                                                 MDM    Final diagnoses:   None       ED Disposition  ED Disposition     None          No follow-up provider specified.       Medication List      No changes were made to your prescriptions during this visit.          Bartolo Law, APRN  05/13/22 [id number]

## 2022-05-19 LAB
ALBUMIN SERPL-MCNC: 4.6 G/DL (ref 3.5–5.2)
ALBUMIN/GLOB SERPL: 1.6 G/DL
ALP SERPL-CCNC: 111 U/L (ref 39–117)
ALT SERPL W P-5'-P-CCNC: 18 U/L (ref 1–41)
ANION GAP SERPL CALCULATED.3IONS-SCNC: 13.3 MMOL/L (ref 5–15)
AST SERPL-CCNC: 19 U/L (ref 1–40)
BASOPHILS # BLD AUTO: 0 10*3/MM3 (ref 0–0.2)
BASOPHILS NFR BLD AUTO: 0 % (ref 0–1.5)
BILIRUB SERPL-MCNC: 0.8 MG/DL (ref 0–1.2)
BUN SERPL-MCNC: 18 MG/DL (ref 8–23)
BUN/CREAT SERPL: 16.2 (ref 7–25)
CALCIUM SPEC-SCNC: 9.7 MG/DL (ref 8.6–10.5)
CHLORIDE SERPL-SCNC: 100 MMOL/L (ref 98–107)
CO2 SERPL-SCNC: 24.7 MMOL/L (ref 22–29)
CREAT SERPL-MCNC: 1.11 MG/DL (ref 0.76–1.27)
D-LACTATE SERPL-SCNC: 1.2 MMOL/L (ref 0.5–2)
DEPRECATED RDW RBC AUTO: 47 FL (ref 37–54)
EGFRCR SERPLBLD CKD-EPI 2021: 70.6 ML/MIN/1.73
EOSINOPHIL # BLD AUTO: 0.1 10*3/MM3 (ref 0–0.4)
EOSINOPHIL NFR BLD AUTO: 1.7 % (ref 0.3–6.2)
ERYTHROCYTE [DISTWIDTH] IN BLOOD BY AUTOMATED COUNT: 13.7 % (ref 12.3–15.4)
GLOBULIN UR ELPH-MCNC: 2.9 GM/DL
GLUCOSE SERPL-MCNC: 104 MG/DL (ref 65–99)
HCT VFR BLD AUTO: 34.4 % (ref 37.5–51)
HGB BLD-MCNC: 11.2 G/DL (ref 13–17.7)
HOLD SPECIMEN: NORMAL
HOLD SPECIMEN: NORMAL
IMM GRANULOCYTES # BLD AUTO: 0.01 10*3/MM3 (ref 0–0.05)
IMM GRANULOCYTES NFR BLD AUTO: 0.2 % (ref 0–0.5)
LYMPHOCYTES # BLD AUTO: 1.11 10*3/MM3 (ref 0.7–3.1)
LYMPHOCYTES NFR BLD AUTO: 18.5 % (ref 19.6–45.3)
MCH RBC QN AUTO: 30.6 PG (ref 26.6–33)
MCHC RBC AUTO-ENTMCNC: 32.6 G/DL (ref 31.5–35.7)
MCV RBC AUTO: 94 FL (ref 79–97)
MONOCYTES # BLD AUTO: 0.53 10*3/MM3 (ref 0.1–0.9)
MONOCYTES NFR BLD AUTO: 8.8 % (ref 5–12)
NEUTROPHILS NFR BLD AUTO: 4.25 10*3/MM3 (ref 1.7–7)
NEUTROPHILS NFR BLD AUTO: 70.8 % (ref 42.7–76)
NRBC BLD AUTO-RTO: 0 /100 WBC (ref 0–0.2)
PLATELET # BLD AUTO: 241 10*3/MM3 (ref 140–450)
PMV BLD AUTO: 9.8 FL (ref 6–12)
POTASSIUM SERPL-SCNC: 4.2 MMOL/L (ref 3.5–5.2)
PROT SERPL-MCNC: 7.5 G/DL (ref 6–8.5)
RBC # BLD AUTO: 3.66 10*6/MM3 (ref 4.14–5.8)
SODIUM SERPL-SCNC: 138 MMOL/L (ref 136–145)
WBC NRBC COR # BLD: 6 10*3/MM3 (ref 3.4–10.8)
WHOLE BLOOD HOLD COAG: NORMAL
WHOLE BLOOD HOLD SPECIMEN: NORMAL

## 2022-05-19 PROCEDURE — 83605 ASSAY OF LACTIC ACID: CPT

## 2022-05-19 PROCEDURE — 99283 EMERGENCY DEPT VISIT LOW MDM: CPT

## 2022-05-19 PROCEDURE — 85025 COMPLETE CBC W/AUTO DIFF WBC: CPT

## 2022-05-19 PROCEDURE — 87040 BLOOD CULTURE FOR BACTERIA: CPT

## 2022-05-19 PROCEDURE — 80053 COMPREHEN METABOLIC PANEL: CPT

## 2022-05-19 PROCEDURE — 36415 COLL VENOUS BLD VENIPUNCTURE: CPT

## 2022-05-19 RX ORDER — SODIUM CHLORIDE 0.9 % (FLUSH) 0.9 %
10 SYRINGE (ML) INJECTION AS NEEDED
Status: DISCONTINUED | OUTPATIENT
Start: 2022-05-19 | End: 2022-05-20 | Stop reason: HOSPADM

## 2022-05-20 ENCOUNTER — HOSPITAL ENCOUNTER (EMERGENCY)
Facility: HOSPITAL | Age: 72
Discharge: HOME | End: 2022-05-20
Attending: EMERGENCY MEDICINE | Admitting: EMERGENCY MEDICINE

## 2022-05-20 ENCOUNTER — APPOINTMENT (OUTPATIENT)
Dept: GENERAL RADIOLOGY | Facility: HOSPITAL | Age: 72
End: 2022-05-20

## 2022-05-20 VITALS
WEIGHT: 197.53 LBS | RESPIRATION RATE: 18 BRPM | BODY MASS INDEX: 26.18 KG/M2 | DIASTOLIC BLOOD PRESSURE: 78 MMHG | SYSTOLIC BLOOD PRESSURE: 125 MMHG | OXYGEN SATURATION: 96 % | TEMPERATURE: 98.1 F | HEART RATE: 56 BPM | HEIGHT: 73 IN

## 2022-05-20 DIAGNOSIS — T14.8XXA HEMATOMA: Primary | ICD-10-CM

## 2022-05-20 PROCEDURE — 73090 X-RAY EXAM OF FOREARM: CPT

## 2022-05-20 NOTE — DISCHARGE INSTRUCTIONS
Breast, ice, and elevate.  Wear the Ace wrap except while bathing.  This will help with some gentle pressure to help reduce the hematoma you have to your left forearm.  Watch for any signs of increased redness or any purulent drainage from the suture site.  Call today to DAV Mejía's office and schedule a follow-up on Monday or Tuesday for your suture removal and so she may reevaluate your left forearm hematoma.  Return to the emergency department for any increase in swelling based on current supplies available at [facility], and with protocols established by [facility] administration and medical staff leadership, and after review of CDC guidance, [place] health department recommendations, I am unable to offer testing to the patient at this time. I have provided education of CORVID-19 and viral illnesses to this patient and educated them on when they should return to their primary care provider or the emergency department itself  should her symptoms worsen. She verbalized understanding of her diagnosis and treatment plan and recommendations for follow-up.  Return to the emergency department for any acutely developing redness, any increase in swelling, any development of pain or any new or worse concerns.

## 2022-05-20 NOTE — ED PROVIDER NOTES
Subjective   The patient presents to the emergency department complaining of increased swelling to his left forearm.  He was seen and treated in the emergency department on May 13 after he was changing out a transmission and states that the transmission fell on his arm.  He subsequently had to have a laceration repair to the mid forearm.  He states about 2 days ago he noticed he has had increased swelling and bruising to his left arm.  Patient is on Plavix and Eliquis due to heart stents but did not have any excessive bruising until 2 days ago.  He is neurovascular intact.  He denies any significant pain.  He reports there is been no drainage or discharge from the laceration site.  He states he has had no redness noted.  He is able to flex and extend the fingers wrist and elbow without difficulties.  He denies any recent fevers.          Review of Systems   Constitutional: Negative for chills and fever.   HENT: Negative for congestion, ear pain and sore throat.    Eyes: Negative for pain.   Respiratory: Negative for cough, chest tightness and shortness of breath.    Cardiovascular: Negative for chest pain.   Gastrointestinal: Negative for abdominal pain, diarrhea, nausea and vomiting.   Genitourinary: Negative for flank pain and hematuria.   Musculoskeletal: Negative for back pain, gait problem, joint swelling and neck pain.        BRUISING AND SWELLING TO HIS LEFT MID FOREARM   Skin: Positive for color change and wound. Negative for pallor.   Neurological: Negative for seizures and headaches.   All other systems reviewed and are negative.      Past Medical History:   Diagnosis Date   • Abnormal ECG    • Anxiety    • Arrhythmia    • Arthritis    • Atrial fibrillation (HCC)    • Bladder disorder    • BPH (benign prostatic hyperplasia)    • CAD (coronary artery disease)    • Cervical spondylosis without myelopathy 01/15/2020   • Cervicalgia    • Chest pain    • Colitis    • Condition not found HERNIA   • Depression     • Diverticulitis    • Diverticulosis of colon    • GERD (gastroesophageal reflux disease)    • H/O degenerative disc disease    • Heart attack (HCC)    • Heart disease    • Hemorrhoids    • High cholesterol    • Hypertension    • IBS (irritable bowel syndrome)    • Mood disorder (HCC)    • Muscle cramps    • Myocardial infarction (HCC)    • Osteoarthritis    • Prostate disorder    • S/P lumpectomy, right breast     CYST 2016       No Known Allergies    Past Surgical History:   Procedure Laterality Date   • APPENDECTOMY     • BREAST MASS EXCISION  2016   • CARDIAC CATHETERIZATION  2016   • CHOLECYSTECTOMY     • COLONOSCOPY  2008,2014,2021   • CORONARY ANGIOPLASTY WITH STENT PLACEMENT  2010   • ENDOSCOPY  2010,2019   • HEMORRHOIDECTOMY  2019   • INGUINAL HERNIA REPAIR  2019   • TURP / TRANSURETHRAL INCISION / DRAINAGE PROSTATE  01/16/2020    BUTTON TURP W/ DR TAM   • UMBILICAL HERNIA REPAIR  2019       Family History   Problem Relation Age of Onset   • Other Mother         bladder stones   • Arthritis Mother    • Heart disease Mother    • Heart failure Father    • Stroke Father    • Colon cancer Paternal Grandfather         60's       Social History     Socioeconomic History   • Marital status:    Tobacco Use   • Smoking status: Never Smoker   • Smokeless tobacco: Never Used   Vaping Use   • Vaping Use: Never used   Substance and Sexual Activity   • Alcohol use: Not Currently   • Drug use: Never   • Sexual activity: Defer           Objective   Physical Exam  Vitals and nursing note reviewed.   Constitutional:       General: He is not in acute distress.     Appearance: Normal appearance. He is not ill-appearing or toxic-appearing.   HENT:      Head: Normocephalic and atraumatic.   Eyes:      General: No scleral icterus.  Cardiovascular:      Rate and Rhythm: Normal rate and regular rhythm.      Pulses: Normal pulses.   Pulmonary:      Effort: Pulmonary effort is normal. No respiratory distress.   Abdominal:       General: Abdomen is flat.   Musculoskeletal:         General: Swelling, tenderness and signs of injury present. No deformity. Normal range of motion.      Right forearm: Normal.      Left forearm: Swelling and tenderness present.        Arms:       Cervical back: Normal range of motion.      Right lower leg: No edema.      Left lower leg: No edema.   Skin:     General: Skin is warm and dry.      Capillary Refill: Capillary refill takes less than 2 seconds.      Findings: Bruising present. No erythema.   Neurological:      General: No focal deficit present.      Mental Status: He is alert and oriented to person, place, and time. Mental status is at baseline.   Psychiatric:         Mood and Affect: Mood normal.         Behavior: Behavior normal.         Procedures           ED Course                                                 MDM    Final diagnoses:   Hematoma       ED Disposition  ED Disposition     ED Disposition   Discharge    Condition   Stable    Comment   --             Edith Marcelo APRN  [address] [place] [id number] [phone]    Call today  FOR FOLLOW UP ON MONDAY OR TUESDAY FOR SUTURE REMOVAL         Medication List      No changes were made to your prescriptions during this visit.          Sheela Pereira APRN  05/20/22 [id number]

## 2022-05-21 ENCOUNTER — HOSPITAL ENCOUNTER (EMERGENCY)
Facility: HOSPITAL | Age: 72
Discharge: HOME | End: 2022-05-22
Attending: EMERGENCY MEDICINE | Admitting: EMERGENCY MEDICINE

## 2022-05-21 VITALS
HEART RATE: 65 BPM | BODY MASS INDEX: 26.03 KG/M2 | WEIGHT: 196.43 LBS | HEIGHT: 73 IN | OXYGEN SATURATION: 100 % | DIASTOLIC BLOOD PRESSURE: 67 MMHG | SYSTOLIC BLOOD PRESSURE: 112 MMHG | RESPIRATION RATE: 20 BRPM | TEMPERATURE: 97.7 F

## 2022-05-21 DIAGNOSIS — R11.0 NAUSEA: ICD-10-CM

## 2022-05-21 DIAGNOSIS — R10.30 LOWER ABDOMINAL PAIN: Primary | ICD-10-CM

## 2022-05-21 LAB
BASOPHILS # BLD AUTO: 0 10*3/MM3 (ref 0–0.2)
BASOPHILS NFR BLD AUTO: 0 % (ref 0–1.5)
DEPRECATED RDW RBC AUTO: 47.9 FL (ref 37–54)
EOSINOPHIL # BLD AUTO: 0.11 10*3/MM3 (ref 0–0.4)
EOSINOPHIL NFR BLD AUTO: 2.2 % (ref 0.3–6.2)
ERYTHROCYTE [DISTWIDTH] IN BLOOD BY AUTOMATED COUNT: 13.9 % (ref 12.3–15.4)
HCT VFR BLD AUTO: 31.5 % (ref 37.5–51)
HGB BLD-MCNC: 10.4 G/DL (ref 13–17.7)
HOLD SPECIMEN: NORMAL
HOLD SPECIMEN: NORMAL
IMM GRANULOCYTES # BLD AUTO: 0.01 10*3/MM3 (ref 0–0.05)
IMM GRANULOCYTES NFR BLD AUTO: 0.2 % (ref 0–0.5)
LYMPHOCYTES # BLD AUTO: 0.96 10*3/MM3 (ref 0.7–3.1)
LYMPHOCYTES NFR BLD AUTO: 19.4 % (ref 19.6–45.3)
MCH RBC QN AUTO: 31 PG (ref 26.6–33)
MCHC RBC AUTO-ENTMCNC: 33 G/DL (ref 31.5–35.7)
MCV RBC AUTO: 93.8 FL (ref 79–97)
MONOCYTES # BLD AUTO: 0.54 10*3/MM3 (ref 0.1–0.9)
MONOCYTES NFR BLD AUTO: 10.9 % (ref 5–12)
NEUTROPHILS NFR BLD AUTO: 3.33 10*3/MM3 (ref 1.7–7)
NEUTROPHILS NFR BLD AUTO: 67.3 % (ref 42.7–76)
NRBC BLD AUTO-RTO: 0 /100 WBC (ref 0–0.2)
PLATELET # BLD AUTO: 228 10*3/MM3 (ref 140–450)
PMV BLD AUTO: 9.3 FL (ref 6–12)
RBC # BLD AUTO: 3.36 10*6/MM3 (ref 4.14–5.8)
WBC NRBC COR # BLD: 4.95 10*3/MM3 (ref 3.4–10.8)
WHOLE BLOOD HOLD COAG: NORMAL
WHOLE BLOOD HOLD SPECIMEN: NORMAL

## 2022-05-21 PROCEDURE — 80053 COMPREHEN METABOLIC PANEL: CPT

## 2022-05-21 PROCEDURE — 99283 EMERGENCY DEPT VISIT LOW MDM: CPT

## 2022-05-21 PROCEDURE — 36415 COLL VENOUS BLD VENIPUNCTURE: CPT

## 2022-05-21 PROCEDURE — 85025 COMPLETE CBC W/AUTO DIFF WBC: CPT

## 2022-05-21 PROCEDURE — 83690 ASSAY OF LIPASE: CPT

## 2022-05-21 RX ORDER — SODIUM CHLORIDE 0.9 % (FLUSH) 0.9 %
10 SYRINGE (ML) INJECTION AS NEEDED
Status: DISCONTINUED | OUTPATIENT
Start: 2022-05-21 | End: 2022-05-22 | Stop reason: HOSPADM

## 2022-05-22 ENCOUNTER — APPOINTMENT (OUTPATIENT)
Dept: CT IMAGING | Facility: HOSPITAL | Age: 72
End: 2022-05-22

## 2022-05-22 LAB
ALBUMIN SERPL-MCNC: 4.4 G/DL (ref 3.5–5.2)
ALBUMIN/GLOB SERPL: 1.7 G/DL
ALP SERPL-CCNC: 119 U/L (ref 39–117)
ALT SERPL W P-5'-P-CCNC: 15 U/L (ref 1–41)
ANION GAP SERPL CALCULATED.3IONS-SCNC: 8.3 MMOL/L (ref 5–15)
AST SERPL-CCNC: 18 U/L (ref 1–40)
BACTERIA UR QL AUTO: ABNORMAL /HPF
BILIRUB SERPL-MCNC: 0.7 MG/DL (ref 0–1.2)
BILIRUB UR QL STRIP: NEGATIVE
BUN SERPL-MCNC: 19 MG/DL (ref 8–23)
BUN/CREAT SERPL: 20 (ref 7–25)
CALCIUM SPEC-SCNC: 9.3 MG/DL (ref 8.6–10.5)
CHLORIDE SERPL-SCNC: 104 MMOL/L (ref 98–107)
CLARITY UR: CLEAR
CO2 SERPL-SCNC: 24.7 MMOL/L (ref 22–29)
COLOR UR: YELLOW
CREAT SERPL-MCNC: 0.95 MG/DL (ref 0.76–1.27)
EGFRCR SERPLBLD CKD-EPI 2021: 85 ML/MIN/1.73
GLOBULIN UR ELPH-MCNC: 2.6 GM/DL
GLUCOSE SERPL-MCNC: 114 MG/DL (ref 65–99)
GLUCOSE UR STRIP-MCNC: NEGATIVE MG/DL
HGB UR QL STRIP.AUTO: ABNORMAL
HYALINE CASTS UR QL AUTO: ABNORMAL /LPF
KETONES UR QL STRIP: NEGATIVE
LEUKOCYTE ESTERASE UR QL STRIP.AUTO: NEGATIVE
LIPASE SERPL-CCNC: 26 U/L (ref 13–60)
NITRITE UR QL STRIP: NEGATIVE
PH UR STRIP.AUTO: 7 [PH] (ref 5–8)
POTASSIUM SERPL-SCNC: 4.2 MMOL/L (ref 3.5–5.2)
PROT SERPL-MCNC: 7 G/DL (ref 6–8.5)
PROT UR QL STRIP: NEGATIVE
RBC # UR STRIP: ABNORMAL /HPF
REF LAB TEST METHOD: ABNORMAL
SODIUM SERPL-SCNC: 137 MMOL/L (ref 136–145)
SP GR UR STRIP: 1.01 (ref 1–1.03)
SQUAMOUS #/AREA URNS HPF: ABNORMAL /HPF
UROBILINOGEN UR QL STRIP: ABNORMAL
WBC # UR STRIP: ABNORMAL /HPF

## 2022-05-22 PROCEDURE — 81001 URINALYSIS AUTO W/SCOPE: CPT | Performed by: EMERGENCY MEDICINE

## 2022-05-22 PROCEDURE — 74177 CT ABD & PELVIS W/CONTRAST: CPT

## 2022-05-22 PROCEDURE — 25010000002 ONDANSETRON PER 1 MG: Performed by: NURSE PRACTITIONER

## 2022-05-22 PROCEDURE — 96374 THER/PROPH/DIAG INJ IV PUSH: CPT

## 2022-05-22 PROCEDURE — 0 IOPAMIDOL PER 1 ML: Performed by: EMERGENCY MEDICINE

## 2022-05-22 PROCEDURE — 25010000002 KETOROLAC TROMETHAMINE PER 15 MG: Performed by: NURSE PRACTITIONER

## 2022-05-22 PROCEDURE — 96375 TX/PRO/DX INJ NEW DRUG ADDON: CPT

## 2022-05-22 RX ORDER — ONDANSETRON 2 MG/ML
4 INJECTION INTRAMUSCULAR; INTRAVENOUS ONCE
Status: COMPLETED | OUTPATIENT
Start: 2022-05-22 | End: 2022-05-22

## 2022-05-22 RX ORDER — KETOROLAC TROMETHAMINE 15 MG/ML
15 INJECTION, SOLUTION INTRAMUSCULAR; INTRAVENOUS ONCE
Status: COMPLETED | OUTPATIENT
Start: 2022-05-22 | End: 2022-05-22

## 2022-05-22 RX ORDER — DICYCLOMINE HYDROCHLORIDE 10 MG/1
20 CAPSULE ORAL ONCE
Status: COMPLETED | OUTPATIENT
Start: 2022-05-22 | End: 2022-05-22

## 2022-05-22 RX ORDER — DICYCLOMINE HCL 20 MG
20 TABLET ORAL EVERY 6 HOURS
Qty: 20 TABLET | Refills: 0 | Status: SHIPPED | OUTPATIENT
Start: 2022-05-22 | End: 2022-09-08

## 2022-05-22 RX ORDER — ONDANSETRON 4 MG/1
4 TABLET, ORALLY DISINTEGRATING ORAL EVERY 8 HOURS PRN
Qty: 10 TABLET | Refills: 0 | OUTPATIENT
Start: 2022-05-22 | End: 2022-06-06

## 2022-05-22 RX ADMIN — DICYCLOMINE HYDROCHLORIDE 20 MG: 10 CAPSULE ORAL at 02:58

## 2022-05-22 RX ADMIN — ONDANSETRON 4 MG: 2 INJECTION INTRAMUSCULAR; INTRAVENOUS at 01:31

## 2022-05-22 RX ADMIN — IOPAMIDOL 100 ML: 755 INJECTION, SOLUTION INTRAVENOUS at 01:45

## 2022-05-22 RX ADMIN — KETOROLAC TROMETHAMINE 15 MG: 15 INJECTION, SOLUTION INTRAMUSCULAR; INTRAVENOUS at 01:31

## 2022-05-22 NOTE — DISCHARGE INSTRUCTIONS
CT scan and lab work was unremarkable for any acute findings.    As we discussed you need to follow-up with a gastroenterologist for reevaluation and possible colonoscopy to explore for source of pain    Call office for next available appointment.    Medications for symptomatic treatment as prescribed.    Return for new or worsening symptoms

## 2022-05-22 NOTE — ED PROVIDER NOTES
Time: 02:23 EDT  Arrived by: Private vehicle  Chief Complaint: Abdominal pain and nausea  History provided by: Patient  History is limited by: N/A    History of Present Illness:  Patient is a 71 y.o. year old male that presents to the emergency department with persistent abdominal pain and nausea      History provided by:  Patient  Nausea  The primary symptoms include abdominal pain and nausea. Primary symptoms do not include fever, fatigue, vomiting, diarrhea, melena, hematemesis, jaundice, hematochezia, dysuria, myalgias, arthralgias or rash. The illness began more than 7 days ago. The problem has not changed since onset.  The illness does not include chills, anorexia, dysphagia, odynophagia, bloating, constipation, tenesmus, back pain or itching.   Abdominal Pain  Pain location:  LLQ and RLQ  Pain quality: aching    Pain radiates to:  Does not radiate  Pain severity:  Moderate  Onset quality:  Gradual  Duration:  3 weeks  Timing:  Constant  Progression:  Unchanged  Relieved by:  Nothing  Worsened by:  Nothing  Ineffective treatments:  OTC medications  Associated symptoms: cough and nausea    Associated symptoms: no anorexia, no belching, no chest pain, no chills, no constipation, no diarrhea, no dysuria, no fatigue, no fever, no flatus, no hematemesis, no hematochezia, no hematuria, no melena, no shortness of breath, no sore throat and no vomiting        Similar Symptoms Previously: NO  Recently seen: not for over 1 month    Patient Care Team  Primary Care Provider: LAURO WELCH    Past Medical History:     No Known Allergies  Past Medical History:   Diagnosis Date   • Abnormal ECG    • Anxiety    • Arrhythmia    • Arthritis    • Atrial fibrillation (HCC)    • Bladder disorder    • BPH (benign prostatic hyperplasia)    • CAD (coronary artery disease)    • Cervical spondylosis without myelopathy 01/15/2020   • Cervicalgia    • Chest pain    • Colitis    • Condition not found HERNIA   • Depression    • Diverticulitis     • Diverticulosis of colon    • GERD (gastroesophageal reflux disease)    • H/O degenerative disc disease    • Heart attack (HCC)    • Heart disease    • Hemorrhoids    • High cholesterol    • Hypertension    • IBS (irritable bowel syndrome)    • Mood disorder (HCC)    • Muscle cramps    • Myocardial infarction (HCC)    • Osteoarthritis    • Prostate disorder    • S/P lumpectomy, right breast     CYST 2016     Past Surgical History:   Procedure Laterality Date   • APPENDECTOMY     • BREAST MASS EXCISION  2016   • CARDIAC CATHETERIZATION  2016   • CHOLECYSTECTOMY     • COLONOSCOPY  2008,2014,2021   • CORONARY ANGIOPLASTY WITH STENT PLACEMENT  2010   • ENDOSCOPY  2010,2019   • HEMORRHOIDECTOMY  2019   • INGUINAL HERNIA REPAIR  2019   • TURP / TRANSURETHRAL INCISION / DRAINAGE PROSTATE  01/16/2020    BUTTON TURP W/ DR TAM   • UMBILICAL HERNIA REPAIR  2019     Family History   Problem Relation Age of Onset   • Other Mother         bladder stones   • Arthritis Mother    • Heart disease Mother    • Heart failure Father    • Stroke Father    • Colon cancer Paternal Grandfather         60's       Home Medications:  Prior to Admission medications    Medication Sig Start Date End Date Taking? Authorizing Provider   acetaminophen (TYLENOL) 325 MG tablet TAKE 2 TABLETS BY MOUTH EVERY 4 HOURS AS needed FOR mild PAIN OR FEVER 2/8/22   Elian Merrill MD   apixaban (ELIQUIS) 5 MG tablet tablet Take 1 tablet by mouth Every 12 (Twelve) Hours. 12/10/21   Mayank Sheehan MD   atorvastatin (LIPITOR) 80 MG tablet Take 1 tablet by mouth at bedtime daily 3/24/22   Mayank Sheehan MD   clonazePAM (KlonoPIN) 0.5 MG tablet Take 1 tablet by mouth every 6 to 8 hours as needed for Anxiety. 4/1/22   Elian Merrill MD   clopidogrel (PLAVIX) 75 MG tablet Take 1 tablet by mouth every day 11/11/21   Mayank Sheehan MD   desvenlafaxine (PRISTIQ) 100 MG 24 hr tablet Take 100 mg by mouth Daily. 4/8/22   Elian Merrill MD    desvenlafaxine (PRISTIQ) 50 MG 24 hr tablet Take 50 mg by mouth Daily. 1/11/22 1/12/23  Elian Merrill MD   desvenlafaxine (PRISTIQ) 50 MG 24 hr tablet  1/11/22   Elian Merrill MD   dicyclomine (BENTYL) 20 MG tablet Take 1 tablet by mouth Every 6 (Six) Hours. 5/8/22   Chasidy Pope APRN   diphenoxylate-atropine (LOMOTIL) 2.5-0.025 MG per tablet Take 1 tablet by mouth 4 (Four) Times a Day As Needed for Diarrhea. 4/15/22   Ciro Gudino MD   hydrOXYzine (ATARAX) 25 MG tablet hydroxyzine HCl 25 mg oral tablet take 1 tablet (25 mg) by oral route 3 times per day   Active    Elian Merrill MD   lisinopril (PRINIVIL,ZESTRIL) 5 MG tablet Take 5 mg by mouth Daily. 6/18/21   Elian Merrill MD   LORazepam (ATIVAN) 0.5 MG tablet Take 1 tablet by mouth 3 (Three) Times a Day. 1/17/22   Elian Merrill MD   LORazepam (ATIVAN) 1 MG tablet Take 1 tablet by mouth 2 (Two) Times a Day As Needed for Anxiety. 12/24/21   Castro Matos MD   metoclopramide (REGLAN) 10 MG tablet Take 1 tablet by mouth 4 (Four) Times a Day Before Meals & at Bedtime. 5/8/22   Chasidy Pope APRN   metoprolol succinate XL (TOPROL-XL) 25 MG 24 hr tablet Take 1 tablet by mouth Daily. 12/10/21   Mayank Sheehan MD   mirtazapine (REMERON) 30 MG tablet Take 30 mg by mouth.    Elian Merrill MD   multivitamin (THERAGRAN) tablet tablet Take 1 tablet by mouth Daily.    Elian Merrill MD   multivitamin with minerals tablet tablet Take 1 tablet by mouth Daily.    Elian Merrill MD   nitroglycerin (NITROSTAT) 0.4 MG SL tablet Place 1 tablet under the tongue Every 5 (Five) Minutes As Needed for Chest Pain (no more than 3 doses in 15 minutes). 12/10/21   Mayank Sheehan MD   ondansetron ODT (ZOFRAN-ODT) 4 MG disintegrating tablet Place 1 tablet on the tongue Every 8 (Eight) Hours As Needed for Nausea or Vomiting. 5/8/22   Chasidy Pope APRN   pantoprazole (PROTONIX) 40 MG EC tablet pantoprazole 40 mg  tablet,delayed release   TAKE ONE TABLET BY MOUTH EVERY DAY    Elian Merrill MD   pantoprazole (PROTONIX) 40 MG EC tablet Take 40 mg by mouth. 4/19/22 4/20/23  Elian Merrill MD   promethazine (PHENERGAN) 12.5 MG tablet Take 1 tablet by mouth Every 6 (Six) Hours As Needed for Nausea or Vomiting. 5/7/22   Cami Newby APRN   sertraline (ZOLOFT) 100 MG tablet Daily.    Elian Merrill MD   sucralfate (CARAFATE) 1 g tablet Take 1 tablet by mouth 4 (Four) Times a Day. 1/30/22   Mayank Heck DO   tadalafil (CIALIS) 5 MG tablet Take 1 tablet by mouth Daily for 360 days. 10/11/21 10/6/22  Mallorie Matt MD   tamsulosin (FLOMAX) 0.4 MG capsule 24 hr capsule TAKE ONE CAPSULE BY MOUTH EVERY DAY ONE-HALF HOUR FOLLOWING THE SAME MEAL EACH DAY 12/29/21   Mallorie Matt MD   traMADol (ULTRAM) 50 MG tablet Take 50 mg by mouth Every 6 (Six) Hours As Needed for Moderate Pain .    Emergency, Nurse Epic, RN   triamcinolone (KENALOG) 0.5 % cream triamcinolone acetonide 0.5 % topical cream    ProviderElian MD        Social History:   PT  reports that he has never smoked. He has never used smokeless tobacco. He reports previous alcohol use. He reports that he does not use drugs.    Record Review:  I have reviewed the patient's records in [facility].     Review of Systems  Review of Systems   Constitutional: Positive for appetite change and unexpected weight change ( 15 LBS IN 1 MONTH). Negative for chills, fatigue and fever.   HENT: Negative for sore throat.    Respiratory: Positive for cough. Negative for shortness of breath.    Cardiovascular: Negative for chest pain.   Gastrointestinal: Positive for abdominal pain and nausea. Negative for anorexia, bloating, constipation, diarrhea, dysphagia, flatus, hematemesis, hematochezia, jaundice, melena and vomiting.   Genitourinary: Negative for dysuria and hematuria.   Musculoskeletal: Negative for arthralgias, back pain and myalgias.   Skin: Negative  "for itching and rash.   Neurological: Negative.    Hematological: Negative.    Psychiatric/Behavioral: Negative.         Physical Exam  /67 (BP Location: Left arm, Patient Position: Sitting)   Pulse 65   Temp 97.7 °F (36.5 °C) (Oral)   Resp 20   Ht 185.4 cm (73\")   Wt 89.1 kg (196 lb 6.9 oz)   SpO2 100%   BMI 25.92 kg/m²     Physical Exam  Vitals and nursing note reviewed.   Constitutional:       General: He is not in acute distress.     Appearance: Normal appearance. He is not toxic-appearing.   HENT:      Head: Normocephalic and atraumatic.      Mouth/Throat:      Mouth: Mucous membranes are moist.   Eyes:      General: No scleral icterus.  Cardiovascular:      Rate and Rhythm: Normal rate and regular rhythm.      Pulses: Normal pulses.      Heart sounds: Normal heart sounds.   Pulmonary:      Effort: Pulmonary effort is normal. No respiratory distress.      Breath sounds: Normal breath sounds.   Abdominal:      General: Bowel sounds are normal. There is no distension.      Palpations: Abdomen is soft.      Tenderness: There is abdominal tenderness in the right lower quadrant and left lower quadrant. There is no right CVA tenderness or left CVA tenderness.   Musculoskeletal:         General: Normal range of motion.      Cervical back: Normal range of motion and neck supple.   Skin:     General: Skin is warm and dry.   Neurological:      Mental Status: He is alert and oriented to person, place, and time. Mental status is at baseline.   Psychiatric:         Mood and Affect: Mood normal.         Behavior: Behavior normal.          ED Course  /67 (BP Location: Left arm, Patient Position: Sitting)   Pulse 65   Temp 97.7 °F (36.5 °C) (Oral)   Resp 20   Ht 185.4 cm (73\")   Wt 89.1 kg (196 lb 6.9 oz)   SpO2 100%   BMI 25.92 kg/m²   Results for orders placed or performed during the hospital encounter of 05/21/22   Comprehensive Metabolic Panel    Specimen: Blood   Result Value Ref Range    Glucose " 114 (H) 65 - 99 mg/dL    BUN 19 8 - 23 mg/dL    Creatinine 0.95 0.76 - 1.27 mg/dL    Sodium 137 136 - 145 mmol/L    Potassium 4.2 3.5 - 5.2 mmol/L    Chloride 104 98 - 107 mmol/L    CO2 24.7 22.0 - 29.0 mmol/L    Calcium 9.3 8.6 - 10.5 mg/dL    Total Protein 7.0 6.0 - 8.5 g/dL    Albumin 4.40 3.50 - 5.20 g/dL    ALT (SGPT) 15 1 - 41 U/L    AST (SGOT) 18 1 - 40 U/L    Alkaline Phosphatase 119 (H) 39 - 117 U/L    Total Bilirubin 0.7 0.0 - 1.2 mg/dL    Globulin 2.6 gm/dL    A/G Ratio 1.7 g/dL    BUN/Creatinine Ratio 20.0 7.0 - 25.0    Anion Gap 8.3 5.0 - 15.0 mmol/L    eGFR 85.0 >60.0 mL/min/1.73   Lipase    Specimen: Blood   Result Value Ref Range    Lipase 26 13 - 60 U/L   Urinalysis With Microscopic If Indicated (No Culture) - Urine, Clean Catch    Specimen: Urine, Clean Catch   Result Value Ref Range    Color, UA Yellow Yellow, Straw    Appearance, UA Clear Clear    pH, UA 7.0 5.0 - 8.0    Specific Gravity, UA 1.009 1.005 - 1.030    Glucose, UA Negative Negative    Ketones, UA Negative Negative    Bilirubin, UA Negative Negative    Blood, UA Trace (A) Negative    Protein, UA Negative Negative    Leuk Esterase, UA Negative Negative    Nitrite, UA Negative Negative    Urobilinogen, UA 0.2 E.U./dL 0.2 - 1.0 E.U./dL   CBC Auto Differential    Specimen: Blood   Result Value Ref Range    WBC 4.95 3.40 - 10.80 10*3/mm3    RBC 3.36 (L) 4.14 - 5.80 10*6/mm3    Hemoglobin 10.4 (L) 13.0 - 17.7 g/dL    Hematocrit 31.5 (L) 37.5 - 51.0 %    MCV 93.8 79.0 - 97.0 fL    MCH 31.0 26.6 - 33.0 pg    MCHC 33.0 31.5 - 35.7 g/dL    RDW 13.9 12.3 - 15.4 %    RDW-SD 47.9 37.0 - 54.0 fl    MPV 9.3 6.0 - 12.0 fL    Platelets 228 140 - 450 10*3/mm3    Neutrophil % 67.3 42.7 - 76.0 %    Lymphocyte % 19.4 (L) 19.6 - 45.3 %    Monocyte % 10.9 5.0 - 12.0 %    Eosinophil % 2.2 0.3 - 6.2 %    Basophil % 0.0 0.0 - 1.5 %    Immature Grans % 0.2 0.0 - 0.5 %    Neutrophils, Absolute 3.33 1.70 - 7.00 10*3/mm3    Lymphocytes, Absolute 0.96 0.70 - 3.10  10*3/mm3    Monocytes, Absolute 0.54 0.10 - 0.90 10*3/mm3    Eosinophils, Absolute 0.11 0.00 - 0.40 10*3/mm3    Basophils, Absolute 0.00 0.00 - 0.20 10*3/mm3    Immature Grans, Absolute 0.01 0.00 - 0.05 10*3/mm3    nRBC 0.0 0.0 - 0.2 /100 WBC   Urinalysis, Microscopic Only - Urine, Clean Catch    Specimen: Urine, Clean Catch   Result Value Ref Range    RBC, UA 0-2 (A) None Seen /HPF    WBC, UA 0-2 (A) None Seen /HPF    Bacteria, UA None Seen None Seen /HPF    Squamous Epithelial Cells, UA 0-2 None Seen, 0-2 /HPF    Hyaline Casts, UA None Seen None Seen /LPF    Methodology Automated Microscopy    Green Top (Gel)   Result Value Ref Range    Extra Tube Hold for add-ons.    Lavender Top   Result Value Ref Range    Extra Tube hold for add-on    Gold Top - SST   Result Value Ref Range    Extra Tube Hold for add-ons.    Light Blue Top   Result Value Ref Range    Extra Tube Hold for add-ons.      Medications   sodium chloride 0.9 % flush 10 mL (has no administration in time range)   dicyclomine (BENTYL) capsule 20 mg (has no administration in time range)   ketorolac (TORADOL) injection 15 mg (15 mg Intravenous Given 5/22/22 0131)   ondansetron (ZOFRAN) injection 4 mg (4 mg Intravenous Given 5/22/22 0131)   iopamidol (ISOVUE-370) 76 % injection 100 mL (100 mL Intravenous Given 5/22/22 0145)     XR Forearm 2 View Left    Result Date: 5/20/2022  Narrative: PROCEDURE: XR FOREARM 2 VW LEFT  COMPARISON: [facility], CR, XR FOREARM 2 VW LEFT, 5/13/2022, 13:00.  INDICATIONS: SWELLING AND REDNESS TO POSTERIOR LEFT FOREARM. LACERATION AND SUTURES DONE 5/13/22  FINDINGS:  There is increasing soft tissue edema at the dorsal aspect of the mid forearm.  Interval resolution of subcutaneous gas.  There is no radiodense foreign body.  There is no bone destruction to suggest osteomyelitis.  There are vascular calcifications.  No fracture or dislocation.  Joint spaces appear intact.      Impression:  Increased soft tissue  swelling at the dorsal forearm with interval resolution of soft tissue gas and no radiographic evidence of osteomyelitis or radiodense foreign body.      NIURKA HAY MD       Electronically Signed and Approved By: NIURKA HAY MD on 5/20/2022 at 3:09             XR Forearm 2 View Left    Result Date: 5/13/2022  Narrative: PROCEDURE: XR FOREARM 2 VW LEFT  COMPARISON: None  INDICATIONS: left forearm pain, posterior mid forearm laceration/swelling transmission fell on arm  FINDINGS:  BONES: Normal.  No significant arthropathy or acute abnormality.  SOFT TISSUES: There is extensive dorsal soft tissue swelling in the left forearm with associated laceration/soft tissue gas. EFFUSION: None visible.  OTHER: Radiopaque foreign bodies are identified      Impression:  Extensive dorsal soft tissue swelling in the left forearm with associated laceration/soft tissue gas.      KENDRICK BOWMAN MD       Electronically Signed and Approved By: KENDRICK BOWMAN MD on 5/13/2022 at 13:03             CT Abdomen Pelvis With Contrast    Result Date: 5/22/2022  Narrative: PROCEDURE: CT ABDOMEN PELVIS W CONTRAST  COMPARISONS: [facility], CT, CT ABDOMEN PELVIS W CONTRAST, 4/15/2022, 17:40.   [facility], CT, CT ABDOMEN PELVIS W CONTRAST, 10/29/2021, 21:01.   [facility], CT, ABDOMEN/PELVIS WITH CONTRAST, 12/24/2020, 14:28.   [facility], CT, ABDOMEN/PELVIS WITH CONTRAST, 9/14/2020, 5:48.   [facility], CT, ABDOMEN/PELVIS WITH CONTRAST, 6/02/2020, 1:17.   [facility], CT, CT ABDOMEN PELVIS W CONTRAST, 4/20/2022, 15:42.  INDICATIONS: LOWER ABD. PAIN; UNEXPLAINED WEIGHT LOSS FOR 3 WEEKS.  TECHNIQUE: After obtaining the patient's consent, 526 CT images were created with non-ionic intravenous contrast material.  No oral contrast agent was administered for the study.  There is slight motion artifact on the exam.  PROTOCOL:   Standard imaging protocol performed.     RADIATION:   DLP: 480.5 mGy*cm.   Automated exposure control was utilized to minimize radiation dose. CONTRAST: 100 mL Isovue 370 I.V.  FINDINGS: The patient has undergone cholecystectomy and appendectomy.  There is a small hiatal hernia.  There is diffuse hepatic steatosis.  Probably no hepatomegaly.  No definite splenomegaly.  Left-sided parapelvic renal cysts are seen, as before.  There may be small cortical renal cysts bilaterally, as well.  Probably no suspicious renal mass is appreciated.  No renal stones or hydronephrosis or obstructive uropathy due to a ureteral calculus.  No acute pyelonephritis is suggested.  There are small bilateral inguinal hernias.  A tiny umbilical hernia is seen.  These hernias do not contain bowel.  There is atherosclerotic change without aneurysmal dilatation of the aortoiliac arterial system.  Coronary artery calcifications are seen.  There may be mild-to-moderate origin narrowing of the celiac trunk (artery).  The superior mesenteric artery appears to be widely patent.  The inferior mesenteric artery appears to be patent.  Please note that CTA technique was not utilized.  No acute infiltrate is suspected within the partially imaged lung bases.  Degenerative changes are seen throughout the imaged spine.  Again, severe spinal stenosis is suspected at the L4-5 level with minimal chronic anterolisthesis.  There may be diffuse idiopathic skeletal hyperostosis (DISH).  There may be Baastrups disease.  Degenerative changes involve the hip joints and the bilateral sacroiliac joint, as well.  No acute fracture.  No aggressive osseous lesion is suggested.  There are scattered colonic diverticula without acute diverticulitis.  No mechanical bowel obstruction or pneumoperitoneum.  No pneumatosis.  No portal or mesenteric venous gas.  No definite pathologic bowel wall thickening.  No pneumobilia.  No biliary ductal dilatation.  No choledocholithiasis.  No acute pancreatitis.  No urinary  bladder wall thickening or urinary bladder calculi.  Probably mild prostatomegaly.  Please correlate with pertinent lab values.  Consider Nuclear Medicine whole-body FDG-PET-CT scan for further imaging assessment clinically warranted.      Impression:  No acute findings are appreciated.  Please see above comments for further detail.      COMMENT:  Part of this note is an electronic transcription of spoken language to printed text. The electronic translation/transcription may permit erroneous, or at times, nonsensical (or even sensical) words or phrases to be inadvertently transcribed or omitted; this  has reviewed the note for such errors (as well as additional errors); however, some may still exist.  SHIRLEY POOL JR, MD       Electronically Signed and Approved By: SHIRLEY POOL JR, MD on 2022 at 2:03              XR Chest 1 View    Result Date: 2022  Narrative: PROCEDURE: XR CHEST 1 VW  COMPARISON: [facility], CR, XR CHEST 1 VW, 2022, 18:04.  INDICATIONS: cough  FINDINGS:  The lungs are adequately expanded and appear grossly clear.  No consolidation or large pleural effusion is seen.  Cardiomediastinal contours are stable.  There are degenerative changes in the thoracic spine.      Impression:  No acute cardiopulmonary abnormality is identified.       MIKAELA PATHAK MD       Electronically Signed and Approved By: MIKAELA PATHAK MD on 2022 at 23:26             CT Abdomen & Pelvis W IV Contrast Without Oral Contrast    Result Date: 2022  Narrative: REVIEWING YOUR TEST RESULTS IN [facility] IS NOT A SUBSTITUTE FOR DISCUSSING THOSE RESULTS WITH YOUR HEALTH CARE PROVIDER. PLEASE CONTACT YOUR PROVIDER VIA [facility] TO DISCUSS ANY QUESTIONS OR CONCERNS YOU MAY HAVE REGARDING THESE TEST RESULTS.  RADIOLOGY REPORT FACILITY:  [facility] UNIT/AGE/GENDER: N.ED  ER      AGE:71 Y          SEX:M PATIENT NAME/:  NAPOLEON VINES LEE    1950 UNIT NUMBER:   [id number] ACCOUNT NUMBER:  [id number] ACCESSION NUMBER:  [id number] DATE: April 24, 2022 EXAMINATION:  Computed tomography of the abdomen and pelvis with intravenous contrast HISTORY: Lower abdominal pain, nausea, decreased appetite TECHNIQUE:  Transaxial computed tomographic images of the abdomen and pelvis were obtained with intravenous contrast according to the standard protocol. Dose reduction technique performed per ALARA protocol. COMPARISON: None FINDINGS: The lung bases are clear. The gallbladder is absent. No focal liver lesions or biliary ductal dilation. The pancreas, spleen, and adrenals are unremarkable. There are left greater than right bilateral parapelvic renal cysts. Additional subcentimeter left cortical hypodense lesions too small to accurately characterize, statistically likely to represent cysts. No hydronephrosis. There is a tiny hiatal hernia. The bowel is not dilated. There is no abnormal bowel wall thickening. The appendix is not visualized and there are no pericecal inflammatory changes. No free air, free fluid, or focal fluid collection. No abdominal or pelvic lymphadenopathy. Urinary bladder is partially collapsed and grossly unremarkable. Prostate is mildly enlarged measuring 4.1 cm transverse. Small fat-containing right inguinal hernia. Left spermatic cord lipomatosis without definite inguinal hernia. Mild aortoiliac atherosclerosis without aneurysm. Lower lumbar facet osteoarthritis. No acute osseous abnormality. IMPRESSION:   1.No acute intra-abdominal findings. 2.Multiple chronic and incidental findings as described above. Dictated by: Stan Baumann D.O. Images and Report reviewed and interpreted by: Stan Baumann D.O. <PS><Electronically signed by: Stan Baumann D.O.> 04/24/2022 1637 D: 04/24/2022 1630 T: 04/24/2022 1630    Medical Decision Making:                     MDM  Number of Diagnoses or Management Options  Lower abdominal pain  Nausea  Diagnosis  management comments: The patient is resting comfortably and feels better, is alert and in no distress. Repeat examination is unremarkable and benign; in particular, there's no discomfort at McBurney's point and there is no pulsatile mass. The history, exam, diagnostic testing, and current condition does not suggest acute appendicitis, bowel obstruction, acute cholecystitis, bowel perforation, major gastrointestinal bleeding, severe diverticulitis, abdominal aortic aneurysm, mesenteric ischemia, volvulus, sepsis, or other significant pathology that warrants further testing, continued ED treatment, admission, for surgical evaluation at this point. The vital signs have been stable. The patient does not have uncontrollable pain, intractable vomiting, or other significant symptoms. The patient's condition is stable and appropriate for discharge from the emergency department.         Amount and/or Complexity of Data Reviewed  Clinical lab tests: reviewed and ordered  Tests in the radiology section of CPT®: ordered and reviewed  Tests in the medicine section of CPT®: ordered and reviewed  Review and summarize past medical records: yes (Patient denied recently seeing MD for this problem and states it started 3 weeks ago but patient was seen in April for lower abdominal pain and had CT imaging.  Past results and visits reviewed )    Risk of Complications, Morbidity, and/or Mortality  Presenting problems: moderate  Diagnostic procedures: moderate  Management options: low    Patient Progress  Patient progress: stable       Final diagnoses:   Lower abdominal pain   Nausea        Disposition:  ED Disposition     ED Disposition   Discharge    Condition   Stable    Comment   --              Chasidy Pope, APRN  05/22/22 0224

## 2022-05-23 ENCOUNTER — HOSPITAL ENCOUNTER (EMERGENCY)
Facility: HOSPITAL | Age: 72
Discharge: HOME | End: 2022-05-23
Attending: EMERGENCY MEDICINE | Admitting: EMERGENCY MEDICINE

## 2022-05-23 VITALS
TEMPERATURE: 99.2 F | SYSTOLIC BLOOD PRESSURE: 144 MMHG | BODY MASS INDEX: 28.22 KG/M2 | RESPIRATION RATE: 17 BRPM | DIASTOLIC BLOOD PRESSURE: 81 MMHG | HEART RATE: 59 BPM | WEIGHT: 197.09 LBS | HEIGHT: 70 IN | OXYGEN SATURATION: 100 %

## 2022-05-23 VITALS
HEIGHT: 73 IN | RESPIRATION RATE: 17 BRPM | SYSTOLIC BLOOD PRESSURE: 110 MMHG | WEIGHT: 198.41 LBS | DIASTOLIC BLOOD PRESSURE: 65 MMHG | BODY MASS INDEX: 26.3 KG/M2 | TEMPERATURE: 98.1 F | HEART RATE: 66 BPM | OXYGEN SATURATION: 100 %

## 2022-05-23 DIAGNOSIS — S40.022S HEMATOMA OF ARM, LEFT, SEQUELA: Primary | ICD-10-CM

## 2022-05-23 DIAGNOSIS — Z48.02 VISIT FOR SUTURE REMOVAL: Primary | ICD-10-CM

## 2022-05-23 LAB
ALBUMIN SERPL-MCNC: 4.6 G/DL (ref 3.5–5.2)
ALBUMIN/GLOB SERPL: 1.5 G/DL
ALP SERPL-CCNC: 110 U/L (ref 39–117)
ALT SERPL W P-5'-P-CCNC: 16 U/L (ref 1–41)
ANION GAP SERPL CALCULATED.3IONS-SCNC: 10 MMOL/L (ref 5–15)
AST SERPL-CCNC: 19 U/L (ref 1–40)
BASOPHILS # BLD AUTO: 0.01 10*3/MM3 (ref 0–0.2)
BASOPHILS NFR BLD AUTO: 0.2 % (ref 0–1.5)
BILIRUB SERPL-MCNC: 0.8 MG/DL (ref 0–1.2)
BUN SERPL-MCNC: 16 MG/DL (ref 8–23)
BUN/CREAT SERPL: 15.2 (ref 7–25)
CALCIUM SPEC-SCNC: 9.6 MG/DL (ref 8.6–10.5)
CHLORIDE SERPL-SCNC: 103 MMOL/L (ref 98–107)
CO2 SERPL-SCNC: 26 MMOL/L (ref 22–29)
CREAT SERPL-MCNC: 1.05 MG/DL (ref 0.76–1.27)
D-LACTATE SERPL-SCNC: 1 MMOL/L (ref 0.5–2)
DEPRECATED RDW RBC AUTO: 49.3 FL (ref 37–54)
EGFRCR SERPLBLD CKD-EPI 2021: 75.4 ML/MIN/1.73
EOSINOPHIL # BLD AUTO: 0.05 10*3/MM3 (ref 0–0.4)
EOSINOPHIL NFR BLD AUTO: 1 % (ref 0.3–6.2)
ERYTHROCYTE [DISTWIDTH] IN BLOOD BY AUTOMATED COUNT: 14 % (ref 12.3–15.4)
GLOBULIN UR ELPH-MCNC: 3 GM/DL
GLUCOSE SERPL-MCNC: 115 MG/DL (ref 65–99)
HCT VFR BLD AUTO: 34.6 % (ref 37.5–51)
HGB BLD-MCNC: 11.1 G/DL (ref 13–17.7)
HOLD SPECIMEN: NORMAL
HOLD SPECIMEN: NORMAL
IMM GRANULOCYTES # BLD AUTO: 0.01 10*3/MM3 (ref 0–0.05)
IMM GRANULOCYTES NFR BLD AUTO: 0.2 % (ref 0–0.5)
LYMPHOCYTES # BLD AUTO: 0.96 10*3/MM3 (ref 0.7–3.1)
LYMPHOCYTES NFR BLD AUTO: 18.9 % (ref 19.6–45.3)
MCH RBC QN AUTO: 30.7 PG (ref 26.6–33)
MCHC RBC AUTO-ENTMCNC: 32.1 G/DL (ref 31.5–35.7)
MCV RBC AUTO: 95.6 FL (ref 79–97)
MONOCYTES # BLD AUTO: 0.48 10*3/MM3 (ref 0.1–0.9)
MONOCYTES NFR BLD AUTO: 9.4 % (ref 5–12)
NEUTROPHILS NFR BLD AUTO: 3.58 10*3/MM3 (ref 1.7–7)
NEUTROPHILS NFR BLD AUTO: 70.3 % (ref 42.7–76)
NRBC BLD AUTO-RTO: 0 /100 WBC (ref 0–0.2)
PLATELET # BLD AUTO: 256 10*3/MM3 (ref 140–450)
PMV BLD AUTO: 9 FL (ref 6–12)
POTASSIUM SERPL-SCNC: 4.4 MMOL/L (ref 3.5–5.2)
PROT SERPL-MCNC: 7.6 G/DL (ref 6–8.5)
RBC # BLD AUTO: 3.62 10*6/MM3 (ref 4.14–5.8)
SODIUM SERPL-SCNC: 139 MMOL/L (ref 136–145)
WBC NRBC COR # BLD: 5.09 10*3/MM3 (ref 3.4–10.8)
WHOLE BLOOD HOLD COAG: NORMAL
WHOLE BLOOD HOLD SPECIMEN: NORMAL

## 2022-05-23 PROCEDURE — 80053 COMPREHEN METABOLIC PANEL: CPT | Performed by: EMERGENCY MEDICINE

## 2022-05-23 PROCEDURE — 87040 BLOOD CULTURE FOR BACTERIA: CPT

## 2022-05-23 PROCEDURE — 83605 ASSAY OF LACTIC ACID: CPT | Performed by: EMERGENCY MEDICINE

## 2022-05-23 PROCEDURE — 99283 EMERGENCY DEPT VISIT LOW MDM: CPT

## 2022-05-23 PROCEDURE — 99202 OFFICE O/P NEW SF 15 MIN: CPT

## 2022-05-23 PROCEDURE — 85025 COMPLETE CBC W/AUTO DIFF WBC: CPT | Performed by: EMERGENCY MEDICINE

## 2022-05-23 RX ORDER — SODIUM CHLORIDE 0.9 % (FLUSH) 0.9 %
10 SYRINGE (ML) INJECTION AS NEEDED
Status: DISCONTINUED | OUTPATIENT
Start: 2022-05-23 | End: 2022-05-23 | Stop reason: HOSPADM

## 2022-05-23 RX ORDER — GINSENG 100 MG
1 CAPSULE ORAL ONCE
Status: COMPLETED | OUTPATIENT
Start: 2022-05-23 | End: 2022-05-23

## 2022-05-23 RX ORDER — GINSENG 100 MG
1 CAPSULE ORAL EVERY 8 HOURS SCHEDULED
Status: DISCONTINUED | OUTPATIENT
Start: 2022-05-23 | End: 2022-05-23

## 2022-05-23 RX ADMIN — Medication 1 APPLICATION: at 20:05

## 2022-05-24 LAB
BACTERIA SPEC AEROBE CULT: NORMAL
BACTERIA SPEC AEROBE CULT: NORMAL

## 2022-05-27 ENCOUNTER — TELEPHONE (OUTPATIENT)
Dept: SURGERY | Facility: CLINIC | Age: 72
End: 2022-05-27

## 2022-05-27 ENCOUNTER — HOSPITAL ENCOUNTER (EMERGENCY)
Facility: HOSPITAL | Age: 72
Discharge: HOME | End: 2022-05-27
Attending: EMERGENCY MEDICINE | Admitting: EMERGENCY MEDICINE

## 2022-05-27 VITALS
HEIGHT: 73 IN | SYSTOLIC BLOOD PRESSURE: 135 MMHG | WEIGHT: 197.53 LBS | DIASTOLIC BLOOD PRESSURE: 61 MMHG | OXYGEN SATURATION: 98 % | RESPIRATION RATE: 18 BRPM | HEART RATE: 65 BPM | BODY MASS INDEX: 26.18 KG/M2 | TEMPERATURE: 98.2 F

## 2022-05-27 DIAGNOSIS — S50.12XS: Primary | ICD-10-CM

## 2022-05-27 LAB
ALBUMIN SERPL-MCNC: 4.3 G/DL (ref 3.5–5.2)
ALBUMIN/GLOB SERPL: 1.6 G/DL
ALP SERPL-CCNC: 122 U/L (ref 39–117)
ALT SERPL W P-5'-P-CCNC: 16 U/L (ref 1–41)
ANION GAP SERPL CALCULATED.3IONS-SCNC: 9.6 MMOL/L (ref 5–15)
AST SERPL-CCNC: 17 U/L (ref 1–40)
BASOPHILS # BLD AUTO: 0 10*3/MM3 (ref 0–0.2)
BASOPHILS NFR BLD AUTO: 0 % (ref 0–1.5)
BILIRUB SERPL-MCNC: 0.6 MG/DL (ref 0–1.2)
BUN SERPL-MCNC: 13 MG/DL (ref 8–23)
BUN/CREAT SERPL: 13.3 (ref 7–25)
CALCIUM SPEC-SCNC: 9.4 MG/DL (ref 8.6–10.5)
CHLORIDE SERPL-SCNC: 103 MMOL/L (ref 98–107)
CO2 SERPL-SCNC: 26.4 MMOL/L (ref 22–29)
CREAT SERPL-MCNC: 0.98 MG/DL (ref 0.76–1.27)
DEPRECATED RDW RBC AUTO: 49.1 FL (ref 37–54)
EGFRCR SERPLBLD CKD-EPI 2021: 81.9 ML/MIN/1.73
EOSINOPHIL # BLD AUTO: 0.05 10*3/MM3 (ref 0–0.4)
EOSINOPHIL NFR BLD AUTO: 1 % (ref 0.3–6.2)
ERYTHROCYTE [DISTWIDTH] IN BLOOD BY AUTOMATED COUNT: 14.1 % (ref 12.3–15.4)
GLOBULIN UR ELPH-MCNC: 2.7 GM/DL
GLUCOSE SERPL-MCNC: 111 MG/DL (ref 65–99)
HCT VFR BLD AUTO: 33.8 % (ref 37.5–51)
HGB BLD-MCNC: 10.9 G/DL (ref 13–17.7)
HOLD SPECIMEN: NORMAL
HOLD SPECIMEN: NORMAL
IMM GRANULOCYTES # BLD AUTO: 0.03 10*3/MM3 (ref 0–0.05)
IMM GRANULOCYTES NFR BLD AUTO: 0.6 % (ref 0–0.5)
LYMPHOCYTES # BLD AUTO: 0.84 10*3/MM3 (ref 0.7–3.1)
LYMPHOCYTES NFR BLD AUTO: 17.5 % (ref 19.6–45.3)
MCH RBC QN AUTO: 30.9 PG (ref 26.6–33)
MCHC RBC AUTO-ENTMCNC: 32.2 G/DL (ref 31.5–35.7)
MCV RBC AUTO: 95.8 FL (ref 79–97)
MONOCYTES # BLD AUTO: 0.38 10*3/MM3 (ref 0.1–0.9)
MONOCYTES NFR BLD AUTO: 7.9 % (ref 5–12)
NEUTROPHILS NFR BLD AUTO: 3.5 10*3/MM3 (ref 1.7–7)
NEUTROPHILS NFR BLD AUTO: 73 % (ref 42.7–76)
NRBC BLD AUTO-RTO: 0 /100 WBC (ref 0–0.2)
PLATELET # BLD AUTO: 267 10*3/MM3 (ref 140–450)
PMV BLD AUTO: 9.3 FL (ref 6–12)
POTASSIUM SERPL-SCNC: 4.1 MMOL/L (ref 3.5–5.2)
PROT SERPL-MCNC: 7 G/DL (ref 6–8.5)
RBC # BLD AUTO: 3.53 10*6/MM3 (ref 4.14–5.8)
SODIUM SERPL-SCNC: 139 MMOL/L (ref 136–145)
WBC NRBC COR # BLD: 4.8 10*3/MM3 (ref 3.4–10.8)
WHOLE BLOOD HOLD COAG: NORMAL
WHOLE BLOOD HOLD SPECIMEN: NORMAL

## 2022-05-27 PROCEDURE — 80053 COMPREHEN METABOLIC PANEL: CPT

## 2022-05-27 PROCEDURE — 99283 EMERGENCY DEPT VISIT LOW MDM: CPT

## 2022-05-27 PROCEDURE — 36415 COLL VENOUS BLD VENIPUNCTURE: CPT

## 2022-05-27 PROCEDURE — 85025 COMPLETE CBC W/AUTO DIFF WBC: CPT

## 2022-05-27 RX ORDER — GINSENG 100 MG
1 CAPSULE ORAL ONCE
Status: COMPLETED | OUTPATIENT
Start: 2022-05-27 | End: 2022-05-27

## 2022-05-27 RX ORDER — SODIUM CHLORIDE 0.9 % (FLUSH) 0.9 %
10 SYRINGE (ML) INJECTION AS NEEDED
Status: DISCONTINUED | OUTPATIENT
Start: 2022-05-27 | End: 2022-05-27 | Stop reason: HOSPADM

## 2022-05-27 RX ADMIN — Medication 1 APPLICATION: at 12:20

## 2022-05-27 NOTE — TELEPHONE ENCOUNTER
Spoke with the H[facility]. Patient is established with Dr. Harmon and was seen in the ER for a wound. Patient is wanting to know if he is needing a follow up regarding wound management or if he needs to see Wound Care.

## 2022-05-27 NOTE — DISCHARGE INSTRUCTIONS
When you leave the emergency department please call your family doctor and discuss a referral to wound care with them.  In the meanwhile you will need to apply bacitracin as you have been and applying a new wound dressing twice daily.  If for some reason your family doctor cannot handle this issue then please return to the emergency department in 2 days for a wound check.  Also call Dr. Harmon general surgery for ongoing outpatient evaluations of the wound to start as soon as possible.

## 2022-05-27 NOTE — ED NOTES
Patient has a laceration to the left forearm that was previously repaired 5/13/22. The patient had his sutures removed 5 days ago. The patient states that yesterday the wound started to swell and Large blood clots started to come out of the wound. The patient is on an anticoagulant and an antiplatelet.

## 2022-05-27 NOTE — ED PROVIDER NOTES
Subjective   Ari Olea is a 71 y.o. male who presents to the emergency department today with complaints of wound problem. Pt was initially seen in this ED on 5/13 for a laceration repair to his left forearm. He has since returned to have the sutures removed without any difficulty. However, he claims yesterday the wound began to swell followed by large blood clots coming out of the wound. Pt is currently on anticoagulant and antiplatelet medication. He denies any fever, weakness, numbness, or any other pertinent sx.       History provided by:  Patient   used: No        Review of Systems   Constitutional: Negative for chills and fever.   HENT: Negative for congestion, rhinorrhea and sore throat.    Eyes: Negative for pain and visual disturbance.   Respiratory: Negative for apnea, cough, chest tightness and shortness of breath.    Cardiovascular: Negative for chest pain and palpitations.   Gastrointestinal: Negative for abdominal pain, diarrhea, nausea and vomiting.   Genitourinary: Negative for difficulty urinating and dysuria.   Musculoskeletal: Negative for joint swelling and myalgias.   Skin: Positive for wound. Negative for color change.   Neurological: Negative for seizures and headaches.   Psychiatric/Behavioral: Negative.    All other systems reviewed and are negative.      Past Medical History:   Diagnosis Date   • Abnormal ECG    • Anxiety    • Arrhythmia    • Arthritis    • Atrial fibrillation (HCC)    • Bladder disorder    • BPH (benign prostatic hyperplasia)    • CAD (coronary artery disease)    • Cervical spondylosis without myelopathy 01/15/2020   • Cervicalgia    • Chest pain    • Colitis    • Condition not found HERNIA   • Depression    • Diverticulitis    • Diverticulosis of colon    • GERD (gastroesophageal reflux disease)    • H/O degenerative disc disease    • Heart attack (HCC)    • Heart disease    • Hemorrhoids    • High cholesterol    • Hypertension    • IBS (irritable  bowel syndrome)    • Mood disorder (HCC)    • Muscle cramps    • Myocardial infarction (HCC)    • Osteoarthritis    • Prostate disorder    • S/P lumpectomy, right breast     CYST 2016       No Known Allergies    Past Surgical History:   Procedure Laterality Date   • APPENDECTOMY     • BREAST MASS EXCISION  2016   • CARDIAC CATHETERIZATION  2016   • CHOLECYSTECTOMY     • COLONOSCOPY  2008,2014,2021   • CORONARY ANGIOPLASTY WITH STENT PLACEMENT  2010   • ENDOSCOPY  2010,2019   • HEMORRHOIDECTOMY  2019   • INGUINAL HERNIA REPAIR  2019   • TURP / TRANSURETHRAL INCISION / DRAINAGE PROSTATE  01/16/2020    BUTTON TURP W/ DR TAM   • UMBILICAL HERNIA REPAIR  2019       Family History   Problem Relation Age of Onset   • Other Mother         bladder stones   • Arthritis Mother    • Heart disease Mother    • Heart failure Father    • Stroke Father    • Colon cancer Paternal Grandfather         60's       Social History     Socioeconomic History   • Marital status:    Tobacco Use   • Smoking status: Never Smoker   • Smokeless tobacco: Never Used   Vaping Use   • Vaping Use: Never used   Substance and Sexual Activity   • Alcohol use: Not Currently   • Drug use: Never   • Sexual activity: Defer         Objective   Physical Exam  Vitals and nursing note reviewed.   Constitutional:       General: He is not in acute distress.     Appearance: Normal appearance. He is not toxic-appearing.   HENT:      Head: Normocephalic and atraumatic.      Jaw: There is normal jaw occlusion.   Eyes:      General: Lids are normal.      Extraocular Movements: Extraocular movements intact.      Conjunctiva/sclera: Conjunctivae normal.      Pupils: Pupils are equal, round, and reactive to light.   Cardiovascular:      Rate and Rhythm: Normal rate and regular rhythm.      Pulses: Normal pulses.      Heart sounds: Normal heart sounds.   Pulmonary:      Effort: Pulmonary effort is normal. No respiratory distress.      Breath sounds: Normal breath  sounds. No wheezing or rhonchi.   Abdominal:      General: Abdomen is flat.      Palpations: Abdomen is soft.      Tenderness: There is no abdominal tenderness. There is no guarding or rebound.   Musculoskeletal:         General: Normal range of motion.      Cervical back: Normal range of motion and neck supple.      Right lower leg: No edema.      Left lower leg: No edema.   Skin:     General: Skin is warm and dry.      Comments: 1.5cm laceration over left forearm.  underlying 10cm hematoma with small amount of dark clotted blood coming from the wound. No erythema, warmth or purulence.    Neurological:      Mental Status: He is alert and oriented to person, place, and time. Mental status is at baseline.   Psychiatric:         Mood and Affect: Mood normal.         Procedures         ED Course                                            MDM  Number of Diagnoses or Management Options     Amount and/or Complexity of Data Reviewed  Clinical lab tests: reviewed  Decide to obtain previous medical records or to obtain history from someone other than the patient: yes  Review and summarize past medical records: yes        Final diagnoses:   Traumatic hematoma of forearm, left, sequela     ED Disposition     ED Disposition   Discharge    Condition   Stable    Comment   --             Documentation assistance provided by henry Baca.  Information recorded by the nileshe was done at my direction and has been verified and validated by me.     Aruna Baca  05/27/22 1131       Mayank Heck DO  05/27/22 [id number]

## 2022-05-28 LAB
BACTERIA SPEC AEROBE CULT: NORMAL
BACTERIA SPEC AEROBE CULT: NORMAL

## 2022-05-29 ENCOUNTER — HOSPITAL ENCOUNTER (EMERGENCY)
Facility: HOSPITAL | Age: 72
Discharge: HOME | End: 2022-05-29
Admitting: EMERGENCY MEDICINE

## 2022-05-29 VITALS
SYSTOLIC BLOOD PRESSURE: 133 MMHG | BODY MASS INDEX: 26.36 KG/M2 | TEMPERATURE: 97.8 F | HEIGHT: 73 IN | OXYGEN SATURATION: 98 % | HEART RATE: 60 BPM | WEIGHT: 198.85 LBS | DIASTOLIC BLOOD PRESSURE: 72 MMHG | RESPIRATION RATE: 18 BRPM

## 2022-05-29 DIAGNOSIS — Z51.89 ENCOUNTER FOR POST-TRAUMATIC WOUND CHECK: Primary | ICD-10-CM

## 2022-05-29 PROCEDURE — 99282 EMERGENCY DEPT VISIT SF MDM: CPT

## 2022-06-05 ENCOUNTER — HOSPITAL ENCOUNTER (EMERGENCY)
Facility: HOSPITAL | Age: 72
Discharge: HOME | End: 2022-06-06
Attending: EMERGENCY MEDICINE | Admitting: EMERGENCY MEDICINE

## 2022-06-05 DIAGNOSIS — R10.12 LEFT UPPER QUADRANT ABDOMINAL PAIN: Primary | ICD-10-CM

## 2022-06-05 DIAGNOSIS — K29.50 CHRONIC GASTRITIS WITHOUT BLEEDING, UNSPECIFIED GASTRITIS TYPE: ICD-10-CM

## 2022-06-05 LAB
BASOPHILS # BLD AUTO: 0 10*3/MM3 (ref 0–0.2)
BASOPHILS NFR BLD AUTO: 0 % (ref 0–1.5)
BILIRUB UR QL STRIP: NEGATIVE
CLARITY UR: CLEAR
COLOR UR: YELLOW
DEPRECATED RDW RBC AUTO: 50.4 FL (ref 37–54)
EOSINOPHIL # BLD AUTO: 0.07 10*3/MM3 (ref 0–0.4)
EOSINOPHIL NFR BLD AUTO: 1.4 % (ref 0.3–6.2)
ERYTHROCYTE [DISTWIDTH] IN BLOOD BY AUTOMATED COUNT: 14.5 % (ref 12.3–15.4)
GLUCOSE UR STRIP-MCNC: NEGATIVE MG/DL
HCT VFR BLD AUTO: 32.9 % (ref 37.5–51)
HGB BLD-MCNC: 10.8 G/DL (ref 13–17.7)
HGB UR QL STRIP.AUTO: NEGATIVE
HOLD SPECIMEN: NORMAL
HOLD SPECIMEN: NORMAL
IMM GRANULOCYTES # BLD AUTO: 0.01 10*3/MM3 (ref 0–0.05)
IMM GRANULOCYTES NFR BLD AUTO: 0.2 % (ref 0–0.5)
KETONES UR QL STRIP: NEGATIVE
LEUKOCYTE ESTERASE UR QL STRIP.AUTO: NEGATIVE
LYMPHOCYTES # BLD AUTO: 0.99 10*3/MM3 (ref 0.7–3.1)
LYMPHOCYTES NFR BLD AUTO: 20.4 % (ref 19.6–45.3)
MCH RBC QN AUTO: 30.9 PG (ref 26.6–33)
MCHC RBC AUTO-ENTMCNC: 32.8 G/DL (ref 31.5–35.7)
MCV RBC AUTO: 94.3 FL (ref 79–97)
MONOCYTES # BLD AUTO: 0.42 10*3/MM3 (ref 0.1–0.9)
MONOCYTES NFR BLD AUTO: 8.7 % (ref 5–12)
NEUTROPHILS NFR BLD AUTO: 3.36 10*3/MM3 (ref 1.7–7)
NEUTROPHILS NFR BLD AUTO: 69.3 % (ref 42.7–76)
NITRITE UR QL STRIP: NEGATIVE
NRBC BLD AUTO-RTO: 0 /100 WBC (ref 0–0.2)
PH UR STRIP.AUTO: 8.5 [PH] (ref 5–8)
PLATELET # BLD AUTO: 235 10*3/MM3 (ref 140–450)
PMV BLD AUTO: 9.1 FL (ref 6–12)
PROT UR QL STRIP: NEGATIVE
RBC # BLD AUTO: 3.49 10*6/MM3 (ref 4.14–5.8)
SP GR UR STRIP: 1.01 (ref 1–1.03)
UROBILINOGEN UR QL STRIP: ABNORMAL
WBC NRBC COR # BLD: 4.85 10*3/MM3 (ref 3.4–10.8)
WHOLE BLOOD HOLD COAG: NORMAL
WHOLE BLOOD HOLD SPECIMEN: NORMAL

## 2022-06-05 PROCEDURE — 99283 EMERGENCY DEPT VISIT LOW MDM: CPT

## 2022-06-05 PROCEDURE — 83690 ASSAY OF LIPASE: CPT

## 2022-06-05 PROCEDURE — 81003 URINALYSIS AUTO W/O SCOPE: CPT

## 2022-06-05 PROCEDURE — 80053 COMPREHEN METABOLIC PANEL: CPT

## 2022-06-05 PROCEDURE — 85025 COMPLETE CBC W/AUTO DIFF WBC: CPT

## 2022-06-05 PROCEDURE — 36415 COLL VENOUS BLD VENIPUNCTURE: CPT

## 2022-06-05 RX ORDER — SODIUM CHLORIDE 0.9 % (FLUSH) 0.9 %
10 SYRINGE (ML) INJECTION AS NEEDED
Status: DISCONTINUED | OUTPATIENT
Start: 2022-06-05 | End: 2022-06-06 | Stop reason: HOSPADM

## 2022-06-06 VITALS
WEIGHT: 198.19 LBS | DIASTOLIC BLOOD PRESSURE: 72 MMHG | HEIGHT: 73 IN | OXYGEN SATURATION: 95 % | RESPIRATION RATE: 16 BRPM | HEART RATE: 55 BPM | TEMPERATURE: 98.3 F | SYSTOLIC BLOOD PRESSURE: 126 MMHG | BODY MASS INDEX: 26.27 KG/M2

## 2022-06-06 LAB
ALBUMIN SERPL-MCNC: 4.4 G/DL (ref 3.5–5.2)
ALBUMIN/GLOB SERPL: 1.8 G/DL
ALP SERPL-CCNC: 126 U/L (ref 39–117)
ALT SERPL W P-5'-P-CCNC: 21 U/L (ref 1–41)
ANION GAP SERPL CALCULATED.3IONS-SCNC: 10.9 MMOL/L (ref 5–15)
AST SERPL-CCNC: 17 U/L (ref 1–40)
BILIRUB SERPL-MCNC: 0.5 MG/DL (ref 0–1.2)
BUN SERPL-MCNC: 15 MG/DL (ref 8–23)
BUN/CREAT SERPL: 15.2 (ref 7–25)
CALCIUM SPEC-SCNC: 9.5 MG/DL (ref 8.6–10.5)
CHLORIDE SERPL-SCNC: 104 MMOL/L (ref 98–107)
CO2 SERPL-SCNC: 24.1 MMOL/L (ref 22–29)
CREAT SERPL-MCNC: 0.99 MG/DL (ref 0.76–1.27)
EGFRCR SERPLBLD CKD-EPI 2021: 80.9 ML/MIN/1.73
GLOBULIN UR ELPH-MCNC: 2.4 GM/DL
GLUCOSE SERPL-MCNC: 110 MG/DL (ref 65–99)
LIPASE SERPL-CCNC: 22 U/L (ref 13–60)
POTASSIUM SERPL-SCNC: 3.8 MMOL/L (ref 3.5–5.2)
PROT SERPL-MCNC: 6.8 G/DL (ref 6–8.5)
SODIUM SERPL-SCNC: 139 MMOL/L (ref 136–145)

## 2022-06-06 PROCEDURE — 96375 TX/PRO/DX INJ NEW DRUG ADDON: CPT

## 2022-06-06 PROCEDURE — 25010000002 ONDANSETRON PER 1 MG: Performed by: EMERGENCY MEDICINE

## 2022-06-06 PROCEDURE — 96374 THER/PROPH/DIAG INJ IV PUSH: CPT

## 2022-06-06 RX ORDER — LIDOCAINE HYDROCHLORIDE 20 MG/ML
15 SOLUTION OROPHARYNGEAL ONCE
Status: COMPLETED | OUTPATIENT
Start: 2022-06-06 | End: 2022-06-06

## 2022-06-06 RX ORDER — ONDANSETRON 2 MG/ML
4 INJECTION INTRAMUSCULAR; INTRAVENOUS ONCE
Status: COMPLETED | OUTPATIENT
Start: 2022-06-06 | End: 2022-06-06

## 2022-06-06 RX ORDER — SODIUM CHLORIDE 0.9 % (FLUSH) 0.9 %
10 SYRINGE (ML) INJECTION AS NEEDED
Status: DISCONTINUED | OUTPATIENT
Start: 2022-06-06 | End: 2022-06-06 | Stop reason: HOSPADM

## 2022-06-06 RX ORDER — PANTOPRAZOLE SODIUM 40 MG/10ML
40 INJECTION, POWDER, LYOPHILIZED, FOR SOLUTION INTRAVENOUS ONCE
Status: COMPLETED | OUTPATIENT
Start: 2022-06-06 | End: 2022-06-06

## 2022-06-06 RX ORDER — ONDANSETRON 4 MG/1
8 TABLET, ORALLY DISINTEGRATING ORAL EVERY 8 HOURS PRN
Qty: 15 TABLET | Refills: 0 | Status: SHIPPED | OUTPATIENT
Start: 2022-06-06 | End: 2022-06-11

## 2022-06-06 RX ORDER — HYDROCODONE BITARTRATE AND ACETAMINOPHEN 5; 325 MG/1; MG/1
1 TABLET ORAL EVERY 8 HOURS PRN
COMMUNITY
Start: 2022-04-20 | End: 2022-06-10

## 2022-06-06 RX ORDER — ALUMINA, MAGNESIA, AND SIMETHICONE 2400; 2400; 240 MG/30ML; MG/30ML; MG/30ML
15 SUSPENSION ORAL ONCE
Status: COMPLETED | OUTPATIENT
Start: 2022-06-06 | End: 2022-06-06

## 2022-06-06 RX ADMIN — ONDANSETRON 4 MG: 2 INJECTION INTRAMUSCULAR; INTRAVENOUS at 02:36

## 2022-06-06 RX ADMIN — ALUMINUM HYDROXIDE, MAGNESIUM HYDROXIDE, AND DIMETHICONE 15 ML: 400; 400; 40 SUSPENSION ORAL at 02:34

## 2022-06-06 RX ADMIN — LIDOCAINE HYDROCHLORIDE 15 ML: 20 SOLUTION ORAL at 02:34

## 2022-06-06 RX ADMIN — PANTOPRAZOLE SODIUM 40 MG: 40 INJECTION, POWDER, FOR SOLUTION INTRAVENOUS at 02:37

## 2022-06-06 NOTE — ED PROVIDER NOTES
Time: 2:13 AM EDT  Arrived by: private car  Chief Complaint: Abdominal pain   History provided by: patient   History is limited by: N/A     History of Present Illness:  Patient is a 71 y.o. year old male that presents to the emergency department with abdominal pain for about 8 weeks. The pain is constant and described as cramping. The pain is worsen when he eat and pt reports feeling nausea. Pt denies vomiting, fever, or abnormalities in his urine and stool. Pain is unchanged.      Pt came to the ED today due to his appointment with his GI ([facility]ir) is far out.   HPI    Similar Symptoms Previously: yes  Recently seen: Pt was recently seen in this ED, PCP, and followed up with general surgeon (Dr. Harmon) for his concern for hernia mesh. Pt previously had a CT scan that was normal.       Patient Care Team  Primary Care Provider: Edith Marcelo APRN    Past Medical History:     No Known Allergies  Past Medical History:   Diagnosis Date   • Abnormal ECG    • Anxiety    • Arrhythmia    • Arthritis    • Atrial fibrillation (HCC)    • Bladder disorder    • BPH (benign prostatic hyperplasia)    • CAD (coronary artery disease)    • Cervical spondylosis without myelopathy 01/15/2020   • Cervicalgia    • Chest pain    • Colitis    • Condition not found HERNIA   • Depression    • Diverticulitis    • Diverticulosis of colon    • GERD (gastroesophageal reflux disease)    • H/O degenerative disc disease    • Heart attack (HCC)    • Heart disease    • Hemorrhoids    • High cholesterol    • Hypertension    • IBS (irritable bowel syndrome)    • Mood disorder (HCC)    • Muscle cramps    • Myocardial infarction (HCC)    • Osteoarthritis    • Prostate disorder    • S/P lumpectomy, right breast     CYST 2016     Past Surgical History:   Procedure Laterality Date   • APPENDECTOMY     • BREAST MASS EXCISION  2016   • CARDIAC CATHETERIZATION  2016   • CHOLECYSTECTOMY     • COLONOSCOPY  2008,2014,2021   • CORONARY ANGIOPLASTY WITH  STENT PLACEMENT  2010   • ENDOSCOPY  2010,2019   • HEMORRHOIDECTOMY  2019   • INGUINAL HERNIA REPAIR  2019   • TURP / TRANSURETHRAL INCISION / DRAINAGE PROSTATE  01/16/2020    BUTTON TURP W/ DR TAM   • UMBILICAL HERNIA REPAIR  2019     Family History   Problem Relation Age of Onset   • Other Mother         bladder stones   • Arthritis Mother    • Heart disease Mother    • Heart failure Father    • Stroke Father    • Colon cancer Paternal Grandfather         60's       Home Medications:  Prior to Admission medications    Medication Sig Start Date End Date Taking? Authorizing Provider   HYDROcodone-acetaminophen (NORCO) 5-325 MG per tablet Take 1 tablet by mouth Every 8 (Eight) Hours As Needed. 4/20/22  Yes Elian Merrill MD   acetaminophen (TYLENOL) 325 MG tablet TAKE 2 TABLETS BY MOUTH EVERY 4 HOURS AS needed FOR mild PAIN OR FEVER 2/8/22   Elian Merrill MD   apixaban (ELIQUIS) 5 MG tablet tablet Take 1 tablet by mouth Every 12 (Twelve) Hours. 12/10/21   Mayank Sheehan MD   atorvastatin (LIPITOR) 80 MG tablet Take 1 tablet by mouth at bedtime daily 3/24/22   Mayank Sheehan MD   clonazePAM (KlonoPIN) 0.5 MG tablet Take 1 tablet by mouth every 6 to 8 hours as needed for Anxiety. 4/1/22   Elian Merrill MD   clopidogrel (PLAVIX) 75 MG tablet Take 1 tablet by mouth every day 11/11/21   Mayank Sheehan MD   desvenlafaxine (PRISTIQ) 100 MG 24 hr tablet Take 100 mg by mouth Daily. 4/8/22   Elian Merrill MD   desvenlafaxine (PRISTIQ) 50 MG 24 hr tablet Take 50 mg by mouth Daily. 1/11/22 1/12/23  Elian Merrill MD   desvenlafaxine (PRISTIQ) 50 MG 24 hr tablet  1/11/22   Elian Merrill MD   dicyclomine (BENTYL) 20 MG tablet Take 1 tablet by mouth Every 6 (Six) Hours. 5/22/22   Chasidy Pope APRN   diphenoxylate-atropine (LOMOTIL) 2.5-0.025 MG per tablet Take 1 tablet by mouth 4 (Four) Times a Day As Needed for Diarrhea. 4/15/22   Ciro Gudino MD   hydrOXYzine (ATARAX) 25 MG  tablet hydroxyzine HCl 25 mg oral tablet take 1 tablet (25 mg) by oral route 3 times per day   Active    Elian Merrill MD   lisinopril (PRINIVIL,ZESTRIL) 5 MG tablet Take 5 mg by mouth Daily. 6/18/21   Elian Merrill MD   LORazepam (ATIVAN) 0.5 MG tablet Take 1 tablet by mouth 3 (Three) Times a Day. 1/17/22   Elian Merrill MD   LORazepam (ATIVAN) 1 MG tablet Take 1 tablet by mouth 2 (Two) Times a Day As Needed for Anxiety. 12/24/21   Castro Matos MD   metoclopramide (REGLAN) 10 MG tablet Take 1 tablet by mouth 4 (Four) Times a Day Before Meals & at Bedtime. 5/8/22   Chasidy Pope APRN   metoprolol succinate XL (TOPROL-XL) 25 MG 24 hr tablet Take 1 tablet by mouth Daily. 12/10/21   Mayank Sheehan MD   mirtazapine (REMERON) 30 MG tablet Take 30 mg by mouth.    Elian Merrill MD   multivitamin (THERAGRAN) tablet tablet Take 1 tablet by mouth Daily.    Elian Merrill MD   multivitamin with minerals tablet tablet Take 1 tablet by mouth Daily.    Elian Merrill MD   nitroglycerin (NITROSTAT) 0.4 MG SL tablet Place 1 tablet under the tongue Every 5 (Five) Minutes As Needed for Chest Pain (no more than 3 doses in 15 minutes). 12/10/21   Mayank Sheehan MD   ondansetron ODT (ZOFRAN-ODT) 4 MG disintegrating tablet Place 1 tablet on the tongue Every 8 (Eight) Hours As Needed for Nausea or Vomiting. 5/22/22   Chasidy Pope APRN   pantoprazole (PROTONIX) 40 MG EC tablet pantoprazole 40 mg tablet,delayed release   TAKE ONE TABLET BY MOUTH EVERY DAY    Elian Mrerill MD   pantoprazole (PROTONIX) 40 MG EC tablet Take 40 mg by mouth. 4/19/22 4/20/23  Elian Merrill MD   promethazine (PHENERGAN) 12.5 MG tablet Take 1 tablet by mouth Every 6 (Six) Hours As Needed for Nausea or Vomiting. 5/7/22   Cami Newby APRN   sertraline (ZOLOFT) 100 MG tablet Daily.    Elian Merrill MD   sucralfate (CARAFATE) 1 g tablet Take 1 tablet by mouth 4 (Four) Times a Day. 1/30/22  "  Mayank Heck,    tadalafil (CIALIS) 5 MG tablet Take 1 tablet by mouth Daily for 360 days. 10/11/21 10/6/22  Mallorie Matt MD   tamsulosin (FLOMAX) 0.4 MG capsule 24 hr capsule TAKE ONE CAPSULE BY MOUTH EVERY DAY ONE-HALF HOUR FOLLOWING THE SAME MEAL EACH DAY 12/29/21   Mallorie Matt MD   traMADol (ULTRAM) 50 MG tablet Take 50 mg by mouth Every 6 (Six) Hours As Needed for Moderate Pain .    Emergency, Nurse Epic, RN   triamcinolone (KENALOG) 0.5 % cream triamcinolone acetonide 0.5 % topical cream    Provider, MD Elian        Social History:   Social History     Tobacco Use   • Smoking status: Never Smoker   • Smokeless tobacco: Never Used   Vaping Use   • Vaping Use: Never used   Substance Use Topics   • Alcohol use: Not Currently   • Drug use: Never         Review of Systems:  Review of Systems   Constitutional: Negative for chills, diaphoresis and fever.   HENT: Negative for congestion, postnasal drip, rhinorrhea and sore throat.    Eyes: Negative for photophobia.   Respiratory: Negative for cough, chest tightness and shortness of breath.    Cardiovascular: Negative for chest pain, palpitations and leg swelling.   Gastrointestinal: Positive for abdominal pain. Negative for diarrhea, nausea and vomiting.   Genitourinary: Negative for difficulty urinating, dysuria, flank pain, frequency, hematuria and urgency.   Musculoskeletal: Negative for neck pain and neck stiffness.   Skin: Negative for pallor and rash.   Neurological: Negative for dizziness, syncope, weakness, numbness and headaches.   Hematological: Negative for adenopathy. Does not bruise/bleed easily.   Psychiatric/Behavioral: Negative.         Physical Exam:  /72 (BP Location: Right arm, Patient Position: Sitting)   Pulse 55   Temp 98.3 °F (36.8 °C) (Oral)   Resp 16   Ht 185.4 cm (73\")   Wt 89.9 kg (198 lb 3.1 oz)   SpO2 95%   BMI 26.15 kg/m²     Physical Exam  Vitals and nursing note reviewed.   Constitutional:       " General: He is not in acute distress.     Appearance: Normal appearance. He is not ill-appearing, toxic-appearing or diaphoretic.   HENT:      Head: Normocephalic and atraumatic.      Mouth/Throat:      Mouth: Mucous membranes are moist.   Eyes:      Pupils: Pupils are equal, round, and reactive to light.   Cardiovascular:      Rate and Rhythm: Normal rate and regular rhythm.      Pulses: Normal pulses.           Carotid pulses are 2+ on the right side and 2+ on the left side.       Radial pulses are 2+ on the right side and 2+ on the left side.        Femoral pulses are 2+ on the right side and 2+ on the left side.       Popliteal pulses are 2+ on the right side and 2+ on the left side.        Dorsalis pedis pulses are 2+ on the right side and 2+ on the left side.        Posterior tibial pulses are 2+ on the right side and 2+ on the left side.      Heart sounds: Normal heart sounds. No murmur heard.  Pulmonary:      Effort: Pulmonary effort is normal. No accessory muscle usage, respiratory distress or retractions.      Breath sounds: Normal breath sounds. No wheezing, rhonchi or rales.   Abdominal:      General: Abdomen is flat. There is no distension.      Palpations: Abdomen is soft. There is no mass.      Tenderness: There is abdominal tenderness in the left upper quadrant. There is no right CVA tenderness, left CVA tenderness, guarding or rebound.      Comments: No rigidity. Surgical scar.    Musculoskeletal:         General: No swelling, tenderness or deformity.      Cervical back: Neck supple. No tenderness.      Right lower leg: No edema.      Left lower leg: No edema.   Skin:     General: Skin is warm and dry.      Capillary Refill: Capillary refill takes less than 2 seconds.      Coloration: Skin is not jaundiced or pale.      Findings: No erythema.   Neurological:      General: No focal deficit present.      Mental Status: He is alert and oriented to person, place, and time. Mental status is at baseline.       Sensory: No sensory deficit.      Motor: No weakness.   Psychiatric:         Mood and Affect: Mood normal.         Behavior: Behavior normal.                Medications in the Emergency Department:  Medications   sodium chloride 0.9 % flush 10 mL (has no administration in time range)   sodium chloride 0.9 % flush 10 mL (has no administration in time range)   ondansetron (ZOFRAN) injection 4 mg (4 mg Intravenous Given 6/6/22 0236)   aluminum-magnesium hydroxide-simethicone (MAALOX MAX) 400-400-40 MG/5ML suspension 15 mL (15 mL Oral Given 6/6/22 0234)   Lidocaine Viscous HCl (XYLOCAINE) 2 % solution 15 mL (15 mL Mouth/Throat Given 6/6/22 0234)   pantoprazole (PROTONIX) injection 40 mg (40 mg Intravenous Given 6/6/22 0237)        Labs  Lab Results (last 24 hours)     Procedure Component Value Units Date/Time    CBC & Differential [[id number]]  (Abnormal) Collected: 06/05/22 2335    Specimen: Blood from Arm, Right Updated: 06/05/22 2345    Narrative:      The following orders were created for panel order CBC & Differential.  Procedure                               Abnormality         Status                     ---------                               -----------         ------                     CBC Auto Differential[[id number]]        Abnormal            Final result                 Please view results for these tests on the individual orders.    Comprehensive Metabolic Panel [[id number]]  (Abnormal) Collected: 06/05/22 2335    Specimen: Blood from Arm, Right Updated: 06/06/22 0033     Glucose 110 mg/dL      BUN 15 mg/dL      Creatinine 0.99 mg/dL      Sodium 139 mmol/L      Potassium 3.8 mmol/L      Chloride 104 mmol/L      CO2 24.1 mmol/L      Calcium 9.5 mg/dL      Total Protein 6.8 g/dL      Albumin 4.40 g/dL      ALT (SGPT) 21 U/L      AST (SGOT) 17 U/L      Alkaline Phosphatase 126 U/L      Total Bilirubin 0.5 mg/dL      Globulin 2.4 gm/dL      A/G Ratio 1.8 g/dL      BUN/Creatinine Ratio 15.2     Anion Gap  10.9 mmol/L      eGFR 80.9 mL/min/1.73      Comment: National Kidney Foundation and American Society of Nephrology (ASN) Task Force recommended calculation based on the Chronic Kidney Disease Epidemiology Collaboration (CKD-EPI) equation refit without adjustment for race.       Narrative:      GFR Normal >60  Chronic Kidney Disease <60  Kidney Failure <15      Lipase [[id number]]  (Normal) Collected: 06/05/22 2335    Specimen: Blood from Arm, Right Updated: 06/06/22 0033     Lipase 22 U/L     CBC Auto Differential [[id number]]  (Abnormal) Collected: 06/05/22 2335    Specimen: Blood from Arm, Right Updated: 06/05/22 2345     WBC 4.85 10*3/mm3      RBC 3.49 10*6/mm3      Hemoglobin 10.8 g/dL      Hematocrit 32.9 %      MCV 94.3 fL      MCH 30.9 pg      MCHC 32.8 g/dL      RDW 14.5 %      RDW-SD 50.4 fl      MPV 9.1 fL      Platelets 235 10*3/mm3      Neutrophil % 69.3 %      Lymphocyte % 20.4 %      Monocyte % 8.7 %      Eosinophil % 1.4 %      Basophil % 0.0 %      Immature Grans % 0.2 %      Neutrophils, Absolute 3.36 10*3/mm3      Lymphocytes, Absolute 0.99 10*3/mm3      Monocytes, Absolute 0.42 10*3/mm3      Eosinophils, Absolute 0.07 10*3/mm3      Basophils, Absolute 0.00 10*3/mm3      Immature Grans, Absolute 0.01 10*3/mm3      nRBC 0.0 /100 WBC     Urinalysis With Microscopic If Indicated (No Culture) - Urine, Clean Catch [[id number]]  (Abnormal) Collected: 06/05/22 2338    Specimen: Urine, Clean Catch Updated: 06/05/22 2347     Color, UA Yellow     Appearance, UA Clear     pH, UA 8.5     Specific Gravity, UA 1.012     Glucose, UA Negative     Ketones, UA Negative     Bilirubin, UA Negative     Blood, UA Negative     Protein, UA Negative     Leuk Esterase, UA Negative     Nitrite, UA Negative     Urobilinogen, UA 0.2 E.U./dL    Narrative:      Urine microscopic not indicated.           Imaging:  No Radiology Exams Resulted Within Past 24 Hours    Procedures:  Procedures    Progress                             Medical Decision Making:  MDM  Number of Diagnoses or Management Options  Diagnosis management comments:     Patient states that the Protonix and GI cocktail was relieved his pain.  The patient states that he only has residual nausea.      The patient is resting comfortably and feels better, is alert and in no distress.  Repeat examination is unremarkable and benign; in particular, there is no discomfort at McBurney's point and there is no pulsatile mass.  The history, exam, diagnostic testing and current condition does not suggest acute appendicitis, bowel obstruction, acute cholecystitis bowel perforation, major gastrointestinal bleeding, severe diverticulitis, abdominal aortic aneurysm, mesenteric ischemia, volvulus, sepsis or other significant pathology that warrants further testing, continued ED treatment, admission for surgical evaluation at this point.  The vital signs have been stable.  The patient does not have uncontrolled pain intractable vomiting or other significant symptoms.  The patient's condition is stable and appropriate for discharge from the emergency department.  The patient will follow up with her primary care physician for serial reexamination of the abdomen tomorrow.  The patient was instructed to return should they have worsening pain, intractable vomiting, fever or new symptoms       Amount and/or Complexity of Data Reviewed  Clinical lab tests: reviewed  Tests in the radiology section of CPT®: reviewed  Decide to obtain previous medical records or to obtain history from someone other than the patient: yes (INDICATIONS:  LOWER ABD. PAIN; UNEXPLAINED WEIGHT LOSS FOR 3 WEEKS.     TECHNIQUE:    After obtaining the patient's consent, 526 CT images were created with non-ionic   intravenous contrast material.  No oral contrast agent was administered for the study.  There is   slight motion artifact on the exam.     PROTOCOL:     Standard imaging protocol performed.                  RADIATION:      DLP: 480.5 mGy*cm.               Automated exposure control was utilized to minimize radiation dose.   CONTRAST:      100 mL Isovue 370 I.V.     FINDINGS:        The patient has undergone cholecystectomy and appendectomy.  There is a small hiatal   hernia.  There is diffuse hepatic steatosis.  Probably no hepatomegaly.  No definite splenomegaly.    Left-sided parapelvic renal cysts are seen, as before.  There may be small cortical renal cysts   bilaterally, as well.  Probably no suspicious renal mass is appreciated.  No renal stones or   hydronephrosis or obstructive uropathy due to a ureteral calculus.  No acute pyelonephritis is   suggested.  There are small bilateral inguinal hernias.  A tiny umbilical hernia is seen.  These   hernias do not contain bowel.  There is atherosclerotic change without aneurysmal dilatation of the   aortoiliac arterial system.  Coronary artery calcifications are seen.  There may be   mild-to-moderate origin narrowing of the celiac trunk (artery).  The superior mesenteric artery   appears to be widely patent.  The inferior mesenteric artery appears to be patent.  Please note   that CTA technique was not utilized.  No acute infiltrate is suspected within the partially imaged   lung bases.  Degenerative changes are seen throughout the imaged spine.  Again, severe spinal   stenosis is suspected at the L4-5 level with minimal chronic anterolisthesis.  There may be diffuse   idiopathic skeletal hyperostosis (DISH).  There may be Baastrups disease.  Degenerative changes   involve the hip joints and the bilateral sacroiliac joint, as well.  No acute fracture.  No   aggressive osseous lesion is suggested.  There are scattered colonic diverticula without acute   diverticulitis.  No mechanical bowel obstruction or pneumoperitoneum.  No pneumatosis.  No portal   or mesenteric venous gas.  No definite pathologic bowel wall thickening.  No pneumobilia.  No   biliary ductal  dilatation.  No choledocholithiasis.  No acute pancreatitis.  No urinary bladder   wall thickening or urinary bladder calculi.  Probably mild prostatomegaly.  Please correlate with   pertinent lab values.  Consider Nuclear Medicine whole-body FDG-PET-CT scan for further imaging   assessment clinically warranted.     IMPRESSION:               No acute findings are appreciated.  Please see above comments for further detail.                COMMENT:  Part of this note is an electronic transcription of spoken language to printed text. The   electronic translation/transcription may permit erroneous, or at times, nonsensical (or even   sensical) words or phrases to be inadvertently transcribed or omitted; this  has   reviewed the note for such errors (as well as additional errors); however, some may still exist.     SHIRLEY POOL JR, MD         Electronically Signed and Approved By: SHIRLEY POOL JR, MD on 5/22/2022 at 2:03        )                 Final diagnoses:   Left upper quadrant abdominal pain   Chronic gastritis without bleeding, unspecified gastritis type        Disposition:  ED Disposition     ED Disposition   Discharge    Condition   Stable    Comment   --             Documentation assistance provided by NAHOMI HILLMAN acting as scribe for Wilmer Evans DO. Information recorded by the scribe was done at my direction and has been verified and validated by me.          Nahomi Hillman  06/06/22 0224       Nahomi Hillman  06/06/22 0245       Nahomi Hillman  06/06/22 4330       Wilmer Evans DO  06/06/22 5142

## 2022-06-06 NOTE — DISCHARGE INSTRUCTIONS
Please follow-up with your doctor tomorrow for serial reexamination the abdomen.  Return to the emergency room immediately for intractable pain, intractable vomiting, fever, shaking chills, muscle aches, near passing out, passing out or any new symptoms you are concerned with    Please continue your Carafate and Protonix as previously prescribed

## 2022-06-10 ENCOUNTER — PREP FOR SURGERY (OUTPATIENT)
Dept: OTHER | Facility: HOSPITAL | Age: 72
End: 2022-06-10

## 2022-06-10 ENCOUNTER — OFFICE VISIT (OUTPATIENT)
Dept: GASTROENTEROLOGY | Facility: CLINIC | Age: 72
End: 2022-06-10

## 2022-06-10 VITALS
HEIGHT: 73 IN | WEIGHT: 200.3 LBS | BODY MASS INDEX: 26.54 KG/M2 | DIASTOLIC BLOOD PRESSURE: 57 MMHG | SYSTOLIC BLOOD PRESSURE: 111 MMHG | OXYGEN SATURATION: 100 % | HEART RATE: 65 BPM

## 2022-06-10 DIAGNOSIS — R10.12 LEFT UPPER QUADRANT ABDOMINAL PAIN: ICD-10-CM

## 2022-06-10 DIAGNOSIS — R11.0 NAUSEA: Primary | ICD-10-CM

## 2022-06-10 DIAGNOSIS — Z87.19 HISTORY OF GASTRITIS: ICD-10-CM

## 2022-06-10 DIAGNOSIS — K21.9 GASTROESOPHAGEAL REFLUX DISEASE, UNSPECIFIED WHETHER ESOPHAGITIS PRESENT: ICD-10-CM

## 2022-06-10 PROCEDURE — 99214 OFFICE O/P EST MOD 30 MIN: CPT | Performed by: NURSE PRACTITIONER

## 2022-06-10 RX ORDER — SUCRALFATE 1 G/1
1 TABLET ORAL 4 TIMES DAILY
Qty: 120 TABLET | Refills: 0 | Status: SHIPPED | OUTPATIENT
Start: 2022-06-10 | End: 2022-07-18

## 2022-06-10 RX ORDER — PANTOPRAZOLE SODIUM 40 MG/1
40 TABLET, DELAYED RELEASE ORAL DAILY
Qty: 30 TABLET | Refills: 3 | Status: SHIPPED | OUTPATIENT
Start: 2022-06-10 | End: 2022-09-08

## 2022-06-10 NOTE — PROGRESS NOTES
Chief Complaint   Abdominal burning and gastritis    History of Present Illness       Ari Olea is a 71 y.o. male who presents to [facility] for follow-up with a history of abdominal burning and history of gastritis.  At the patient's last office visit with Dr. Haas the patient continued to have left lower quadrant abdominal pain and he was set up for a colonoscopy and had had 3 CT scans over the past year that were negative.  He has a family history of colon cancer in his grandfather.  Patient reports he has been seen in the emergency department multiple times for nausea, reflux, gastritis and poor appetite.  Patient continues on Protonix 40 mg daily, Pepcid 20 mg at night and was previously on Carafate.  Patient reports he discontinued Carafate a couple of weeks ago as it was prescribed by the ER.  Patient reports his appetite has improved over this past week as well as his nausea.  He was drinking boost and Ensure to ensure that he was standing well nourished.  But his appetite is back to normal and he is able to eat a more regular diet.  He reports his bowel movements are normal occurring daily without melena or hematochezia.  He reports epigastric discomfort no other abdominal pain.  Patient denies fever, vomiting, weight loss, night sweats, melena, hematochezia, hematemesis.    Colonoscopy: Review of the patient's most recent colonoscopy performed by Dr. Haas on 02/24/2021 a few nonbleeding diverticula in the sigmoid colon, 4 mm polyp in the cecum and grade 1 internal hemorrhoids.  Repeat colonoscopy 5 years.    EGD: Review of the patient's most recent EGD performed by Dr. Haas on 07/03/2019 small hiatal hernia.  Normal mucosa throughout the esophagus stomach and duodenum.    CT abdomen and pelvis with contrast performed on 05/22/2022 no acute findings.  Diffuse hepatic steatosis.  Diverticula without diverticulitis.    Labs performed on 06/05/2022    Results        Result Review :       CMP    CMP 5/23/22 5/27/22 6/5/22   Glucose 115 (A) 111 (A) 110 (A)   BUN 16 13 15   Creatinine 1.05 0.98 0.99   Sodium 139 139 139   Potassium 4.4 4.1 3.8   Chloride 103 103 104   Calcium 9.6 9.4 9.5   Albumin 4.60 4.30 4.40   Total Bilirubin 0.8 0.6 0.5   Alkaline Phosphatase 110 122 (A) 126 (A)   AST (SGOT) 19 17 17   ALT (SGPT) 16 16 21   (A) Abnormal value            CBC    CBC 5/23/22 5/27/22 6/5/22   WBC 5.09 4.80 4.85   RBC 3.62 (A) 3.53 (A) 3.49 (A)   Hemoglobin 11.1 (A) 10.9 (A) 10.8 (A)   Hematocrit 34.6 (A) 33.8 (A) 32.9 (A)   MCV 95.6 95.8 94.3   MCH 30.7 30.9 30.9   MCHC 32.1 32.2 32.8   RDW 14.0 14.1 14.5   Platelets 256 267 235   (A) Abnormal value                          Past Medical History       Past Medical History:   Diagnosis Date   • Abnormal ECG    • Anxiety    • Arrhythmia    • Arthritis    • Atrial fibrillation (HCC)    • Bladder disorder    • BPH (benign prostatic hyperplasia)    • CAD (coronary artery disease)    • Cervical spondylosis without myelopathy 01/15/2020   • Cervicalgia    • Chest pain    • Colitis    • Condition not found HERNIA   • Depression    • Diverticulitis    • Diverticulosis of colon    • GERD (gastroesophageal reflux disease)    • H/O degenerative disc disease    • Heart attack (HCC)    • Heart disease    • Hemorrhoids    • High cholesterol    • Hypertension    • IBS (irritable bowel syndrome)    • Mood disorder (HCC)    • Muscle cramps    • Myocardial infarction (HCC)    • Osteoarthritis    • Prostate disorder    • S/P lumpectomy, right breast     CYST 2016       Past Surgical History:   Procedure Laterality Date   • APPENDECTOMY     • BREAST MASS EXCISION  2016   • CARDIAC CATHETERIZATION  2016   • CHOLECYSTECTOMY     • COLONOSCOPY  2008,2014,2021   • CORONARY ANGIOPLASTY WITH STENT PLACEMENT  2010   • ENDOSCOPY  2010,2019   • HEMORRHOIDECTOMY  2019   • INGUINAL HERNIA REPAIR  2019   • TURP / TRANSURETHRAL INCISION / DRAINAGE PROSTATE   01/16/2020    POLI MENON W/ DR TAM   • UMBILICAL HERNIA REPAIR  2019   • UPPER GASTROINTESTINAL ENDOSCOPY           Current Outpatient Medications:   •  apixaban (ELIQUIS) 5 MG tablet tablet, Take 1 tablet by mouth Every 12 (Twelve) Hours., Disp: 60 tablet, Rfl: 11  •  atorvastatin (LIPITOR) 80 MG tablet, Take 1 tablet by mouth at bedtime daily, Disp: 90 tablet, Rfl: 3  •  clonazePAM (KlonoPIN) 0.5 MG tablet, Take 1 tablet by mouth every 6 to 8 hours as needed for Anxiety., Disp: , Rfl:   •  clopidogrel (PLAVIX) 75 MG tablet, Take 1 tablet by mouth every day, Disp: 90 tablet, Rfl: 2  •  desvenlafaxine (PRISTIQ) 100 MG 24 hr tablet, Take 100 mg by mouth Daily., Disp: , Rfl:   •  lisinopril (PRINIVIL,ZESTRIL) 5 MG tablet, Take 5 mg by mouth Daily., Disp: , Rfl:   •  metoprolol succinate XL (TOPROL-XL) 25 MG 24 hr tablet, Take 1 tablet by mouth Daily., Disp: 30 tablet, Rfl: 11  •  mirtazapine (REMERON) 30 MG tablet, Take 30 mg by mouth., Disp: , Rfl:   •  multivitamin with minerals tablet tablet, Take 1 tablet by mouth Daily., Disp: , Rfl:   •  nitroglycerin (NITROSTAT) 0.4 MG SL tablet, Place 1 tablet under the tongue Every 5 (Five) Minutes As Needed for Chest Pain (no more than 3 doses in 15 minutes)., Disp: 25 tablet, Rfl: 2  •  ondansetron ODT (ZOFRAN-ODT) 4 MG disintegrating tablet, Place 2 tablets on the tongue Every 8 (Eight) Hours As Needed for Nausea or Vomiting for up to 5 days., Disp: 15 tablet, Rfl: 0  •  pantoprazole (PROTONIX) 40 MG EC tablet, Take 1 tablet by mouth Daily., Disp: 30 tablet, Rfl: 3  •  promethazine (PHENERGAN) 12.5 MG tablet, Take 1 tablet by mouth Every 6 (Six) Hours As Needed for Nausea or Vomiting., Disp: 30 tablet, Rfl: 0  •  sucralfate (CARAFATE) 1 g tablet, Take 1 tablet by mouth 4 (Four) Times a Day., Disp: 120 tablet, Rfl: 0  •  tadalafil (CIALIS) 5 MG tablet, Take 1 tablet by mouth Daily for 360 days., Disp: 90 tablet, Rfl: 3  •  tamsulosin (FLOMAX) 0.4 MG capsule 24 hr  "capsule, TAKE ONE CAPSULE BY MOUTH EVERY DAY ONE-HALF HOUR FOLLOWING THE SAME MEAL EACH DAY, Disp: 30 capsule, Rfl: 12  •  traMADol (ULTRAM) 50 MG tablet, Take 50 mg by mouth Every 6 (Six) Hours As Needed for Moderate Pain ., Disp: , Rfl:   •  triamcinolone (KENALOG) 0.5 % cream, triamcinolone acetonide 0.5 % topical cream, Disp: , Rfl:   •  dicyclomine (BENTYL) 20 MG tablet, Take 1 tablet by mouth Every 6 (Six) Hours., Disp: 20 tablet, Rfl: 0  •  diphenoxylate-atropine (LOMOTIL) 2.5-0.025 MG per tablet, Take 1 tablet by mouth 4 (Four) Times a Day As Needed for Diarrhea., Disp: 10 tablet, Rfl: 0     No Known Allergies    Family History   Problem Relation Age of Onset   • Other Mother         bladder stones   • Arthritis Mother    • Heart disease Mother    • Heart failure Father    • Stroke Father    • Colon cancer Paternal Grandfather         60's        Social History     Social History Narrative   • Not on file       Objective     Vital Signs:   /57 (BP Location: Left arm, Patient Position: Sitting, Cuff Size: Adult)   Pulse 65   Ht 185.4 cm (73\")   Wt 90.9 kg (200 lb 4.8 oz)   SpO2 100%   BMI 26.43 kg/m²       Physical Exam  Constitutional:       General: He is not in acute distress.     Appearance: Normal appearance. He is well-developed and normal weight.   Eyes:      Conjunctiva/sclera: Conjunctivae normal.      Pupils: Pupils are equal, round, and reactive to light.      Visual Fields: Right eye visual fields normal and left eye visual fields normal.   Cardiovascular:      Rate and Rhythm: Normal rate and regular rhythm.      Heart sounds: Normal heart sounds.   Pulmonary:      Effort: Pulmonary effort is normal. No retractions.      Breath sounds: Normal breath sounds and air entry.      Comments: Inspection of chest: normal appearance  Abdominal:      General: Bowel sounds are normal.      Palpations: Abdomen is soft.      Tenderness: There is no abdominal tenderness.      Comments: No " appreciable hepatosplenomegaly   Musculoskeletal:      Cervical back: Neck supple.      Right lower leg: No edema.      Left lower leg: No edema.   Lymphadenopathy:      Cervical: No cervical adenopathy.   Skin:     Findings: No lesion.      Comments: Turgor normal   Neurological:      Mental Status: He is alert and oriented to person, place, and time.   Psychiatric:         Mood and Affect: Mood and affect normal.           Assessment & Plan          Assessment and Plan    Diagnoses and all orders for this visit:    1. Nausea (Primary)    2. Gastroesophageal reflux disease, unspecified whether esophagitis present    3. History of gastritis    Other orders  -     sucralfate (CARAFATE) 1 g tablet; Take 1 tablet by mouth 4 (Four) Times a Day.  Dispense: 120 tablet; Refill: 0  -     pantoprazole (PROTONIX) 40 MG EC tablet; Take 1 tablet by mouth Daily.  Dispense: 30 tablet; Refill: 3      71-year-old male present to the office today for follow-up with a history of abdominal burning and history of gastritis.  I have recommended that the patient undergo further evaluation with an EGD.  I have discussed this procedure in detail with the patient.  I have discussed the risks, benefits and alternatives.  I have discussed the risk of anesthesia, bleeding and perforation.  Patient understands these risks, benefits and alternatives and wishes to proceed.  I will schedule him at his earliest convenience.  Patient will continue Protonix, famotidine and Carafate for reflux management.  Patient will follow-up in the office after endoscopy.  Patient agreeable to this plan will call with any questions or concerns.            Follow Up       Follow Up   Return for Follow up after endoscopy in office.  Patient was given instructions and counseling regarding his condition or for health maintenance advice. Please see specific information pulled into the AVS if appropriate.

## 2022-06-19 ENCOUNTER — HOSPITAL ENCOUNTER (EMERGENCY)
Facility: HOSPITAL | Age: 72
Discharge: HOME | End: 2022-06-19
Attending: EMERGENCY MEDICINE | Admitting: EMERGENCY MEDICINE

## 2022-06-19 ENCOUNTER — APPOINTMENT (OUTPATIENT)
Dept: GENERAL RADIOLOGY | Facility: HOSPITAL | Age: 72
End: 2022-06-19

## 2022-06-19 VITALS
HEART RATE: 57 BPM | WEIGHT: 199.96 LBS | DIASTOLIC BLOOD PRESSURE: 75 MMHG | OXYGEN SATURATION: 99 % | RESPIRATION RATE: 18 BRPM | HEIGHT: 73 IN | SYSTOLIC BLOOD PRESSURE: 133 MMHG | BODY MASS INDEX: 26.5 KG/M2 | TEMPERATURE: 98.2 F

## 2022-06-19 DIAGNOSIS — I15.9 SECONDARY HYPERTENSION: Primary | ICD-10-CM

## 2022-06-19 LAB
ALBUMIN SERPL-MCNC: 4.4 G/DL (ref 3.5–5.2)
ALBUMIN/GLOB SERPL: 1.8 G/DL
ALP SERPL-CCNC: 106 U/L (ref 39–117)
ALT SERPL W P-5'-P-CCNC: 17 U/L (ref 1–41)
ANION GAP SERPL CALCULATED.3IONS-SCNC: 9.4 MMOL/L (ref 5–15)
AST SERPL-CCNC: 17 U/L (ref 1–40)
BASOPHILS # BLD AUTO: 0 10*3/MM3 (ref 0–0.2)
BASOPHILS NFR BLD AUTO: 0 % (ref 0–1.5)
BILIRUB SERPL-MCNC: 0.6 MG/DL (ref 0–1.2)
BUN SERPL-MCNC: 15 MG/DL (ref 8–23)
BUN/CREAT SERPL: 15.5 (ref 7–25)
CALCIUM SPEC-SCNC: 9.6 MG/DL (ref 8.6–10.5)
CHLORIDE SERPL-SCNC: 101 MMOL/L (ref 98–107)
CO2 SERPL-SCNC: 23.6 MMOL/L (ref 22–29)
CREAT SERPL-MCNC: 0.97 MG/DL (ref 0.76–1.27)
DEPRECATED RDW RBC AUTO: 49.8 FL (ref 37–54)
EGFRCR SERPLBLD CKD-EPI 2021: 82.9 ML/MIN/1.73
EOSINOPHIL # BLD AUTO: 0.06 10*3/MM3 (ref 0–0.4)
EOSINOPHIL NFR BLD AUTO: 1 % (ref 0.3–6.2)
ERYTHROCYTE [DISTWIDTH] IN BLOOD BY AUTOMATED COUNT: 14.4 % (ref 12.3–15.4)
GLOBULIN UR ELPH-MCNC: 2.5 GM/DL
GLUCOSE SERPL-MCNC: 110 MG/DL (ref 65–99)
HCT VFR BLD AUTO: 34.3 % (ref 37.5–51)
HGB BLD-MCNC: 11.3 G/DL (ref 13–17.7)
HOLD SPECIMEN: NORMAL
HOLD SPECIMEN: NORMAL
IMM GRANULOCYTES # BLD AUTO: 0.02 10*3/MM3 (ref 0–0.05)
IMM GRANULOCYTES NFR BLD AUTO: 0.3 % (ref 0–0.5)
LYMPHOCYTES # BLD AUTO: 0.87 10*3/MM3 (ref 0.7–3.1)
LYMPHOCYTES NFR BLD AUTO: 14 % (ref 19.6–45.3)
MAGNESIUM SERPL-MCNC: 2 MG/DL (ref 1.6–2.4)
MCH RBC QN AUTO: 30.9 PG (ref 26.6–33)
MCHC RBC AUTO-ENTMCNC: 32.9 G/DL (ref 31.5–35.7)
MCV RBC AUTO: 93.7 FL (ref 79–97)
MONOCYTES # BLD AUTO: 0.5 10*3/MM3 (ref 0.1–0.9)
MONOCYTES NFR BLD AUTO: 8.1 % (ref 5–12)
NEUTROPHILS NFR BLD AUTO: 4.75 10*3/MM3 (ref 1.7–7)
NEUTROPHILS NFR BLD AUTO: 76.6 % (ref 42.7–76)
NRBC BLD AUTO-RTO: 0 /100 WBC (ref 0–0.2)
PLATELET # BLD AUTO: 222 10*3/MM3 (ref 140–450)
PMV BLD AUTO: 9.7 FL (ref 6–12)
POTASSIUM SERPL-SCNC: 4 MMOL/L (ref 3.5–5.2)
PROT SERPL-MCNC: 6.9 G/DL (ref 6–8.5)
QT INTERVAL: 436 MS
RBC # BLD AUTO: 3.66 10*6/MM3 (ref 4.14–5.8)
SODIUM SERPL-SCNC: 134 MMOL/L (ref 136–145)
T4 FREE SERPL-MCNC: 0.97 NG/DL (ref 0.93–1.7)
TROPONIN T SERPL-MCNC: <0.01 NG/ML (ref 0–0.03)
TSH SERPL DL<=0.05 MIU/L-ACNC: 3.9 UIU/ML (ref 0.27–4.2)
WBC NRBC COR # BLD: 6.2 10*3/MM3 (ref 3.4–10.8)
WHOLE BLOOD HOLD COAG: NORMAL
WHOLE BLOOD HOLD SPECIMEN: NORMAL

## 2022-06-19 PROCEDURE — 84484 ASSAY OF TROPONIN QUANT: CPT

## 2022-06-19 PROCEDURE — 36415 COLL VENOUS BLD VENIPUNCTURE: CPT

## 2022-06-19 PROCEDURE — 80053 COMPREHEN METABOLIC PANEL: CPT

## 2022-06-19 PROCEDURE — 71045 X-RAY EXAM CHEST 1 VIEW: CPT

## 2022-06-19 PROCEDURE — 85025 COMPLETE CBC W/AUTO DIFF WBC: CPT

## 2022-06-19 PROCEDURE — 84443 ASSAY THYROID STIM HORMONE: CPT | Performed by: NURSE PRACTITIONER

## 2022-06-19 PROCEDURE — 93005 ELECTROCARDIOGRAM TRACING: CPT

## 2022-06-19 PROCEDURE — 84439 ASSAY OF FREE THYROXINE: CPT | Performed by: NURSE PRACTITIONER

## 2022-06-19 PROCEDURE — 83735 ASSAY OF MAGNESIUM: CPT

## 2022-06-19 PROCEDURE — 93005 ELECTROCARDIOGRAM TRACING: CPT | Performed by: EMERGENCY MEDICINE

## 2022-06-19 PROCEDURE — 99283 EMERGENCY DEPT VISIT LOW MDM: CPT

## 2022-06-19 PROCEDURE — 93010 ELECTROCARDIOGRAM REPORT: CPT | Performed by: INTERNAL MEDICINE

## 2022-06-19 RX ORDER — SODIUM CHLORIDE 0.9 % (FLUSH) 0.9 %
10 SYRINGE (ML) INJECTION AS NEEDED
Status: DISCONTINUED | OUTPATIENT
Start: 2022-06-19 | End: 2022-06-19 | Stop reason: HOSPADM

## 2022-06-19 NOTE — ED PROVIDER NOTES
"Time: 7:11 AM EDT   Arrived by: Car  Chief Complaint: Hypertension  History provided by: Patient  History is limited by: N/A    History of Present Illness:    Ari Olea is a 71 y.o. male who presents to the emergency department today with complaints of hypertension. The patient reports that around 0400, he developed a \"funny feeling\" with palpitations and generalized burning sensation, particularly in the chest and right arm. He decided to check his blood pressure at that time and found it to be elevated at 295 systolic. His heartrate was also measured to be 120. He notes that his symptoms have now resolved. The patient did have similar symptoms previously \"the other night.\"    The patient states that he has taken all morning hypertension medications as prescribed. He denies taking any hypertension medications at night.    The patient notes a sensation of tingling previously but denies any shortness of breath, cough, or fever.    Along with hypertension, the patient also has a history of atrial fibrillation, anxiety, coronary artery disease, and GERD. The patient denies smoking cigarettes, drinking alcohol, or illicit drug use. There are no other acute complaints at this time.       History provided by:  Patient   used: No    Hypertension  Severity:  Moderate  Onset quality:  Gradual  Duration:  3 hours  Timing:  Intermittent  Progression:  Resolved  Chronicity:  Recurrent  Time since last dose of antihypertensive: \"This morning\"  Notable PTA blood pressures:  295 systolic  Context comment:  The patient reports that around 0400, he developed a \"funny feeling\" with palpitations and generalized burning sensation, particularly in the chest and right arm. He decided to check his blood pressure at that time and found it to be elevated.  Relieved by:  Nothing  Worsened by:  Nothing  Ineffective treatments: Hypertension medication (in the mornings)  Associated symptoms: chest pain (radiation to " right arm) and palpitations (heartrate of 120)    Associated symptoms: no abdominal pain, no fever, no headaches, no nausea, no shortness of breath and not vomiting    Risk factors: no alcohol use and no tobacco use            Similar Symptoms Previously: Yes.  Recently seen: Patient was seen for surgery prep with RENALDO Tracey, on 6/10/2022. He was last seen in the ED on 6/5/2022 with abdominal pain.      Patient Care Team  Primary Care Provider: Edith Marcelo APRN    Past Medical History:     No Known Allergies  Past Medical History:   Diagnosis Date   • Abnormal ECG    • Anxiety    • Arrhythmia    • Arthritis    • Atrial fibrillation (HCC)    • Bladder disorder    • BPH (benign prostatic hyperplasia)    • CAD (coronary artery disease)    • Cervical spondylosis without myelopathy 01/15/2020   • Cervicalgia    • Chest pain    • Colitis    • Condition not found HERNIA   • Depression    • Diverticulitis    • Diverticulosis of colon    • GERD (gastroesophageal reflux disease)    • H/O degenerative disc disease    • Heart attack (HCC)    • Heart disease    • Hemorrhoids    • High cholesterol    • Hypertension    • IBS (irritable bowel syndrome)    • Mood disorder (HCC)    • Muscle cramps    • Myocardial infarction (HCC)    • Osteoarthritis    • Prostate disorder    • S/P lumpectomy, right breast     CYST 2016     Past Surgical History:   Procedure Laterality Date   • APPENDECTOMY     • BREAST MASS EXCISION  2016   • CARDIAC CATHETERIZATION  2016   • CHOLECYSTECTOMY     • COLONOSCOPY  2008,2014,2021   • CORONARY ANGIOPLASTY WITH STENT PLACEMENT  2010   • ENDOSCOPY  2010,2019   • HEMORRHOIDECTOMY  2019   • INGUINAL HERNIA REPAIR  2019   • TURP / TRANSURETHRAL INCISION / DRAINAGE PROSTATE  01/16/2020    BUTTON TURP W/ DR TAM   • UMBILICAL HERNIA REPAIR  2019   • UPPER GASTROINTESTINAL ENDOSCOPY       Family History   Problem Relation Age of Onset   • Other Mother         bladder stones   • Arthritis Mother    •  Heart disease Mother    • Heart failure Father    • Stroke Father    • Colon cancer Paternal Grandfather         60's       Home Medications:  Prior to Admission medications    Medication Sig Start Date End Date Taking? Authorizing Provider   apixaban (ELIQUIS) 5 MG tablet tablet Take 1 tablet by mouth Every 12 (Twelve) Hours. 12/10/21   Mayank Sheehan MD   atorvastatin (LIPITOR) 80 MG tablet Take 1 tablet by mouth at bedtime daily 3/24/22   Mayank Sheehan MD   clonazePAM (KlonoPIN) 0.5 MG tablet Take 1 tablet by mouth every 6 to 8 hours as needed for Anxiety. 4/1/22   Elian Merrill MD   clopidogrel (PLAVIX) 75 MG tablet Take 1 tablet by mouth every day 11/11/21   Mayank Sheehan MD   desvenlafaxine (PRISTIQ) 100 MG 24 hr tablet Take 100 mg by mouth Daily. 4/8/22   Elian Merrill MD   dicyclomine (BENTYL) 20 MG tablet Take 1 tablet by mouth Every 6 (Six) Hours. 5/22/22   Chasidy Pope APRN   diphenoxylate-atropine (LOMOTIL) 2.5-0.025 MG per tablet Take 1 tablet by mouth 4 (Four) Times a Day As Needed for Diarrhea. 4/15/22   Ciro Gudino MD   lisinopril (PRINIVIL,ZESTRIL) 5 MG tablet Take 5 mg by mouth Daily. 6/18/21   Elian Merrill MD   metoprolol succinate XL (TOPROL-XL) 25 MG 24 hr tablet Take 1 tablet by mouth Daily. 12/10/21   Mayank Sheehan MD   mirtazapine (REMERON) 30 MG tablet Take 30 mg by mouth.    Elian Merrill MD   multivitamin with minerals tablet tablet Take 1 tablet by mouth Daily.    Elian Merrill MD   nitroglycerin (NITROSTAT) 0.4 MG SL tablet Place 1 tablet under the tongue Every 5 (Five) Minutes As Needed for Chest Pain (no more than 3 doses in 15 minutes). 12/10/21   Mayank Sheehan MD   pantoprazole (PROTONIX) 40 MG EC tablet Take 1 tablet by mouth Daily. 6/10/22 6/11/23  Olena Gustafson APRN   promethazine (PHENERGAN) 12.5 MG tablet Take 1 tablet by mouth Every 6 (Six) Hours As Needed for Nausea or Vomiting. 5/7/22   Cami Newby APRN   sucralfate  "(CARAFATE) 1 g tablet Take 1 tablet by mouth 4 (Four) Times a Day. 6/10/22   Olena Gustafson APRN   tadalafil (CIALIS) 5 MG tablet Take 1 tablet by mouth Daily for 360 days. 10/11/21 10/6/22  Mallorie Matt MD   tamsulosin (FLOMAX) 0.4 MG capsule 24 hr capsule TAKE ONE CAPSULE BY MOUTH EVERY DAY ONE-HALF HOUR FOLLOWING THE SAME MEAL EACH DAY 12/29/21   Mallorie Matt MD   traMADol (ULTRAM) 50 MG tablet Take 50 mg by mouth Every 6 (Six) Hours As Needed for Moderate Pain .    Emergency, Nurse Epic, RN   triamcinolone (KENALOG) 0.5 % cream triamcinolone acetonide 0.5 % topical cream    Provider, MD Elian        Social History:   PT  reports that he is a non-smoker but has been exposed to tobacco smoke. He has never used smokeless tobacco. He reports previous alcohol use. He reports that he does not use drugs.    Record Review:  I have reviewed the patient's records in [facility].     Review of Systems  Review of Systems   Constitutional: Negative for chills and fever.   HENT: Negative for congestion, rhinorrhea and sore throat.    Eyes: Negative for pain and visual disturbance.   Respiratory: Negative for apnea, cough, chest tightness and shortness of breath.    Cardiovascular: Positive for chest pain (radiation to right arm) and palpitations (heartrate of 120).   Gastrointestinal: Negative for abdominal pain, diarrhea, nausea and vomiting.   Genitourinary: Negative for difficulty urinating and dysuria.   Musculoskeletal: Negative for joint swelling and myalgias.        Generalized burning sensation.   Skin: Negative for color change.   Neurological: Negative for seizures and headaches.        Tingling sensation. Now resolved.   Psychiatric/Behavioral: Negative.    All other systems reviewed and are negative.       Physical Exam  /75   Pulse 57   Temp 98.2 °F (36.8 °C)   Resp 18   Ht 185.4 cm (73\")   Wt 90.7 kg (199 lb 15.3 oz)   SpO2 99%   BMI 26.38 kg/m²     Physical Exam  Vitals and nursing note " "reviewed.   Constitutional:       General: He is not in acute distress.     Appearance: Normal appearance. He is not toxic-appearing.   HENT:      Head: Normocephalic and atraumatic.      Jaw: There is normal jaw occlusion.   Eyes:      General: Lids are normal.      Extraocular Movements: Extraocular movements intact.      Conjunctiva/sclera: Conjunctivae normal.      Pupils: Pupils are equal, round, and reactive to light.   Cardiovascular:      Rate and Rhythm: Normal rate and regular rhythm.      Pulses: Normal pulses.      Heart sounds: Normal heart sounds.   Pulmonary:      Effort: Pulmonary effort is normal. No respiratory distress.      Breath sounds: Normal breath sounds. No wheezing or rhonchi.   Abdominal:      General: Abdomen is flat.      Palpations: Abdomen is soft.      Tenderness: There is no abdominal tenderness. There is no guarding or rebound.   Musculoskeletal:         General: Normal range of motion.      Cervical back: Normal range of motion and neck supple.      Right lower leg: No edema.      Left lower leg: No edema.   Skin:     General: Skin is warm and dry.   Neurological:      Mental Status: He is alert and oriented to person, place, and time. Mental status is at baseline.   Psychiatric:         Mood and Affect: Mood normal.                  ED Course    ED Course as of 06/19/22 0951   Sun Jun 19, 2022   0949 Discussed case in detail with Dr. Sheehan. At bedside reevaluation the patient and updating on Dr. Sheehan's recommendations. Patient is agreeable with plan. [RF]   0950 EKG:    Rhythm: sinus  Rate: 58  Axis: normal  Intervals: normal  ST Segment: no elevations    EKG Comparison: unchanged    Interpreted by me   [BN]      ED Course User Index  [BN] Ciro Gudino MD  [RF] Rose Albert       /75   Pulse 57   Temp 98.2 °F (36.8 °C)   Resp 18   Ht 185.4 cm (73\")   Wt 90.7 kg (199 lb 15.3 oz)   SpO2 99%   BMI 26.38 kg/m²   Results for orders placed or performed during the " hospital encounter of 06/19/22   Comprehensive Metabolic Panel    Specimen: Arm, Right; Blood   Result Value Ref Range    Glucose 110 (H) 65 - 99 mg/dL    BUN 15 8 - 23 mg/dL    Creatinine 0.97 0.76 - 1.27 mg/dL    Sodium 134 (L) 136 - 145 mmol/L    Potassium 4.0 3.5 - 5.2 mmol/L    Chloride 101 98 - 107 mmol/L    CO2 23.6 22.0 - 29.0 mmol/L    Calcium 9.6 8.6 - 10.5 mg/dL    Total Protein 6.9 6.0 - 8.5 g/dL    Albumin 4.40 3.50 - 5.20 g/dL    ALT (SGPT) 17 1 - 41 U/L    AST (SGOT) 17 1 - 40 U/L    Alkaline Phosphatase 106 39 - 117 U/L    Total Bilirubin 0.6 0.0 - 1.2 mg/dL    Globulin 2.5 gm/dL    A/G Ratio 1.8 g/dL    BUN/Creatinine Ratio 15.5 7.0 - 25.0    Anion Gap 9.4 5.0 - 15.0 mmol/L    eGFR 82.9 >60.0 mL/min/1.73   Magnesium    Specimen: Arm, Right; Blood   Result Value Ref Range    Magnesium 2.0 1.6 - 2.4 mg/dL   Troponin    Specimen: Arm, Right; Blood   Result Value Ref Range    Troponin T <0.010 0.000 - 0.030 ng/mL   CBC Auto Differential    Specimen: Arm, Right; Blood   Result Value Ref Range    WBC 6.20 3.40 - 10.80 10*3/mm3    RBC 3.66 (L) 4.14 - 5.80 10*6/mm3    Hemoglobin 11.3 (L) 13.0 - 17.7 g/dL    Hematocrit 34.3 (L) 37.5 - 51.0 %    MCV 93.7 79.0 - 97.0 fL    MCH 30.9 26.6 - 33.0 pg    MCHC 32.9 31.5 - 35.7 g/dL    RDW 14.4 12.3 - 15.4 %    RDW-SD 49.8 37.0 - 54.0 fl    MPV 9.7 6.0 - 12.0 fL    Platelets 222 140 - 450 10*3/mm3    Neutrophil % 76.6 (H) 42.7 - 76.0 %    Lymphocyte % 14.0 (L) 19.6 - 45.3 %    Monocyte % 8.1 5.0 - 12.0 %    Eosinophil % 1.0 0.3 - 6.2 %    Basophil % 0.0 0.0 - 1.5 %    Immature Grans % 0.3 0.0 - 0.5 %    Neutrophils, Absolute 4.75 1.70 - 7.00 10*3/mm3    Lymphocytes, Absolute 0.87 0.70 - 3.10 10*3/mm3    Monocytes, Absolute 0.50 0.10 - 0.90 10*3/mm3    Eosinophils, Absolute 0.06 0.00 - 0.40 10*3/mm3    Basophils, Absolute 0.00 0.00 - 0.20 10*3/mm3    Immature Grans, Absolute 0.02 0.00 - 0.05 10*3/mm3    nRBC 0.0 0.0 - 0.2 /100 WBC   TSH    Specimen: Arm, Right; Blood    Result Value Ref Range    TSH 3.900 0.270 - 4.200 uIU/mL   T4, Free    Specimen: Arm, Right; Blood   Result Value Ref Range    Free T4 0.97 0.93 - 1.70 ng/dL   ECG 12 Lead   Result Value Ref Range    QT Interval 436 ms   Green Top (Gel)   Result Value Ref Range    Extra Tube Hold for add-ons.    Lavender Top   Result Value Ref Range    Extra Tube hold for add-on    Gold Top - SST   Result Value Ref Range    Extra Tube Hold for add-ons.    Light Blue Top   Result Value Ref Range    Extra Tube Hold for add-ons.      Medications   sodium chloride 0.9 % flush 10 mL (has no administration in time range)     CT Abdomen Pelvis With Contrast    Result Date: 5/22/2022  Narrative: PROCEDURE: CT ABDOMEN PELVIS W CONTRAST  COMPARISONS: [facility], CT, CT ABDOMEN PELVIS W CONTRAST, 4/15/2022, 17:40.   [facility], CT, CT ABDOMEN PELVIS W CONTRAST, 10/29/2021, 21:01.   [facility], CT, ABDOMEN/PELVIS WITH CONTRAST, 12/24/2020, 14:28.   [facility], CT, ABDOMEN/PELVIS WITH CONTRAST, 9/14/2020, 5:48.   [facility], CT, ABDOMEN/PELVIS WITH CONTRAST, 6/02/2020, 1:17.   [facility], CT, CT ABDOMEN PELVIS W CONTRAST, 4/20/2022, 15:42.  INDICATIONS: LOWER ABD. PAIN; UNEXPLAINED WEIGHT LOSS FOR 3 WEEKS.  TECHNIQUE: After obtaining the patient's consent, 526 CT images were created with non-ionic intravenous contrast material.  No oral contrast agent was administered for the study.  There is slight motion artifact on the exam.  PROTOCOL:   Standard imaging protocol performed.    RADIATION:   DLP: 480.5 mGy*cm.   Automated exposure control was utilized to minimize radiation dose. CONTRAST: 100 mL Isovue 370 I.V.  FINDINGS: The patient has undergone cholecystectomy and appendectomy.  There is a small hiatal hernia.  There is diffuse hepatic steatosis.  Probably no hepatomegaly.  No definite splenomegaly.  Left-sided parapelvic renal cysts are seen, as before.   There may be small cortical renal cysts bilaterally, as well.  Probably no suspicious renal mass is appreciated.  No renal stones or hydronephrosis or obstructive uropathy due to a ureteral calculus.  No acute pyelonephritis is suggested.  There are small bilateral inguinal hernias.  A tiny umbilical hernia is seen.  These hernias do not contain bowel.  There is atherosclerotic change without aneurysmal dilatation of the aortoiliac arterial system.  Coronary artery calcifications are seen.  There may be mild-to-moderate origin narrowing of the celiac trunk (artery).  The superior mesenteric artery appears to be widely patent.  The inferior mesenteric artery appears to be patent.  Please note that CTA technique was not utilized.  No acute infiltrate is suspected within the partially imaged lung bases.  Degenerative changes are seen throughout the imaged spine.  Again, severe spinal stenosis is suspected at the L4-5 level with minimal chronic anterolisthesis.  There may be diffuse idiopathic skeletal hyperostosis (DISH).  There may be Baastrups disease.  Degenerative changes involve the hip joints and the bilateral sacroiliac joint, as well.  No acute fracture.  No aggressive osseous lesion is suggested.  There are scattered colonic diverticula without acute diverticulitis.  No mechanical bowel obstruction or pneumoperitoneum.  No pneumatosis.  No portal or mesenteric venous gas.  No definite pathologic bowel wall thickening.  No pneumobilia.  No biliary ductal dilatation.  No choledocholithiasis.  No acute pancreatitis.  No urinary bladder wall thickening or urinary bladder calculi.  Probably mild prostatomegaly.  Please correlate with pertinent lab values.  Consider Nuclear Medicine whole-body FDG-PET-CT scan for further imaging assessment clinically warranted.      Impression:  No acute findings are appreciated.  Please see above comments for further detail.      COMMENT:  Part of this note is an electronic  transcription of spoken language to printed text. The electronic translation/transcription may permit erroneous, or at times, nonsensical (or even sensical) words or phrases to be inadvertently transcribed or omitted; this  has reviewed the note for such errors (as well as additional errors); however, some may still exist.  SHIRLEY POOL JR, MD       Electronically Signed and Approved By: SHIRLEY POOL JR, MD on 5/22/2022 at 2:03              XR Chest 1 View    Result Date: 6/19/2022  Narrative: PROCEDURE: XR CHEST 1 VW  COMPARISON: 4/27/2022.  INDICATIONS: HYPERTENSION; CARDIAC PALPITATIONS.  FINDINGS:  A single PA upright portable chest radiograph was performed.  No cardiac enlargement is seen.  No acute infiltrate is appreciated.  No pleural effusion or pneumothorax is identified.  Degenerative changes involve the imaged spine and the bilateral shoulders.  Coronary artery calcifications and/or stents are identified, seen previously.  Chronic calcified granulomatous disease involves the chest.  No significant interval change is seen since the prior study.      Impression:   No acute infiltrate is appreciated.      COMMENT:  Part of this note is an electronic transcription of spoken language to printed text. The electronic translation/transcription may permit erroneous, or at times, nonsensical (or even sensical) words or phrases to be inadvertently transcribed or omitted; this  has reviewed the note for such errors (as well as additional errors); however, some may still exist.  SHIRLEY POOL JR, MD       Electronically Signed and Approved By: SHIRLEY POOL JR, MD on 6/19/2022 at 5:47                Procedures/EKGs:  Procedures        Medical Decision Making:                         MDM  Number of Diagnoses or Management Options  Secondary hypertension  Diagnosis management comments: The patient presents to the ED with hypertension. The patient was placed on a monitor in the ED. the  patient´s CBC was reviewed and shows no abnormalities of critical concern. Of note, there is no anemia requiring a blood transfusion and the platelet count is acceptable.  The patient´s CMP was reviewed and shows no abnormalities of critical concern. Of note, the patient´s sodium and potassium are acceptable. The patient´s liver enzymes are unremarkable. The patient´s renal function (creatinine) is preserved. The patient has a normal anion gap.  Chest x-ray is negative for pneumonia.  Case was discussed with Dr. Sheehan who recommends increasing the patient's metoprolol to 25 twice daily.  The blood pressure is much improved with ED treatment. The patient denies chest pain. The patient has a normal neurological exam and has mental status at baseline. Upon re-examination, the patient has no signs or symptoms of acute end organ damage.  The patient was advised of the importance of medical compliance with an emphasis in awareness of their daily sodium intake. The patient was counseled that elevated blood pressure is detrimental to their heart, eyes, kidneys, and may lead to a stroke. The patient was advised to check their blood pressure regularly and follow up as an outpatient regarding this matter. The patient was counseled to return to the ED for sudden or severe headache, numbness or tingling on one side of the body, facial droop, difficulty speaking, chest pain, or shortness of breath. The patient's condition is stable and appropriate for discharge. The patient will pursue further outpatient evaluation with the primary care physician or other designated or consulting physician as indicated in the discharge instructions.        Amount and/or Complexity of Data Reviewed  Clinical lab tests: reviewed  Tests in the radiology section of CPT®: reviewed  Tests in the medicine section of CPT®: reviewed  Independent visualization of images, tracings, or specimens: yes    Risk of Complications, Morbidity, and/or  Mortality  Presenting problems: moderate  Management options: moderate    Patient Progress  Patient progress: stable       Final diagnoses:   Secondary hypertension          Disposition:  ED Disposition     ED Disposition   Discharge    Condition   Stable    Comment   --             Documentation assistance provided by Ciro Gudino MD acting as scribe for Dr. Ciro Gudino. Information recorded by the scribe was done at my direction and has been verified and validated by me.      Rose Albert  06/19/22 0725       Rose Albert  06/19/22 0725       Ciro Gudino MD  06/19/22 0951       Ciro Gudino MD  06/19/22 0951

## 2022-06-23 ENCOUNTER — OFFICE VISIT (OUTPATIENT)
Dept: CARDIOLOGY | Facility: CLINIC | Age: 72
End: 2022-06-23

## 2022-06-23 VITALS
HEART RATE: 58 BPM | HEIGHT: 73 IN | SYSTOLIC BLOOD PRESSURE: 109 MMHG | WEIGHT: 202 LBS | BODY MASS INDEX: 26.77 KG/M2 | DIASTOLIC BLOOD PRESSURE: 61 MMHG

## 2022-06-23 DIAGNOSIS — I25.10 CAD S/P PERCUTANEOUS CORONARY ANGIOPLASTY: ICD-10-CM

## 2022-06-23 DIAGNOSIS — I48.0 PAF (PAROXYSMAL ATRIAL FIBRILLATION): Primary | ICD-10-CM

## 2022-06-23 DIAGNOSIS — Z98.61 CAD S/P PERCUTANEOUS CORONARY ANGIOPLASTY: ICD-10-CM

## 2022-06-23 DIAGNOSIS — E78.00 HIGH CHOLESTEROL: ICD-10-CM

## 2022-06-23 DIAGNOSIS — I10 ESSENTIAL HYPERTENSION: ICD-10-CM

## 2022-06-23 PROCEDURE — 99214 OFFICE O/P EST MOD 30 MIN: CPT | Performed by: INTERNAL MEDICINE

## 2022-06-23 RX ORDER — METOPROLOL SUCCINATE 25 MG/1
25 TABLET, EXTENDED RELEASE ORAL 2 TIMES DAILY
Qty: 90 TABLET | Refills: 3 | Status: SHIPPED | OUTPATIENT
Start: 2022-06-23 | End: 2022-12-27 | Stop reason: SDUPTHER

## 2022-06-23 NOTE — PROGRESS NOTES
Chief Complaint  Atrial Fibrillation (Wakes him up out of his sleep.  Heart racing), Hypertension, and Hyperlipidemia    Subjective    Patient with recent episodes usually waking up at sleep of tachycardia rate in the 130s to 1 teens associate also with hypertension with systolics in the 190s recent was seen in the emergency room work-up there was unremarkable the patient was in normal sinus rhythm at that time.  He denies any anginal chest discomfort symptoms    Past Medical History:   Diagnosis Date   • Abnormal ECG    • Anxiety    • Arrhythmia    • Arthritis    • Atrial fibrillation (HCC)    • Bladder disorder    • BPH (benign prostatic hyperplasia)    • CAD (coronary artery disease)    • Cervical spondylosis without myelopathy 01/15/2020   • Cervicalgia    • Chest pain    • Colitis    • Condition not found HERNIA   • Depression    • Diverticulitis    • Diverticulosis of colon    • GERD (gastroesophageal reflux disease)    • H/O degenerative disc disease    • Heart attack (HCC)    • Heart disease    • Hemorrhoids    • High cholesterol    • Hypertension    • IBS (irritable bowel syndrome)    • Mood disorder (HCC)    • Muscle cramps    • Myocardial infarction (HCC)    • Osteoarthritis    • Prostate disorder    • S/P lumpectomy, right breast     CYST 2016         Current Outpatient Medications:   •  apixaban (ELIQUIS) 5 MG tablet tablet, Take 1 tablet by mouth Every 12 (Twelve) Hours., Disp: 60 tablet, Rfl: 11  •  atorvastatin (LIPITOR) 80 MG tablet, Take 1 tablet by mouth at bedtime daily, Disp: 90 tablet, Rfl: 3  •  clonazePAM (KlonoPIN) 0.5 MG tablet, Take 1 tablet by mouth every 6 to 8 hours as needed for Anxiety., Disp: , Rfl:   •  clopidogrel (PLAVIX) 75 MG tablet, Take 1 tablet by mouth every day, Disp: 90 tablet, Rfl: 2  •  dicyclomine (BENTYL) 20 MG tablet, Take 1 tablet by mouth Every 6 (Six) Hours., Disp: 20 tablet, Rfl: 0  •  lisinopril (PRINIVIL,ZESTRIL) 5 MG tablet, Take 5 mg by mouth Daily., Disp: ,  Rfl:   •  mirtazapine (REMERON) 30 MG tablet, Take 30 mg by mouth., Disp: , Rfl:   •  multivitamin with minerals tablet tablet, Take 1 tablet by mouth Daily., Disp: , Rfl:   •  nitroglycerin (NITROSTAT) 0.4 MG SL tablet, Place 1 tablet under the tongue Every 5 (Five) Minutes As Needed for Chest Pain (no more than 3 doses in 15 minutes)., Disp: 25 tablet, Rfl: 2  •  pantoprazole (PROTONIX) 40 MG EC tablet, Take 1 tablet by mouth Daily., Disp: 30 tablet, Rfl: 3  •  sucralfate (CARAFATE) 1 g tablet, Take 1 tablet by mouth 4 (Four) Times a Day., Disp: 120 tablet, Rfl: 0  •  tadalafil (CIALIS) 5 MG tablet, Take 1 tablet by mouth Daily for 360 days., Disp: 90 tablet, Rfl: 3  •  tamsulosin (FLOMAX) 0.4 MG capsule 24 hr capsule, TAKE ONE CAPSULE BY MOUTH EVERY DAY ONE-HALF HOUR FOLLOWING THE SAME MEAL EACH DAY, Disp: 30 capsule, Rfl: 12  •  traMADol (ULTRAM) 50 MG tablet, Take 50 mg by mouth Every 6 (Six) Hours As Needed for Moderate Pain ., Disp: , Rfl:   •  triamcinolone (KENALOG) 0.5 % cream, triamcinolone acetonide 0.5 % topical cream, Disp: , Rfl:   •  metoprolol succinate XL (TOPROL-XL) 25 MG 24 hr tablet, Take 1 tablet by mouth 2 (Two) Times a Day., Disp: 90 tablet, Rfl: 3    Medications Discontinued During This Encounter   Medication Reason   • desvenlafaxine (PRISTIQ) 100 MG 24 hr tablet *Therapy completed   • metoprolol tartrate (LOPRESSOR) 25 MG tablet Duplicate order   • diphenoxylate-atropine (LOMOTIL) 2.5-0.025 MG per tablet *Therapy completed   • promethazine (PHENERGAN) 12.5 MG tablet *Therapy completed   • metoprolol succinate XL (TOPROL-XL) 25 MG 24 hr tablet      No Known Allergies     Social History     Tobacco Use   • Smoking status: Passive Smoke Exposure - Never Smoker   • Smokeless tobacco: Never Used   Vaping Use   • Vaping Use: Never used   Substance Use Topics   • Alcohol use: Not Currently   • Drug use: Never       Family History   Problem Relation Age of Onset   • Other Mother         bladder  "stones   • Arthritis Mother    • Heart disease Mother    • Heart failure Father    • Stroke Father    • Colon cancer Paternal Grandfather         60's        Objective     /61   Pulse 58   Ht 185.4 cm (73\")   Wt 91.6 kg (202 lb)   BMI 26.65 kg/m²       Physical Exam    General Appearance:   · no acute distress  · Alert and oriented x3  HENT:   · lips not cyanotic  · Atraumatic  Neck:  · No jvd   · supple  Respiratory:  · no respiratory distress  · normal breath sounds  · no rales  Cardiovascular:  · Regular rate and rhythm  · no S3, no S4   · no murmur  · no rub  Extremities  · No cyanosis  · lower extremity edema: none    Skin:   · warm, dry  · No rashes      Result Review :     proBNP   Date Value Ref Range Status   01/30/2022 52.8 0.0 - 900.0 pg/mL Final     CMP    CMP 5/27/22 6/5/22 6/19/22   Glucose 111 (A) 110 (A) 110 (A)   BUN 13 15 15   Creatinine 0.98 0.99 0.97   Sodium 139 139 134 (A)   Potassium 4.1 3.8 4.0   Chloride 103 104 101   Calcium 9.4 9.5 9.6   Albumin 4.30 4.40 4.40   Total Bilirubin 0.6 0.5 0.6   Alkaline Phosphatase 122 (A) 126 (A) 106   AST (SGOT) 17 17 17   ALT (SGPT) 16 21 17   (A) Abnormal value            CBC w/diff    CBC w/Diff 5/27/22 6/5/22 6/19/22   WBC 4.80 4.85 6.20   RBC 3.53 (A) 3.49 (A) 3.66 (A)   Hemoglobin 10.9 (A) 10.8 (A) 11.3 (A)   Hematocrit 33.8 (A) 32.9 (A) 34.3 (A)   MCV 95.8 94.3 93.7   MCH 30.9 30.9 30.9   MCHC 32.2 32.8 32.9   RDW 14.1 14.5 14.4   Platelets 267 235 222   Neutrophil Rel % 73.0 69.3 76.6 (A)   Immature Granulocyte Rel % 0.6 (A) 0.2 0.3   Lymphocyte Rel % 17.5 (A) 20.4 14.0 (A)   Monocyte Rel % 7.9 8.7 8.1   Eosinophil Rel % 1.0 1.4 1.0   Basophil Rel % 0.0 0.0 0.0   (A) Abnormal value             Lab Results   Component Value Date    TSH 3.900 06/19/2022      Lab Results   Component Value Date    FREET4 0.97 06/19/2022      No results found for: DDIMERQUANT  Magnesium   Date Value Ref Range Status   06/19/2022 2.0 1.6 - 2.4 mg/dL Final    "   No results found for: DIGOXIN   Lab Results   Component Value Date    TROPONINT <0.010 06/19/2022             Lab Results   Component Value Date    POCTROP 0.00 01/30/2022       Results for orders placed in visit on 12/29/21    Adult Transthoracic Echo Complete W/ Cont if Necessary Per Protocol    Interpretation Summary  · Left ventricular ejection fraction appears to be 51 - 55%. Left ventricular systolic function is low normal.  · Left ventricular diastolic function was normal.  · Significant valvular disease.                 Diagnoses and all orders for this visit:    1. PAF (paroxysmal atrial fibrillation) ([facility]) (Primary)  Assessment & Plan:  Patient with recurrent tachycardic episodes sound suspicious for atrial fibrillation increased Toprol to 25 twice daily we will see how he does with this symptomatically consider also the possibility of an antiarrhythmic therapy in the future if recurrent spells.  We will get 24-hour Holter monitor as well    Orders:  -     Holter Monitor - 24 Hour; Future    2. CAD S/P percutaneous coronary angioplasty  Assessment & Plan:  No angina continue with Plavix 75 daily      3. Essential hypertension  Assessment & Plan:  Blood pressure improved after increasing of Toprol recommended just continue monitoring with a home log      4. High cholesterol    Other orders  -     metoprolol succinate XL (TOPROL-XL) 25 MG 24 hr tablet; Take 1 tablet by mouth 2 (Two) Times a Day.  Dispense: 90 tablet; Refill: 3          Follow Up     Return in about 6 months (around 12/23/2022) for Follow with Sigrid Iqbal, EKG with F/U.          Patient was given instructions and counseling regarding his condition or for health maintenance advice. Please see specific information pulled into the AVS if appropriate.

## 2022-06-23 NOTE — ASSESSMENT & PLAN NOTE
Patient with recurrent tachycardic episodes sound suspicious for atrial fibrillation increased Toprol to 25 twice daily we will see how he does with this symptomatically consider also the possibility of an antiarrhythmic therapy in the future if recurrent spells.  We will get 24-hour Holter monitor as well

## 2022-06-23 NOTE — ASSESSMENT & PLAN NOTE
Blood pressure improved after increasing of Toprol recommended just continue monitoring with a home log

## 2022-06-26 ENCOUNTER — APPOINTMENT (OUTPATIENT)
Dept: GENERAL RADIOLOGY | Facility: HOSPITAL | Age: 72
End: 2022-06-26

## 2022-06-26 ENCOUNTER — HOSPITAL ENCOUNTER (EMERGENCY)
Facility: HOSPITAL | Age: 72
Discharge: HOME | End: 2022-06-27
Attending: EMERGENCY MEDICINE | Admitting: EMERGENCY MEDICINE

## 2022-06-26 DIAGNOSIS — R07.9 CHEST PAIN, UNSPECIFIED TYPE: Primary | ICD-10-CM

## 2022-06-26 DIAGNOSIS — K29.00 ACUTE GASTRITIS WITHOUT HEMORRHAGE, UNSPECIFIED GASTRITIS TYPE: ICD-10-CM

## 2022-06-26 LAB
ALBUMIN SERPL-MCNC: 4.6 G/DL (ref 3.5–5.2)
ALBUMIN/GLOB SERPL: 1.9 G/DL
ALP SERPL-CCNC: 130 U/L (ref 39–117)
ALT SERPL W P-5'-P-CCNC: 21 U/L (ref 1–41)
ANION GAP SERPL CALCULATED.3IONS-SCNC: 10.4 MMOL/L (ref 5–15)
AST SERPL-CCNC: 23 U/L (ref 1–40)
BASOPHILS # BLD AUTO: 0.01 10*3/MM3 (ref 0–0.2)
BASOPHILS NFR BLD AUTO: 0.2 % (ref 0–1.5)
BILIRUB SERPL-MCNC: 0.4 MG/DL (ref 0–1.2)
BUN SERPL-MCNC: 26 MG/DL (ref 8–23)
BUN/CREAT SERPL: 22.6 (ref 7–25)
CALCIUM SPEC-SCNC: 9.9 MG/DL (ref 8.6–10.5)
CHLORIDE SERPL-SCNC: 103 MMOL/L (ref 98–107)
CK MB SERPL-CCNC: 6.21 NG/ML
CK SERPL-CCNC: 253 U/L (ref 20–200)
CO2 SERPL-SCNC: 25.6 MMOL/L (ref 22–29)
CREAT SERPL-MCNC: 1.15 MG/DL (ref 0.76–1.27)
DEPRECATED RDW RBC AUTO: 49.1 FL (ref 37–54)
EGFRCR SERPLBLD CKD-EPI 2021: 67.6 ML/MIN/1.73
EOSINOPHIL # BLD AUTO: 0.11 10*3/MM3 (ref 0–0.4)
EOSINOPHIL NFR BLD AUTO: 2 % (ref 0.3–6.2)
ERYTHROCYTE [DISTWIDTH] IN BLOOD BY AUTOMATED COUNT: 14.4 % (ref 12.3–15.4)
GLOBULIN UR ELPH-MCNC: 2.4 GM/DL
GLUCOSE SERPL-MCNC: 103 MG/DL (ref 65–99)
HCT VFR BLD AUTO: 34.7 % (ref 37.5–51)
HGB BLD-MCNC: 11.5 G/DL (ref 13–17.7)
HOLD SPECIMEN: NORMAL
IMM GRANULOCYTES # BLD AUTO: 0.02 10*3/MM3 (ref 0–0.05)
IMM GRANULOCYTES NFR BLD AUTO: 0.4 % (ref 0–0.5)
LIPASE SERPL-CCNC: 27 U/L (ref 13–60)
LYMPHOCYTES # BLD AUTO: 1.32 10*3/MM3 (ref 0.7–3.1)
LYMPHOCYTES NFR BLD AUTO: 23.9 % (ref 19.6–45.3)
MAGNESIUM SERPL-MCNC: 2.1 MG/DL (ref 1.6–2.4)
MCH RBC QN AUTO: 30.8 PG (ref 26.6–33)
MCHC RBC AUTO-ENTMCNC: 33.1 G/DL (ref 31.5–35.7)
MCV RBC AUTO: 93 FL (ref 79–97)
MONOCYTES # BLD AUTO: 0.6 10*3/MM3 (ref 0.1–0.9)
MONOCYTES NFR BLD AUTO: 10.8 % (ref 5–12)
NEUTROPHILS NFR BLD AUTO: 3.47 10*3/MM3 (ref 1.7–7)
NEUTROPHILS NFR BLD AUTO: 62.7 % (ref 42.7–76)
NRBC BLD AUTO-RTO: 0 /100 WBC (ref 0–0.2)
NT-PROBNP SERPL-MCNC: 36.5 PG/ML (ref 0–900)
PLATELET # BLD AUTO: 241 10*3/MM3 (ref 140–450)
PMV BLD AUTO: 9.6 FL (ref 6–12)
POTASSIUM SERPL-SCNC: 4 MMOL/L (ref 3.5–5.2)
PROT SERPL-MCNC: 7 G/DL (ref 6–8.5)
RBC # BLD AUTO: 3.73 10*6/MM3 (ref 4.14–5.8)
SODIUM SERPL-SCNC: 139 MMOL/L (ref 136–145)
TROPONIN I SERPL-MCNC: 0 NG/ML (ref 0–0.6)
WBC NRBC COR # BLD: 5.53 10*3/MM3 (ref 3.4–10.8)
WHOLE BLOOD HOLD COAG: NORMAL
WHOLE BLOOD HOLD SPECIMEN: NORMAL

## 2022-06-26 PROCEDURE — 80053 COMPREHEN METABOLIC PANEL: CPT

## 2022-06-26 PROCEDURE — 85025 COMPLETE CBC W/AUTO DIFF WBC: CPT

## 2022-06-26 PROCEDURE — 83735 ASSAY OF MAGNESIUM: CPT

## 2022-06-26 PROCEDURE — 36415 COLL VENOUS BLD VENIPUNCTURE: CPT

## 2022-06-26 PROCEDURE — 93005 ELECTROCARDIOGRAM TRACING: CPT | Performed by: EMERGENCY MEDICINE

## 2022-06-26 PROCEDURE — 83690 ASSAY OF LIPASE: CPT

## 2022-06-26 PROCEDURE — 71045 X-RAY EXAM CHEST 1 VIEW: CPT

## 2022-06-26 PROCEDURE — 83880 ASSAY OF NATRIURETIC PEPTIDE: CPT

## 2022-06-26 PROCEDURE — 93005 ELECTROCARDIOGRAM TRACING: CPT

## 2022-06-26 PROCEDURE — 82550 ASSAY OF CK (CPK): CPT

## 2022-06-26 PROCEDURE — 84484 ASSAY OF TROPONIN QUANT: CPT

## 2022-06-26 PROCEDURE — 82553 CREATINE MB FRACTION: CPT

## 2022-06-26 PROCEDURE — 99284 EMERGENCY DEPT VISIT MOD MDM: CPT

## 2022-06-26 RX ORDER — ASPIRIN 81 MG/1
324 TABLET, CHEWABLE ORAL ONCE
Status: COMPLETED | OUTPATIENT
Start: 2022-06-26 | End: 2022-06-26

## 2022-06-26 RX ORDER — SODIUM CHLORIDE 0.9 % (FLUSH) 0.9 %
10 SYRINGE (ML) INJECTION AS NEEDED
Status: DISCONTINUED | OUTPATIENT
Start: 2022-06-26 | End: 2022-06-27 | Stop reason: HOSPADM

## 2022-06-26 RX ADMIN — ASPIRIN 81 MG CHEWABLE TABLET 324 MG: 81 TABLET CHEWABLE at 23:34

## 2022-06-27 VITALS
BODY MASS INDEX: 26.56 KG/M2 | HEART RATE: 50 BPM | HEIGHT: 73 IN | SYSTOLIC BLOOD PRESSURE: 104 MMHG | RESPIRATION RATE: 16 BRPM | TEMPERATURE: 98.1 F | DIASTOLIC BLOOD PRESSURE: 63 MMHG | WEIGHT: 200.4 LBS | OXYGEN SATURATION: 98 %

## 2022-06-27 LAB
HOLD SPECIMEN: NORMAL
QT INTERVAL: 435 MS
QT INTERVAL: 458 MS
TROPONIN I SERPL-MCNC: 0 NG/ML (ref 0–0.6)

## 2022-06-27 PROCEDURE — 93005 ELECTROCARDIOGRAM TRACING: CPT | Performed by: EMERGENCY MEDICINE

## 2022-06-27 PROCEDURE — 84484 ASSAY OF TROPONIN QUANT: CPT

## 2022-06-27 RX ORDER — LIDOCAINE HYDROCHLORIDE 20 MG/ML
15 SOLUTION OROPHARYNGEAL ONCE
Status: COMPLETED | OUTPATIENT
Start: 2022-06-27 | End: 2022-06-27

## 2022-06-27 RX ORDER — ALUMINA, MAGNESIA, AND SIMETHICONE 2400; 2400; 240 MG/30ML; MG/30ML; MG/30ML
15 SUSPENSION ORAL ONCE
Status: COMPLETED | OUTPATIENT
Start: 2022-06-27 | End: 2022-06-27

## 2022-06-27 RX ADMIN — LIDOCAINE HYDROCHLORIDE 15 ML: 20 SOLUTION ORAL; TOPICAL at 02:23

## 2022-06-27 RX ADMIN — ALUMINUM HYDROXIDE, MAGNESIUM HYDROXIDE, AND DIMETHICONE 15 ML: 400; 400; 40 SUSPENSION ORAL at 02:23

## 2022-07-06 ENCOUNTER — TELEPHONE (OUTPATIENT)
Dept: GASTROENTEROLOGY | Facility: CLINIC | Age: 72
End: 2022-07-06

## 2022-07-06 NOTE — TELEPHONE ENCOUNTER
Blood Thinner/Cardiac Clearance                                                                                                                 [facility] [place] [facility]    7/6/2022    Dear,     Patient: Ari Olea   YOB: 1950        This patient is waiting to have a Colonoscopy and/or Esophagogastroduodenoscopy which [facility] at [facility] 07[facility]20[facility]22 .     Our records indicate this patient is currently taking Plavix and Eliquis. This procedure requires the patient to suspend their anticoagulant medication prior to surgery.     Please respond to this request noting your recommendations. You may contact our office at [phone] Option 1 with any questions. I appreciate your prompt response in this matter.     Please return this form to our office as soon as possible to Fax [phone]    ____ I approve my patient to stop taking their plavix 7 days and Eliquis 2 days prior to the scheduled procedure.    ____ I do NOT approve my patient to stop taking their Anticoagulant Therapy medication at this time.    ____ I approve my patient from a cardiac standpoint.    ____ I do NOT approve my patient from a cardiac standpoint at this time.    Approving physician name (please print):     _____________________________________________    Approving physician signature:     ________________________________    Date:________________      Sincerely,  [facility]   Gastroenterology -

## 2022-07-11 NOTE — TELEPHONE ENCOUNTER
Procedure: Colonoscopy and/or EGD    Med Directive: Plavix, Aspirin    PMH: afib, CAD with stent prior to 4/25/19, hyperlipidemia, HTN    Last Seen: 6/23/22

## 2022-07-13 ENCOUNTER — TELEPHONE (OUTPATIENT)
Dept: CARDIOLOGY | Facility: CLINIC | Age: 72
End: 2022-07-13

## 2022-07-14 ENCOUNTER — TELEPHONE (OUTPATIENT)
Dept: GASTROENTEROLOGY | Facility: CLINIC | Age: 72
End: 2022-07-14

## 2022-07-14 NOTE — TELEPHONE ENCOUNTER
Spoke to Mr. Olea about him stopping his plavix and eliquis prior to his procedure. He stated his understanding. Nano

## 2022-07-16 LAB
ALBUMIN SERPL-MCNC: 4.7 G/DL (ref 3.5–5.2)
ALBUMIN/GLOB SERPL: 1.9 G/DL
ALP SERPL-CCNC: 114 U/L (ref 39–117)
ALT SERPL W P-5'-P-CCNC: 20 U/L (ref 1–41)
ANION GAP SERPL CALCULATED.3IONS-SCNC: 7.9 MMOL/L (ref 5–15)
AST SERPL-CCNC: 23 U/L (ref 1–40)
BASOPHILS # BLD AUTO: 0 10*3/MM3 (ref 0–0.2)
BASOPHILS NFR BLD AUTO: 0 % (ref 0–1.5)
BILIRUB SERPL-MCNC: 0.5 MG/DL (ref 0–1.2)
BUN SERPL-MCNC: 20 MG/DL (ref 8–23)
BUN/CREAT SERPL: 18.3 (ref 7–25)
CALCIUM SPEC-SCNC: 9.7 MG/DL (ref 8.6–10.5)
CHLORIDE SERPL-SCNC: 104 MMOL/L (ref 98–107)
CO2 SERPL-SCNC: 27.1 MMOL/L (ref 22–29)
CREAT SERPL-MCNC: 1.09 MG/DL (ref 0.76–1.27)
D-LACTATE SERPL-SCNC: 0.9 MMOL/L (ref 0.5–2)
DEPRECATED RDW RBC AUTO: 48.2 FL (ref 37–54)
EGFRCR SERPLBLD CKD-EPI 2021: 72.1 ML/MIN/1.73
EOSINOPHIL # BLD AUTO: 0.08 10*3/MM3 (ref 0–0.4)
EOSINOPHIL NFR BLD AUTO: 1.6 % (ref 0.3–6.2)
ERYTHROCYTE [DISTWIDTH] IN BLOOD BY AUTOMATED COUNT: 14 % (ref 12.3–15.4)
GLOBULIN UR ELPH-MCNC: 2.5 GM/DL
GLUCOSE SERPL-MCNC: 104 MG/DL (ref 65–99)
HCT VFR BLD AUTO: 34 % (ref 37.5–51)
HGB BLD-MCNC: 11.3 G/DL (ref 13–17.7)
HOLD SPECIMEN: NORMAL
HOLD SPECIMEN: NORMAL
IMM GRANULOCYTES # BLD AUTO: 0.01 10*3/MM3 (ref 0–0.05)
IMM GRANULOCYTES NFR BLD AUTO: 0.2 % (ref 0–0.5)
LIPASE SERPL-CCNC: 23 U/L (ref 13–60)
LYMPHOCYTES # BLD AUTO: 1.29 10*3/MM3 (ref 0.7–3.1)
LYMPHOCYTES NFR BLD AUTO: 26.5 % (ref 19.6–45.3)
MCH RBC QN AUTO: 30.8 PG (ref 26.6–33)
MCHC RBC AUTO-ENTMCNC: 33.2 G/DL (ref 31.5–35.7)
MCV RBC AUTO: 92.6 FL (ref 79–97)
MONOCYTES # BLD AUTO: 0.52 10*3/MM3 (ref 0.1–0.9)
MONOCYTES NFR BLD AUTO: 10.7 % (ref 5–12)
NEUTROPHILS NFR BLD AUTO: 2.96 10*3/MM3 (ref 1.7–7)
NEUTROPHILS NFR BLD AUTO: 61 % (ref 42.7–76)
NRBC BLD AUTO-RTO: 0 /100 WBC (ref 0–0.2)
PLATELET # BLD AUTO: 219 10*3/MM3 (ref 140–450)
PMV BLD AUTO: 9.4 FL (ref 6–12)
POTASSIUM SERPL-SCNC: 3.9 MMOL/L (ref 3.5–5.2)
PROT SERPL-MCNC: 7.2 G/DL (ref 6–8.5)
RBC # BLD AUTO: 3.67 10*6/MM3 (ref 4.14–5.8)
SODIUM SERPL-SCNC: 139 MMOL/L (ref 136–145)
WBC NRBC COR # BLD: 4.86 10*3/MM3 (ref 3.4–10.8)
WHOLE BLOOD HOLD COAG: NORMAL
WHOLE BLOOD HOLD SPECIMEN: NORMAL

## 2022-07-16 PROCEDURE — 83690 ASSAY OF LIPASE: CPT

## 2022-07-16 PROCEDURE — 85025 COMPLETE CBC W/AUTO DIFF WBC: CPT

## 2022-07-16 PROCEDURE — 99283 EMERGENCY DEPT VISIT LOW MDM: CPT

## 2022-07-16 PROCEDURE — 36415 COLL VENOUS BLD VENIPUNCTURE: CPT

## 2022-07-16 PROCEDURE — 80053 COMPREHEN METABOLIC PANEL: CPT

## 2022-07-16 PROCEDURE — 93005 ELECTROCARDIOGRAM TRACING: CPT | Performed by: EMERGENCY MEDICINE

## 2022-07-16 PROCEDURE — 93005 ELECTROCARDIOGRAM TRACING: CPT

## 2022-07-16 PROCEDURE — 93010 ELECTROCARDIOGRAM REPORT: CPT | Performed by: INTERNAL MEDICINE

## 2022-07-16 PROCEDURE — 83605 ASSAY OF LACTIC ACID: CPT

## 2022-07-16 PROCEDURE — 81003 URINALYSIS AUTO W/O SCOPE: CPT

## 2022-07-16 RX ORDER — SODIUM CHLORIDE 0.9 % (FLUSH) 0.9 %
10 SYRINGE (ML) INJECTION AS NEEDED
Status: DISCONTINUED | OUTPATIENT
Start: 2022-07-16 | End: 2022-07-17 | Stop reason: HOSPADM

## 2022-07-17 ENCOUNTER — APPOINTMENT (OUTPATIENT)
Dept: CT IMAGING | Facility: HOSPITAL | Age: 72
End: 2022-07-17

## 2022-07-17 ENCOUNTER — HOSPITAL ENCOUNTER (EMERGENCY)
Facility: HOSPITAL | Age: 72
Discharge: HOME | End: 2022-07-17
Attending: EMERGENCY MEDICINE | Admitting: EMERGENCY MEDICINE

## 2022-07-17 VITALS
WEIGHT: 199.74 LBS | BODY MASS INDEX: 26.47 KG/M2 | RESPIRATION RATE: 16 BRPM | HEIGHT: 73 IN | OXYGEN SATURATION: 100 % | DIASTOLIC BLOOD PRESSURE: 85 MMHG | SYSTOLIC BLOOD PRESSURE: 146 MMHG | TEMPERATURE: 98.1 F | HEART RATE: 56 BPM

## 2022-07-17 DIAGNOSIS — R10.9 ABDOMINAL PAIN, UNSPECIFIED ABDOMINAL LOCATION: Primary | ICD-10-CM

## 2022-07-17 LAB
BILIRUB UR QL STRIP: NEGATIVE
CLARITY UR: CLEAR
COLOR UR: YELLOW
GLUCOSE UR STRIP-MCNC: NEGATIVE MG/DL
HGB UR QL STRIP.AUTO: NEGATIVE
KETONES UR QL STRIP: NEGATIVE
LEUKOCYTE ESTERASE UR QL STRIP.AUTO: NEGATIVE
NITRITE UR QL STRIP: NEGATIVE
PH UR STRIP.AUTO: 8.5 [PH] (ref 5–8)
PROT UR QL STRIP: NEGATIVE
SP GR UR STRIP: 1.01 (ref 1–1.03)
UROBILINOGEN UR QL STRIP: ABNORMAL

## 2022-07-17 PROCEDURE — 74177 CT ABD & PELVIS W/CONTRAST: CPT

## 2022-07-17 PROCEDURE — 0 IOPAMIDOL PER 1 ML: Performed by: EMERGENCY MEDICINE

## 2022-07-17 RX ADMIN — IOPAMIDOL 100 ML: 755 INJECTION, SOLUTION INTRAVENOUS at 04:28

## 2022-07-17 NOTE — DISCHARGE INSTRUCTIONS
Drink plenty fluids.  Take medication as directed.  Follow-up with Dr. Haas and your doctor tomorrow.

## 2022-07-17 NOTE — ED PROVIDER NOTES
Time: 6:11 AM EDT  Arrived by: private car  Chief Complaint: Abdominal pain  History provided by: Patient  History is limited by: Nothing    History of Present Illness:  Patient is a 71 y.o. year old male who presents to the emergency department with abdominal pain.  The patient states that he has been having intermittent bilateral lower abdominal pain which is crampy and nonradiating.  The patient states that this pain has been going on for the last several days.  He has had no fever chills or cough.  The patient has had multiple episodes of abdominal discomfort in the past he currently states the pain is very mild.  He has had no nausea vomiting or diarrhea has had no fever chills or cough.  The patient does have intermittent epigastric discomfort and he states that he is scheduled to have an EGD performed by Dr Haas next week.  Currently patient is resting comfortably and has no significant pain.    HPI    Similar Symptoms Previously: Yes  Recently seen: Yes      Patient Care Team  Primary Care Provider: Edith Marcelo APRN    Past Medical History:     No Known Allergies  Past Medical History:   Diagnosis Date   • A-fib ([facility])    • Abnormal ECG    • Anxiety    • Arrhythmia    • Arthritis    • Atrial fibrillation ([facility])    • Bladder disorder    • BPH (benign prostatic hyperplasia)    • CAD (coronary artery disease)    • Cervical spondylosis without myelopathy 01/15/2020   • Cervicalgia    • Chest pain    • Colitis    • Condition not found HERNIA   • Depression    • Diverticulitis    • Diverticulosis of colon    • GERD (gastroesophageal reflux disease)    • H/O degenerative disc disease    • Heart attack ([facility])    • Heart disease    • Hemorrhoids    • High cholesterol    • Hypertension    • IBS (irritable bowel syndrome)    • Mood disorder ([facility])    • Muscle cramps    • Myocardial infarction ([facility])    • Osteoarthritis    • Prostate disorder    • S/P lumpectomy, right breast     CYST 2016     Past Surgical  History:   Procedure Laterality Date   • APPENDECTOMY     • BREAST MASS EXCISION  2016   • CARDIAC CATHETERIZATION  2016   • CHOLECYSTECTOMY     • COLONOSCOPY  2008,2014,2021   • CORONARY ANGIOPLASTY WITH STENT PLACEMENT  2010   • ENDOSCOPY  2010,2019   • HEMORRHOIDECTOMY  2019   • INGUINAL HERNIA REPAIR  2019   • TURP / TRANSURETHRAL INCISION / DRAINAGE PROSTATE  01/16/2020    BUTTON TURP W/ DR TAM   • UMBILICAL HERNIA REPAIR  2019   • UPPER GASTROINTESTINAL ENDOSCOPY       Family History   Problem Relation Age of Onset   • Other Mother         bladder stones   • Arthritis Mother    • Heart disease Mother    • Heart failure Father    • Stroke Father    • Colon cancer Paternal Grandfather         60's       Home Medications:  Prior to Admission medications    Medication Sig Start Date End Date Taking? Authorizing Provider   apixaban (ELIQUIS) 5 MG tablet tablet Take 1 tablet by mouth Every 12 (Twelve) Hours. 12/10/21   Mayank Sheehan MD   atorvastatin (LIPITOR) 80 MG tablet Take 1 tablet by mouth at bedtime daily 3/24/22   Mayank Sheehan MD   clonazePAM (KlonoPIN) 0.5 MG tablet Take 1 tablet by mouth every 6 to 8 hours as needed for Anxiety. 4/1/22   Elian Merrill MD   clopidogrel (PLAVIX) 75 MG tablet Take 1 tablet by mouth every day 11/11/21   Mayank Sheehan MD   dicyclomine (BENTYL) 20 MG tablet Take 1 tablet by mouth Every 6 (Six) Hours. 5/22/22   Chasidy Pope APRN   lisinopril (PRINIVIL,ZESTRIL) 5 MG tablet Take 5 mg by mouth Daily. 6/18/21   Elian Merirll MD   metoprolol succinate XL (TOPROL-XL) 25 MG 24 hr tablet Take 1 tablet by mouth 2 (Two) Times a Day. 6/23/22   Mayank Sheehan MD   mirtazapine (REMERON) 30 MG tablet Take 30 mg by mouth.    Elian Merrill MD   multivitamin with minerals tablet tablet Take 1 tablet by mouth Daily.    Elian Merrill MD   nitroglycerin (NITROSTAT) 0.4 MG SL tablet Place 1 tablet under the tongue Every 5 (Five) Minutes As Needed for Chest Pain (no  more than 3 doses in 15 minutes). 12/10/21   Mayank Sheehan MD   pantoprazole (PROTONIX) 40 MG EC tablet Take 1 tablet by mouth Daily. 6/10/22 6/11/23  Olena Gustafson APRN   sucralfate (CARAFATE) 1 g tablet Take 1 tablet by mouth 4 (Four) Times a Day. 6/10/22   Olena Gustafson APRN   tadalafil (CIALIS) 5 MG tablet Take 1 tablet by mouth Daily for 360 days. 10/11/21 10/6/22  Mallorie Matt MD   tamsulosin (FLOMAX) 0.4 MG capsule 24 hr capsule TAKE ONE CAPSULE BY MOUTH EVERY DAY ONE-HALF HOUR FOLLOWING THE SAME MEAL EACH DAY 12/29/21   Mallorie Matt MD   traMADol (ULTRAM) 50 MG tablet Take 50 mg by mouth Every 6 (Six) Hours As Needed for Moderate Pain .    Emergency, Nurse Epic, RN   triamcinolone (KENALOG) 0.5 % cream triamcinolone acetonide 0.5 % topical cream    Provider, MD Elian        Social History:   Social History     Tobacco Use   • Smoking status: Passive Smoke Exposure - Never Smoker   • Smokeless tobacco: Never Used   Vaping Use   • Vaping Use: Never used   Substance Use Topics   • Alcohol use: Not Currently   • Drug use: Never     Recent travel: no     Review of Systems:  Review of Systems   Constitutional: Negative for activity change, chills, diaphoresis, fatigue and fever.   HENT: Negative for congestion, postnasal drip, rhinorrhea, sinus pressure, sore throat and voice change.    Eyes: Negative for photophobia, pain, discharge and visual disturbance.   Respiratory: Negative for cough, chest tightness, shortness of breath and wheezing.    Cardiovascular: Negative for chest pain and palpitations.   Gastrointestinal: Positive for abdominal pain. Negative for diarrhea, nausea and vomiting.   Endocrine: Negative for cold intolerance, polydipsia and polyuria.   Genitourinary: Negative for difficulty urinating, frequency, scrotal swelling, testicular pain and urgency.   Musculoskeletal: Negative for arthralgias, gait problem, myalgias and neck stiffness.   Skin: Negative for color change, rash  "and wound.   Allergic/Immunologic: Negative for environmental allergies and immunocompromised state.   Neurological: Negative for dizziness, speech difficulty, weakness and light-headedness.   Psychiatric/Behavioral: Negative.         Physical Exam:  /85 (Patient Position: Sitting)   Pulse 56   Temp 98.1 °F (36.7 °C) (Oral)   Resp 16   Ht 185.4 cm (73\")   Wt 90.6 kg (199 lb 11.8 oz)   SpO2 100%   BMI 26.35 kg/m²     Physical Exam  Constitutional:       Appearance: He is well-developed.   HENT:      Head: Normocephalic and atraumatic.      Mouth/Throat:      Mouth: Mucous membranes are moist.      Pharynx: Oropharynx is clear.   Eyes:      Extraocular Movements: Extraocular movements intact.      Pupils: Pupils are equal, round, and reactive to light.   Cardiovascular:      Rate and Rhythm: Regular rhythm. Bradycardia present.      Heart sounds: Normal heart sounds.   Pulmonary:      Effort: Pulmonary effort is normal.      Breath sounds: Normal breath sounds.   Abdominal:      General: Abdomen is flat. Bowel sounds are normal.      Palpations: Abdomen is rigid.      Tenderness: There is no abdominal tenderness.      Hernia: No hernia is present.   Skin:     General: Skin is warm and dry.      Capillary Refill: Capillary refill takes less than 2 seconds.   Neurological:      General: No focal deficit present.      Mental Status: He is alert and oriented to person, place, and time.   Psychiatric:         Mood and Affect: Mood normal.         Behavior: Behavior normal.                Medications in the Emergency Department:  Medications   iopamidol (ISOVUE-370) 76 % injection 100 mL (100 mL Intravenous Given 7/17/22 0428)        Labs  Lab Results (last 24 hours)     Procedure Component Value Units Date/Time    CBC & Differential [[id number]]  (Abnormal) Collected: 07/16/22 2332    Specimen: Blood Updated: 07/16/22 2340    Narrative:      The following orders were created for panel order CBC & " Differential.  Procedure                               Abnormality         Status                     ---------                               -----------         ------                     CBC Auto Differential[[id number]]        Abnormal            Final result                 Please view results for these tests on the individual orders.    Comprehensive Metabolic Panel [[id number]]  (Abnormal) Collected: 07/16/22 2332    Specimen: Blood Updated: 07/16/22 2359     Glucose 104 mg/dL      BUN 20 mg/dL      Creatinine 1.09 mg/dL      Sodium 139 mmol/L      Potassium 3.9 mmol/L      Chloride 104 mmol/L      CO2 27.1 mmol/L      Calcium 9.7 mg/dL      Total Protein 7.2 g/dL      Albumin 4.70 g/dL      ALT (SGPT) 20 U/L      AST (SGOT) 23 U/L      Alkaline Phosphatase 114 U/L      Total Bilirubin 0.5 mg/dL      Globulin 2.5 gm/dL      A/G Ratio 1.9 g/dL      BUN/Creatinine Ratio 18.3     Anion Gap 7.9 mmol/L      eGFR 72.1 mL/min/1.73      Comment: National Kidney Foundation and American Society of Nephrology (ASN) Task Force recommended calculation based on the Chronic Kidney Disease Epidemiology Collaboration (CKD-EPI) equation refit without adjustment for race.       Narrative:      GFR Normal >60  Chronic Kidney Disease <60  Kidney Failure <15      Lipase [[id number]]  (Normal) Collected: 07/16/22 2332    Specimen: Blood Updated: 07/16/22 2359     Lipase 23 U/L     Lactic Acid, Plasma [[id number]]  (Normal) Collected: 07/16/22 2332    Specimen: Blood Updated: 07/16/22 2356     Lactate 0.9 mmol/L     CBC Auto Differential [[id number]]  (Abnormal) Collected: 07/16/22 2332    Specimen: Blood Updated: 07/16/22 2340     WBC 4.86 10*3/mm3      RBC 3.67 10*6/mm3      Hemoglobin 11.3 g/dL      Hematocrit 34.0 %      MCV 92.6 fL      MCH 30.8 pg      MCHC 33.2 g/dL      RDW 14.0 %      RDW-SD 48.2 fl      MPV 9.4 fL      Platelets 219 10*3/mm3      Neutrophil % 61.0 %      Lymphocyte % 26.5 %      Monocyte % 10.7 %      Eosinophil  % 1.6 %      Basophil % 0.0 %      Immature Grans % 0.2 %      Neutrophils, Absolute 2.96 10*3/mm3      Lymphocytes, Absolute 1.29 10*3/mm3      Monocytes, Absolute 0.52 10*3/mm3      Eosinophils, Absolute 0.08 10*3/mm3      Basophils, Absolute 0.00 10*3/mm3      Immature Grans, Absolute 0.01 10*3/mm3      nRBC 0.0 /100 WBC     Urinalysis With Microscopic If Indicated (No Culture) - Urine, Clean Catch [[id number]]  (Abnormal) Collected: 07/16/22 7629    Specimen: Urine, Clean Catch Updated: 07/17/22 0011     Color, UA Yellow     Appearance, UA Clear     pH, UA 8.5     Specific Gravity, UA 1.006     Glucose, UA Negative     Ketones, UA Negative     Bilirubin, UA Negative     Blood, UA Negative     Protein, UA Negative     Leuk Esterase, UA Negative     Nitrite, UA Negative     Urobilinogen, UA 0.2 E.U./dL    Narrative:      Urine microscopic not indicated.           Imaging:  CT Abdomen Pelvis With Contrast    Result Date: 7/17/2022  PROCEDURE: CT ABDOMEN PELVIS W CONTRAST  COMPARISON: [facility], CT, CT ABDOMEN PELVIS W CONTRAST, 10/29/2021, 21:01.  [facility], CT, CT ABDOMEN PELVIS W CONTRAST, 4/15/2022, 17:40.  [facility], CT, CT ABDOMEN PELVIS W CONTRAST, 5/22/2022, 1:35.  INDICATIONS: LOW ABDOMEN BURNING PAIN FOR 2 MONTHS  TECHNIQUE: After obtaining the patient's consent, CT images were created with non-ionic intravenous contrast material.   PROTOCOL:   Standard imaging protocol performed    RADIATION:   DLP: 495mGy*cm   Automated exposure control was utilized to minimize radiation dose. CONTRAST: 100cc Isovue 370 I.V.  FINDINGS:  Included lung bases are clear.  The gallbladder is surgically absent.  There are stable left renal pelvic cysts.  Right kidney, adrenal glands, spleen, pancreas, and liver appear within normal limits.  No abnormal small bowel distention is seen.  Appendix is not visualized, but no right lower quadrant inflammation is seen.  There is stool  throughout most of the colon.  No urinary bladder wall thickening is seen.  No significant free fluid or adenopathy is seen.  There are mild atherosclerotic vascular calcifications.  No acute osseous abnormality is identified.  There is mild to moderate degenerative change in the lumbar and lower thoracic spine.        No acute abdominal or pelvic abnormality is identified.     MIKAELA PATHAK MD       Electronically Signed and Approved By: MIKAELA PATHAK MD on 7/17/2022 at 4:52               Procedures:  Procedures    Progress  ED Course as of 07/17/22 1520   Sat Jul 16, 2022   2323 --- PROVIDER IN TRIAGE NOTE ---    The patient was evaluated my Jase bennett in triage. Orders were placed and the patient is currently awaiting disposition. [AJ]      ED Course User Index  [AJ] Jase Negro PA-C                            Medical Decision Making:  [facility]     Final diagnoses:   Abdominal pain, unspecified abdominal location        Disposition:  ED Disposition     ED Disposition   Discharge    Condition   Stable    Comment   --             This medical record created using voice recognition software.           Justino Hoover,   07/17/22 1520

## 2022-07-17 NOTE — ED PROVIDER NOTES
History of Present Illness:  Patient is a 71 y.o. year old male who presents to the emergency department with         HPI        Patient Care Team  Primary Care Provider: Edith Marcelo APRN    Past Medical History:     No Known Allergies  Past Medical History:   Diagnosis Date   • A-fib (HCC)    • Abnormal ECG    • Anxiety    • Arrhythmia    • Arthritis    • Atrial fibrillation (HCC)    • Bladder disorder    • BPH (benign prostatic hyperplasia)    • CAD (coronary artery disease)    • Cervical spondylosis without myelopathy 01/15/2020   • Cervicalgia    • Chest pain    • Colitis    • Condition not found HERNIA   • Depression    • Diverticulitis    • Diverticulosis of colon    • GERD (gastroesophageal reflux disease)    • H/O degenerative disc disease    • Heart attack (HCC)    • Heart disease    • Hemorrhoids    • High cholesterol    • Hypertension    • IBS (irritable bowel syndrome)    • Mood disorder ([facility])    • Muscle cramps    • Myocardial infarction ([facility])    • Osteoarthritis    • Prostate disorder    • S/P lumpectomy, right breast     CYST 2016     Past Surgical History:   Procedure Laterality Date   • APPENDECTOMY     • BREAST MASS EXCISION  2016   • CARDIAC CATHETERIZATION  2016   • CHOLECYSTECTOMY     • COLONOSCOPY  2008,2014,2021   • CORONARY ANGIOPLASTY WITH STENT PLACEMENT  2010   • ENDOSCOPY  2010,2019   • ENDOSCOPY N/A 7/20/2022    Procedure: ESOPHAGOGASTRODUODENOSCOPY WITH BIOPSY;  Surgeon: Nigel Haas MD;  Location: [facility] ENDOSCOPY;  Service: Gastroenterology;  Laterality: N/A;  HIATAL HERNIA   • HEMORRHOIDECTOMY  2019   • INGUINAL HERNIA REPAIR  2019   • TURP / TRANSURETHRAL INCISION / DRAINAGE PROSTATE  01/16/2020    BUTTON TURP W/ DR TAM   • UMBILICAL HERNIA REPAIR  2019   • UPPER GASTROINTESTINAL ENDOSCOPY       Family History   Problem Relation Age of Onset   • Other Mother         bladder stones   • Arthritis Mother    • Heart disease Mother    • Heart failure Father    •  Stroke Father    • Colon cancer Paternal Grandfather         60's   • Malig Hyperthermia Neg Hx        Home Medications:  Prior to Admission medications    Medication Sig Start Date End Date Taking? Authorizing Provider   apixaban (ELIQUIS) 5 MG tablet tablet Take 1 tablet by mouth Every 12 (Twelve) Hours. 12/10/21   Mayank Sheehan MD   atorvastatin (LIPITOR) 80 MG tablet Take 1 tablet by mouth at bedtime daily 3/24/22   Mayank Sheehan MD   clonazePAM (KlonoPIN) 0.5 MG tablet Take 1 tablet by mouth every 6 to 8 hours as needed for Anxiety. 4/1/22   Elian Merrill MD   clopidogrel (PLAVIX) 75 MG tablet Take 1 tablet by mouth every day 11/11/21   Mayank Sheehan MD   dicyclomine (BENTYL) 20 MG tablet Take 1 tablet by mouth Every 6 (Six) Hours. 5/22/22   Chasidy Pope APRN   lisinopril (PRINIVIL,ZESTRIL) 5 MG tablet Take 5 mg by mouth Daily. 6/18/21   Elian Merrill MD   metoprolol succinate XL (TOPROL-XL) 25 MG 24 hr tablet Take 1 tablet by mouth 2 (Two) Times a Day. 6/23/22   Mayank Sheehan MD   mirtazapine (REMERON) 30 MG tablet Take 30 mg by mouth.    Elian Merrill MD   multivitamin with minerals tablet tablet Take 1 tablet by mouth Daily.    Elian Merrill MD   nitroglycerin (NITROSTAT) 0.4 MG SL tablet Place 1 tablet under the tongue Every 5 (Five) Minutes As Needed for Chest Pain (no more than 3 doses in 15 minutes). 12/10/21   Mayank Sheehan MD   pantoprazole (PROTONIX) 40 MG EC tablet Take 1 tablet by mouth Daily. 6/10/22 6/11/23  Olena Gustafson APRN   sucralfate (CARAFATE) 1 g tablet Take 1 tablet by mouth 4 (Four) Times a Day. 6/10/22   Olena Gustafson APRN   tadalafil (CIALIS) 5 MG tablet Take 1 tablet by mouth Daily for 360 days. 10/11/21 10/6/22  Mallorie Matt MD   tamsulosin (FLOMAX) 0.4 MG capsule 24 hr capsule TAKE ONE CAPSULE BY MOUTH EVERY DAY ONE-HALF HOUR FOLLOWING THE SAME MEAL EACH DAY 12/29/21   Mallorie Matt MD   traMADol (ULTRAM) 50 MG tablet Take 50 mg by mouth  "Every 6 (Six) Hours As Needed for Moderate Pain .    Emergency, Nurse Epic, RN   triamcinolone (KENALOG) 0.5 % cream triamcinolone acetonide 0.5 % topical cream    Provider, MD Elian        Social History:   Social History     Tobacco Use   • Smoking status: Passive Smoke Exposure - Never Smoker   • Smokeless tobacco: Never Used   Vaping Use   • Vaping Use: Never used   Substance Use Topics   • Alcohol use: Never   • Drug use: Never     Recent travel: no     Review of Systems:  Review of Systems     Physical Exam:  /85 (Patient Position: Sitting)   Pulse 56   Temp 98.1 °F (36.7 °C) (Oral)   Resp 16   Ht 185.4 cm (73\")   Wt 90.6 kg (199 lb 11.8 oz)   SpO2 100%   BMI 26.35 kg/m²     Physical Exam           Medications in the Emergency Department:  Medications   iopamidol (ISOVUE-370) 76 % injection 100 mL (100 mL Intravenous Given 7/17/22 0428)        Labs  Lab Results (last 24 hours)     Procedure Component Value Units Date/Time    CBC & Differential [[id number]]  (Abnormal) Collected: 07/22/22 2307    Specimen: Blood Updated: 07/22/22 2317    Narrative:      The following orders were created for panel order CBC & Differential.  Procedure                               Abnormality         Status                     ---------                               -----------         ------                     CBC Auto Differential[[id number]]        Abnormal            Final result                 Please view results for these tests on the individual orders.    Comprehensive Metabolic Panel [[id number]]  (Abnormal) Collected: 07/22/22 2307    Specimen: Blood Updated: 07/22/22 2336     Glucose 116 mg/dL      BUN 25 mg/dL      Creatinine 1.20 mg/dL      Sodium 138 mmol/L      Potassium 4.0 mmol/L      Chloride 104 mmol/L      CO2 23.7 mmol/L      Calcium 9.5 mg/dL      Total Protein 6.7 g/dL      Albumin 4.30 g/dL      ALT (SGPT) 23 U/L      AST (SGOT) 22 U/L      Alkaline Phosphatase 144 U/L      Total Bilirubin " 0.4 mg/dL      Globulin 2.4 gm/dL      A/G Ratio 1.8 g/dL      BUN/Creatinine Ratio 20.8     Anion Gap 10.3 mmol/L      eGFR 64.3 mL/min/1.73      Comment: National Kidney Foundation and American Society of Nephrology (ASN) Task Force recommended calculation based on the Chronic Kidney Disease Epidemiology Collaboration (CKD-EPI) equation refit without adjustment for race.       Narrative:      GFR Normal >60  Chronic Kidney Disease <60  Kidney Failure <15      BNP [[id number]]  (Normal) Collected: 07/22/22 2307    Specimen: Blood Updated: 07/22/22 2334     proBNP 66.5 pg/mL     Narrative:      Among patients with dyspnea, NT-proBNP is highly sensitive for the detection of acute congestive heart failure. In addition NT-proBNP of <300 pg/ml effectively rules out acute congestive heart failure with 99% negative predictive value.    Results may be falsely decreased if patient taking Biotin.      CBC Auto Differential [[id number]]  (Abnormal) Collected: 07/22/22 2307    Specimen: Blood Updated: 07/22/22 2317     WBC 4.77 10*3/mm3      RBC 3.60 10*6/mm3      Hemoglobin 11.1 g/dL      Hematocrit 33.7 %      MCV 93.6 fL      MCH 30.8 pg      MCHC 32.9 g/dL      RDW 14.1 %      RDW-SD 48.6 fl      MPV 9.5 fL      Platelets 207 10*3/mm3      Neutrophil % 67.3 %      Lymphocyte % 20.1 %      Monocyte % 9.9 %      Eosinophil % 2.1 %      Basophil % 0.2 %      Immature Grans % 0.4 %      Neutrophils, Absolute 3.21 10*3/mm3      Lymphocytes, Absolute 0.96 10*3/mm3      Monocytes, Absolute 0.47 10*3/mm3      Eosinophils, Absolute 0.10 10*3/mm3      Basophils, Absolute 0.01 10*3/mm3      Immature Grans, Absolute 0.02 10*3/mm3      nRBC 0.0 /100 WBC            Imaging:  No Radiology Exams Resulted Within Past 24 Hours    Procedures:  Procedures    Progress  ED Course as of 07/23/22 0016   Sat Jul 16, 2022   4167 --- PROVIDER IN TRIAGE NOTE ---    The patient was evaluated my me, Jase Negro in triage. Orders were placed and the  patient is currently awaiting disposition. [AJ]      ED Course User Index  [AJ] Jase Negro PA-C                            The patient was initially evaluated in the triage area where orders were placed. The patient was later dispositioned by Jase Negro PA-C.      Medical Decision Making:  [facility]     Final diagnoses:   Abdominal pain, unspecified abdominal location        Disposition:  ED Disposition     ED Disposition   Discharge    Condition   Stable    Comment   --             This medical record created using voice recognition software.           Jase Negro PA-C  07/23/22 0016

## 2022-07-18 NOTE — PRE-PROCEDURE INSTRUCTIONS
Pt. Instructed on NPO after midnight, pre-op meds. May take Tylenol if needed,no other over the counter pain relievers. No vitamins or supplements.

## 2022-07-20 ENCOUNTER — ANESTHESIA EVENT (OUTPATIENT)
Dept: GASTROENTEROLOGY | Facility: HOSPITAL | Age: 72
End: 2022-07-20

## 2022-07-20 ENCOUNTER — ANESTHESIA (OUTPATIENT)
Dept: GASTROENTEROLOGY | Facility: HOSPITAL | Age: 72
End: 2022-07-20

## 2022-07-20 ENCOUNTER — HOSPITAL ENCOUNTER (OUTPATIENT)
Facility: HOSPITAL | Age: 72
Setting detail: HOSPITAL OUTPATIENT SURGERY
Discharge: HOME | End: 2022-07-20
Attending: INTERNAL MEDICINE | Admitting: INTERNAL MEDICINE

## 2022-07-20 VITALS
BODY MASS INDEX: 26 KG/M2 | OXYGEN SATURATION: 97 % | RESPIRATION RATE: 15 BRPM | SYSTOLIC BLOOD PRESSURE: 101 MMHG | HEART RATE: 45 BPM | TEMPERATURE: 97.6 F | DIASTOLIC BLOOD PRESSURE: 68 MMHG | WEIGHT: 197.09 LBS

## 2022-07-20 DIAGNOSIS — K21.9 GASTROESOPHAGEAL REFLUX DISEASE, UNSPECIFIED WHETHER ESOPHAGITIS PRESENT: ICD-10-CM

## 2022-07-20 DIAGNOSIS — Z87.19 HISTORY OF GASTRITIS: ICD-10-CM

## 2022-07-20 DIAGNOSIS — R11.0 NAUSEA: ICD-10-CM

## 2022-07-20 DIAGNOSIS — R10.12 LEFT UPPER QUADRANT ABDOMINAL PAIN: ICD-10-CM

## 2022-07-20 PROCEDURE — 43239 EGD BIOPSY SINGLE/MULTIPLE: CPT | Performed by: INTERNAL MEDICINE

## 2022-07-20 PROCEDURE — 25010000002 PROPOFOL 10 MG/ML EMULSION: Performed by: NURSE ANESTHETIST, CERTIFIED REGISTERED

## 2022-07-20 PROCEDURE — 88305 TISSUE EXAM BY PATHOLOGIST: CPT | Performed by: INTERNAL MEDICINE

## 2022-07-20 RX ORDER — LIDOCAINE HYDROCHLORIDE 20 MG/ML
INJECTION, SOLUTION EPIDURAL; INFILTRATION; INTRACAUDAL; PERINEURAL AS NEEDED
Status: DISCONTINUED | OUTPATIENT
Start: 2022-07-20 | End: 2022-07-20 | Stop reason: SURG

## 2022-07-20 RX ORDER — SODIUM CHLORIDE, SODIUM LACTATE, POTASSIUM CHLORIDE, CALCIUM CHLORIDE 600; 310; 30; 20 MG/100ML; MG/100ML; MG/100ML; MG/100ML
30 INJECTION, SOLUTION INTRAVENOUS CONTINUOUS
Status: DISCONTINUED | OUTPATIENT
Start: 2022-07-20 | End: 2022-07-20 | Stop reason: HOSPADM

## 2022-07-20 RX ORDER — PROPOFOL 10 MG/ML
VIAL (ML) INTRAVENOUS AS NEEDED
Status: DISCONTINUED | OUTPATIENT
Start: 2022-07-20 | End: 2022-07-20 | Stop reason: SURG

## 2022-07-20 RX ADMIN — LIDOCAINE HYDROCHLORIDE 100 MG: 20 INJECTION, SOLUTION EPIDURAL; INFILTRATION; INTRACAUDAL; PERINEURAL at 09:41

## 2022-07-20 RX ADMIN — SODIUM CHLORIDE, POTASSIUM CHLORIDE, SODIUM LACTATE AND CALCIUM CHLORIDE 30 ML/HR: 600; 310; 30; 20 INJECTION, SOLUTION INTRAVENOUS at 08:47

## 2022-07-20 RX ADMIN — PROPOFOL 200 MCG/KG/MIN: 10 INJECTION, EMULSION INTRAVENOUS at 09:41

## 2022-07-20 RX ADMIN — PROPOFOL 50 MG: 10 INJECTION, EMULSION INTRAVENOUS at 09:41

## 2022-07-20 NOTE — ANESTHESIA POSTPROCEDURE EVALUATION
Patient: Ari Olea    Procedure Summary     Date: 07/20/22 Room / Location: [facility] ENDOSCOPY 2 / [facility] ENDOSCOPY    Anesthesia Start: 0939 Anesthesia Stop: 0953    Procedure: ESOPHAGOGASTRODUODENOSCOPY WITH BIOPSY (N/A ) Diagnosis:       Nausea      Gastroesophageal reflux disease, unspecified whether esophagitis present      History of gastritis      Left upper quadrant abdominal pain      (Nausea [R11.0])      (Gastroesophageal reflux disease, unspecified whether esophagitis present [K21.9])      (History of gastritis [Z87.19])      (Left upper quadrant abdominal pain [R10.12])    Surgeons: Nigel Haas MD Provider: Nikolai Alva MD    Anesthesia Type: general ASA Status: 3          Anesthesia Type: general    Vitals  Vitals Value Taken Time   /68 07/20/22 1005   Temp 36.4 °C (97.6 °F) 07/20/22 1005   Pulse 45 07/20/22 1005   Resp 15 07/20/22 1005   SpO2 97 % 07/20/22 1005           Post Anesthesia Care and Evaluation    Patient location during evaluation: bedside  Patient participation: complete - patient participated  Level of consciousness: awake  Pain management: adequate    Airway patency: patent  Anesthetic complications: No anesthetic complications  PONV Status: none  Cardiovascular status: acceptable and stable  Respiratory status: acceptable  Hydration status: acceptable    Comments: An Anesthesiologist personally participated in the most demanding procedures (including induction and emergence if applicable) in the anesthesia plan, monitored the course of anesthesia administration at frequent intervals and remained physically present and available for immediate diagnosis and treatment of emergencies.

## 2022-07-20 NOTE — ANESTHESIA PREPROCEDURE EVALUATION
Anesthesia Evaluation     Patient summary reviewed and Nursing notes reviewed   no history of anesthetic complications:  NPO Solid Status: > 8 hours  NPO Liquid Status: > 2 hours           Airway   Mallampati: II  TM distance: >3 FB  Neck ROM: full  No difficulty expected  Dental      Pulmonary - negative pulmonary ROS and normal exam    breath sounds clear to auscultation  Cardiovascular - normal exam  Exercise tolerance: good (4-7 METS)    Rhythm: regular  Rate: normal    (+) hypertension well controlled, past MI  >12 months, CAD, dysrhythmias, hyperlipidemia,       Neuro/Psych- negative ROS  GI/Hepatic/Renal/Endo    (+)  GERD well controlled,      Musculoskeletal (-) negative ROS    Abdominal    Substance History - negative use     OB/GYN negative ob/gyn ROS         Other - negative ROS                       Anesthesia Plan    ASA 3     general     (Total IV Anesthesia    Patient understands anesthesia not responsible for dental damage.  )  intravenous induction     Anesthetic plan, risks, benefits, and alternatives have been provided, discussed and informed consent has been obtained with: patient.    Plan discussed with CRNA.        CODE STATUS:

## 2022-07-20 NOTE — H&P
Pre Procedure History & Physical    Chief Complaint:   gerd  nausea    Subjective     HPI:   As above    Past Medical History:   Past Medical History:   Diagnosis Date   • A-fib ([facility])    • Abnormal ECG    • Anxiety    • Arrhythmia    • Arthritis    • Atrial fibrillation ([facility])    • Bladder disorder    • BPH (benign prostatic hyperplasia)    • CAD (coronary artery disease)    • Cervical spondylosis without myelopathy 01/15/2020   • Cervicalgia    • Chest pain    • Colitis    • Condition not found HERNIA   • Depression    • Diverticulitis    • Diverticulosis of colon    • GERD (gastroesophageal reflux disease)    • H/O degenerative disc disease    • Heart attack ([facility])    • Heart disease    • Hemorrhoids    • High cholesterol    • Hypertension    • IBS (irritable bowel syndrome)    • Mood disorder ([facility])    • Muscle cramps    • Myocardial infarction ([facility])    • Osteoarthritis    • Prostate disorder    • S/P lumpectomy, right breast     CYST 2016       Past Surgical History:  Past Surgical History:   Procedure Laterality Date   • APPENDECTOMY     • BREAST MASS EXCISION  2016   • CARDIAC CATHETERIZATION  2016   • CHOLECYSTECTOMY     • COLONOSCOPY  2008,2014,2021   • CORONARY ANGIOPLASTY WITH STENT PLACEMENT  2010   • ENDOSCOPY  2010,2019   • HEMORRHOIDECTOMY  2019   • INGUINAL HERNIA REPAIR  2019   • TURP / TRANSURETHRAL INCISION / DRAINAGE PROSTATE  01/16/2020    BUTTON TURP W/ DR TAM   • UMBILICAL HERNIA REPAIR  2019   • UPPER GASTROINTESTINAL ENDOSCOPY         Family History:  Family History   Problem Relation Age of Onset   • Other Mother         bladder stones   • Arthritis Mother    • Heart disease Mother    • Heart failure Father    • Stroke Father    • Colon cancer Paternal Grandfather         60's   • Malig Hyperthermia Neg Hx        Social History:   reports that he is a non-smoker but has been exposed to tobacco smoke. He has never used smokeless tobacco. He reports that he does not drink alcohol and does not  use drugs.    Medications:   Medications Prior to Admission   Medication Sig Dispense Refill Last Dose   • apixaban (ELIQUIS) 5 MG tablet tablet Take 1 tablet by mouth Every 12 (Twelve) Hours. 60 tablet 11 Past Week at Unknown time   • atorvastatin (LIPITOR) 80 MG tablet Take 1 tablet by mouth at bedtime daily 90 tablet 3 7/19/2022 at Unknown time   • clonazePAM (KlonoPIN) 0.5 MG tablet Take 1 tablet by mouth every 6 to 8 hours as needed for Anxiety.   7/19/2022 at Unknown time   • clopidogrel (PLAVIX) 75 MG tablet Take 1 tablet by mouth every day 90 tablet 2 Past Week at Unknown time   • dicyclomine (BENTYL) 20 MG tablet Take 1 tablet by mouth Every 6 (Six) Hours. 20 tablet 0    • hyoscyamine (LEVSIN) 0.125 MG SL tablet Take 1 tablet by mouth Every 4 (Four) Hours As Needed for Cramping (abdominal pain). 12 tablet 0    • lisinopril (PRINIVIL,ZESTRIL) 5 MG tablet Take 5 mg by mouth Daily.      • metoprolol succinate XL (TOPROL-XL) 25 MG 24 hr tablet Take 1 tablet by mouth 2 (Two) Times a Day. 90 tablet 3 7/19/2022 at Unknown time   • mirtazapine (REMERON) 30 MG tablet Take 30 mg by mouth.   7/19/2022 at Unknown time   • multivitamin with minerals tablet tablet Take 1 tablet by mouth Daily.   7/19/2022 at Unknown time   • pantoprazole (PROTONIX) 40 MG EC tablet Take 1 tablet by mouth Daily. 30 tablet 3 Past Month at Unknown time   • tamsulosin (FLOMAX) 0.4 MG capsule 24 hr capsule TAKE ONE CAPSULE BY MOUTH EVERY DAY ONE-HALF HOUR FOLLOWING THE SAME MEAL EACH DAY 30 capsule 12 7/19/2022 at Unknown time   • triamcinolone (KENALOG) 0.5 % cream Apply 1 application topically to the appropriate area as directed 2 (Two) Times a Day.   Past Week at Unknown time   • nitroglycerin (NITROSTAT) 0.4 MG SL tablet Place 1 tablet under the tongue Every 5 (Five) Minutes As Needed for Chest Pain (no more than 3 doses in 15 minutes). 25 tablet 2 Unknown at Unknown time   • tadalafil (CIALIS) 5 MG tablet Take 1 tablet by mouth Daily for  360 days. 90 tablet 3 Unknown at Unknown time   • traMADol (ULTRAM) 50 MG tablet Take 50 mg by mouth Every 6 (Six) Hours As Needed for Moderate Pain .   Unknown at Unknown time       Allergies:  Patient has no known allergies.        Objective     Blood pressure 105/71, pulse 50, temperature 97.8 °F (36.6 °C), temperature source Temporal, resp. rate 18, weight 89.4 kg (197 lb 1.5 oz), SpO2 96 %.    Physical Exam   Constitutional: Pt is oriented to person, place, and time and well-developed, well-nourished, and in no distress.   Mouth/Throat: Oropharynx is clear and moist.   Neck: Normal range of motion.   Cardiovascular: Normal rate, regular rhythm and normal heart sounds.    Pulmonary/Chest: Effort normal and breath sounds normal.   Abdominal: Soft. Nontender  Skin: Skin is warm and dry.   Psychiatric: Mood, memory, affect and judgment normal.     Assessment & Plan     Diagnosis:  gerd  nausea    Anticipated Surgical Procedure:  egd    The risks, benefits, and alternatives of this procedure have been discussed with the patient or the responsible party- the patient understands and agrees to proceed.

## 2022-07-21 VITALS
SYSTOLIC BLOOD PRESSURE: 115 MMHG | HEART RATE: 53 BPM | HEIGHT: 73 IN | OXYGEN SATURATION: 100 % | TEMPERATURE: 97.5 F | BODY MASS INDEX: 26.82 KG/M2 | WEIGHT: 202.38 LBS | DIASTOLIC BLOOD PRESSURE: 60 MMHG | RESPIRATION RATE: 18 BRPM

## 2022-07-21 LAB
CYTO UR: NORMAL
LAB AP CASE REPORT: NORMAL
LAB AP CLINICAL INFORMATION: NORMAL
PATH REPORT.FINAL DX SPEC: NORMAL
PATH REPORT.GROSS SPEC: NORMAL

## 2022-07-21 PROCEDURE — 99211 OFF/OP EST MAY X REQ PHY/QHP: CPT | Performed by: EMERGENCY MEDICINE

## 2022-07-22 ENCOUNTER — TELEPHONE (OUTPATIENT)
Dept: CARDIOLOGY | Facility: CLINIC | Age: 72
End: 2022-07-22

## 2022-07-22 ENCOUNTER — HOSPITAL ENCOUNTER (EMERGENCY)
Facility: HOSPITAL | Age: 72
Discharge: LEFT BEFORE DISCHARGE | End: 2022-07-22
Attending: EMERGENCY MEDICINE

## 2022-07-22 LAB
ALBUMIN SERPL-MCNC: 4.3 G/DL (ref 3.5–5.2)
ALBUMIN/GLOB SERPL: 1.8 G/DL
ALP SERPL-CCNC: 144 U/L (ref 39–117)
ALT SERPL W P-5'-P-CCNC: 23 U/L (ref 1–41)
ANION GAP SERPL CALCULATED.3IONS-SCNC: 10.3 MMOL/L (ref 5–15)
AST SERPL-CCNC: 22 U/L (ref 1–40)
BASOPHILS # BLD AUTO: 0.01 10*3/MM3 (ref 0–0.2)
BASOPHILS NFR BLD AUTO: 0.2 % (ref 0–1.5)
BILIRUB SERPL-MCNC: 0.4 MG/DL (ref 0–1.2)
BUN SERPL-MCNC: 25 MG/DL (ref 8–23)
BUN/CREAT SERPL: 20.8 (ref 7–25)
CALCIUM SPEC-SCNC: 9.5 MG/DL (ref 8.6–10.5)
CHLORIDE SERPL-SCNC: 104 MMOL/L (ref 98–107)
CO2 SERPL-SCNC: 23.7 MMOL/L (ref 22–29)
CREAT SERPL-MCNC: 1.2 MG/DL (ref 0.76–1.27)
DEPRECATED RDW RBC AUTO: 48.6 FL (ref 37–54)
EGFRCR SERPLBLD CKD-EPI 2021: 64.3 ML/MIN/1.73
EOSINOPHIL # BLD AUTO: 0.1 10*3/MM3 (ref 0–0.4)
EOSINOPHIL NFR BLD AUTO: 2.1 % (ref 0.3–6.2)
ERYTHROCYTE [DISTWIDTH] IN BLOOD BY AUTOMATED COUNT: 14.1 % (ref 12.3–15.4)
GLOBULIN UR ELPH-MCNC: 2.4 GM/DL
GLUCOSE SERPL-MCNC: 116 MG/DL (ref 65–99)
HCT VFR BLD AUTO: 33.7 % (ref 37.5–51)
HGB BLD-MCNC: 11.1 G/DL (ref 13–17.7)
IMM GRANULOCYTES # BLD AUTO: 0.02 10*3/MM3 (ref 0–0.05)
IMM GRANULOCYTES NFR BLD AUTO: 0.4 % (ref 0–0.5)
LYMPHOCYTES # BLD AUTO: 0.96 10*3/MM3 (ref 0.7–3.1)
LYMPHOCYTES NFR BLD AUTO: 20.1 % (ref 19.6–45.3)
MCH RBC QN AUTO: 30.8 PG (ref 26.6–33)
MCHC RBC AUTO-ENTMCNC: 32.9 G/DL (ref 31.5–35.7)
MCV RBC AUTO: 93.6 FL (ref 79–97)
MONOCYTES # BLD AUTO: 0.47 10*3/MM3 (ref 0.1–0.9)
MONOCYTES NFR BLD AUTO: 9.9 % (ref 5–12)
NEUTROPHILS NFR BLD AUTO: 3.21 10*3/MM3 (ref 1.7–7)
NEUTROPHILS NFR BLD AUTO: 67.3 % (ref 42.7–76)
NRBC BLD AUTO-RTO: 0 /100 WBC (ref 0–0.2)
NT-PROBNP SERPL-MCNC: 66.5 PG/ML (ref 0–900)
PLATELET # BLD AUTO: 207 10*3/MM3 (ref 140–450)
PMV BLD AUTO: 9.5 FL (ref 6–12)
POTASSIUM SERPL-SCNC: 4 MMOL/L (ref 3.5–5.2)
PROT SERPL-MCNC: 6.7 G/DL (ref 6–8.5)
RBC # BLD AUTO: 3.6 10*6/MM3 (ref 4.14–5.8)
SODIUM SERPL-SCNC: 138 MMOL/L (ref 136–145)
WBC NRBC COR # BLD: 4.77 10*3/MM3 (ref 3.4–10.8)

## 2022-07-22 PROCEDURE — 83880 ASSAY OF NATRIURETIC PEPTIDE: CPT

## 2022-07-22 PROCEDURE — 80053 COMPREHEN METABOLIC PANEL: CPT

## 2022-07-22 PROCEDURE — 36415 COLL VENOUS BLD VENIPUNCTURE: CPT

## 2022-07-22 PROCEDURE — 85025 COMPLETE CBC W/AUTO DIFF WBC: CPT

## 2022-07-22 PROCEDURE — 93010 ELECTROCARDIOGRAM REPORT: CPT | Performed by: INTERNAL MEDICINE

## 2022-07-22 PROCEDURE — 93005 ELECTROCARDIOGRAM TRACING: CPT

## 2022-07-22 PROCEDURE — 99283 EMERGENCY DEPT VISIT LOW MDM: CPT

## 2022-07-22 PROCEDURE — 93005 ELECTROCARDIOGRAM TRACING: CPT | Performed by: EMERGENCY MEDICINE

## 2022-07-22 NOTE — TELEPHONE ENCOUNTER
SW patient patient states he went to ER last night due to /100. Patient left without being seen. Patient states he was calling this morning to see what to do. Patient had not taken his BP medications this AM. At that time advised patient to take medication and to call back with BP reading. Patient BP after after medication was 135/76.   Patient states he notices at night is when his BP will spike. Patient states at night before bed his BP will be 150/60s.     Patient taking:  Metoprolol 25 mg BID  Lisinopril 5 mg daily

## 2022-07-22 NOTE — TELEPHONE ENCOUNTER
Continue metoprolol 25 mg BID  Change lisinopril to 5 mg AM and 2.5 mg PM  He should try this tonight and over the weekend. He needs to write bp down and call office Monday to report levels

## 2022-07-23 ENCOUNTER — HOSPITAL ENCOUNTER (EMERGENCY)
Facility: HOSPITAL | Age: 72
Discharge: HOME | End: 2022-07-23
Attending: EMERGENCY MEDICINE | Admitting: EMERGENCY MEDICINE

## 2022-07-23 VITALS
DIASTOLIC BLOOD PRESSURE: 81 MMHG | TEMPERATURE: 98.1 F | SYSTOLIC BLOOD PRESSURE: 129 MMHG | HEART RATE: 56 BPM | OXYGEN SATURATION: 99 % | HEIGHT: 73 IN | RESPIRATION RATE: 16 BRPM | BODY MASS INDEX: 27 KG/M2 | WEIGHT: 203.71 LBS

## 2022-07-23 DIAGNOSIS — I10 HYPERTENSION, UNSPECIFIED TYPE: Primary | ICD-10-CM

## 2022-07-23 DIAGNOSIS — F41.9 ANXIETY: ICD-10-CM

## 2022-07-23 LAB
BACTERIA UR QL AUTO: ABNORMAL /HPF
BILIRUB UR QL STRIP: NEGATIVE
CLARITY UR: CLEAR
COLOR UR: YELLOW
GLUCOSE UR STRIP-MCNC: NEGATIVE MG/DL
HGB UR QL STRIP.AUTO: ABNORMAL
HYALINE CASTS UR QL AUTO: ABNORMAL /LPF
KETONES UR QL STRIP: NEGATIVE
LEUKOCYTE ESTERASE UR QL STRIP.AUTO: NEGATIVE
NITRITE UR QL STRIP: NEGATIVE
PH UR STRIP.AUTO: 6.5 [PH] (ref 5–8)
PROT UR QL STRIP: NEGATIVE
QT INTERVAL: 421 MS
RBC # UR STRIP: ABNORMAL /HPF
REF LAB TEST METHOD: ABNORMAL
SP GR UR STRIP: <=1.005 (ref 1–1.03)
SQUAMOUS #/AREA URNS HPF: ABNORMAL /HPF
UROBILINOGEN UR QL STRIP: ABNORMAL
WBC # UR STRIP: ABNORMAL /HPF

## 2022-07-23 PROCEDURE — 81001 URINALYSIS AUTO W/SCOPE: CPT | Performed by: NURSE PRACTITIONER

## 2022-07-23 RX ORDER — SUCRALFATE 1 G/1
1 TABLET ORAL 4 TIMES DAILY
COMMUNITY
End: 2022-09-08

## 2022-07-23 NOTE — ED TRIAGE NOTES
Pt to ED 7 with c/o unregulated bp over the last 3w. Pt reports only at night around 8-9pm he will notice an increase in bp to 200s/100s w/ accompanying frontal ha.  Pt reports pcp is aware of this and has not adjusted home medications, reports his next appt is not until December and wanted evaluation to better manage his bp and sx.  Pt presents a/ox4, gcs 15

## 2022-07-23 NOTE — ED PROVIDER NOTES
Time: 3:09 AM EDT  Arrived by: private car  Chief Complaint: elevated blood pressure  History provided by: patient  History is limited by: N/A     History of Present Illness:  Patient is a 71 y.o. year old male, with h/o HTN, A-fib, who presents to the emergency department with elevated blood pressure over the last three weeks. Patient reports his blood pressure has been elevating between 2000 to 0300 at night when he is laying down, and states tonight his blood pressure went to 200/100. He also states his heart rate was 95. Patient reports tonight he awoke with his heart racing. He also notes that he has chest burning that occurs mainly after taking his cholesterol medication and Flomax. He is on Lisinopril in the morning and Metoprolol at night for his HTN, Clonazepam for anxiety, and on Carafate for his GERD. Patient reports he is concerned that his symptoms are due to his anxiety as he does feel anxious when he has these symptoms. He denies LE edema.     HPI    Similar Symptoms Previously: no  Recently seen: yes      Patient Care Team  Primary Care Provider: Edith Marcelo APRN    Past Medical History:     No Known Allergies  Past Medical History:   Diagnosis Date   • A-fib (HCC)    • Abnormal ECG    • Anxiety    • Arrhythmia    • Arthritis    • Atrial fibrillation ([facility])    • Bladder disorder    • BPH (benign prostatic hyperplasia)    • CAD (coronary artery disease)    • Cervical spondylosis without myelopathy 01/15/2020   • Cervicalgia    • Chest pain    • Colitis    • Condition not found HERNIA   • Depression    • Diverticulitis    • Diverticulosis of colon    • GERD (gastroesophageal reflux disease)    • H/O degenerative disc disease    • Heart attack ([facility])    • Heart disease    • Hemorrhoids    • High cholesterol    • Hypertension    • IBS (irritable bowel syndrome)    • Mood disorder ([facility])    • Muscle cramps    • Myocardial infarction ([facility])    • Osteoarthritis    • Prostate disorder    • S/P  lumpectomy, right breast     CYST 2016     Past Surgical History:   Procedure Laterality Date   • APPENDECTOMY     • BREAST MASS EXCISION  2016   • CARDIAC CATHETERIZATION  2016   • CHOLECYSTECTOMY     • COLONOSCOPY  2008,2014,2021   • CORONARY ANGIOPLASTY WITH STENT PLACEMENT  2010   • ENDOSCOPY  2010,2019   • ENDOSCOPY N/A 7/20/2022    Procedure: ESOPHAGOGASTRODUODENOSCOPY WITH BIOPSY;  Surgeon: Nigel Haas MD;  Location: [facility] ENDOSCOPY;  Service: Gastroenterology;  Laterality: N/A;  HIATAL HERNIA   • HEMORRHOIDECTOMY  2019   • INGUINAL HERNIA REPAIR  2019   • TURP / TRANSURETHRAL INCISION / DRAINAGE PROSTATE  01/16/2020    BUTTON TURP W/ DR TAM   • UMBILICAL HERNIA REPAIR  2019   • UPPER GASTROINTESTINAL ENDOSCOPY       Family History   Problem Relation Age of Onset   • Other Mother         bladder stones   • Arthritis Mother    • Heart disease Mother    • Heart failure Father    • Stroke Father    • Colon cancer Paternal Grandfather         60's   • Malig Hyperthermia Neg Hx        Home Medications:  Prior to Admission medications    Medication Sig Start Date End Date Taking? Authorizing Provider   apixaban (ELIQUIS) 5 MG tablet tablet Take 1 tablet by mouth Every 12 (Twelve) Hours. 12/10/21   Mayank Sheehan MD   atorvastatin (LIPITOR) 80 MG tablet Take 1 tablet by mouth at bedtime daily 3/24/22   Mayank Sheehan MD   clonazePAM (KlonoPIN) 0.5 MG tablet Take 1 tablet by mouth every 6 to 8 hours as needed for Anxiety. 4/1/22   ProviderElian MD   clopidogrel (PLAVIX) 75 MG tablet Take 1 tablet by mouth every day 11/11/21   Mayank Sheehan MD   dicyclomine (BENTYL) 20 MG tablet Take 1 tablet by mouth Every 6 (Six) Hours. 5/22/22   Chasidy Pope APRN   hyoscyamine (LEVSIN) 0.125 MG SL tablet Take 1 tablet by mouth Every 4 (Four) Hours As Needed for Cramping (abdominal pain). 7/17/22   Justino Hoover DO   lisinopril (PRINIVIL,ZESTRIL) 5 MG tablet Take 5 mg by mouth Daily. 6/18/21   Provider  MD Elian   metoprolol succinate XL (TOPROL-XL) 25 MG 24 hr tablet Take 1 tablet by mouth 2 (Two) Times a Day. 6/23/22   Mayank Sheehan MD   mirtazapine (REMERON) 30 MG tablet Take 30 mg by mouth.    ProviderElian MD   multivitamin with minerals tablet tablet Take 1 tablet by mouth Daily.    ProviderElian MD   nitroglycerin (NITROSTAT) 0.4 MG SL tablet Place 1 tablet under the tongue Every 5 (Five) Minutes As Needed for Chest Pain (no more than 3 doses in 15 minutes). 12/10/21   Mayank Sheehan MD   pantoprazole (PROTONIX) 40 MG EC tablet Take 1 tablet by mouth Daily. 6/10/22 6/11/23  Olena Gustafson APRN   sucralfate (CARAFATE) 1 g tablet Take 1 g by mouth 4 (Four) Times a Day.    Elian Merrill MD   tadalafil (CIALIS) 5 MG tablet Take 1 tablet by mouth Daily for 360 days. 10/11/21 10/6/22  Mallorie Matt MD   tamsulosin (FLOMAX) 0.4 MG capsule 24 hr capsule TAKE ONE CAPSULE BY MOUTH EVERY DAY ONE-HALF HOUR FOLLOWING THE SAME MEAL EACH DAY 12/29/21   Mallorie Matt MD   traMADol (ULTRAM) 50 MG tablet Take 50 mg by mouth Every 6 (Six) Hours As Needed for Moderate Pain .    Emergency, Nurse Epic, RN   triamcinolone (KENALOG) 0.5 % cream Apply 1 application topically to the appropriate area as directed 2 (Two) Times a Day.    ProviderElian MD        Social History:   Social History     Tobacco Use   • Smoking status: Passive Smoke Exposure - Never Smoker   • Smokeless tobacco: Never Used   Vaping Use   • Vaping Use: Never used   Substance Use Topics   • Alcohol use: Never   • Drug use: Never     Recent travel: no     Review of Systems:  Review of Systems   Constitutional: Negative for chills and fever.   HENT: Negative for congestion, rhinorrhea and sore throat.    Eyes: Negative for pain and visual disturbance.   Respiratory: Negative for apnea, cough, chest tightness and shortness of breath.    Cardiovascular: Positive for palpitations.        Positive for chest burning.  "  Gastrointestinal: Negative for abdominal pain, diarrhea, nausea and vomiting.   Genitourinary: Negative for difficulty urinating and dysuria.   Musculoskeletal: Negative for joint swelling and myalgias.   Skin: Negative for color change.   Neurological: Negative for seizures and headaches.   Psychiatric/Behavioral: The patient is nervous/anxious.    All other systems reviewed and are negative.       Physical Exam:  /81 (BP Location: Right arm, Patient Position: Lying)   Pulse 56   Temp 98.1 °F (36.7 °C) (Oral)   Resp 16   Ht 185.4 cm (73\")   Wt 92.4 kg (203 lb 11.3 oz)   SpO2 99%   BMI 26.88 kg/m²     Physical Exam  Vitals and nursing note reviewed.   Constitutional:       General: He is not in acute distress.     Appearance: Normal appearance. He is not toxic-appearing.   HENT:      Head: Normocephalic and atraumatic.      Jaw: There is normal jaw occlusion.   Eyes:      General: Lids are normal.      Extraocular Movements: Extraocular movements intact.      Conjunctiva/sclera: Conjunctivae normal.      Pupils: Pupils are equal, round, and reactive to light.   Cardiovascular:      Rate and Rhythm: Regular rhythm. Bradycardia present.      Pulses: Normal pulses.      Heart sounds: Normal heart sounds.   Pulmonary:      Effort: Pulmonary effort is normal. No respiratory distress.      Breath sounds: Normal breath sounds. No wheezing or rhonchi.   Abdominal:      General: Abdomen is flat.      Palpations: Abdomen is soft.      Tenderness: There is no abdominal tenderness. There is no guarding or rebound.   Musculoskeletal:         General: Normal range of motion.      Cervical back: Normal range of motion and neck supple.      Right lower leg: No edema.      Left lower leg: No edema.   Skin:     General: Skin is warm and dry.   Neurological:      Mental Status: He is alert and oriented to person, place, and time. Mental status is at baseline.   Psychiatric:         Mood and Affect: Mood normal.      "           Medications in the Emergency Department:  Medications - No data to display     Labs  Lab Results (last 24 hours)     Procedure Component Value Units Date/Time    CBC & Differential [[id number]]  (Abnormal) Collected: 07/22/22 2307    Specimen: Blood Updated: 07/22/22 2317    Narrative:      The following orders were created for panel order CBC & Differential.  Procedure                               Abnormality         Status                     ---------                               -----------         ------                     CBC Auto Differential[[id number]]        Abnormal            Final result                 Please view results for these tests on the individual orders.    Comprehensive Metabolic Panel [[id number]]  (Abnormal) Collected: 07/22/22 2307    Specimen: Blood Updated: 07/22/22 2336     Glucose 116 mg/dL      BUN 25 mg/dL      Creatinine 1.20 mg/dL      Sodium 138 mmol/L      Potassium 4.0 mmol/L      Chloride 104 mmol/L      CO2 23.7 mmol/L      Calcium 9.5 mg/dL      Total Protein 6.7 g/dL      Albumin 4.30 g/dL      ALT (SGPT) 23 U/L      AST (SGOT) 22 U/L      Alkaline Phosphatase 144 U/L      Total Bilirubin 0.4 mg/dL      Globulin 2.4 gm/dL      A/G Ratio 1.8 g/dL      BUN/Creatinine Ratio 20.8     Anion Gap 10.3 mmol/L      eGFR 64.3 mL/min/1.73      Comment: National Kidney Foundation and American Society of Nephrology (ASN) Task Force recommended calculation based on the Chronic Kidney Disease Epidemiology Collaboration (CKD-EPI) equation refit without adjustment for race.       Narrative:      GFR Normal >60  Chronic Kidney Disease <60  Kidney Failure <15      BNP [[id number]]  (Normal) Collected: 07/22/22 2307    Specimen: Blood Updated: 07/22/22 2334     proBNP 66.5 pg/mL     Narrative:      Among patients with dyspnea, NT-proBNP is highly sensitive for the detection of acute congestive heart failure. In addition NT-proBNP of <300 pg/ml effectively rules out acute congestive  heart failure with 99% negative predictive value.    Results may be falsely decreased if patient taking Biotin.      CBC Auto Differential [[id number]]  (Abnormal) Collected: 07/22/22 2307    Specimen: Blood Updated: 07/22/22 2317     WBC 4.77 10*3/mm3      RBC 3.60 10*6/mm3      Hemoglobin 11.1 g/dL      Hematocrit 33.7 %      MCV 93.6 fL      MCH 30.8 pg      MCHC 32.9 g/dL      RDW 14.1 %      RDW-SD 48.6 fl      MPV 9.5 fL      Platelets 207 10*3/mm3      Neutrophil % 67.3 %      Lymphocyte % 20.1 %      Monocyte % 9.9 %      Eosinophil % 2.1 %      Basophil % 0.2 %      Immature Grans % 0.4 %      Neutrophils, Absolute 3.21 10*3/mm3      Lymphocytes, Absolute 0.96 10*3/mm3      Monocytes, Absolute 0.47 10*3/mm3      Eosinophils, Absolute 0.10 10*3/mm3      Basophils, Absolute 0.01 10*3/mm3      Immature Grans, Absolute 0.02 10*3/mm3      nRBC 0.0 /100 WBC     Urinalysis With Microscopic If Indicated (No Culture) - Urine, Clean Catch [[id number]]  (Abnormal) Collected: 07/23/22 0329    Specimen: Urine, Clean Catch Updated: 07/23/22 0346     Color, UA Yellow     Appearance, UA Clear     pH, UA 6.5     Specific Gravity, UA <=1.005     Glucose, UA Negative     Ketones, UA Negative     Bilirubin, UA Negative     Blood, UA Trace     Protein, UA Negative     Leuk Esterase, UA Negative     Nitrite, UA Negative     Urobilinogen, UA 0.2 E.U./dL    Urinalysis, Microscopic Only - Urine, Clean Catch [[id number]] Collected: 07/23/22 0329    Specimen: Urine, Clean Catch Updated: 07/23/22 0345           Imaging:  No Radiology Exams Resulted Within Past 24 Hours    Procedures:  Procedures    Progress                            Medical Decision Making:  MDM  Number of Diagnoses or Management Options  Anxiety  Hypertension, unspecified type  Diagnosis management comments: In summary this is a 71-year-old male presents emerged department for elevated blood pressure overnight.  Emergency department has no specific complaints at this  time as his blood pressure is down to 120s systolic.  Work-up including CBC, CMP are unremarkable.  Patient does also state that he has bad anxiety and has not been taking his benzodiazepines for quite some time.  He believes he may need to go back on these.  Advised him to begin taking his clonazepam each night.  Very strict return to ER and follow-up instructions have been provided to the patient.         Final diagnoses:   Hypertension, unspecified type   Anxiety        Disposition:  ED Disposition     ED Disposition   Discharge    Condition   Stable    Comment   --             This medical record created using voice recognition software.        Documentation assistance provided by Lynda Hernandez acting as scribe for Manish Crain MD. Information recorded by the scribe was done at my direction and has been verified and validated by me.        Lynda Hernandez  07/23/22 0325       Manish Crain MD  07/23/22 9155

## 2022-07-27 LAB
ALBUMIN SERPL-MCNC: 4.3 G/DL (ref 3.5–5.2)
ALBUMIN/GLOB SERPL: 1.7 G/DL
ALP SERPL-CCNC: 129 U/L (ref 39–117)
ALT SERPL W P-5'-P-CCNC: 28 U/L (ref 1–41)
ANION GAP SERPL CALCULATED.3IONS-SCNC: 9.3 MMOL/L (ref 5–15)
AST SERPL-CCNC: 24 U/L (ref 1–40)
BASOPHILS # BLD AUTO: 0 10*3/MM3 (ref 0–0.2)
BASOPHILS NFR BLD AUTO: 0 % (ref 0–1.5)
BILIRUB SERPL-MCNC: 0.5 MG/DL (ref 0–1.2)
BUN SERPL-MCNC: 18 MG/DL (ref 8–23)
BUN/CREAT SERPL: 16.7 (ref 7–25)
CALCIUM SPEC-SCNC: 9.5 MG/DL (ref 8.6–10.5)
CHLORIDE SERPL-SCNC: 106 MMOL/L (ref 98–107)
CO2 SERPL-SCNC: 24.7 MMOL/L (ref 22–29)
CREAT SERPL-MCNC: 1.08 MG/DL (ref 0.76–1.27)
D-LACTATE SERPL-SCNC: 1.1 MMOL/L (ref 0.5–2)
DEPRECATED RDW RBC AUTO: 47.6 FL (ref 37–54)
EGFRCR SERPLBLD CKD-EPI 2021: 72.9 ML/MIN/1.73
EOSINOPHIL # BLD AUTO: 0.1 10*3/MM3 (ref 0–0.4)
EOSINOPHIL NFR BLD AUTO: 2.1 % (ref 0.3–6.2)
ERYTHROCYTE [DISTWIDTH] IN BLOOD BY AUTOMATED COUNT: 14 % (ref 12.3–15.4)
GLOBULIN UR ELPH-MCNC: 2.5 GM/DL
GLUCOSE SERPL-MCNC: 106 MG/DL (ref 65–99)
HCT VFR BLD AUTO: 33.2 % (ref 37.5–51)
HGB BLD-MCNC: 11.1 G/DL (ref 13–17.7)
HOLD SPECIMEN: NORMAL
HOLD SPECIMEN: NORMAL
IMM GRANULOCYTES # BLD AUTO: 0.02 10*3/MM3 (ref 0–0.05)
IMM GRANULOCYTES NFR BLD AUTO: 0.4 % (ref 0–0.5)
LIPASE SERPL-CCNC: 25 U/L (ref 13–60)
LYMPHOCYTES # BLD AUTO: 1.08 10*3/MM3 (ref 0.7–3.1)
LYMPHOCYTES NFR BLD AUTO: 22.6 % (ref 19.6–45.3)
MCH RBC QN AUTO: 30.7 PG (ref 26.6–33)
MCHC RBC AUTO-ENTMCNC: 33.4 G/DL (ref 31.5–35.7)
MCV RBC AUTO: 91.7 FL (ref 79–97)
MONOCYTES # BLD AUTO: 0.5 10*3/MM3 (ref 0.1–0.9)
MONOCYTES NFR BLD AUTO: 10.5 % (ref 5–12)
NEUTROPHILS NFR BLD AUTO: 3.08 10*3/MM3 (ref 1.7–7)
NEUTROPHILS NFR BLD AUTO: 64.4 % (ref 42.7–76)
NRBC BLD AUTO-RTO: 0 /100 WBC (ref 0–0.2)
PLATELET # BLD AUTO: 208 10*3/MM3 (ref 140–450)
PMV BLD AUTO: 9.5 FL (ref 6–12)
POTASSIUM SERPL-SCNC: 3.9 MMOL/L (ref 3.5–5.2)
PROT SERPL-MCNC: 6.8 G/DL (ref 6–8.5)
RBC # BLD AUTO: 3.62 10*6/MM3 (ref 4.14–5.8)
SODIUM SERPL-SCNC: 140 MMOL/L (ref 136–145)
WBC NRBC COR # BLD: 4.78 10*3/MM3 (ref 3.4–10.8)
WHOLE BLOOD HOLD COAG: NORMAL
WHOLE BLOOD HOLD SPECIMEN: NORMAL

## 2022-07-27 PROCEDURE — 99283 EMERGENCY DEPT VISIT LOW MDM: CPT

## 2022-07-27 PROCEDURE — 96372 THER/PROPH/DIAG INJ SC/IM: CPT

## 2022-07-27 PROCEDURE — 80053 COMPREHEN METABOLIC PANEL: CPT

## 2022-07-27 PROCEDURE — 83605 ASSAY OF LACTIC ACID: CPT

## 2022-07-27 PROCEDURE — 83690 ASSAY OF LIPASE: CPT

## 2022-07-27 PROCEDURE — 36415 COLL VENOUS BLD VENIPUNCTURE: CPT

## 2022-07-27 PROCEDURE — 85025 COMPLETE CBC W/AUTO DIFF WBC: CPT

## 2022-07-27 RX ORDER — SODIUM CHLORIDE 0.9 % (FLUSH) 0.9 %
10 SYRINGE (ML) INJECTION AS NEEDED
Status: DISCONTINUED | OUTPATIENT
Start: 2022-07-27 | End: 2022-07-28 | Stop reason: HOSPADM

## 2022-07-28 ENCOUNTER — HOSPITAL ENCOUNTER (EMERGENCY)
Facility: HOSPITAL | Age: 72
Discharge: HOME | End: 2022-07-28
Attending: EMERGENCY MEDICINE | Admitting: EMERGENCY MEDICINE

## 2022-07-28 VITALS
HEIGHT: 73 IN | BODY MASS INDEX: 26.88 KG/M2 | WEIGHT: 202.82 LBS | HEART RATE: 55 BPM | DIASTOLIC BLOOD PRESSURE: 74 MMHG | TEMPERATURE: 98 F | SYSTOLIC BLOOD PRESSURE: 122 MMHG | OXYGEN SATURATION: 96 % | RESPIRATION RATE: 18 BRPM

## 2022-07-28 DIAGNOSIS — R10.84 GENERALIZED ABDOMINAL PAIN: Primary | ICD-10-CM

## 2022-07-28 LAB
BILIRUB UR QL STRIP: NEGATIVE
CLARITY UR: CLEAR
COLOR UR: YELLOW
GLUCOSE UR STRIP-MCNC: NEGATIVE MG/DL
HGB UR QL STRIP.AUTO: NEGATIVE
KETONES UR QL STRIP: NEGATIVE
LEUKOCYTE ESTERASE UR QL STRIP.AUTO: NEGATIVE
NITRITE UR QL STRIP: NEGATIVE
PH UR STRIP.AUTO: 7.5 [PH] (ref 5–8)
PROT UR QL STRIP: NEGATIVE
SP GR UR STRIP: 1.01 (ref 1–1.03)
UROBILINOGEN UR QL STRIP: NORMAL

## 2022-07-28 PROCEDURE — 96372 THER/PROPH/DIAG INJ SC/IM: CPT

## 2022-07-28 PROCEDURE — 81003 URINALYSIS AUTO W/O SCOPE: CPT | Performed by: EMERGENCY MEDICINE

## 2022-07-28 PROCEDURE — 25010000002 KETOROLAC TROMETHAMINE PER 15 MG: Performed by: EMERGENCY MEDICINE

## 2022-07-28 RX ORDER — ALUMINA, MAGNESIA, AND SIMETHICONE 2400; 2400; 240 MG/30ML; MG/30ML; MG/30ML
15 SUSPENSION ORAL ONCE
Status: COMPLETED | OUTPATIENT
Start: 2022-07-28 | End: 2022-07-28

## 2022-07-28 RX ORDER — KETOROLAC TROMETHAMINE 15 MG/ML
15 INJECTION, SOLUTION INTRAMUSCULAR; INTRAVENOUS ONCE
Status: DISCONTINUED | OUTPATIENT
Start: 2022-07-28 | End: 2022-07-28

## 2022-07-28 RX ORDER — LIDOCAINE HYDROCHLORIDE 20 MG/ML
15 SOLUTION OROPHARYNGEAL ONCE
Status: COMPLETED | OUTPATIENT
Start: 2022-07-28 | End: 2022-07-28

## 2022-07-28 RX ORDER — KETOROLAC TROMETHAMINE 15 MG/ML
15 INJECTION, SOLUTION INTRAMUSCULAR; INTRAVENOUS ONCE
Status: COMPLETED | OUTPATIENT
Start: 2022-07-28 | End: 2022-07-28

## 2022-07-28 RX ADMIN — ALUMINUM HYDROXIDE, MAGNESIUM HYDROXIDE, AND DIMETHICONE 15 ML: 400; 400; 40 SUSPENSION ORAL at 02:06

## 2022-07-28 RX ADMIN — KETOROLAC TROMETHAMINE 15 MG: 15 INJECTION, SOLUTION INTRAMUSCULAR; INTRAVENOUS at 02:06

## 2022-07-28 RX ADMIN — LIDOCAINE HYDROCHLORIDE 15 ML: 20 SOLUTION ORAL; TOPICAL at 02:06

## 2022-08-01 ENCOUNTER — TELEPHONE (OUTPATIENT)
Dept: GASTROENTEROLOGY | Facility: CLINIC | Age: 72
End: 2022-08-01

## 2022-08-01 ENCOUNTER — APPOINTMENT (OUTPATIENT)
Dept: GENERAL RADIOLOGY | Facility: HOSPITAL | Age: 72
End: 2022-08-01

## 2022-08-01 ENCOUNTER — HOSPITAL ENCOUNTER (EMERGENCY)
Facility: HOSPITAL | Age: 72
Discharge: HOME | End: 2022-08-02
Attending: EMERGENCY MEDICINE | Admitting: EMERGENCY MEDICINE

## 2022-08-01 DIAGNOSIS — F41.1 GENERALIZED ANXIETY DISORDER: ICD-10-CM

## 2022-08-01 DIAGNOSIS — R07.89 ATYPICAL CHEST PAIN: Primary | ICD-10-CM

## 2022-08-01 LAB
ALBUMIN SERPL-MCNC: 4.5 G/DL (ref 3.5–5.2)
ALBUMIN/GLOB SERPL: 1.7 G/DL
ALP SERPL-CCNC: 133 U/L (ref 39–117)
ALT SERPL W P-5'-P-CCNC: 29 U/L (ref 1–41)
ANION GAP SERPL CALCULATED.3IONS-SCNC: 9.8 MMOL/L (ref 5–15)
AST SERPL-CCNC: 25 U/L (ref 1–40)
BASOPHILS # BLD AUTO: 0 10*3/MM3 (ref 0–0.2)
BASOPHILS NFR BLD AUTO: 0 % (ref 0–1.5)
BILIRUB SERPL-MCNC: 0.4 MG/DL (ref 0–1.2)
BUN SERPL-MCNC: 20 MG/DL (ref 8–23)
BUN/CREAT SERPL: 17.7 (ref 7–25)
CALCIUM SPEC-SCNC: 9.5 MG/DL (ref 8.6–10.5)
CHLORIDE SERPL-SCNC: 104 MMOL/L (ref 98–107)
CK MB SERPL-CCNC: 3.7 NG/ML
CK SERPL-CCNC: 157 U/L (ref 20–200)
CO2 SERPL-SCNC: 24.2 MMOL/L (ref 22–29)
CREAT SERPL-MCNC: 1.13 MG/DL (ref 0.76–1.27)
DEPRECATED RDW RBC AUTO: 48 FL (ref 37–54)
EGFRCR SERPLBLD CKD-EPI 2021: 69.1 ML/MIN/1.73
EOSINOPHIL # BLD AUTO: 0.06 10*3/MM3 (ref 0–0.4)
EOSINOPHIL NFR BLD AUTO: 1.2 % (ref 0.3–6.2)
ERYTHROCYTE [DISTWIDTH] IN BLOOD BY AUTOMATED COUNT: 13.9 % (ref 12.3–15.4)
GLOBULIN UR ELPH-MCNC: 2.7 GM/DL
GLUCOSE SERPL-MCNC: 121 MG/DL (ref 65–99)
HCT VFR BLD AUTO: 34.8 % (ref 37.5–51)
HGB BLD-MCNC: 11.4 G/DL (ref 13–17.7)
HOLD SPECIMEN: NORMAL
IMM GRANULOCYTES # BLD AUTO: 0.01 10*3/MM3 (ref 0–0.05)
IMM GRANULOCYTES NFR BLD AUTO: 0.2 % (ref 0–0.5)
LIPASE SERPL-CCNC: 24 U/L (ref 13–60)
LYMPHOCYTES # BLD AUTO: 1.13 10*3/MM3 (ref 0.7–3.1)
LYMPHOCYTES NFR BLD AUTO: 22.1 % (ref 19.6–45.3)
MAGNESIUM SERPL-MCNC: 2.1 MG/DL (ref 1.6–2.4)
MCH RBC QN AUTO: 30.5 PG (ref 26.6–33)
MCHC RBC AUTO-ENTMCNC: 32.8 G/DL (ref 31.5–35.7)
MCV RBC AUTO: 93 FL (ref 79–97)
MONOCYTES # BLD AUTO: 0.53 10*3/MM3 (ref 0.1–0.9)
MONOCYTES NFR BLD AUTO: 10.4 % (ref 5–12)
NEUTROPHILS NFR BLD AUTO: 3.39 10*3/MM3 (ref 1.7–7)
NEUTROPHILS NFR BLD AUTO: 66.1 % (ref 42.7–76)
NRBC BLD AUTO-RTO: 0 /100 WBC (ref 0–0.2)
NT-PROBNP SERPL-MCNC: 40.6 PG/ML (ref 0–900)
PLATELET # BLD AUTO: 200 10*3/MM3 (ref 140–450)
PMV BLD AUTO: 9.4 FL (ref 6–12)
POTASSIUM SERPL-SCNC: 4 MMOL/L (ref 3.5–5.2)
PROT SERPL-MCNC: 7.2 G/DL (ref 6–8.5)
RBC # BLD AUTO: 3.74 10*6/MM3 (ref 4.14–5.8)
SODIUM SERPL-SCNC: 138 MMOL/L (ref 136–145)
TROPONIN I SERPL-MCNC: 0 NG/ML (ref 0–0.08)
TROPONIN I SERPL-MCNC: 0.02 NG/ML (ref 0–0.08)
WBC NRBC COR # BLD: 5.12 10*3/MM3 (ref 3.4–10.8)
WHOLE BLOOD HOLD COAG: NORMAL
WHOLE BLOOD HOLD SPECIMEN: NORMAL

## 2022-08-01 PROCEDURE — 99284 EMERGENCY DEPT VISIT MOD MDM: CPT

## 2022-08-01 PROCEDURE — 82553 CREATINE MB FRACTION: CPT

## 2022-08-01 PROCEDURE — 84484 ASSAY OF TROPONIN QUANT: CPT

## 2022-08-01 PROCEDURE — 80053 COMPREHEN METABOLIC PANEL: CPT

## 2022-08-01 PROCEDURE — 85025 COMPLETE CBC W/AUTO DIFF WBC: CPT

## 2022-08-01 PROCEDURE — 36415 COLL VENOUS BLD VENIPUNCTURE: CPT

## 2022-08-01 PROCEDURE — 83690 ASSAY OF LIPASE: CPT

## 2022-08-01 PROCEDURE — 93005 ELECTROCARDIOGRAM TRACING: CPT

## 2022-08-01 PROCEDURE — 82550 ASSAY OF CK (CPK): CPT

## 2022-08-01 PROCEDURE — 93005 ELECTROCARDIOGRAM TRACING: CPT | Performed by: EMERGENCY MEDICINE

## 2022-08-01 PROCEDURE — 83880 ASSAY OF NATRIURETIC PEPTIDE: CPT

## 2022-08-01 PROCEDURE — 83735 ASSAY OF MAGNESIUM: CPT

## 2022-08-01 PROCEDURE — 71045 X-RAY EXAM CHEST 1 VIEW: CPT

## 2022-08-01 RX ORDER — ASPIRIN 81 MG/1
324 TABLET, CHEWABLE ORAL ONCE
Status: COMPLETED | OUTPATIENT
Start: 2022-08-01 | End: 2022-08-02

## 2022-08-01 RX ORDER — SODIUM CHLORIDE 0.9 % (FLUSH) 0.9 %
10 SYRINGE (ML) INJECTION AS NEEDED
Status: DISCONTINUED | OUTPATIENT
Start: 2022-08-01 | End: 2022-08-02 | Stop reason: HOSPADM

## 2022-08-01 NOTE — TELEPHONE ENCOUNTER
----- Message from DAV Corrigan sent at 7/21/2022  1:30 PM EDT -----  I have reviewed the patients upper endoscopy and pathology. Biopsies are negative for H. Pylori, dysplasia, metaplasia, and malignancy. Duodenum biopsies are normal.

## 2022-08-01 NOTE — TELEPHONE ENCOUNTER
I attempted to call mr schwab, this is the 4th attempts by phone.  Voicemail box not set up, does not have [employer]. Will mail letter to nelson Calderón

## 2022-08-02 ENCOUNTER — TELEPHONE (OUTPATIENT)
Dept: GASTROENTEROLOGY | Facility: CLINIC | Age: 72
End: 2022-08-02

## 2022-08-02 VITALS
SYSTOLIC BLOOD PRESSURE: 131 MMHG | DIASTOLIC BLOOD PRESSURE: 82 MMHG | HEIGHT: 73 IN | BODY MASS INDEX: 26.88 KG/M2 | TEMPERATURE: 98.4 F | RESPIRATION RATE: 16 BRPM | OXYGEN SATURATION: 98 % | HEART RATE: 56 BPM | WEIGHT: 202.82 LBS

## 2022-08-02 LAB — HOLD SPECIMEN: NORMAL

## 2022-08-02 RX ADMIN — ASPIRIN 81 MG CHEWABLE TABLET 324 MG: 81 TABLET CHEWABLE at 00:01

## 2022-08-02 NOTE — TELEPHONE ENCOUNTER
Blood Thinner/Cardiac Clearance                                                                                                                 [facility]    8/2/2022    Dear Dr. Sheehan,     Patient: Ari Olea   YOB: 1950        This patient is waiting to have a Colonoscopy and/or Esophagogastroduodenoscopy which I will perform at [facility] on 09.13.2022.     Our records indicate this patient is currently taking ELIQUIS AND PLAVIX . This procedure requires the patient to suspend their anticoagulant medication prior to surgery.     Please respond to this request noting your recommendations. You may contact our office at [phone] Option 1 with any questions. I appreciate your prompt response in this matter.     Please return this form to our office as soon as possible to [phone]    ____ I approve my patient to stop taking their Anticoagulant Therapy medication ELIQUIS 2 days prior and PLAVIX 7 days prior to the scheduled procedure.    ____ I do NOT approve my patient to stop taking their Anticoagulant Therapy medication at this time.    ____ I approve my patient from a cardiac standpoint.    ____ I do NOT approve my patient from a cardiac standpoint at this time.    Approving physician name (please print):     _____________________________________________    Approving physician signature:     ________________________________    Date:________________      Sincerely,  [facility]   Gastroenterology -

## 2022-08-02 NOTE — ED PROVIDER NOTES
"Time: 11:17 PM EDT  Arrived by: private car  Chief Complaint: fleeting chest pain and palpitations, SOB  History provided by: patient  History is limited by: not applicable    History of Present Illness:  Patient is a 71 y.o. year old male who presents to the emergency department with fleeting chest pain, palpitations, and SOB. Pt states he has pain in left lateral lower chest that comes and goes and SOB. Pt feels like he can't get a deep breath and is anxious about it. Pt states his BP is \"soham high\" at night. Pt denies fever, cough, vomiting, or diarrhea. Pt has a hx of anxiety, MI, Afib, HTN, and frequent ER visits.      Similar Symptoms Previously: no  Recently seen: yes, PCP      Patient Care Team  Primary Care Provider: Edith Marcelo APRN    Past Medical History:     No Known Allergies  Past Medical History:   Diagnosis Date   • A-fib (HCC)    • Abnormal ECG    • Anxiety    • Arrhythmia    • Arthritis    • Atrial fibrillation (HCC)    • Bladder disorder    • BPH (benign prostatic hyperplasia)    • CAD (coronary artery disease)    • Cervical spondylosis without myelopathy 01/15/2020   • Cervicalgia    • Chest pain    • Colitis    • Condition not found HERNIA   • Depression    • Diverticulitis    • Diverticulosis of colon    • GERD (gastroesophageal reflux disease)    • H/O degenerative disc disease    • Heart attack ([facility])    • Heart disease    • Hemorrhoids    • High cholesterol    • Hypertension    • IBS (irritable bowel syndrome)    • Mood disorder (HCC)    • Muscle cramps    • Myocardial infarction (HCC)    • Osteoarthritis    • Prostate disorder    • S/P lumpectomy, right breast     CYST 2016     Past Surgical History:   Procedure Laterality Date   • APPENDECTOMY     • BREAST MASS EXCISION  2016   • CARDIAC CATHETERIZATION  2016   • CHOLECYSTECTOMY     • COLONOSCOPY  2008,2014,2021   • CORONARY ANGIOPLASTY WITH STENT PLACEMENT  2010   • ENDOSCOPY  2010,2019   • ENDOSCOPY N/A 7/20/2022    Procedure: " ESOPHAGOGASTRODUODENOSCOPY WITH BIOPSY;  Surgeon: Nigel Haas MD;  Location: [facility] ENDOSCOPY;  Service: Gastroenterology;  Laterality: N/A;  HIATAL HERNIA   • HEMORRHOIDECTOMY  2019   • INGUINAL HERNIA REPAIR  2019   • TURP / TRANSURETHRAL INCISION / DRAINAGE PROSTATE  01/16/2020    BUTTON TURP W/ DR TAM   • UMBILICAL HERNIA REPAIR  2019   • UPPER GASTROINTESTINAL ENDOSCOPY       Family History   Problem Relation Age of Onset   • Other Mother         bladder stones   • Arthritis Mother    • Heart disease Mother    • Heart failure Father    • Stroke Father    • Colon cancer Paternal Grandfather         60's   • Malig Hyperthermia Neg Hx        Home Medications:  Prior to Admission medications    Medication Sig Start Date End Date Taking? Authorizing Provider   apixaban (ELIQUIS) 5 MG tablet tablet Take 1 tablet by mouth Every 12 (Twelve) Hours. 12/10/21   Mayank Sheehan MD   atorvastatin (LIPITOR) 80 MG tablet Take 1 tablet by mouth at bedtime daily 3/24/22   Mayank Sheehan MD   clonazePAM (KlonoPIN) 0.5 MG tablet Take 1 tablet by mouth every 6 to 8 hours as needed for Anxiety. 4/1/22   Elian Merrill MD   clopidogrel (PLAVIX) 75 MG tablet Take 1 tablet by mouth every day 11/11/21   Mayank Sheehan MD   dicyclomine (BENTYL) 20 MG tablet Take 1 tablet by mouth Every 6 (Six) Hours. 5/22/22   Chasidy Pope APRN   hyoscyamine (LEVSIN) 0.125 MG SL tablet Take 1 tablet by mouth Every 4 (Four) Hours As Needed for Cramping (abdominal pain). 7/17/22   Justino Hoover DO   lisinopril (PRINIVIL,ZESTRIL) 5 MG tablet Take 5 mg by mouth Daily. 6/18/21   Elian Merrill MD   metoprolol succinate XL (TOPROL-XL) 25 MG 24 hr tablet Take 1 tablet by mouth 2 (Two) Times a Day. 6/23/22   Mayank Sheehan MD   mirtazapine (REMERON) 30 MG tablet Take 30 mg by mouth.    Elian Merrill MD   multivitamin with minerals tablet tablet Take 1 tablet by mouth Daily.    Elian Merrill MD   nitroglycerin  (NITROSTAT) 0.4 MG SL tablet Place 1 tablet under the tongue Every 5 (Five) Minutes As Needed for Chest Pain (no more than 3 doses in 15 minutes). 12/10/21   Mayank Sheehan MD   pantoprazole (PROTONIX) 40 MG EC tablet Take 1 tablet by mouth Daily. 6/10/22 6/11/23  Olena Gustafson APRN   sucralfate (CARAFATE) 1 g tablet Take 1 g by mouth 4 (Four) Times a Day.    ProviderElian MD   tadalafil (CIALIS) 5 MG tablet Take 1 tablet by mouth Daily for 360 days. 10/11/21 10/6/22  Mallorie Matt MD   tamsulosin (FLOMAX) 0.4 MG capsule 24 hr capsule TAKE ONE CAPSULE BY MOUTH EVERY DAY ONE-HALF HOUR FOLLOWING THE SAME MEAL EACH DAY 12/29/21   Mallorie Matt MD   traMADol (ULTRAM) 50 MG tablet Take 50 mg by mouth Every 6 (Six) Hours As Needed for Moderate Pain .    Emergency, Nurse Epic, RN   triamcinolone (KENALOG) 0.5 % cream Apply 1 application topically to the appropriate area as directed 2 (Two) Times a Day.    Provider, MD Elian        Social History:   Social History     Tobacco Use   • Smoking status: Passive Smoke Exposure - Never Smoker   • Smokeless tobacco: Never Used   Vaping Use   • Vaping Use: Never used   Substance Use Topics   • Alcohol use: Never   • Drug use: Never     Recent travel: not applicable    Review of Systems:  Review of Systems   Constitutional: Negative for chills and fever.   HENT: Negative for congestion, ear pain and sore throat.    Eyes: Negative for pain.   Respiratory: Positive for shortness of breath. Negative for cough and chest tightness.    Cardiovascular: Positive for chest pain (left lateral lower chest) and palpitations.   Gastrointestinal: Negative for abdominal pain, diarrhea, nausea and vomiting.   Genitourinary: Negative for flank pain and hematuria.   Musculoskeletal: Negative for joint swelling.   Skin: Negative for pallor.   Neurological: Negative for seizures and headaches.   Psychiatric/Behavioral: The patient is nervous/anxious.    All other systems reviewed  "and are negative.       Physical Exam:  /82   Pulse 56   Temp 98.4 °F (36.9 °C) (Oral)   Resp 16   Ht 185.4 cm (73\")   Wt 92 kg (202 lb 13.2 oz)   SpO2 98%   BMI 26.76 kg/m²     Physical Exam  Vitals and nursing note reviewed.   Constitutional:       General: He is not in acute distress.     Appearance: Normal appearance. He is not toxic-appearing.   HENT:      Head: Normocephalic and atraumatic.      Mouth/Throat:      Mouth: Mucous membranes are moist.   Eyes:      General: No scleral icterus.  Cardiovascular:      Rate and Rhythm: Normal rate and regular rhythm.      Pulses: Normal pulses.      Heart sounds: Normal heart sounds. No murmur heard.     Comments: No calf tenderness  Pulmonary:      Effort: Pulmonary effort is normal. No respiratory distress.      Breath sounds: Normal breath sounds.   Chest:      Chest wall: No tenderness.   Abdominal:      General: Abdomen is flat.      Palpations: Abdomen is soft.      Tenderness: There is no abdominal tenderness.   Musculoskeletal:         General: Normal range of motion.      Cervical back: Normal range of motion and neck supple.      Right lower leg: Normal. No tenderness.      Left lower leg: Normal. No tenderness.   Skin:     General: Skin is warm and dry.   Neurological:      Mental Status: He is alert and oriented to person, place, and time. Mental status is at baseline.                Medications in the Emergency Department:  Medications   sodium chloride 0.9 % flush 10 mL (has no administration in time range)   aspirin chewable tablet 324 mg (324 mg Oral Given 8/2/22 0001)        Labs  Lab Results (last 24 hours)     Procedure Component Value Units Date/Time    POC Troponin I with Hold Tube [[id number]] Collected: 08/01/22 2040    Specimen: Blood Updated: 08/01/22 2254    Narrative:      The following orders were created for panel order POC Troponin I with Hold Tube.  Procedure                               Abnormality         Status            "          ---------                               -----------         ------                     POC Troponin I[[id number]]                                                              HOLD Troponin-I Tube[[id number]]                             Final result                 Please view results for these tests on the individual orders.    CBC & Differential [[id number]]  (Abnormal) Collected: 08/01/22 2040    Specimen: Blood Updated: 08/01/22 2052    Narrative:      The following orders were created for panel order CBC & Differential.  Procedure                               Abnormality         Status                     ---------                               -----------         ------                     CBC Auto Differential[[id number]]        Abnormal            Final result                 Please view results for these tests on the individual orders.    Comprehensive Metabolic Panel [[id number]]  (Abnormal) Collected: 08/01/22 2040    Specimen: Blood Updated: 08/01/22 2131     Glucose 121 mg/dL      BUN 20 mg/dL      Creatinine 1.13 mg/dL      Sodium 138 mmol/L      Potassium 4.0 mmol/L      Chloride 104 mmol/L      CO2 24.2 mmol/L      Calcium 9.5 mg/dL      Total Protein 7.2 g/dL      Albumin 4.50 g/dL      ALT (SGPT) 29 U/L      AST (SGOT) 25 U/L      Alkaline Phosphatase 133 U/L      Total Bilirubin 0.4 mg/dL      Globulin 2.7 gm/dL      A/G Ratio 1.7 g/dL      BUN/Creatinine Ratio 17.7     Anion Gap 9.8 mmol/L      eGFR 69.1 mL/min/1.73      Comment: National Kidney Foundation and American Society of Nephrology (ASN) Task Force recommended calculation based on the Chronic Kidney Disease Epidemiology Collaboration (CKD-EPI) equation refit without adjustment for race.       Narrative:      GFR Normal >60  Chronic Kidney Disease <60  Kidney Failure <15      Lipase [[id number]]  (Normal) Collected: 08/01/22 2040    Specimen: Blood Updated: 08/01/22 2131     Lipase 24 U/L     BNP [[id number]]  (Normal) Collected:  08/01/22 2040    Specimen: Blood Updated: 08/01/22 2126     proBNP 40.6 pg/mL     Narrative:      Among patients with dyspnea, NT-proBNP is highly sensitive for the detection of acute congestive heart failure. In addition NT-proBNP of <300 pg/ml effectively rules out acute congestive heart failure with 99% negative predictive value.    Results may be falsely decreased if patient taking Biotin.      Magnesium [[id number]]  (Normal) Collected: 08/01/22 2040    Specimen: Blood Updated: 08/01/22 2131     Magnesium 2.1 mg/dL     CK Total & CKMB [[id number]]  (Normal) Collected: 08/01/22 2040    Specimen: Blood Updated: 08/01/22 2131     CKMB 3.70 ng/mL      Creatine Kinase 157 U/L     Narrative:      CKMB results may be falsely decreased if patient taking Biotin.    CBC Auto Differential [[id number]]  (Abnormal) Collected: 08/01/22 2040    Specimen: Blood Updated: 08/01/22 2052     WBC 5.12 10*3/mm3      RBC 3.74 10*6/mm3      Hemoglobin 11.4 g/dL      Hematocrit 34.8 %      MCV 93.0 fL      MCH 30.5 pg      MCHC 32.8 g/dL      RDW 13.9 %      RDW-SD 48.0 fl      MPV 9.4 fL      Platelets 200 10*3/mm3      Neutrophil % 66.1 %      Lymphocyte % 22.1 %      Monocyte % 10.4 %      Eosinophil % 1.2 %      Basophil % 0.0 %      Immature Grans % 0.2 %      Neutrophils, Absolute 3.39 10*3/mm3      Lymphocytes, Absolute 1.13 10*3/mm3      Monocytes, Absolute 0.53 10*3/mm3      Eosinophils, Absolute 0.06 10*3/mm3      Basophils, Absolute 0.00 10*3/mm3      Immature Grans, Absolute 0.01 10*3/mm3      nRBC 0.0 /100 WBC     POC Troponin I [[id number]]  (Normal) Collected: 08/01/22 2043    Specimen: Blood Updated: 08/01/22 2055     Troponin I 0.02 ng/mL      Comment: Serial Number: [id number]:  [id number]       POC Troponin I with Hold Tube [[id number]] Collected: 08/01/22 2312    Specimen: Blood Updated: 08/02/22 0104    Narrative:      The following orders were created for panel order POC Troponin I with Hold Tube.  Procedure                                Abnormality         Status                     ---------                               -----------         ------                     POC Troponin I[[id number]]                                                              HOLD Troponin-I Tube[[id number]]                             Final result                 Please view results for these tests on the individual orders.    POC Troponin I [[id number]]  (Normal) Collected: 08/01/22 2314    Specimen: Blood Updated: 08/01/22 2327     Troponin I 0.00 ng/mL      Comment: Serial Number: [id number]:  [id number]              Imaging:  XR Chest 1 View    Result Date: 8/1/2022  PROCEDURE: XR CHEST 1 VW  COMPARISON: [facility], CR, XR CHEST 1 VW, 6/26/2022, 23:33.  INDICATIONS: Chest Pain Triage Protocol/chest pain  FINDINGS:  Lungs are clear bilaterally.  Cardiac and mediastinal contours are within normal limits.  No evidence of pneumothorax.  Regional skeleton is not changed.        1. No acute cardiopulmonary disease.       JESSICA SANTOS MD       Electronically Signed and Approved By: JESSICA SANTOS MD on 8/01/2022 at 22:40               Procedures:  ECG 12 Lead      Date/Time: 8/1/2022 11:17 PM  Performed by: Castro Matos MD  Authorized by: Castro Matos MD   Interpreted by physician  Comparison: compared with previous ECG from 7/1/2022  Similar to previous ECG  Comparison to previous ECG: Unchanged from previous  Rhythm: sinus bradycardia  BPM: 53  Conduction: right bundle branch block  ST Segments: ST segments normal  T Waves: T waves normal  Comments: Sinus bradycardia, 53 bpm, normal P waves, QRS -right bundle branch block, ST segment normal, T waves normal, no acute ischemia            Progress                            Medical Decision Making:  [facility]     For my differential diagnosis of this patient's chest pain I considered acute coronary syndrome, pulmonary embolism, musculoskeletal chest wall pain, acid reflux with esophagitis,  thoracic muscle strain, or anxiety induced chest pain.          This patient is a 71-year-old male with history of very frequent ED visits over the last several months, now presenting with some intermittent left lateral chest pains occurring at rest when he was trying to sleep tonight.    The pain comes on spontaneously beneath his left breast and only lasts for seconds before spontaneously resolving.    He is currently chest pain-free now, and has a benign work-up here today with normal vital signs, normal EKG without any signs of acute ischemia.    Chest x-ray normal and all of his blood work normal.    Both sets of cardiac enzymes are normal.    I considered his presentation to be mostly secondary to anxiety related chest pains, and I will have him follow-up with his doctor as an outpatient given his negative work-up today.        Final diagnoses:   Atypical chest pain   Generalized anxiety disorder        Disposition:  ED Disposition     ED Disposition   Discharge    Condition   Stable    Comment   --             This medical record created using voice recognition software.           Fermin Mitchell  08/01/22 4750       Castro Matos MD  08/02/22 [id number]

## 2022-08-02 NOTE — DISCHARGE INSTRUCTIONS
Your EKG and chest x-ray and lab work including both sets of heart enzymes came back normal today.    No signs of a heart attack or anything serious or life-threatening were found.    It sounds like you may be having anxiety related chest pains, and you may want to take an extra dose of your Klonopin to see if that helps you get some rest tonight.

## 2022-08-07 LAB — QT INTERVAL: 450 MS

## 2022-08-09 LAB
QT INTERVAL: 407 MS
QT INTERVAL: 445 MS

## 2022-08-25 DIAGNOSIS — N52.9 ERECTILE DYSFUNCTION, UNSPECIFIED ERECTILE DYSFUNCTION TYPE: Primary | ICD-10-CM

## 2022-08-25 RX ORDER — SILDENAFIL 100 MG/1
100 TABLET, FILM COATED ORAL AS NEEDED
Qty: 30 TABLET | Refills: 0 | Status: SHIPPED | OUTPATIENT
Start: 2022-08-25 | End: 2022-10-12 | Stop reason: SDUPTHER

## 2022-09-08 ENCOUNTER — OFFICE VISIT (OUTPATIENT)
Dept: GASTROENTEROLOGY | Facility: CLINIC | Age: 72
End: 2022-09-08

## 2022-09-08 VITALS
DIASTOLIC BLOOD PRESSURE: 57 MMHG | BODY MASS INDEX: 27.43 KG/M2 | HEART RATE: 64 BPM | WEIGHT: 207 LBS | SYSTOLIC BLOOD PRESSURE: 116 MMHG | OXYGEN SATURATION: 100 % | HEIGHT: 73 IN

## 2022-09-08 DIAGNOSIS — R14.0 BLOATING: Primary | ICD-10-CM

## 2022-09-08 DIAGNOSIS — K44.9 HIATAL HERNIA: ICD-10-CM

## 2022-09-08 PROCEDURE — 99213 OFFICE O/P EST LOW 20 MIN: CPT

## 2022-09-08 RX ORDER — CLONAZEPAM 0.5 MG/1
0.5 TABLET ORAL
COMMUNITY
Start: 2022-09-07 | End: 2022-09-08

## 2022-09-08 RX ORDER — TRIAMCINOLONE ACETONIDE 1 MG/G
CREAM TOPICAL
COMMUNITY
Start: 2021-09-30 | End: 2022-10-01

## 2022-09-08 RX ORDER — DESVENLAFAXINE 100 MG/1
100 TABLET, EXTENDED RELEASE ORAL DAILY
COMMUNITY
Start: 2022-09-07 | End: 2023-09-08

## 2022-09-08 RX ORDER — NITROGLYCERIN 0.4 MG/1
0.4 TABLET SUBLINGUAL
COMMUNITY
Start: 2022-06-28 | End: 2023-06-29

## 2022-09-08 NOTE — PROGRESS NOTES
Chief Complaint  Follow-up (- EGD ) and Bloated (- feels gas on stomach )    Ari Olea is a 71 y.o. male who presents to [facility] GASTROENTEROLOGY- [facility] for EGD follow-up    History of present Illness  Patient presents to the office for EGD follow-up. He was previously experiencing a lot of epigastric pain, nausea, and heartburn.  He was recently started back on Klonopin to better control his anxiety and has felt that his GI symptoms have improved.  He has discontinued Protonix, Bentyl, and Carafate.  On occasion patient can have some bloating and belching but is adequately relieved with Gas-X.  Denies heartburn, nausea, vomiting, epigastric pain.  Patient has no lower GI symptoms such as change in bowel habits, melena, and hematochezia.  Overall patient is doing very well from a GI standpoint.    EGD 7/20/2022 by Dr. Haas-small hiatal hernia, normal esophagus, stomach, and duodenum.  Duodenum and stomach biopsies normal.    Colonoscopy 2/24/2021 by Dr. Haas-diverticula in the sigmoid colon, 4 mm juvenile polyp in the cecum, grade 1 internal hemorrhoids.  Recommended repeat colonoscopy in 5 years.    Past Medical History:   Diagnosis Date   • A-fib (HCC)    • Abnormal ECG    • Anxiety    • Arrhythmia    • Arthritis    • Atrial fibrillation (HCC)    • Bladder disorder    • BPH (benign prostatic hyperplasia)    • CAD (coronary artery disease)    • Cervical spondylosis without myelopathy 01/15/2020   • Cervicalgia    • Chest pain    • Colitis    • Condition not found HERNIA   • Depression    • Diverticulitis    • Diverticulosis of colon    • GERD (gastroesophageal reflux disease)    • H/O degenerative disc disease    • Heart attack (HCC)    • Heart disease    • Hemorrhoids    • High cholesterol    • Hypertension    • IBS (irritable bowel syndrome)    • Mood disorder (HCC)    • Muscle cramps    • Myocardial infarction ([facility])    • Osteoarthritis    • Prostate disorder    • S/P lumpectomy,  right breast     CYST 2016       Past Surgical History:   Procedure Laterality Date   • APPENDECTOMY     • BREAST MASS EXCISION  2016   • CARDIAC CATHETERIZATION  2016   • CHOLECYSTECTOMY     • COLONOSCOPY  2008,2014,2021   • CORONARY ANGIOPLASTY WITH STENT PLACEMENT  2010   • ENDOSCOPY  2010,2019   • ENDOSCOPY N/A 7/20/2022    Procedure: ESOPHAGOGASTRODUODENOSCOPY WITH BIOPSY;  Surgeon: Nigel Haas MD;  Location: [facility] ENDOSCOPY;  Service: Gastroenterology;  Laterality: N/A;  HIATAL HERNIA   • HEMORRHOIDECTOMY  2019   • INGUINAL HERNIA REPAIR  2019   • TURP / TRANSURETHRAL INCISION / DRAINAGE PROSTATE  01/16/2020    BUTTON TURP W/ DR TAM   • UMBILICAL HERNIA REPAIR  2019   • UPPER GASTROINTESTINAL ENDOSCOPY           Current Outpatient Medications:   •  apixaban (ELIQUIS) 5 MG tablet tablet, Take 1 tablet by mouth Every 12 (Twelve) Hours., Disp: 60 tablet, Rfl: 11  •  atorvastatin (LIPITOR) 80 MG tablet, Take 1 tablet by mouth at bedtime daily, Disp: 90 tablet, Rfl: 3  •  clonazePAM (KlonoPIN) 0.5 MG tablet, Take 1 tablet by mouth every 6 to 8 hours as needed for Anxiety., Disp: , Rfl:   •  clopidogrel (PLAVIX) 75 MG tablet, Take 1 tablet by mouth every day, Disp: 90 tablet, Rfl: 2  •  desvenlafaxine (PRISTIQ) 100 MG 24 hr tablet, Take 100 mg by mouth Daily., Disp: , Rfl:   •  lisinopril (PRINIVIL,ZESTRIL) 5 MG tablet, Take 5 mg by mouth Daily., Disp: , Rfl:   •  metoprolol succinate XL (TOPROL-XL) 25 MG 24 hr tablet, Take 1 tablet by mouth 2 (Two) Times a Day., Disp: 90 tablet, Rfl: 3  •  mirtazapine (REMERON) 30 MG tablet, Take 30 mg by mouth., Disp: , Rfl:   •  multivitamin with minerals tablet tablet, Take 1 tablet by mouth Daily., Disp: , Rfl:   •  tamsulosin (FLOMAX) 0.4 MG capsule 24 hr capsule, TAKE ONE CAPSULE BY MOUTH EVERY DAY ONE-HALF HOUR FOLLOWING THE SAME MEAL EACH DAY, Disp: 30 capsule, Rfl: 12  •  nitroglycerin (NITROSTAT) 0.4 MG SL tablet, Place 1 tablet under the tongue Every 5  "(Five) Minutes As Needed for Chest Pain (no more than 3 doses in 15 minutes)., Disp: 25 tablet, Rfl: 2  •  nitroglycerin (NITROSTAT) 0.4 MG SL tablet, Place 0.4 mg under the tongue., Disp: , Rfl:   •  sildenafil (Viagra) 100 MG tablet, Take 1 tablet by mouth As Needed for Erectile Dysfunction. Keep October appointment for refills, Disp: 30 tablet, Rfl: 0  •  tadalafil (CIALIS) 5 MG tablet, Take 1 tablet by mouth Daily for 360 days., Disp: 90 tablet, Rfl: 3  •  traMADol (ULTRAM) 50 MG tablet, Take 50 mg by mouth Every 6 (Six) Hours As Needed for Moderate Pain ., Disp: , Rfl:   •  triamcinolone (KENALOG) 0.1 % cream, Apply  topically to the appropriate area as directed., Disp: , Rfl:   •  triamcinolone (KENALOG) 0.5 % cream, Apply 1 application topically to the appropriate area as directed 2 (Two) Times a Day., Disp: , Rfl:      No Known Allergies    Family History   Problem Relation Age of Onset   • Other Mother         bladder stones   • Arthritis Mother    • Heart disease Mother    • Heart failure Father    • Stroke Father    • Colon cancer Paternal Grandfather         60's   • Malig Hyperthermia Neg Hx         Social History     Social History Narrative   • Not on file       Objective       Vital Signs:   /57 (BP Location: Left arm, Patient Position: Sitting, Cuff Size: Adult)   Pulse 64   Ht 185.4 cm (72.99\")   Wt 93.9 kg (207 lb)   SpO2 100%   BMI 27.32 kg/m²       Physical Exam  Constitutional:       Appearance: Normal appearance.   HENT:      Head: Normocephalic.   Cardiovascular:      Rate and Rhythm: Normal rate and regular rhythm.      Heart sounds: Normal heart sounds.   Pulmonary:      Effort: Pulmonary effort is normal.      Breath sounds: Normal breath sounds.   Abdominal:      General: Bowel sounds are normal.      Palpations: Abdomen is soft.   Skin:     General: Skin is warm and dry.   Neurological:      Mental Status: He is alert and oriented to person, place, and time. Mental status is " at baseline.   Psychiatric:         Mood and Affect: Mood normal.         Behavior: Behavior normal.         Thought Content: Thought content normal.         Judgment: Judgment normal.         Result Review :       CBC w/diff    CBC w/Diff 7/22/22 7/27/22 8/1/22   WBC 4.77 4.78 5.12   RBC 3.60 (A) 3.62 (A) 3.74 (A)   Hemoglobin 11.1 (A) 11.1 (A) 11.4 (A)   Hematocrit 33.7 (A) 33.2 (A) 34.8 (A)   MCV 93.6 91.7 93.0   MCH 30.8 30.7 30.5   MCHC 32.9 33.4 32.8   RDW 14.1 14.0 13.9   Platelets 207 208 200   Neutrophil Rel % 67.3 64.4 66.1   Immature Granulocyte Rel % 0.4 0.4 0.2   Lymphocyte Rel % 20.1 22.6 22.1   Monocyte Rel % 9.9 10.5 10.4   Eosinophil Rel % 2.1 2.1 1.2   Basophil Rel % 0.2 0.0 0.0   (A) Abnormal value            CMP    CMP 7/22/22 7/27/22 8/1/22   Glucose 116 (A) 106 (A) 121 (A)   BUN 25 (A) 18 20   Creatinine 1.20 1.08 1.13   Sodium 138 140 138   Potassium 4.0 3.9 4.0   Chloride 104 106 104   Calcium 9.5 9.5 9.5   Albumin 4.30 4.30 4.50   Total Bilirubin 0.4 0.5 0.4   Alkaline Phosphatase 144 (A) 129 (A) 133 (A)   AST (SGOT) 22 24 25   ALT (SGPT) 23 28 29   (A) Abnormal value                    Assessment and Plan    Diagnoses and all orders for this visit:    1. Bloating (Primary)    2. Hiatal hernia    71-year-old patient presents to the office for endoscopy follow-up.  I have reviewed most recent endoscopy reports and pathology with the patient.  GI symptoms have resolved since starting Klonopin and adequately controlling his anxiety.  Patient has discontinued all GI medicine and denies breakthrough heartburn, nausea, vomiting, and epigastric pain.  Overall patient is doing very well from a GI standpoint and will follow-up as needed.  Patient is agreeable to plan will call the office for any questions or concerns.    Follow Up   No follow-ups on file.  Patient was given instructions and counseling regarding his condition or for health maintenance advice. Please see specific information pulled  into the AVS if appropriate.

## 2022-10-12 ENCOUNTER — OFFICE VISIT (OUTPATIENT)
Dept: UROLOGY | Facility: CLINIC | Age: 72
End: 2022-10-12

## 2022-10-12 VITALS — HEIGHT: 73 IN | RESPIRATION RATE: 16 BRPM | WEIGHT: 202.4 LBS | BODY MASS INDEX: 26.83 KG/M2

## 2022-10-12 DIAGNOSIS — N52.9 ERECTILE DYSFUNCTION, UNSPECIFIED ERECTILE DYSFUNCTION TYPE: ICD-10-CM

## 2022-10-12 DIAGNOSIS — Z12.5 PROSTATE CANCER SCREENING: ICD-10-CM

## 2022-10-12 DIAGNOSIS — N40.1 BPH ASSOCIATED WITH NOCTURIA: ICD-10-CM

## 2022-10-12 DIAGNOSIS — N40.0 BENIGN PROSTATIC HYPERPLASIA, UNSPECIFIED WHETHER LOWER URINARY TRACT SYMPTOMS PRESENT: Primary | ICD-10-CM

## 2022-10-12 DIAGNOSIS — R35.1 BPH ASSOCIATED WITH NOCTURIA: ICD-10-CM

## 2022-10-12 LAB
BILIRUB BLD-MCNC: NEGATIVE MG/DL
CLARITY, POC: CLEAR
COLOR UR: YELLOW
EXPIRATION DATE: ABNORMAL
GLUCOSE UR STRIP-MCNC: NEGATIVE MG/DL
KETONES UR QL: ABNORMAL
LEUKOCYTE EST, POC: NEGATIVE
Lab: ABNORMAL
NITRITE UR-MCNC: NEGATIVE MG/ML
PH UR: 5.5 [PH] (ref 5–8)
PROT UR STRIP-MCNC: NEGATIVE MG/DL
RBC # UR STRIP: ABNORMAL /UL
SP GR UR: 1.03 (ref 1–1.03)
UROBILINOGEN UR QL: ABNORMAL

## 2022-10-12 PROCEDURE — 81003 URINALYSIS AUTO W/O SCOPE: CPT | Performed by: UROLOGY

## 2022-10-12 PROCEDURE — 99214 OFFICE O/P EST MOD 30 MIN: CPT | Performed by: UROLOGY

## 2022-10-12 RX ORDER — SILDENAFIL 100 MG/1
100 TABLET, FILM COATED ORAL AS NEEDED
Qty: 20 TABLET | Refills: 4 | Status: SHIPPED | OUTPATIENT
Start: 2022-10-12 | End: 2022-12-27 | Stop reason: SDUPTHER

## 2022-10-12 RX ORDER — TAMSULOSIN HYDROCHLORIDE 0.4 MG/1
1 CAPSULE ORAL DAILY
Qty: 90 CAPSULE | Refills: 3 | Status: SHIPPED | OUTPATIENT
Start: 2022-10-12 | End: 2023-10-07

## 2022-10-12 RX ORDER — TADALAFIL 5 MG/1
5 TABLET ORAL DAILY
Qty: 90 TABLET | Refills: 3 | Status: SHIPPED | OUTPATIENT
Start: 2022-10-12 | End: 2023-10-07

## 2022-10-12 NOTE — PROGRESS NOTES
"Chief Complaint  Benign Prostatic Hypertrophy    Subjective          Ari Olea presents to [facility] UROLOGY     History of Present Illness  The patient is a very pleasant 71-year-old male that has several urological problems.       He has also had some problems with BPH symptoms. He is currently on Flomax and tadalafil is stable with that.     He had a button TURP in January 2020.       He is using sildenafil and is doing well on it. He takes 60mg and it works well for him.       Since I saw him last he has been on tadalafil 5mg once a day and it is working really well.  He has no new complaints.       The patient reports that after he urinates, it feels like he does not cut off. He states that he does not do it all the time, but he does not stand there. He reports that it feels like a \" jumping \" and he starts moving then he sees a wet spot. He states that he tries to stand there and see if the urine is coming out, but its not. He reports the Cilas is working well.   He reports that he needs a refill on Flomax and Cialis. He states that the sildenafil does not work unless he is on an empty stomach.  He reports that he would like to have his PSA checked every year.    Objective   Vital Signs:   Resp 16   Ht 185.4 cm (72.99\")   Wt 91.8 kg (202 lb 6.4 oz)   BMI 26.71 kg/m²       Physical Exam  Vitals and nursing note reviewed.   Constitutional:       Appearance: Normal appearance. He is well-developed.   Pulmonary:      Effort: Pulmonary effort is normal.      Breath sounds: Normal air entry.   Neurological:      Mental Status: He is alert and oriented to person, place, and time.      Motor: Motor function is intact.   Psychiatric:         Mood and Affect: Mood normal.         Behavior: Behavior normal.       Result Review :   The following data was reviewed by: Mallorie Matt MD on 10/12/2022:    Results for orders placed or performed in visit on 10/12/22   POC Urinalysis Dipstick, " Automated    Specimen: Urine   Result Value Ref Range    Color Yellow Yellow, Straw, Dark Yellow, Nano    Clarity, UA Clear Clear    Specific Gravity  1.030 1.005 - 1.030    pH, Urine 5.5 5.0 - 8.0    Leukocytes Negative Negative    Nitrite, UA Negative Negative    Protein, POC Negative Negative mg/dL    Glucose, UA Negative Negative mg/dL    Ketones, UA Trace (A) Negative    Urobilinogen, UA 0.2 E.U./dL Normal, 0.2 E.U./dL    Bilirubin Negative Negative    Blood, UA Small (A) Negative    Lot Number [id number]     Expiration Date 82,023               Assessment and Plan    Diagnoses and all orders for this visit:    1. Benign prostatic hyperplasia, unspecified whether lower urinary tract symptoms present (Primary)  -     POC Urinalysis Dipstick, Automated    2. BPH associated with nocturia  Assessment & Plan:   Continue taking tamsulosin    Orders:  -     tadalafil (CIALIS) 5 MG tablet; Take 1 tablet by mouth Daily for 360 days.  Dispense: 90 tablet; Refill: 3  -     tamsulosin (FLOMAX) 0.4 MG capsule 24 hr capsule; Take 1 capsule by mouth Daily for 360 days.  Dispense: 90 capsule; Refill: 3    3. Erectile dysfunction, unspecified erectile dysfunction type  Assessment & Plan:  He will continue on his sildenafil for ED and  tadalafil daily for his BPH as well.    Orders:  -     sildenafil (Viagra) 100 MG tablet; Take 1 tablet by mouth As Needed for Erectile Dysfunction.  Dispense: 20 tablet; Refill: 4    4. Prostate cancer screening  Assessment & Plan:  I will put an order in for his PSA screening.    Orders:  -     PSA Screen; Future  -     PSA Screen; Future          Follow Up       Return in about 1 year (around 10/12/2023) for PSA prior to visit.  Patient was given instructions and counseling regarding his condition or for health maintenance advice. Please see specific information pulled into the AVS if appropriate.   Transcribed from ambient dictation for Mallorie Matt MD by Leah Miller.  10/12/22   14:14  EDT    Patient or patient representative verbalized consent to the visit recording.  I have personally performed the services described in this document as transcribed by the above individual, and it is both accurate and complete.

## 2022-10-20 ENCOUNTER — HOSPITAL ENCOUNTER (EMERGENCY)
Facility: HOSPITAL | Age: 72
Discharge: HOME | End: 2022-10-21
Attending: EMERGENCY MEDICINE | Admitting: EMERGENCY MEDICINE

## 2022-10-20 VITALS
WEIGHT: 205.69 LBS | BODY MASS INDEX: 27.26 KG/M2 | SYSTOLIC BLOOD PRESSURE: 149 MMHG | HEIGHT: 73 IN | DIASTOLIC BLOOD PRESSURE: 84 MMHG | HEART RATE: 64 BPM | OXYGEN SATURATION: 100 % | TEMPERATURE: 98.1 F | RESPIRATION RATE: 18 BRPM

## 2022-10-20 DIAGNOSIS — F32.89 OTHER DEPRESSION: Primary | ICD-10-CM

## 2022-10-20 DIAGNOSIS — F41.9 ANXIETY: ICD-10-CM

## 2022-10-20 LAB
ALBUMIN SERPL-MCNC: 4.5 G/DL (ref 3.5–5.2)
ALBUMIN/GLOB SERPL: 1.8 G/DL
ALP SERPL-CCNC: 123 U/L (ref 39–117)
ALT SERPL W P-5'-P-CCNC: 20 U/L (ref 1–41)
AMPHET+METHAMPHET UR QL: NEGATIVE
ANION GAP SERPL CALCULATED.3IONS-SCNC: 6.9 MMOL/L (ref 5–15)
APAP SERPL-MCNC: <5 MCG/ML (ref 0–30)
AST SERPL-CCNC: 25 U/L (ref 1–40)
BARBITURATES UR QL SCN: NEGATIVE
BASOPHILS # BLD AUTO: 0 10*3/MM3 (ref 0–0.2)
BASOPHILS NFR BLD AUTO: 0 % (ref 0–1.5)
BENZODIAZ UR QL SCN: NEGATIVE
BILIRUB SERPL-MCNC: 0.6 MG/DL (ref 0–1.2)
BUN SERPL-MCNC: 11 MG/DL (ref 8–23)
BUN/CREAT SERPL: 11.8 (ref 7–25)
CALCIUM SPEC-SCNC: 9.4 MG/DL (ref 8.6–10.5)
CANNABINOIDS SERPL QL: NEGATIVE
CHLORIDE SERPL-SCNC: 104 MMOL/L (ref 98–107)
CO2 SERPL-SCNC: 28.1 MMOL/L (ref 22–29)
COCAINE UR QL: NEGATIVE
CREAT SERPL-MCNC: 0.93 MG/DL (ref 0.76–1.27)
DEPRECATED RDW RBC AUTO: 49.6 FL (ref 37–54)
EGFRCR SERPLBLD CKD-EPI 2021: 86.7 ML/MIN/1.73
EOSINOPHIL # BLD AUTO: 0.36 10*3/MM3 (ref 0–0.4)
EOSINOPHIL NFR BLD AUTO: 6 % (ref 0.3–6.2)
ERYTHROCYTE [DISTWIDTH] IN BLOOD BY AUTOMATED COUNT: 14.5 % (ref 12.3–15.4)
ETHANOL BLD-MCNC: <10 MG/DL (ref 0–10)
ETHANOL UR QL: <0.01 %
GLOBULIN UR ELPH-MCNC: 2.5 GM/DL
GLUCOSE SERPL-MCNC: 112 MG/DL (ref 65–99)
HCT VFR BLD AUTO: 35 % (ref 37.5–51)
HGB BLD-MCNC: 11.4 G/DL (ref 13–17.7)
HOLD SPECIMEN: NORMAL
HOLD SPECIMEN: NORMAL
IMM GRANULOCYTES # BLD AUTO: 0.03 10*3/MM3 (ref 0–0.05)
IMM GRANULOCYTES NFR BLD AUTO: 0.5 % (ref 0–0.5)
LYMPHOCYTES # BLD AUTO: 0.69 10*3/MM3 (ref 0.7–3.1)
LYMPHOCYTES NFR BLD AUTO: 11.4 % (ref 19.6–45.3)
MCH RBC QN AUTO: 30.5 PG (ref 26.6–33)
MCHC RBC AUTO-ENTMCNC: 32.6 G/DL (ref 31.5–35.7)
MCV RBC AUTO: 93.6 FL (ref 79–97)
METHADONE UR QL SCN: NEGATIVE
MONOCYTES # BLD AUTO: 0.56 10*3/MM3 (ref 0.1–0.9)
MONOCYTES NFR BLD AUTO: 9.3 % (ref 5–12)
NEUTROPHILS NFR BLD AUTO: 4.4 10*3/MM3 (ref 1.7–7)
NEUTROPHILS NFR BLD AUTO: 72.8 % (ref 42.7–76)
NRBC BLD AUTO-RTO: 0 /100 WBC (ref 0–0.2)
OPIATES UR QL: NEGATIVE
OXYCODONE UR QL SCN: NEGATIVE
PLATELET # BLD AUTO: 237 10*3/MM3 (ref 140–450)
PMV BLD AUTO: 9.7 FL (ref 6–12)
POTASSIUM SERPL-SCNC: 4 MMOL/L (ref 3.5–5.2)
PROT SERPL-MCNC: 7 G/DL (ref 6–8.5)
RBC # BLD AUTO: 3.74 10*6/MM3 (ref 4.14–5.8)
SALICYLATES SERPL-MCNC: <0.3 MG/DL
SODIUM SERPL-SCNC: 139 MMOL/L (ref 136–145)
T4 FREE SERPL-MCNC: 1.09 NG/DL (ref 0.93–1.7)
TSH SERPL DL<=0.05 MIU/L-ACNC: 4.32 UIU/ML (ref 0.27–4.2)
WBC NRBC COR # BLD: 6.04 10*3/MM3 (ref 3.4–10.8)
WHOLE BLOOD HOLD COAG: NORMAL
WHOLE BLOOD HOLD SPECIMEN: NORMAL

## 2022-10-20 PROCEDURE — 80307 DRUG TEST PRSMV CHEM ANLYZR: CPT

## 2022-10-20 PROCEDURE — 80053 COMPREHEN METABOLIC PANEL: CPT

## 2022-10-20 PROCEDURE — 85025 COMPLETE CBC W/AUTO DIFF WBC: CPT

## 2022-10-20 PROCEDURE — 99283 EMERGENCY DEPT VISIT LOW MDM: CPT

## 2022-10-20 PROCEDURE — 82077 ASSAY SPEC XCP UR&BREATH IA: CPT

## 2022-10-20 PROCEDURE — 84443 ASSAY THYROID STIM HORMONE: CPT

## 2022-10-20 PROCEDURE — 36415 COLL VENOUS BLD VENIPUNCTURE: CPT

## 2022-10-20 PROCEDURE — 80179 DRUG ASSAY SALICYLATE: CPT

## 2022-10-20 PROCEDURE — 84439 ASSAY OF FREE THYROXINE: CPT

## 2022-10-20 PROCEDURE — 80143 DRUG ASSAY ACETAMINOPHEN: CPT

## 2022-10-20 RX ORDER — SODIUM CHLORIDE 0.9 % (FLUSH) 0.9 %
10 SYRINGE (ML) INJECTION AS NEEDED
Status: DISCONTINUED | OUTPATIENT
Start: 2022-10-20 | End: 2022-10-21 | Stop reason: HOSPADM

## 2022-10-21 NOTE — ED PROVIDER NOTES
"Time: 10:14 PM EDT  Chief Complaint:   Chief Complaint   Patient presents with   • Psychiatric Evaluation           History of Present Illness:  Patient is a 72 y.o. year old male who presents to the emergency department with c/o an anxiety attack that happened to him PTA and an episode of HTN.  He denies any chest pain, soa, SI, HI.  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 today got upset and bp as high like 200/100's. took a klonopin and feels better but wants psych eval  Relieved by:  Klonopin  Worsened by:  Stressful situations  Ineffective treatments:  Na  Associated symptoms: no abdominal pain, no chest pain, no fever, no nausea, no shortness of breath and no vomiting            Patient Care Team  Primary Care Provider: Edith Marcelo APRN    Past Medical History:     No Known Allergies  Past Medical History:   Diagnosis Date   • A-fib (HCC)    • Abnormal ECG    • Anxiety    • Arrhythmia    • Arthritis    • Atrial fibrillation ([facility])    • Bladder disorder    • BPH (benign prostatic hyperplasia)    • CAD (coronary artery disease)    • Cervical spondylosis without myelopathy 01/15/2020   • Cervicalgia    • Chest pain    • Colitis    • Condition not found HERNIA   • Depression    • Diverticulitis    • Diverticulosis of colon    • GERD (gastroesophageal reflux disease)    • H/O degenerative disc disease    • Heart attack (HCC)    • Heart disease    • Hemorrhoids    • High cholesterol    • Hypertension    • IBS (irritable bowel syndrome)    • Mood disorder ([facility])    • Muscle cramps    • Myocardial infarction (HCC)    • Osteoarthritis "    • Prostate disorder    • S/P lumpectomy, right breast     CYST 2016     Past Surgical History:   Procedure Laterality Date   • APPENDECTOMY     • BREAST MASS EXCISION  2016   • CARDIAC CATHETERIZATION  2016   • CHOLECYSTECTOMY     • COLONOSCOPY  2008,2014,2021   • CORONARY ANGIOPLASTY WITH STENT PLACEMENT  2010   • ENDOSCOPY  2010,2019   • ENDOSCOPY N/A 7/20/2022    Procedure: ESOPHAGOGASTRODUODENOSCOPY WITH BIOPSY;  Surgeon: Nigel Haas MD;  Location: [facility];  Service: Gastroenterology;  Laterality: N/A;  HIATAL HERNIA   • HEMORRHOIDECTOMY  2019   • INGUINAL HERNIA REPAIR  2019   • TURP / TRANSURETHRAL INCISION / DRAINAGE PROSTATE  01/16/2020    BUTTON TURP W/ DR TAM   • UMBILICAL HERNIA REPAIR  2019   • UPPER GASTROINTESTINAL ENDOSCOPY       Family History   Problem Relation Age of Onset   • Other Mother         bladder stones   • Arthritis Mother    • Heart disease Mother    • Heart failure Father    • Stroke Father    • Colon cancer Paternal Grandfather         60's   • Malig Hyperthermia Neg Hx        Home Medications:  Prior to Admission medications    Medication Sig Start Date End Date Taking? Authorizing Provider   apixaban (ELIQUIS) 5 MG tablet tablet Take 1 tablet by mouth Every 12 (Twelve) Hours. 12/10/21   Mayank Sheehan MD   atorvastatin (LIPITOR) 80 MG tablet Take 1 tablet by mouth at bedtime daily 3/24/22   Mayank Sheehan MD   clonazePAM (KlonoPIN) 0.5 MG tablet Take 1 tablet by mouth every 6 to 8 hours as needed for Anxiety. 4/1/22   Elian Merrill MD   clopidogrel (PLAVIX) 75 MG tablet Take 1 tablet by mouth every day 11/11/21   Mayank Sheehan MD   desvenlafaxine (PRISTIQ) 100 MG 24 hr tablet Take 100 mg by mouth Daily. 9/7/22 9/8/23  Elian Merrill MD   lisinopril (PRINIVIL,ZESTRIL) 5 MG tablet Take 5 mg by mouth Daily. 6/18/21   Elian Merrill MD   metoprolol succinate XL (TOPROL-XL) 25 MG 24 hr tablet Take 1 tablet by mouth 2 (Two) Times a Day. 6/23/22    Mayank Sheehan MD   mirtazapine (REMERON) 30 MG tablet Take 30 mg by mouth.    ProviderElian MD   multivitamin with minerals tablet tablet Take 1 tablet by mouth Daily.    Elian Merrill MD   nitroglycerin (NITROSTAT) 0.4 MG SL tablet Place 1 tablet under the tongue Every 5 (Five) Minutes As Needed for Chest Pain (no more than 3 doses in 15 minutes). 12/10/21   Mayank Sheehan MD   nitroglycerin (NITROSTAT) 0.4 MG SL tablet Place 0.4 mg under the tongue. 6/28/22 6/29/23  Elian Merrill MD   sildenafil (Viagra) 100 MG tablet Take 1 tablet by mouth As Needed for Erectile Dysfunction. 10/12/22   Mallorie Matt MD   tadalafil (CIALIS) 5 MG tablet Take 1 tablet by mouth Daily for 360 days. 10/12/22 10/7/23  Mallorie Matt MD   tamsulosin (FLOMAX) 0.4 MG capsule 24 hr capsule Take 1 capsule by mouth Daily for 360 days. 10/12/22 10/7/23  Mallorie Matt MD   traMADol (ULTRAM) 50 MG tablet Take 50 mg by mouth Every 6 (Six) Hours As Needed for Moderate Pain .    Emergency, Nurse Epic, RN   triamcinolone (KENALOG) 0.5 % cream Apply 1 application topically to the appropriate area as directed 2 (Two) Times a Day.    ProviderElian MD        Social History:   Social History     Tobacco Use   • Smoking status: Passive Smoke Exposure - Never Smoker   • Smokeless tobacco: Never   Vaping Use   • Vaping Use: Never used   Substance Use Topics   • Alcohol use: Never   • Drug use: Never         Review of Systems:  Review of Systems   Constitutional: Negative for fever.   HENT: Negative.    Respiratory: Negative for shortness of breath.    Cardiovascular: Negative for chest pain, palpitations and leg swelling.   Gastrointestinal: Negative for abdominal pain, nausea and vomiting.   Genitourinary: Negative.    Musculoskeletal: Negative.    Skin: Negative.    Neurological: Negative.    Hematological: Negative.    Psychiatric/Behavioral: Positive for dysphoric mood. Negative for self-injury and suicidal  "ideas. The patient is nervous/anxious.    All other systems reviewed and are negative.       Physical Exam:  /84   Pulse 64   Temp 98.1 °F (36.7 °C)   Resp 18   Ht 185.4 cm (73\")   Wt 93.3 kg (205 lb 11 oz)   SpO2 100%   BMI 27.14 kg/m²     Physical Exam  Vitals and nursing note reviewed.   Constitutional:       General: He is not in acute distress.     Appearance: Normal appearance. He is not toxic-appearing.   HENT:      Head: Normocephalic and atraumatic.      Nose: Nose normal.      Mouth/Throat:      Mouth: Mucous membranes are moist.   Eyes:      Extraocular Movements: Extraocular movements intact.      Conjunctiva/sclera: Conjunctivae normal.   Cardiovascular:      Rate and Rhythm: Normal rate and regular rhythm.      Pulses: Normal pulses.      Heart sounds: Normal heart sounds.   Pulmonary:      Effort: Pulmonary effort is normal.      Breath sounds: Normal breath sounds.   Abdominal:      General: Bowel sounds are normal. There is no distension.      Palpations: Abdomen is soft.      Tenderness: There is no abdominal tenderness.   Musculoskeletal:         General: Normal range of motion.      Cervical back: Normal range of motion.      Right lower leg: No edema.      Left lower leg: No edema.   Skin:     General: Skin is warm and dry.      Coloration: Skin is not cyanotic.   Neurological:      General: No focal deficit present.      Mental Status: He is alert and oriented to person, place, and time.   Psychiatric:         Attention and Perception: Attention and perception normal.         Mood and Affect: Mood normal.         Behavior: Behavior normal.         Thought Content: Thought content normal.         Judgment: Judgment normal.      Comments: No suicidal or homicidal ideation            Medications in the Emergency Department:  Medications   sodium chloride 0.9 % flush 10 mL (has no administration in time range)        Labs  Lab Results (last 24 hours)     Procedure Component Value Units " Date/Time    CBC & Differential [[id number]]  (Abnormal) Collected: 10/20/22 2219    Specimen: Blood from Arm, Right Updated: 10/20/22 2249    Narrative:      The following orders were created for panel order CBC & Differential.  Procedure                               Abnormality         Status                     ---------                               -----------         ------                     CBC Auto Differential[[id number]]        Abnormal            Final result                 Please view results for these tests on the individual orders.    Comprehensive Metabolic Panel [[id number]]  (Abnormal) Collected: 10/20/22 2219    Specimen: Blood from Arm, Right Updated: 10/20/22 2310     Glucose 112 mg/dL      BUN 11 mg/dL      Creatinine 0.93 mg/dL      Sodium 139 mmol/L      Potassium 4.0 mmol/L      Chloride 104 mmol/L      CO2 28.1 mmol/L      Calcium 9.4 mg/dL      Total Protein 7.0 g/dL      Albumin 4.50 g/dL      ALT (SGPT) 20 U/L      AST (SGOT) 25 U/L      Alkaline Phosphatase 123 U/L      Total Bilirubin 0.6 mg/dL      Globulin 2.5 gm/dL      A/G Ratio 1.8 g/dL      BUN/Creatinine Ratio 11.8     Anion Gap 6.9 mmol/L      eGFR 86.7 mL/min/1.73      Comment: National Kidney Foundation and American Society of Nephrology (ASN) Task Force recommended calculation based on the Chronic Kidney Disease Epidemiology Collaboration (CKD-EPI) equation refit without adjustment for race.       Narrative:      GFR Normal >60  Chronic Kidney Disease <60  Kidney Failure <15    The GFR formula is only valid for adults with stable renal function between ages 18 and 70.    Acetaminophen Level [[id number]]  (Normal) Collected: 10/20/22 2219    Specimen: Blood from Arm, Right Updated: 10/20/22 2310     Acetaminophen <5.0 mcg/mL     Ethanol [[id number]] Collected: 10/20/22 2219    Specimen: Blood from Arm, Right Updated: 10/20/22 2310     Ethanol <10 mg/dL      Ethanol % <0.010 %     Narrative:      Ethanol (Plasma)  <10  Essentially Negative    Toxic Concentrations           mg/dL    Flushing, slowing of reflexes    Impaired visual activity         Depression of CNS              >100  Possible Coma                  >300       Salicylate Level [[id number]]  (Normal) Collected: 10/20/22 2219    Specimen: Blood from Arm, Right Updated: 10/20/22 2310     Salicylate <0.3 mg/dL     TSH [[id number]]  (Abnormal) Collected: 10/20/22 2219    Specimen: Blood from Arm, Right Updated: 10/20/22 2316     TSH 4.320 uIU/mL     T4, Free [[id number]]  (Normal) Collected: 10/20/22 2219    Specimen: Blood from Arm, Right Updated: 10/20/22 2316     Free T4 1.09 ng/dL     Narrative:      Results may be falsely increased if patient taking Biotin.      CBC Auto Differential [[id number]]  (Abnormal) Collected: 10/20/22 2219    Specimen: Blood from Arm, Right Updated: 10/20/22 2249     WBC 6.04 10*3/mm3      RBC 3.74 10*6/mm3      Hemoglobin 11.4 g/dL      Hematocrit 35.0 %      MCV 93.6 fL      MCH 30.5 pg      MCHC 32.6 g/dL      RDW 14.5 %      RDW-SD 49.6 fl      MPV 9.7 fL      Platelets 237 10*3/mm3      Neutrophil % 72.8 %      Lymphocyte % 11.4 %      Monocyte % 9.3 %      Eosinophil % 6.0 %      Basophil % 0.0 %      Immature Grans % 0.5 %      Neutrophils, Absolute 4.40 10*3/mm3      Lymphocytes, Absolute 0.69 10*3/mm3      Monocytes, Absolute 0.56 10*3/mm3      Eosinophils, Absolute 0.36 10*3/mm3      Basophils, Absolute 0.00 10*3/mm3      Immature Grans, Absolute 0.03 10*3/mm3      nRBC 0.0 /100 WBC     Urine Drug Screen - Urine, Clean Catch [[id number]]  (Normal) Collected: 10/20/22 2227    Specimen: Urine, Clean Catch Updated: 10/20/22 2305     Amphet/Methamphet, Screen Negative     Barbiturates Screen, Urine Negative     Benzodiazepine Screen, Urine Negative     Cocaine Screen, Urine Negative     Opiate Screen Negative     THC, Screen, Urine Negative     Methadone Screen, Urine Negative     Oxycodone Screen, Urine Negative    Narrative:       Negative Thresholds Per Drugs Screened:    Amphetamines                 500 ng/ml  Barbiturates                 200 ng/ml  Benzodiazepines              100 ng/ml  Cocaine                      300 ng/ml  Methadone                    300 ng/ml  Opiates                      300 ng/ml  Oxycodone                    100 ng/ml  THC                           50 ng/ml    The Normal Value for all drugs tested is negative. This report includes final unconfirmed screening results to be used for medical treatment purposes only. Unconfirmed results must not be used for non-medical purposes such as employment or legal testing. Clinical consideration should be applied to any drug of abuse test, particularly when unconfirmed results are used.                   Imaging:  No Radiology Exams Resulted Within Past 24 Hours    Progress  ED Course as of 10/21/22 0130   Thu Oct 20, 2022   2211 --- PROVIDER IN TRIAGE NOTE ---    The patient was seen and evaluated by donald Bailey in triage. Orders were placed and the patient is currently awaiting disposition. [KS]   Fri Oct 21, 2022   0126 Patient has been seen and evaluated by Quynh with [facility].  He is not acutely suicidal or homicidal and can be discharged outpatient with follow-up on Tuesday morning at 9:30 AM [DS]      ED Course User Index  [DS] Chasidy Pope APRN  [KS] Maria Elena Bailey APRN                            The patient was initially evaluated in the triage area where orders were placed. The patient was later dispositioned by DAV Hawkins.      The patient was advised to stay for completion of workup which includes but is not limited to communication of labs and radiological results, reassessment and plan. The patient was advised that leaving prior to disposition by a provider could result in critical findings that are not communicated to the patient.     Medical Decision Making:  MDM  Number of Diagnoses or Management Options  Anxiety  Other  depression  Diagnosis management comments: Patient has been seen and evaluated for a psychiatric review by Quynh from [facility].  Patient is not actively suicidal or homicidal.  Patient has contracted for safety and will follow-up outpatient       Amount and/or Complexity of Data Reviewed  Clinical lab tests: reviewed and ordered  Obtain history from someone other than the patient: yes (Daughter)    Risk of Complications, Morbidity, and/or Mortality  Presenting problems: low  Diagnostic procedures: low  Management options: low    Patient Progress  Patient progress: stable           The following orders were placed after triage and evaluation:  Orders Placed This Encounter   Procedures   • [place] Draw   • Comprehensive Metabolic Panel   • Acetaminophen Level   • Ethanol   • Salicylate Level   • Urine Drug Screen - Urine, Clean Catch   • TSH   • T4, Free   • CBC Auto Differential   • NPO Diet NPO Type: Sips with Meds   • Cardiac Monitoring   • Continuous Pulse Oximetry   • Vital Signs   • Undress & Gown   • Inpatient [facility] Consult   • Insert Peripheral IV   • CBC & Differential   • Green Top (Gel)   • Lavender Top   • Gold Top - SST   • Light Blue Top       Final diagnoses:   Other depression   Anxiety          Disposition:  ED Disposition     ED Disposition   Discharge    Condition   Stable    Comment   --             This medical record created using voice recognition software.           Chasidy Pope, APRN  10/21/22 0131

## 2022-10-21 NOTE — DISCHARGE INSTRUCTIONS
Follow-up Tuesday as arranged at [facility] at 930 to discuss anxiety, depression and need for sleep medication    Return for new or worsening symptoms

## 2022-11-04 ENCOUNTER — HOSPITAL ENCOUNTER (EMERGENCY)
Facility: HOSPITAL | Age: 72
Discharge: LEFT BEFORE DISCHARGE | End: 2022-11-05

## 2022-11-04 VITALS
RESPIRATION RATE: 18 BRPM | OXYGEN SATURATION: 99 % | HEART RATE: 56 BPM | TEMPERATURE: 98.2 F | HEIGHT: 73 IN | BODY MASS INDEX: 26.88 KG/M2 | SYSTOLIC BLOOD PRESSURE: 139 MMHG | DIASTOLIC BLOOD PRESSURE: 75 MMHG | WEIGHT: 202.82 LBS

## 2022-11-04 LAB
ALBUMIN SERPL-MCNC: 4.4 G/DL (ref 3.5–5.2)
ALBUMIN/GLOB SERPL: 1.7 G/DL
ALP SERPL-CCNC: 108 U/L (ref 39–117)
ALT SERPL W P-5'-P-CCNC: 20 U/L (ref 1–41)
ANION GAP SERPL CALCULATED.3IONS-SCNC: 9.2 MMOL/L (ref 5–15)
AST SERPL-CCNC: 23 U/L (ref 1–40)
BASOPHILS # BLD AUTO: 0 10*3/MM3 (ref 0–0.2)
BASOPHILS NFR BLD AUTO: 0 % (ref 0–1.5)
BILIRUB SERPL-MCNC: 0.5 MG/DL (ref 0–1.2)
BILIRUB UR QL STRIP: NEGATIVE
BUN SERPL-MCNC: 17 MG/DL (ref 8–23)
BUN/CREAT SERPL: 15.5 (ref 7–25)
CALCIUM SPEC-SCNC: 9.6 MG/DL (ref 8.6–10.5)
CHLORIDE SERPL-SCNC: 105 MMOL/L (ref 98–107)
CLARITY UR: CLEAR
CO2 SERPL-SCNC: 26.8 MMOL/L (ref 22–29)
COLOR UR: YELLOW
CREAT SERPL-MCNC: 1.1 MG/DL (ref 0.76–1.27)
D-LACTATE SERPL-SCNC: 0.9 MMOL/L (ref 0.5–2)
DEPRECATED RDW RBC AUTO: 50.5 FL (ref 37–54)
EGFRCR SERPLBLD CKD-EPI 2021: 70.9 ML/MIN/1.73
EOSINOPHIL # BLD AUTO: 0.35 10*3/MM3 (ref 0–0.4)
EOSINOPHIL NFR BLD AUTO: 6.2 % (ref 0.3–6.2)
ERYTHROCYTE [DISTWIDTH] IN BLOOD BY AUTOMATED COUNT: 14.5 % (ref 12.3–15.4)
GLOBULIN UR ELPH-MCNC: 2.6 GM/DL
GLUCOSE SERPL-MCNC: 108 MG/DL (ref 65–99)
GLUCOSE UR STRIP-MCNC: NEGATIVE MG/DL
HCT VFR BLD AUTO: 35 % (ref 37.5–51)
HGB BLD-MCNC: 11.3 G/DL (ref 13–17.7)
HGB UR QL STRIP.AUTO: NEGATIVE
HOLD SPECIMEN: NORMAL
HOLD SPECIMEN: NORMAL
IMM GRANULOCYTES # BLD AUTO: 0.01 10*3/MM3 (ref 0–0.05)
IMM GRANULOCYTES NFR BLD AUTO: 0.2 % (ref 0–0.5)
KETONES UR QL STRIP: NEGATIVE
LEUKOCYTE ESTERASE UR QL STRIP.AUTO: NEGATIVE
LIPASE SERPL-CCNC: 25 U/L (ref 13–60)
LYMPHOCYTES # BLD AUTO: 0.99 10*3/MM3 (ref 0.7–3.1)
LYMPHOCYTES NFR BLD AUTO: 17.6 % (ref 19.6–45.3)
MCH RBC QN AUTO: 30.4 PG (ref 26.6–33)
MCHC RBC AUTO-ENTMCNC: 32.3 G/DL (ref 31.5–35.7)
MCV RBC AUTO: 94.1 FL (ref 79–97)
MONOCYTES # BLD AUTO: 0.67 10*3/MM3 (ref 0.1–0.9)
MONOCYTES NFR BLD AUTO: 11.9 % (ref 5–12)
NEUTROPHILS NFR BLD AUTO: 3.61 10*3/MM3 (ref 1.7–7)
NEUTROPHILS NFR BLD AUTO: 64.1 % (ref 42.7–76)
NITRITE UR QL STRIP: NEGATIVE
NRBC BLD AUTO-RTO: 0 /100 WBC (ref 0–0.2)
PH UR STRIP.AUTO: 7.5 [PH] (ref 5–8)
PLATELET # BLD AUTO: 238 10*3/MM3 (ref 140–450)
PMV BLD AUTO: 9.8 FL (ref 6–12)
POTASSIUM SERPL-SCNC: 4 MMOL/L (ref 3.5–5.2)
PROT SERPL-MCNC: 7 G/DL (ref 6–8.5)
PROT UR QL STRIP: NEGATIVE
RBC # BLD AUTO: 3.72 10*6/MM3 (ref 4.14–5.8)
SODIUM SERPL-SCNC: 141 MMOL/L (ref 136–145)
SP GR UR STRIP: <=1.005 (ref 1–1.03)
UROBILINOGEN UR QL STRIP: NORMAL
WBC NRBC COR # BLD: 5.63 10*3/MM3 (ref 3.4–10.8)
WHOLE BLOOD HOLD COAG: NORMAL
WHOLE BLOOD HOLD SPECIMEN: NORMAL

## 2022-11-04 PROCEDURE — 85025 COMPLETE CBC W/AUTO DIFF WBC: CPT

## 2022-11-04 PROCEDURE — 80053 COMPREHEN METABOLIC PANEL: CPT

## 2022-11-04 PROCEDURE — 83690 ASSAY OF LIPASE: CPT

## 2022-11-04 PROCEDURE — 83605 ASSAY OF LACTIC ACID: CPT

## 2022-11-04 PROCEDURE — 81003 URINALYSIS AUTO W/O SCOPE: CPT

## 2022-11-04 PROCEDURE — 99211 OFF/OP EST MAY X REQ PHY/QHP: CPT

## 2022-11-04 PROCEDURE — 36415 COLL VENOUS BLD VENIPUNCTURE: CPT

## 2022-11-04 RX ORDER — SODIUM CHLORIDE 0.9 % (FLUSH) 0.9 %
10 SYRINGE (ML) INJECTION AS NEEDED
Status: DISCONTINUED | OUTPATIENT
Start: 2022-11-04 | End: 2022-11-05 | Stop reason: HOSPADM

## 2022-11-05 NOTE — ED TRIAGE NOTES
"Pt to ED from home with reports of couple day history of waking up with abdomen and BL hands burning and numb.      Pt states hands are not numb currently, but states he does have abdominal pain/burning.  Pt also reports urinary frequency, stating he \"pees every five minutes\" and reports intermittent dysuria.    Pt states during these episodes he checks blood pressure and it is 195/100 with .  "

## 2022-11-21 RX ORDER — CLOPIDOGREL BISULFATE 75 MG/1
TABLET ORAL
Qty: 180 TABLET | Refills: 0 | Status: SHIPPED | OUTPATIENT
Start: 2022-11-21 | End: 2022-12-27 | Stop reason: SDUPTHER

## 2022-12-13 ENCOUNTER — APPOINTMENT (OUTPATIENT)
Dept: GENERAL RADIOLOGY | Facility: HOSPITAL | Age: 72
End: 2022-12-13

## 2022-12-13 ENCOUNTER — HOSPITAL ENCOUNTER (EMERGENCY)
Facility: HOSPITAL | Age: 72
Discharge: PSYCHIATRIC OR SUBSTANCE USE FACILITY | End: 2022-12-14
Attending: EMERGENCY MEDICINE | Admitting: EMERGENCY MEDICINE

## 2022-12-13 DIAGNOSIS — R45.851 DEPRESSION WITH SUICIDAL IDEATION: Primary | ICD-10-CM

## 2022-12-13 DIAGNOSIS — R45.850 HOMICIDAL IDEATION: ICD-10-CM

## 2022-12-13 DIAGNOSIS — F32.A DEPRESSION WITH SUICIDAL IDEATION: Primary | ICD-10-CM

## 2022-12-13 LAB
ALBUMIN SERPL-MCNC: 4.7 G/DL (ref 3.5–5.2)
ALBUMIN/GLOB SERPL: 1.9 G/DL
ALP SERPL-CCNC: 122 U/L (ref 39–117)
ALT SERPL W P-5'-P-CCNC: 27 U/L (ref 1–41)
AMPHET+METHAMPHET UR QL: NEGATIVE
ANION GAP SERPL CALCULATED.3IONS-SCNC: 9.5 MMOL/L (ref 5–15)
APAP SERPL-MCNC: <5 MCG/ML (ref 0–30)
AST SERPL-CCNC: 31 U/L (ref 1–40)
BARBITURATES UR QL SCN: NEGATIVE
BASOPHILS # BLD AUTO: 0 10*3/MM3 (ref 0–0.2)
BASOPHILS NFR BLD AUTO: 0 % (ref 0–1.5)
BENZODIAZ UR QL SCN: NEGATIVE
BILIRUB SERPL-MCNC: 0.7 MG/DL (ref 0–1.2)
BILIRUB UR QL STRIP: NEGATIVE
BUN SERPL-MCNC: 13 MG/DL (ref 8–23)
BUN/CREAT SERPL: 12.1 (ref 7–25)
CALCIUM SPEC-SCNC: 9.2 MG/DL (ref 8.6–10.5)
CANNABINOIDS SERPL QL: NEGATIVE
CHLORIDE SERPL-SCNC: 101 MMOL/L (ref 98–107)
CLARITY UR: CLEAR
CO2 SERPL-SCNC: 27.5 MMOL/L (ref 22–29)
COCAINE UR QL: NEGATIVE
COLOR UR: YELLOW
CREAT SERPL-MCNC: 1.07 MG/DL (ref 0.76–1.27)
DEPRECATED RDW RBC AUTO: 47.1 FL (ref 37–54)
EGFRCR SERPLBLD CKD-EPI 2021: 73.3 ML/MIN/1.73
EOSINOPHIL # BLD AUTO: 0.15 10*3/MM3 (ref 0–0.4)
EOSINOPHIL NFR BLD AUTO: 2.3 % (ref 0.3–6.2)
ERYTHROCYTE [DISTWIDTH] IN BLOOD BY AUTOMATED COUNT: 14 % (ref 12.3–15.4)
ETHANOL BLD-MCNC: <10 MG/DL (ref 0–10)
ETHANOL UR QL: <0.01 %
FLUAV AG NPH QL: NEGATIVE
FLUBV AG NPH QL IA: NEGATIVE
GLOBULIN UR ELPH-MCNC: 2.5 GM/DL
GLUCOSE SERPL-MCNC: 106 MG/DL (ref 65–99)
GLUCOSE UR STRIP-MCNC: NEGATIVE MG/DL
HCT VFR BLD AUTO: 35.9 % (ref 37.5–51)
HGB BLD-MCNC: 11.9 G/DL (ref 13–17.7)
HGB UR QL STRIP.AUTO: NEGATIVE
HOLD SPECIMEN: NORMAL
HOLD SPECIMEN: NORMAL
IMM GRANULOCYTES # BLD AUTO: 0.01 10*3/MM3 (ref 0–0.05)
IMM GRANULOCYTES NFR BLD AUTO: 0.2 % (ref 0–0.5)
KETONES UR QL STRIP: NEGATIVE
LEUKOCYTE ESTERASE UR QL STRIP.AUTO: NEGATIVE
LYMPHOCYTES # BLD AUTO: 0.97 10*3/MM3 (ref 0.7–3.1)
LYMPHOCYTES NFR BLD AUTO: 14.7 % (ref 19.6–45.3)
MCH RBC QN AUTO: 31 PG (ref 26.6–33)
MCHC RBC AUTO-ENTMCNC: 33.1 G/DL (ref 31.5–35.7)
MCV RBC AUTO: 93.5 FL (ref 79–97)
METHADONE UR QL SCN: NEGATIVE
MONOCYTES # BLD AUTO: 0.74 10*3/MM3 (ref 0.1–0.9)
MONOCYTES NFR BLD AUTO: 11.2 % (ref 5–12)
NEUTROPHILS NFR BLD AUTO: 4.74 10*3/MM3 (ref 1.7–7)
NEUTROPHILS NFR BLD AUTO: 71.6 % (ref 42.7–76)
NITRITE UR QL STRIP: NEGATIVE
NRBC BLD AUTO-RTO: 0 /100 WBC (ref 0–0.2)
OPIATES UR QL: NEGATIVE
OXYCODONE UR QL SCN: NEGATIVE
PH UR STRIP.AUTO: 7.5 [PH] (ref 5–8)
PLATELET # BLD AUTO: 226 10*3/MM3 (ref 140–450)
PMV BLD AUTO: 9.4 FL (ref 6–12)
POTASSIUM SERPL-SCNC: 4 MMOL/L (ref 3.5–5.2)
PROT SERPL-MCNC: 7.2 G/DL (ref 6–8.5)
PROT UR QL STRIP: NEGATIVE
RBC # BLD AUTO: 3.84 10*6/MM3 (ref 4.14–5.8)
SALICYLATES SERPL-MCNC: <0.3 MG/DL
SARS-COV-2 RNA RESP QL NAA+PROBE: NOT DETECTED
SODIUM SERPL-SCNC: 138 MMOL/L (ref 136–145)
SP GR UR STRIP: <=1.005 (ref 1–1.03)
UROBILINOGEN UR QL STRIP: NORMAL
WBC NRBC COR # BLD: 6.61 10*3/MM3 (ref 3.4–10.8)
WHOLE BLOOD HOLD COAG: NORMAL
WHOLE BLOOD HOLD SPECIMEN: NORMAL

## 2022-12-13 PROCEDURE — 80053 COMPREHEN METABOLIC PANEL: CPT

## 2022-12-13 PROCEDURE — 81003 URINALYSIS AUTO W/O SCOPE: CPT | Performed by: NURSE PRACTITIONER

## 2022-12-13 PROCEDURE — 80307 DRUG TEST PRSMV CHEM ANLYZR: CPT

## 2022-12-13 PROCEDURE — 82077 ASSAY SPEC XCP UR&BREATH IA: CPT

## 2022-12-13 PROCEDURE — 93005 ELECTROCARDIOGRAM TRACING: CPT | Performed by: NURSE PRACTITIONER

## 2022-12-13 PROCEDURE — 87804 INFLUENZA ASSAY W/OPTIC: CPT | Performed by: NURSE PRACTITIONER

## 2022-12-13 PROCEDURE — 93010 ELECTROCARDIOGRAM REPORT: CPT | Performed by: INTERNAL MEDICINE

## 2022-12-13 PROCEDURE — 99284 EMERGENCY DEPT VISIT MOD MDM: CPT

## 2022-12-13 PROCEDURE — 85025 COMPLETE CBC W/AUTO DIFF WBC: CPT

## 2022-12-13 PROCEDURE — 36415 COLL VENOUS BLD VENIPUNCTURE: CPT

## 2022-12-13 PROCEDURE — 71045 X-RAY EXAM CHEST 1 VIEW: CPT

## 2022-12-13 PROCEDURE — 80143 DRUG ASSAY ACETAMINOPHEN: CPT

## 2022-12-13 PROCEDURE — 80179 DRUG ASSAY SALICYLATE: CPT

## 2022-12-13 PROCEDURE — U0003 INFECTIOUS AGENT DETECTION BY NUCLEIC ACID (DNA OR RNA); SEVERE ACUTE RESPIRATORY SYNDROME CORONAVIRUS 2 (SARS-COV-2) (CORONAVIRUS DISEASE [COVID-19]), AMPLIFIED PROBE TECHNIQUE, MAKING USE OF HIGH THROUGHPUT TECHNOLOGIES AS DESCRIBED BY CMS-2020-01-R: HCPCS | Performed by: EMERGENCY MEDICINE

## 2022-12-13 PROCEDURE — U0005 INFEC AGEN DETEC AMPLI PROBE: HCPCS | Performed by: EMERGENCY MEDICINE

## 2022-12-13 RX ORDER — SODIUM CHLORIDE 0.9 % (FLUSH) 0.9 %
10 SYRINGE (ML) INJECTION AS NEEDED
Status: DISCONTINUED | OUTPATIENT
Start: 2022-12-13 | End: 2022-12-14 | Stop reason: HOSPADM

## 2022-12-14 VITALS
DIASTOLIC BLOOD PRESSURE: 59 MMHG | OXYGEN SATURATION: 98 % | SYSTOLIC BLOOD PRESSURE: 114 MMHG | WEIGHT: 202.82 LBS | TEMPERATURE: 99.3 F | RESPIRATION RATE: 18 BRPM | BODY MASS INDEX: 26.88 KG/M2 | HEART RATE: 62 BPM | HEIGHT: 73 IN

## 2022-12-14 LAB — QT INTERVAL: 446 MS

## 2022-12-14 NOTE — SIGNIFICANT NOTE
12/13/22 8489   Plan   Final Note ELIZABETH sent referrals to the following: [facility] and [facility] regarding placement this date. ELIZABETH updated provider.

## 2022-12-14 NOTE — ED PROVIDER NOTES
Time: 7:47 PM EST  Chief Complaint:   Chief Complaint   Patient presents with   • Suicidal   • Homicidal           History of Present Illness:  Patient is a 72 y.o. year old male who presents to the emergency department with thoughts of suicide.  He says he would shoot himself and has access to multiple loaded guns.  He says it is because of his wife.  He says she is driving him crazy.  He says she is accusing him of messing around with a female coworker.          History provided by:  Patient  Suicidal  Presenting symptoms: depression, homicidal ideas, suicidal thoughts and suicidal threats    Degree of incapacity (severity):  Severe  Onset quality:  Unable to specify  Timing:  Constant  Progression:  Unchanged  Chronicity:  Recurrent  Context comment:  Patient states his wife accuses him of cheating on her and she is driving him crazy  Treatment compliance:  All of the time  Time since last psychoactive medication taken:  12 hours  Relieved by:  Nothing  Worsened by:  Nothing  Ineffective treatments:  None tried  Associated symptoms: irritability    Associated symptoms: no abdominal pain, no anxiety, no appetite change, no chest pain, no fatigue and no headaches    Homicidal  Associated symptoms: no abdominal pain, no chest pain, no cough, no diarrhea, no ear pain, no fatigue, no fever, no headaches, no myalgias, no rash, no rhinorrhea, no shortness of breath, no sore throat and no vomiting            Patient Care Team  Primary Care Provider: Edith Marcelo APRN    Past Medical History:     No Known Allergies  Past Medical History:   Diagnosis Date   • A-fib (HCC)    • Abnormal ECG    • Anxiety    • Arrhythmia    • Arthritis    • Atrial fibrillation (HCC)    • Bladder disorder    • BPH (benign prostatic hyperplasia)    • CAD (coronary artery disease)    • Cervical spondylosis without myelopathy 01/15/2020   • Cervicalgia    • Chest pain    • Colitis    • Condition not found HERNIA   • Depression    •  Diverticulitis    • Diverticulosis of colon    • GERD (gastroesophageal reflux disease)    • H/O degenerative disc disease    • Heart attack (HCC)    • Heart disease    • Hemorrhoids    • High cholesterol    • Hypertension    • IBS (irritable bowel syndrome)    • Mood disorder (HCC)    • Muscle cramps    • Myocardial infarction (HCC)    • Osteoarthritis    • Prostate disorder    • S/P lumpectomy, right breast     CYST 2016     Past Surgical History:   Procedure Laterality Date   • APPENDECTOMY     • BREAST MASS EXCISION  2016   • CARDIAC CATHETERIZATION  2016   • CHOLECYSTECTOMY     • COLONOSCOPY  2008,2014,2021   • CORONARY ANGIOPLASTY WITH STENT PLACEMENT  2010   • ENDOSCOPY  2010,2019   • ENDOSCOPY N/A 7/20/2022    Procedure: ESOPHAGOGASTRODUODENOSCOPY WITH BIOPSY;  Surgeon: Nigel Haas MD;  Location: [facility] ENDOSCOPY;  Service: Gastroenterology;  Laterality: N/A;  HIATAL HERNIA   • HEMORRHOIDECTOMY  2019   • INGUINAL HERNIA REPAIR  2019   • TURP / TRANSURETHRAL INCISION / DRAINAGE PROSTATE  01/16/2020    BUTTON TURP W/ DR TAM   • UMBILICAL HERNIA REPAIR  2019   • UPPER GASTROINTESTINAL ENDOSCOPY       Family History   Problem Relation Age of Onset   • Other Mother         bladder stones   • Arthritis Mother    • Heart disease Mother    • Heart failure Father    • Stroke Father    • Colon cancer Paternal Grandfather         60's   • Malig Hyperthermia Neg Hx        Home Medications:  Prior to Admission medications    Medication Sig Start Date End Date Taking? Authorizing Provider   apixaban (ELIQUIS) 5 MG tablet tablet Take 1 tablet by mouth Every 12 (Twelve) Hours. 12/10/21   Mayank Sheehan MD   atorvastatin (LIPITOR) 80 MG tablet Take 1 tablet by mouth at bedtime daily 3/24/22   Mayank Sheehan MD   clonazePAM (KlonoPIN) 0.5 MG tablet Take 1 tablet by mouth every 6 to 8 hours as needed for Anxiety. 4/1/22   Provider, MD Elian   clopidogrel (PLAVIX) 75 MG tablet TAKE ONE TABLET BY MOUTH EVERY  DAY 11/21/22   Mayank Sheehan MD   desvenlafaxine (PRISTIQ) 100 MG 24 hr tablet Take 100 mg by mouth Daily. 9/7/22 9/8/23  Elian Merrill MD   lisinopril (PRINIVIL,ZESTRIL) 5 MG tablet Take 5 mg by mouth Daily. 6/18/21   Elian Merrill MD   metoprolol succinate XL (TOPROL-XL) 25 MG 24 hr tablet Take 1 tablet by mouth 2 (Two) Times a Day. 6/23/22   Mayank Sheehan MD   mirtazapine (REMERON) 30 MG tablet Take 30 mg by mouth.    Elian Merrill MD   multivitamin with minerals tablet tablet Take 1 tablet by mouth Daily.    Elian Merrill MD   nitroglycerin (NITROSTAT) 0.4 MG SL tablet Place 1 tablet under the tongue Every 5 (Five) Minutes As Needed for Chest Pain (no more than 3 doses in 15 minutes). 12/10/21   Maynak Sheehan MD   nitroglycerin (NITROSTAT) 0.4 MG SL tablet Place 0.4 mg under the tongue. 6/28/22 6/29/23  Elian Merrill MD   sildenafil (Viagra) 100 MG tablet Take 1 tablet by mouth As Needed for Erectile Dysfunction. 10/12/22   Mallorie Matt MD   tadalafil (CIALIS) 5 MG tablet Take 1 tablet by mouth Daily for 360 days. 10/12/22 10/7/23  Mallorie Matt MD   tamsulosin (FLOMAX) 0.4 MG capsule 24 hr capsule Take 1 capsule by mouth Daily for 360 days. 10/12/22 10/7/23  Mallorie Matt MD   traMADol (ULTRAM) 50 MG tablet Take 50 mg by mouth Every 6 (Six) Hours As Needed for Moderate Pain .    Emergency, Nurse Epic, RN   triamcinolone (KENALOG) 0.5 % cream Apply 1 application topically to the appropriate area as directed 2 (Two) Times a Day.    Elian Merrill MD        Social History:   Social History     Tobacco Use   • Smoking status: Passive Smoke Exposure - Never Smoker   • Smokeless tobacco: Never   Vaping Use   • Vaping Use: Never used   Substance Use Topics   • Alcohol use: Never   • Drug use: Never         Review of Systems:  Review of Systems   Constitutional: Positive for irritability. Negative for appetite change, chills, fatigue and fever.   HENT:  "Negative for ear pain, rhinorrhea and sore throat.    Eyes: Negative for visual disturbance.   Respiratory: Negative for cough and shortness of breath.    Cardiovascular: Negative for chest pain.   Gastrointestinal: Negative for abdominal pain, diarrhea and vomiting.   Genitourinary: Negative for difficulty urinating.   Musculoskeletal: Negative for arthralgias, back pain and myalgias.   Skin: Negative for rash.   Neurological: Negative for light-headedness and headaches.   Hematological: Negative for adenopathy.   Psychiatric/Behavioral: Positive for dysphoric mood, homicidal ideas and suicidal ideas. The patient is not nervous/anxious.         Physical Exam:  /90   Pulse 69   Temp 98.6 °F (37 °C) (Oral)   Resp 18   Ht 185.4 cm (73\")   Wt 92 kg (202 lb 13.2 oz)   SpO2 97%   BMI 26.76 kg/m²     Physical Exam  Vitals and nursing note reviewed.   Constitutional:       General: He is not in acute distress.     Appearance: Normal appearance. He is not ill-appearing or toxic-appearing.   HENT:      Head: Normocephalic and atraumatic.      Nose: Nose normal.      Mouth/Throat:      Mouth: Mucous membranes are moist.   Eyes:      Conjunctiva/sclera: Conjunctivae normal.   Cardiovascular:      Rate and Rhythm: Normal rate and regular rhythm.      Pulses: Normal pulses.      Heart sounds: Normal heart sounds.   Pulmonary:      Effort: Pulmonary effort is normal.      Breath sounds: Normal breath sounds.   Abdominal:      General: Bowel sounds are normal.      Palpations: Abdomen is soft.      Tenderness: There is no abdominal tenderness.   Musculoskeletal:         General: Normal range of motion.      Cervical back: Normal range of motion.   Skin:     General: Skin is warm and dry.   Neurological:      General: No focal deficit present.      Mental Status: He is alert and oriented to person, place, and time.   Psychiatric:         Thought Content: Thought content includes suicidal ideation. Thought content does " not include homicidal ideation. Thought content includes suicidal plan. Thought content does not include homicidal plan.      Comments: Patient does have suicidal ideation.  He says he has guns and he will shoot himself.    He also states his wife is driving him crazy and he is feeling homicidal as well    No hallucinations                Medications in the Emergency Department:  Medications   sodium chloride 0.9 % flush 10 mL (has no administration in time range)        Labs  Lab Results (last 24 hours)     Procedure Component Value Units Date/Time    CBC & Differential [[id number]]  (Abnormal) Collected: 12/13/22 1955    Specimen: Blood from Arm, Right Updated: 12/13/22 2005    Narrative:      The following orders were created for panel order CBC & Differential.  Procedure                               Abnormality         Status                     ---------                               -----------         ------                     CBC Auto Differential[[id number]]        Abnormal            Final result                 Please view results for these tests on the individual orders.    Comprehensive Metabolic Panel [[id number]]  (Abnormal) Collected: 12/13/22 1955    Specimen: Blood from Arm, Right Updated: 12/13/22 2028     Glucose 106 mg/dL      BUN 13 mg/dL      Creatinine 1.07 mg/dL      Sodium 138 mmol/L      Potassium 4.0 mmol/L      Chloride 101 mmol/L      CO2 27.5 mmol/L      Calcium 9.2 mg/dL      Total Protein 7.2 g/dL      Albumin 4.70 g/dL      ALT (SGPT) 27 U/L      AST (SGOT) 31 U/L      Alkaline Phosphatase 122 U/L      Total Bilirubin 0.7 mg/dL      Globulin 2.5 gm/dL      A/G Ratio 1.9 g/dL      BUN/Creatinine Ratio 12.1     Anion Gap 9.5 mmol/L      eGFR 73.3 mL/min/1.73      Comment: National Kidney Foundation and American Society of Nephrology (ASN) Task Force recommended calculation based on the Chronic Kidney Disease Epidemiology Collaboration (CKD-EPI) equation refit without adjustment for  race.       Narrative:      GFR Normal >60  Chronic Kidney Disease <60  Kidney Failure <15    The GFR formula is only valid for adults with stable renal function between ages 18 and 70.    Acetaminophen Level [[id number]]  (Normal) Collected: 12/13/22 1955    Specimen: Blood from Arm, Right Updated: 12/13/22 2028     Acetaminophen <5.0 mcg/mL     Ethanol [[id number]] Collected: 12/13/22 1955    Specimen: Blood from Arm, Right Updated: 12/13/22 2028     Ethanol <10 mg/dL      Ethanol % <0.010 %     Narrative:      Ethanol (Plasma)  <10 Essentially Negative    Toxic Concentrations           mg/dL    Flushing, slowing of reflexes    Impaired visual activity         Depression of CNS              >100  Possible Coma                  >300       Salicylate Level [[id number]]  (Normal) Collected: 12/13/22 1955    Specimen: Blood from Arm, Right Updated: 12/13/22 2028     Salicylate <0.3 mg/dL     CBC Auto Differential [[id number]]  (Abnormal) Collected: 12/13/22 1955    Specimen: Blood from Arm, Right Updated: 12/13/22 2005     WBC 6.61 10*3/mm3      RBC 3.84 10*6/mm3      Hemoglobin 11.9 g/dL      Hematocrit 35.9 %      MCV 93.5 fL      MCH 31.0 pg      MCHC 33.1 g/dL      RDW 14.0 %      RDW-SD 47.1 fl      MPV 9.4 fL      Platelets 226 10*3/mm3      Neutrophil % 71.6 %      Lymphocyte % 14.7 %      Monocyte % 11.2 %      Eosinophil % 2.3 %      Basophil % 0.0 %      Immature Grans % 0.2 %      Neutrophils, Absolute 4.74 10*3/mm3      Lymphocytes, Absolute 0.97 10*3/mm3      Monocytes, Absolute 0.74 10*3/mm3      Eosinophils, Absolute 0.15 10*3/mm3      Basophils, Absolute 0.00 10*3/mm3      Immature Grans, Absolute 0.01 10*3/mm3      nRBC 0.0 /100 WBC     Urine Drug Screen - Urine, Clean Catch [[id number]]  (Normal) Collected: 12/13/22 2026    Specimen: Urine, Clean Catch Updated: 12/13/22 2105     Amphet/Methamphet, Screen Negative     Barbiturates Screen, Urine Negative     Benzodiazepine Screen, Urine Negative      Cocaine Screen, Urine Negative     Opiate Screen Negative     THC, Screen, Urine Negative     Methadone Screen, Urine Negative     Oxycodone Screen, Urine Negative    Narrative:      Negative Thresholds Per Drugs Screened:    Amphetamines                 500 ng/ml  Barbiturates                 200 ng/ml  Benzodiazepines              100 ng/ml  Cocaine                      300 ng/ml  Methadone                    300 ng/ml  Opiates                      300 ng/ml  Oxycodone                    100 ng/ml  THC                           50 ng/ml    The Normal Value for all drugs tested is negative. This report includes final unconfirmed screening results to be used for medical treatment purposes only. Unconfirmed results must not be used for non-medical purposes such as employment or legal testing. Clinical consideration should be applied to any drug of abuse test, particularly when unconfirmed results are used.            Urinalysis With Microscopic If Indicated (No Culture) - Urine, Clean Catch [[id number]]  (Normal) Collected: 12/13/22 2026    Specimen: Urine, Clean Catch Updated: 12/13/22 2213     Color, UA Yellow     Appearance, UA Clear     pH, UA 7.5     Specific Gravity, UA <=1.005     Glucose, UA Negative     Ketones, UA Negative     Bilirubin, UA Negative     Blood, UA Negative     Protein, UA Negative     Leuk Esterase, UA Negative     Nitrite, UA Negative     Urobilinogen, UA 0.2 E.U./dL    Narrative:      Urine microscopic not indicated.    COVID-19,CEPHEID/YURI,COR/EDUARDO/PAD/MYRNA/MAD IN-HOUSE(OR EMERGENT/ADD-ON),NP SWAB IN TRANSPORT MEDIA 3-4 HR TAT, RT-PCR - Swab, Nasopharynx [[id number]]  (Normal) Collected: 12/13/22 2228    Specimen: Swab from Nasopharynx Updated: 12/13/22 2313     COVID19 Not Detected    Narrative:      Fact sheet for providers: https://www.fda.gov/media/093471/download     Fact sheet for patients: https://www.fda.gov/media/752806/download  Fact sheet for providers:  https://www.fda.gov/media/149756/download     Fact sheet for patients: https://www.fda.gov/media/977657/download    Influenza Antigen, Rapid - Swab, Nasopharynx [[id number]]  (Normal) Collected: 12/13/22 2246    Specimen: Swab from Nasopharynx Updated: 12/13/22 2315     Influenza A Ag, EIA Negative     Influenza B Ag, EIA Negative           Imaging:  XR Chest 1 View    Result Date: 12/13/2022  PROCEDURE: XR CHEST 1 VW  COMPARISON: 8/1/2022.  INDICATIONS: medical clearance for psych transfer  FINDINGS:  Two views of the chest (AP upright portable projections) reveal no cardiac enlargement and no acute infiltrate.  No pleural effusion or pneumothorax is seen.  Chronic calcified granulomatous disease involves the chest.  No significant interval change is seen since the prior study.        No acute infiltrate is appreciated.      Please note that portions of this note were completed with a voice recognition program.  SHIRLEY POOL JR, MD       Electronically Signed and Approved By: SHIRLEY POOL JR, MD on 12/13/2022 at 23:16                Procedures:  Procedures    Progress  ED Course as of 12/13/22 2355   Tue Dec 13, 2022   2322 XR Chest 1 View  No acute findings [DS]      ED Course User Index  [DS] Chasidy Pope APRN                            The patient was initially evaluated in the triage area where orders were placed. The patient was later dispositioned by DAV Hawkins.      The patient was advised to stay for completion of workup which includes but is not limited to communication of labs and radiological results, reassessment and plan. The patient was advised that leaving prior to disposition by a provider could result in critical findings that are not communicated to the patient.     Medical Decision Making:  MDM  Number of Diagnoses or Management Options  Depression with suicidal ideation  Homicidal ideation  Diagnosis management comments: Patient has been [facility] at [facility] for psychiatric  inpatient treatment.  Patient is a voluntary admit.  Patient agrees with this plan.    Patient is stable for transport.  Vital signs have been stable.       Amount and/or Complexity of Data Reviewed  Clinical lab tests: reviewed and ordered  Tests in the radiology section of CPT®: reviewed and ordered  Tests in the medicine section of CPT®: reviewed and ordered    Risk of Complications, Morbidity, and/or Mortality  Presenting problems: moderate  Diagnostic procedures: low  Management options: low    Patient Progress  Patient progress: stable           The following orders were placed after triage and evaluation:  Orders Placed This Encounter   Procedures   • COVID-19,CEPHEID/YURI,COR/EDUARDO/PAD/MYRNA/MAD IN-HOUSE(OR EMERGENT/ADD-ON),NP SWAB IN TRANSPORT MEDIA 3-4 HR TAT, RT-PCR - Swab, Nasopharynx   • Influenza Antigen, Rapid - Swab, Nasopharynx   • XR Chest 1 View   • [place] Draw   • Comprehensive Metabolic Panel   • Acetaminophen Level   • Ethanol   • Salicylate Level   • Urine Drug Screen - Urine, Clean Catch   • CBC Auto Differential   • Urinalysis With Microscopic If Indicated (No Culture) - Urine, Clean Catch   • NPO Diet NPO Type: Strict NPO   • Cardiac Monitoring   • Continuous Pulse Oximetry   • Vital Signs   • Undress & Gown   • Psych / Access to See   • POC Glucose Once   • ECG 12 Lead Other; Medical clearance for psych evaluation transfer   • Insert Peripheral IV   • Suicide Precautions   • CBC & Differential   • Green Top (Gel)   • Lavender Top   • Gold Top - SST   • Light Blue Top       Final diagnoses:   Depression with suicidal ideation   Homicidal ideation          Disposition:  ED Disposition     ED Disposition   DC/Transfer to Behavioral Health Condition   Stable    Comment   --             This medical record created using voice recognition software.           Chasidy Pope, APRN  12/13/22 5971
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
## 2022-12-27 ENCOUNTER — OFFICE VISIT (OUTPATIENT)
Dept: CARDIOLOGY | Facility: CLINIC | Age: 72
End: 2022-12-27

## 2022-12-27 VITALS
BODY MASS INDEX: 27.17 KG/M2 | HEIGHT: 73 IN | HEART RATE: 63 BPM | SYSTOLIC BLOOD PRESSURE: 126 MMHG | DIASTOLIC BLOOD PRESSURE: 69 MMHG | WEIGHT: 205 LBS

## 2022-12-27 DIAGNOSIS — I25.10 CAD S/P PERCUTANEOUS CORONARY ANGIOPLASTY: Primary | ICD-10-CM

## 2022-12-27 DIAGNOSIS — E78.2 MIXED HYPERLIPIDEMIA: ICD-10-CM

## 2022-12-27 DIAGNOSIS — Z98.61 CAD S/P PERCUTANEOUS CORONARY ANGIOPLASTY: Primary | ICD-10-CM

## 2022-12-27 DIAGNOSIS — I71.21 ANEURYSM OF ASCENDING AORTA WITHOUT RUPTURE: ICD-10-CM

## 2022-12-27 DIAGNOSIS — I48.0 PAF (PAROXYSMAL ATRIAL FIBRILLATION): ICD-10-CM

## 2022-12-27 DIAGNOSIS — I10 ESSENTIAL HYPERTENSION: ICD-10-CM

## 2022-12-27 PROBLEM — R10.12 LEFT UPPER QUADRANT ABDOMINAL PAIN: Status: RESOLVED | Noted: 2022-06-10 | Resolved: 2022-12-27

## 2022-12-27 PROBLEM — K52.3 INDETERMINATE COLITIS: Status: ACTIVE | Noted: 2022-12-27

## 2022-12-27 PROBLEM — M47.812 CERVICAL SPONDYLOSIS WITHOUT MYELOPATHY: Status: ACTIVE | Noted: 2019-02-12

## 2022-12-27 PROBLEM — R11.0 NAUSEA: Status: RESOLVED | Noted: 2022-06-10 | Resolved: 2022-12-27

## 2022-12-27 PROBLEM — M19.90 OSTEOARTHRITIS: Status: ACTIVE | Noted: 2022-12-27

## 2022-12-27 PROBLEM — M47.817 LUMBOSACRAL SPONDYLOSIS WITHOUT MYELOPATHY: Status: ACTIVE | Noted: 2019-03-12

## 2022-12-27 PROBLEM — F41.9 ANXIETY AND DEPRESSION: Status: ACTIVE | Noted: 2021-09-30

## 2022-12-27 PROBLEM — Z98.890 S/P LUMPECTOMY, RIGHT BREAST: Status: ACTIVE | Noted: 2022-12-27

## 2022-12-27 PROBLEM — F32.A ANXIETY AND DEPRESSION: Status: ACTIVE | Noted: 2021-09-30

## 2022-12-27 PROBLEM — E78.00 HIGH CHOLESTEROL: Status: RESOLVED | Noted: 2021-08-23 | Resolved: 2022-12-27

## 2022-12-27 PROBLEM — K57.30 DIVERTICULOSIS OF COLON: Status: ACTIVE | Noted: 2022-12-27

## 2022-12-27 PROCEDURE — 99214 OFFICE O/P EST MOD 30 MIN: CPT | Performed by: NURSE PRACTITIONER

## 2022-12-27 RX ORDER — METOPROLOL SUCCINATE 25 MG/1
25 TABLET, EXTENDED RELEASE ORAL 2 TIMES DAILY
Qty: 90 TABLET | Refills: 3 | Status: SHIPPED | OUTPATIENT
Start: 2022-12-27

## 2022-12-27 RX ORDER — CLOPIDOGREL BISULFATE 75 MG/1
75 TABLET ORAL DAILY
Qty: 180 TABLET | Refills: 3 | Status: SHIPPED | OUTPATIENT
Start: 2022-12-27

## 2022-12-27 RX ORDER — ARIPIPRAZOLE 5 MG/1
5 TABLET ORAL DAILY
COMMUNITY
Start: 2022-12-22

## 2022-12-27 RX ORDER — ATORVASTATIN CALCIUM 80 MG/1
80 TABLET, FILM COATED ORAL DAILY
Qty: 90 TABLET | Refills: 3 | Status: SHIPPED | OUTPATIENT
Start: 2022-12-27

## 2022-12-27 NOTE — PROGRESS NOTES
Chief Complaint  Atrial Fibrillation, Coronary Artery Disease, Hypertension, Hyperlipidemia, and Follow-up    Subjective            History of Present Illness  Ari Olea is a 72-year-old white/ male patient who presents to the office today for follow-up.  He has CAD with prior stenting, paroxysmal atrial fibrillation, hypertension, hyperlipidemia, and a sending aortic aneurysm.  He reports compliance with all medications.  He denies any chest pain, shortness of breath, lightheadedness/dizziness, palpitations, or edema.    PMH  Past Medical History:   Diagnosis Date   • A-fib (Formerly [facility])    • Abnormal ECG    • Anxiety    • Arrhythmia    • Arthritis    • Atrial fibrillation (Formerly [facility])    • Bladder disorder    • BPH (benign prostatic hyperplasia)    • CAD (coronary artery disease)    • Cervical spondylosis without myelopathy 01/15/2020   • Cervicalgia    • Chest pain    • Colitis    • Condition not found HERNIA   • Depression    • Diverticulitis    • Diverticulosis of colon    • GERD (gastroesophageal reflux disease)    • H/O degenerative disc disease    • Heart attack (Formerly [facility])    • Heart disease    • Hemorrhoids    • High cholesterol    • Hypertension    • IBS (irritable bowel syndrome)    • Mood disorder (Formerly [facility])    • Muscle cramps    • Myocardial infarction (Formerly [facility])    • Osteoarthritis    • Paroxysmal atrial fibrillation 12/4/2021   • Prostate disorder    • S/P lumpectomy, right breast     CYST 2016         ALLERGY  No Known Allergies       SURGICALHX  Past Surgical History:   Procedure Laterality Date   • APPENDECTOMY     • BREAST MASS EXCISION  2016   • CARDIAC CATHETERIZATION  2016   • CHOLECYSTECTOMY     • COLONOSCOPY  2008,2014,2021   • CORONARY ANGIOPLASTY WITH STENT PLACEMENT  2010   • ENDOSCOPY  2010,2019   • ENDOSCOPY N/A 7/20/2022    Procedure: ESOPHAGOGASTRODUODENOSCOPY WITH BIOPSY;  Surgeon: Nigel Haas MD;  Location: [facility] ENDOSCOPY;  Service: Gastroenterology;  Laterality: N/A;  HIATAL HERNIA   •  HEMORRHOIDECTOMY  2019   • INGUINAL HERNIA REPAIR  2019   • TURP / TRANSURETHRAL INCISION / DRAINAGE PROSTATE  01/16/2020    BUTTON TURP W/ DR TAM   • UMBILICAL HERNIA REPAIR  2019   • UPPER GASTROINTESTINAL ENDOSCOPY            SOC  Social History     Socioeconomic History   • Marital status:    Tobacco Use   • Smoking status: Never     Passive exposure: Yes   • Smokeless tobacco: Never   Vaping Use   • Vaping Use: Never used   Substance and Sexual Activity   • Alcohol use: Never   • Drug use: Never   • Sexual activity: Defer         FAMHX  Family History   Problem Relation Age of Onset   • Other Mother         bladder stones   • Arthritis Mother    • Heart disease Mother    • Heart failure Father    • Stroke Father    • Colon cancer Paternal Grandfather         60's   • Malig Hyperthermia Neg Hx           MEDSIGONLY  Current Outpatient Medications on File Prior to Visit   Medication Sig   • apixaban (ELIQUIS) 5 MG tablet tablet Take 1 tablet by mouth Every 12 (Twelve) Hours.   • ARIPiprazole (ABILIFY) 5 MG tablet Take 5 mg by mouth Daily.   • atorvastatin (LIPITOR) 80 MG tablet Take 1 tablet by mouth at bedtime daily   • clonazePAM (KlonoPIN) 0.5 MG tablet Take 1 tablet by mouth every 6 to 8 hours as needed for Anxiety.   • clopidogrel (PLAVIX) 75 MG tablet TAKE ONE TABLET BY MOUTH EVERY DAY   • desvenlafaxine (PRISTIQ) 100 MG 24 hr tablet Take 100 mg by mouth Daily.   • lisinopril (PRINIVIL,ZESTRIL) 5 MG tablet Take 5 mg by mouth Daily.   • metoprolol succinate XL (TOPROL-XL) 25 MG 24 hr tablet Take 1 tablet by mouth 2 (Two) Times a Day.   • multivitamin with minerals tablet tablet Take 1 tablet by mouth Daily.   • nitroglycerin (NITROSTAT) 0.4 MG SL tablet Place 1 tablet under the tongue Every 5 (Five) Minutes As Needed for Chest Pain (no more than 3 doses in 15 minutes).   • nitroglycerin (NITROSTAT) 0.4 MG SL tablet Place 0.4 mg under the tongue.   • tadalafil (CIALIS) 5 MG tablet Take 1 tablet by  "mouth Daily for 360 days.   • tamsulosin (FLOMAX) 0.4 MG capsule 24 hr capsule Take 1 capsule by mouth Daily for 360 days.   • traMADol (ULTRAM) 50 MG tablet Take 50 mg by mouth Every 6 (Six) Hours As Needed for Moderate Pain .   • triamcinolone (KENALOG) 0.5 % cream Apply 1 application topically to the appropriate area as directed 2 (Two) Times a Day.   • [DISCONTINUED] mirtazapine (REMERON) 30 MG tablet Take 30 mg by mouth.   • [DISCONTINUED] sildenafil (Viagra) 100 MG tablet Take 1 tablet by mouth As Needed for Erectile Dysfunction.     No current facility-administered medications on file prior to visit.         Objective   /69   Pulse 63   Ht 185.4 cm (73\")   Wt 93 kg (205 lb)   BMI 27.05 kg/m²       Physical Exam  HENT:      Head: Normocephalic.   Neck:      Vascular: No carotid bruit.   Cardiovascular:      Rate and Rhythm: Normal rate and regular rhythm.      Pulses: Normal pulses.      Heart sounds: Normal heart sounds. No murmur heard.  Pulmonary:      Effort: Pulmonary effort is normal.      Breath sounds: Normal breath sounds.   Musculoskeletal:      Cervical back: Neck supple.      Right lower leg: No edema.      Left lower leg: No edema.   Skin:     General: Skin is dry.      Capillary Refill: Capillary refill takes less than 2 seconds.   Neurological:      Mental Status: He is alert and oriented to person, place, and time.   Psychiatric:         Behavior: Behavior normal.       Result Review :   The following data was reviewed by: DAV Duncan on 12/27/2022:  proBNP   Date Value Ref Range Status   08/01/2022 40.6 0.0 - 900.0 pg/mL Final     CMP    CMP 12/13/22   Glucose 106 (A)   BUN 13   Creatinine 1.07   eGFR 73.3   Sodium 138   Potassium 4.0   Chloride 101   Calcium 9.2   Total Protein 7.2   Albumin 4.70   Globulin 2.5   Total Bilirubin 0.7   Alkaline Phosphatase 122 (A)   AST (SGOT) 31   ALT (SGPT) 27   Albumin/Globulin Ratio 1.9   BUN/Creatinine Ratio 12.1   Anion Gap 9.5   (A) " Abnormal value       Comments are available for some flowsheets but are not being displayed.           CBC w/diff    CBC w/Diff 12/13/22   WBC 6.61   RBC 3.84 (A)   Hemoglobin 11.9 (A)   Hematocrit 35.9 (A)   MCV 93.5   MCH 31.0   MCHC 33.1   RDW 14.0   Platelets 226   Neutrophil Rel % 71.6   Immature Granulocyte Rel % 0.2   Lymphocyte Rel % 14.7 (A)   Monocyte Rel % 11.2   Eosinophil Rel % 2.3   Basophil Rel % 0.0   (A) Abnormal value             Lab Results   Component Value Date    TSH 4.320 (H) 10/20/2022      Lab Results   Component Value Date    FREET4 1.09 10/20/2022      No results found for: DDIMERQUANT  Magnesium   Date Value Ref Range Status   08/01/2022 2.1 1.6 - 2.4 mg/dL Final      No results found for: DIGOXIN   Lab Results   Component Value Date    TROPONINT <0.010 06/19/2022            Results for orders placed in visit on 12/29/21    Adult Transthoracic Echo Complete W/ Cont if Necessary Per Protocol    Interpretation Summary  · Left ventricular ejection fraction appears to be 51 - 55%. Left ventricular systolic function is low normal.  · Left ventricular diastolic function was normal.  · Significant valvular disease.    [id number] Score: 3        Assessment and Plan    Diagnoses and all orders for this visit:    1. CAD S/P percutaneous coronary angioplasty (Primary)  He denies any anginal symptoms, continue Plavix 75 mg daily.    2. Paroxysmal atrial fibrillation  Symptomatically stable at this time, continue metoprolol 25 mg twice daily.  Continue Eliquis for CVA prevention.    3. Essential hypertension  Currently controlled and without adverse effect from medication, continue lisinopril 5 mg daily.  Low-sodium diet discussed.    4. Mixed hyperlipidemia  Last lipid panel was 4/1/2022 with LDL of 48 which is within his goal range, continue atorvastatin 80 mg daily.    5. Aneurysm of ascending aorta without rupture  Last CT of chest was 1/20/2022 and shows ascending aorta at 4.0 cm.  Repeat CT  chest next month.  -     CT Chest Without Contrast; Future    Other orders  -     atorvastatin (LIPITOR) 80 MG tablet; Take 1 tablet by mouth Daily.  Dispense: 90 tablet; Refill: 3  -     metoprolol succinate XL (TOPROL-XL) 25 MG 24 hr tablet; Take 1 tablet by mouth 2 (Two) Times a Day.  Dispense: 90 tablet; Refill: 3  -     clopidogrel (PLAVIX) 75 MG tablet; Take 1 tablet by mouth Daily.  Dispense: 180 tablet; Refill: 3        Follow Up   Return in about 6 months (around 6/27/2023) for Follow up with Dr Sheehan.    Patient was given instructions and counseling regarding his condition or for health maintenance advice. Please see specific information pulled into the AVS if appropriate.     Ari HARMAN Olea  reports that he has never smoked. He has been exposed to tobacco smoke. He has never used smokeless tobacco.           Sigrid Iqbal, APRN  12/27/22  14:31 EST    Dictated Utilizing Dragon Dictation

## 2023-01-04 RX ORDER — APIXABAN 5 MG/1
TABLET, FILM COATED ORAL
Qty: 180 TABLET | Refills: 3 | Status: SHIPPED | OUTPATIENT
Start: 2023-01-04

## 2023-01-30 ENCOUNTER — HOSPITAL ENCOUNTER (OUTPATIENT)
Dept: CT IMAGING | Facility: HOSPITAL | Age: 73
Discharge: HOME | End: 2023-01-30
Admitting: NURSE PRACTITIONER
Payer: MEDICARE

## 2023-01-30 DIAGNOSIS — I71.21 ANEURYSM OF ASCENDING AORTA WITHOUT RUPTURE: ICD-10-CM

## 2023-01-30 PROCEDURE — 71250 CT THORAX DX C-: CPT

## 2023-01-31 ENCOUNTER — TELEPHONE (OUTPATIENT)
Dept: CARDIOLOGY | Facility: CLINIC | Age: 73
End: 2023-01-31
Payer: MEDICARE

## 2023-01-31 NOTE — TELEPHONE ENCOUNTER
----- Message from Omayra Canales MA sent at 1/31/2023  9:13 AM EST -----    ----- Message -----  From: Sigrid Iqbal APRN  Sent: 1/30/2023   3:26 PM EST  To: Omayra Canales MA    Notify pt CT Chest shows ascending aorta is again noted to be mildly aneurysmal, measuring up to 4.1 cm maximally. Repeat imaging in one year  Please forward to PCP regarding right middle lobe nodule

## 2023-02-14 LAB
ALBUMIN SERPL-MCNC: 4.4 G/DL (ref 3.5–5.2)
ALBUMIN/GLOB SERPL: 1.7 G/DL
ALP SERPL-CCNC: 130 U/L (ref 39–117)
ALT SERPL W P-5'-P-CCNC: 22 U/L (ref 1–41)
ANION GAP SERPL CALCULATED.3IONS-SCNC: 9.3 MMOL/L (ref 5–15)
AST SERPL-CCNC: 23 U/L (ref 1–40)
BASOPHILS # BLD AUTO: 0.01 10*3/MM3 (ref 0–0.2)
BASOPHILS NFR BLD AUTO: 0.2 % (ref 0–1.5)
BILIRUB SERPL-MCNC: 0.5 MG/DL (ref 0–1.2)
BUN SERPL-MCNC: 18 MG/DL (ref 8–23)
BUN/CREAT SERPL: 16.5 (ref 7–25)
CALCIUM SPEC-SCNC: 9.7 MG/DL (ref 8.6–10.5)
CHLORIDE SERPL-SCNC: 105 MMOL/L (ref 98–107)
CO2 SERPL-SCNC: 25.7 MMOL/L (ref 22–29)
CREAT SERPL-MCNC: 1.09 MG/DL (ref 0.76–1.27)
DEPRECATED RDW RBC AUTO: 46.9 FL (ref 37–54)
EGFRCR SERPLBLD CKD-EPI 2021: 71.7 ML/MIN/1.73
EOSINOPHIL # BLD AUTO: 0.11 10*3/MM3 (ref 0–0.4)
EOSINOPHIL NFR BLD AUTO: 2.1 % (ref 0.3–6.2)
ERYTHROCYTE [DISTWIDTH] IN BLOOD BY AUTOMATED COUNT: 13.9 % (ref 12.3–15.4)
GLOBULIN UR ELPH-MCNC: 2.6 GM/DL
GLUCOSE SERPL-MCNC: 109 MG/DL (ref 65–99)
HCT VFR BLD AUTO: 34.9 % (ref 37.5–51)
HGB BLD-MCNC: 12 G/DL (ref 13–17.7)
IMM GRANULOCYTES # BLD AUTO: 0.01 10*3/MM3 (ref 0–0.05)
IMM GRANULOCYTES NFR BLD AUTO: 0.2 % (ref 0–0.5)
LYMPHOCYTES # BLD AUTO: 1.08 10*3/MM3 (ref 0.7–3.1)
LYMPHOCYTES NFR BLD AUTO: 21 % (ref 19.6–45.3)
MCH RBC QN AUTO: 31.5 PG (ref 26.6–33)
MCHC RBC AUTO-ENTMCNC: 34.4 G/DL (ref 31.5–35.7)
MCV RBC AUTO: 91.6 FL (ref 79–97)
MONOCYTES # BLD AUTO: 0.51 10*3/MM3 (ref 0.1–0.9)
MONOCYTES NFR BLD AUTO: 9.9 % (ref 5–12)
NEUTROPHILS NFR BLD AUTO: 3.43 10*3/MM3 (ref 1.7–7)
NEUTROPHILS NFR BLD AUTO: 66.6 % (ref 42.7–76)
NRBC BLD AUTO-RTO: 0 /100 WBC (ref 0–0.2)
NT-PROBNP SERPL-MCNC: 57 PG/ML (ref 0–900)
PLATELET # BLD AUTO: 206 10*3/MM3 (ref 140–450)
PMV BLD AUTO: 9.2 FL (ref 6–12)
POTASSIUM SERPL-SCNC: 4 MMOL/L (ref 3.5–5.2)
PROT SERPL-MCNC: 7 G/DL (ref 6–8.5)
RBC # BLD AUTO: 3.81 10*6/MM3 (ref 4.14–5.8)
SODIUM SERPL-SCNC: 140 MMOL/L (ref 136–145)
TROPONIN T SERPL HS-MCNC: 14 NG/L
WBC NRBC COR # BLD: 5.15 10*3/MM3 (ref 3.4–10.8)

## 2023-02-14 PROCEDURE — 93010 ELECTROCARDIOGRAM REPORT: CPT | Performed by: INTERNAL MEDICINE

## 2023-02-14 PROCEDURE — 36415 COLL VENOUS BLD VENIPUNCTURE: CPT

## 2023-02-14 PROCEDURE — 93005 ELECTROCARDIOGRAM TRACING: CPT

## 2023-02-14 PROCEDURE — 84484 ASSAY OF TROPONIN QUANT: CPT

## 2023-02-14 PROCEDURE — 99283 EMERGENCY DEPT VISIT LOW MDM: CPT

## 2023-02-14 PROCEDURE — 80053 COMPREHEN METABOLIC PANEL: CPT

## 2023-02-14 PROCEDURE — 83880 ASSAY OF NATRIURETIC PEPTIDE: CPT

## 2023-02-14 PROCEDURE — 85025 COMPLETE CBC W/AUTO DIFF WBC: CPT

## 2023-02-15 ENCOUNTER — APPOINTMENT (OUTPATIENT)
Dept: GENERAL RADIOLOGY | Facility: HOSPITAL | Age: 73
End: 2023-02-15
Payer: MEDICARE

## 2023-02-15 ENCOUNTER — HOSPITAL ENCOUNTER (EMERGENCY)
Facility: HOSPITAL | Age: 73
Discharge: HOME | End: 2023-02-15
Attending: EMERGENCY MEDICINE | Admitting: EMERGENCY MEDICINE
Payer: MEDICARE

## 2023-02-15 VITALS
BODY MASS INDEX: 29.04 KG/M2 | WEIGHT: 219.14 LBS | TEMPERATURE: 98.4 F | SYSTOLIC BLOOD PRESSURE: 125 MMHG | HEIGHT: 73 IN | RESPIRATION RATE: 16 BRPM | DIASTOLIC BLOOD PRESSURE: 71 MMHG | HEART RATE: 50 BPM | OXYGEN SATURATION: 95 %

## 2023-02-15 DIAGNOSIS — R06.00 DYSPNEA, UNSPECIFIED TYPE: Primary | ICD-10-CM

## 2023-02-15 LAB — QT INTERVAL: 421 MS

## 2023-02-15 PROCEDURE — 71045 X-RAY EXAM CHEST 1 VIEW: CPT

## 2023-02-15 NOTE — ED PROVIDER NOTES
"Time: 12:32 AM EST  Date of encounter:  2/14/2023  Independent Historian/Clinical History and Information was obtained by:   Patient  Chief Complaint   Patient presents with   • Hypertension       History is limited by: N/A    History of Present Illness:  Patient is a 72 y.o. year old male who presents to the emergency department for evaluation of hypertension.  Patient states his blood pressure got as high as in the 200s/80s at home.  Patient states he feels like he has smothering.  Patient admits to \"pain around the heart\".  Patient is on blood pressure medication and states he took his medication today.  (Provider in triage, [facility]) Pt notes intermittent shortness of breath. Pt notes shorntess of breath is worse at night. Pt notes currently having trouble getting a full breath. Pt denies chest pain, fever, chills, coughing, and congestion. Pt notes hx of Afib.       History provided by:  Patient   used: No        Patient Care Team  Primary Care Provider: Edith Marcelo APRN    Past Medical History:     No Known Allergies  Past Medical History:   Diagnosis Date   • A-fib (HCC)    • Abnormal ECG    • Anxiety    • Arrhythmia    • Arthritis    • Atrial fibrillation ([facility])    • Bladder disorder    • BPH (benign prostatic hyperplasia)    • CAD (coronary artery disease)    • Cervical spondylosis without myelopathy 01/15/2020   • Cervicalgia    • Chest pain    • Colitis    • Condition not found HERNIA   • Depression    • Diverticulitis    • Diverticulosis of colon    • GERD (gastroesophageal reflux disease)    • H/O degenerative disc disease    • Heart attack (HCC)    • Heart disease    • Hemorrhoids    • High cholesterol    • Hypertension    • IBS (irritable bowel syndrome)    • Mood disorder ([facility])    • Muscle cramps    • Myocardial infarction ([facility])    • Osteoarthritis    • Paroxysmal atrial fibrillation 12/4/2021   • Prostate disorder    • S/P lumpectomy, right breast     CYST 2016 "     Past Surgical History:   Procedure Laterality Date   • APPENDECTOMY     • BREAST MASS EXCISION  2016   • CARDIAC CATHETERIZATION  2016   • CHOLECYSTECTOMY     • COLONOSCOPY  2008,2014,2021   • CORONARY ANGIOPLASTY WITH STENT PLACEMENT  2010   • ENDOSCOPY  2010,2019   • ENDOSCOPY N/A 7/20/2022    Procedure: ESOPHAGOGASTRODUODENOSCOPY WITH BIOPSY;  Surgeon: Nigel Haas MD;  Location: [facility] ENDOSCOPY;  Service: Gastroenterology;  Laterality: N/A;  HIATAL HERNIA   • HEMORRHOIDECTOMY  2019   • INGUINAL HERNIA REPAIR  2019   • TURP / TRANSURETHRAL INCISION / DRAINAGE PROSTATE  01/16/2020    BUTTON TURP W/ DR TAM   • UMBILICAL HERNIA REPAIR  2019   • UPPER GASTROINTESTINAL ENDOSCOPY       Family History   Problem Relation Age of Onset   • Other Mother         bladder stones   • Arthritis Mother    • Heart disease Mother    • Heart failure Father    • Stroke Father    • Colon cancer Paternal Grandfather         60's   • Malig Hyperthermia Neg Hx        Home Medications:  Prior to Admission medications    Medication Sig Start Date End Date Taking? Authorizing Provider   ARIPiprazole (ABILIFY) 5 MG tablet Take 5 mg by mouth Daily. 12/22/22   Elian Merrill MD   atorvastatin (LIPITOR) 80 MG tablet Take 1 tablet by mouth Daily. 12/27/22   Sigrid Iqbal APRN   clonazePAM (KlonoPIN) 0.5 MG tablet Take 1 tablet by mouth every 6 to 8 hours as needed for Anxiety. 4/1/22   Elian Merrill MD   clopidogrel (PLAVIX) 75 MG tablet Take 1 tablet by mouth Daily. 12/27/22   Sigrid Iqbal APRN   desvenlafaxine (PRISTIQ) 100 MG 24 hr tablet Take 100 mg by mouth Daily. 9/7/22 9/8/23  Elian Merrill MD   Eliquis 5 MG tablet tablet TAKE ONE TABLET BY MOUTH EVERY TWELVE HOURS 1/4/23   Mayank Sheehan MD   lisinopril (PRINIVIL,ZESTRIL) 5 MG tablet Take 5 mg by mouth Daily. 6/18/21   Elian Merrill MD   metoprolol succinate XL (TOPROL-XL) 25 MG 24 hr tablet Take 1 tablet by mouth 2 (Two)  "Times a Day. 12/27/22   Rasheed Sigrid ReidDAV   multivitamin with minerals tablet tablet Take 1 tablet by mouth Daily.    Provider, MD Elian   nitroglycerin (NITROSTAT) 0.4 MG SL tablet Place 1 tablet under the tongue Every 5 (Five) Minutes As Needed for Chest Pain (no more than 3 doses in 15 minutes). 12/10/21   Mayank Sheehan MD   nitroglycerin (NITROSTAT) 0.4 MG SL tablet Place 0.4 mg under the tongue. 6/28/22 6/29/23  ProviderElian MD   tadalafil (CIALIS) 5 MG tablet Take 1 tablet by mouth Daily for 360 days. 10/12/22 10/7/23  Mallorie Matt MD   tamsulosin (FLOMAX) 0.4 MG capsule 24 hr capsule Take 1 capsule by mouth Daily for 360 days. 10/12/22 10/7/23  Mallorie Matt MD   traMADol (ULTRAM) 50 MG tablet Take 50 mg by mouth Every 6 (Six) Hours As Needed for Moderate Pain .    Emergency, Nurse Epic, RN   triamcinolone (KENALOG) 0.5 % cream Apply 1 application topically to the appropriate area as directed 2 (Two) Times a Day.    Provider, MD Elian        Social History:   Social History     Tobacco Use   • Smoking status: Never     Passive exposure: Yes   • Smokeless tobacco: Never   Vaping Use   • Vaping Use: Never used   Substance Use Topics   • Alcohol use: Never   • Drug use: Never         Review of Systems:  Review of Systems   Constitutional: Negative for chills and fever.   HENT: Negative for congestion, ear pain and sore throat.    Eyes: Negative for pain.   Respiratory: Positive for shortness of breath. Negative for cough and chest tightness.    Cardiovascular: Negative for chest pain.   Gastrointestinal: Negative for abdominal pain, diarrhea, nausea and vomiting.   Genitourinary: Negative for flank pain and hematuria.   Musculoskeletal: Negative for joint swelling.   Skin: Negative for pallor.   Neurological: Negative for seizures and headaches.        Physical Exam:  /71   Pulse 50   Temp 98.4 °F (36.9 °C) (Oral)   Resp 16   Ht 185.4 cm (73\")   Wt 99.4 kg (219 " lb 2.2 oz)   SpO2 95%   BMI 28.91 kg/m²     Physical Exam  Vitals and nursing note reviewed.   Constitutional:       General: He is not in acute distress.     Appearance: Normal appearance. He is not toxic-appearing.   HENT:      Head: Normocephalic and atraumatic.      Nose: Nose normal.      Mouth/Throat:      Mouth: Mucous membranes are moist.   Eyes:      General: No scleral icterus.     Extraocular Movements: Extraocular movements intact.      Conjunctiva/sclera: Conjunctivae normal.      Pupils: Pupils are equal, round, and reactive to light.   Cardiovascular:      Rate and Rhythm: Normal rate and regular rhythm.      Pulses: Normal pulses.      Heart sounds: Normal heart sounds.   Pulmonary:      Effort: Pulmonary effort is normal. No respiratory distress.      Breath sounds: Normal breath sounds.   Abdominal:      General: Abdomen is flat. There is no distension.      Palpations: Abdomen is soft.      Tenderness: There is no abdominal tenderness.   Musculoskeletal:         General: Normal range of motion.      Cervical back: Normal range of motion and neck supple.   Skin:     General: Skin is warm and dry.      Capillary Refill: Capillary refill takes less than 2 seconds.      Coloration: Skin is not cyanotic.   Neurological:      General: No focal deficit present.      Mental Status: He is alert and oriented to person, place, and time. Mental status is at baseline.   Psychiatric:         Attention and Perception: Attention and perception normal.         Mood and Affect: Mood is anxious.         Behavior: Behavior normal.                  Procedures:  Procedures      Medical Decision Making:      Comorbidities that affect care:    Atrial Fibrillation, Coronary Artery Disease    External Notes reviewed:    Previous Clinic Note: patient seen by cardiology on12/27/22 for follow up for CAD, Afib, HTN, HLD      The following orders were placed and all results were independently analyzed by me:  Orders Placed  This Encounter   Procedures   • XR Chest 1 View   • Comprehensive Metabolic Panel   • BNP   • Single High Sensitivity Troponin T   • CBC Auto Differential   • ECG 12 Lead Chest Pain   • CBC & Differential       Medications Given in the Emergency Department:  Medications - No data to display     ED Course:    The patient was initially evaluated in the triage area where orders were placed. The patient was later dispositioned by Arnoldo Colvin MD.      The patient was advised to stay for completion of workup which includes but is not limited to communication of labs and radiological results, reassessment and plan. The patient was advised that leaving prior to disposition by a provider could result in critical findings that are not communicated to the patient.     ED Course as of 02/15/23 0637   Tue Feb 14, 2023 2033 PROVIDER IN TRIAGE  Patient was evaluated by me in triage, vAni Gonzales PA-C.  Orders were placed and patient is currently awaiting final results and disposition.  [MD]   Wed Feb 15, 2023   0632 ECG 12 Lead Chest Pain  Normal sinus rhythm with rate of 56. IVCD, RBBB. [place] interval normal. QTc interval is normal. No ST elevation or depression. No T wave abnormalities. No change when compared to tahir. This EKG was interpreted by me.  [LD]      ED Course User Index  [LD] Arnoldo Colvin MD  [MD] Avni Gonzales PA-C       Labs:    Lab Results (last 24 hours)     Procedure Component Value Units Date/Time    CBC & Differential [[id number]]  (Abnormal) Collected: 02/14/23 2036    Specimen: Blood Updated: 02/14/23 2047    Narrative:      The following orders were created for panel order CBC & Differential.  Procedure                               Abnormality         Status                     ---------                               -----------         ------                     CBC Auto Differential[[id number]]        Abnormal            Final result                 Please view results for these  tests on the individual orders.    Comprehensive Metabolic Panel [[id number]]  (Abnormal) Collected: 02/14/23 2036    Specimen: Blood Updated: 02/14/23 2111     Glucose 109 mg/dL      BUN 18 mg/dL      Creatinine 1.09 mg/dL      Sodium 140 mmol/L      Potassium 4.0 mmol/L      Chloride 105 mmol/L      CO2 25.7 mmol/L      Calcium 9.7 mg/dL      Total Protein 7.0 g/dL      Albumin 4.4 g/dL      ALT (SGPT) 22 U/L      AST (SGOT) 23 U/L      Alkaline Phosphatase 130 U/L      Total Bilirubin 0.5 mg/dL      Globulin 2.6 gm/dL      A/G Ratio 1.7 g/dL      BUN/Creatinine Ratio 16.5     Anion Gap 9.3 mmol/L      eGFR 71.7 mL/min/1.73     Narrative:      GFR Normal >60  Chronic Kidney Disease <60  Kidney Failure <15    The GFR formula is only valid for adults with stable renal function between ages 18 and 70.    BNP [[id number]]  (Normal) Collected: 02/14/23 2036    Specimen: Blood Updated: 02/14/23 2108     proBNP 57.0 pg/mL     Narrative:      Among patients with dyspnea, NT-proBNP is highly sensitive for the detection of acute congestive heart failure. In addition NT-proBNP of <300 pg/ml effectively rules out acute congestive heart failure with 99% negative predictive value.      Single High Sensitivity Troponin T [[id number]]  (Normal) Collected: 02/14/23 2036    Specimen: Blood Updated: 02/14/23 2111     HS Troponin T 14 ng/L     Narrative:      High Sensitive Troponin T Reference Range:  <10.0 ng/L- Negative Female for AMI  <15.0 ng/L- Negative Male for AMI  >=10 - Abnormal Female indicating possible myocardial injury.  >=15 - Abnormal Male indicating possible myocardial injury.   Clinicians would have to utilize clinical acumen, EKG, Troponin, and serial changes to determine if it is an Acute Myocardial Infarction or myocardial injury due to an underlying chronic condition.         CBC Auto Differential [[id number]]  (Abnormal) Collected: 02/14/23 2036    Specimen: Blood Updated: 02/14/23 2047     WBC 5.15 10*3/mm3       RBC 3.81 10*6/mm3      Hemoglobin 12.0 g/dL      Hematocrit 34.9 %      MCV 91.6 fL      MCH 31.5 pg      MCHC 34.4 g/dL      RDW 13.9 %      RDW-SD 46.9 fl      MPV 9.2 fL      Platelets 206 10*3/mm3      Neutrophil % 66.6 %      Lymphocyte % 21.0 %      Monocyte % 9.9 %      Eosinophil % 2.1 %      Basophil % 0.2 %      Immature Grans % 0.2 %      Neutrophils, Absolute 3.43 10*3/mm3      Lymphocytes, Absolute 1.08 10*3/mm3      Monocytes, Absolute 0.51 10*3/mm3      Eosinophils, Absolute 0.11 10*3/mm3      Basophils, Absolute 0.01 10*3/mm3      Immature Grans, Absolute 0.01 10*3/mm3      nRBC 0.0 /100 WBC            Imaging:    XR Chest 1 View    Result Date: 2/15/2023  PROCEDURE: XR CHEST 1 VW  COMPARISON: 12/13/2022.  INDICATIONS: cough; shortness of breath  FINDINGS:  A single AP (or PA) upright portable chest radiograph was performed.  No cardiac enlargement is seen.  No acute infiltrate is appreciated.  No pleural effusion or pneumothorax is identified.  External artifacts obscure detail.  Chronic calcified granulomatous disease may involve the chest.  Degenerative changes involve the bilateral shoulders and the imaged spine.  The thoracic aorta is atherosclerotic.  Coronary artery stents/calcifications are noted.  No significant interval change is seen since the prior study (or studies).        No acute infiltrate is appreciated.     Please note that portions of this note were completed with a voice recognition program.  SHIRLEY POOL JR, MD       Electronically Signed and Approved By: SHIRLEY POOL JR, MD on 2/15/2023 at 2:45                  Differential Diagnosis and Discussion:      Dyspnea: Differential diagnosis includes but is not limited to metabolic acidosis, neurological disorders, psychogenic, asthma, pneumothorax, upper airway obstruction, COPD, pneumonia, noncardiogenic pulmonary edema, interstitial lung disease, anemia, congestive heart failure, and pulmonary embolism    All labs were  reviewed and interpreted by me.  All X-rays were independently reviewed by me.  EKG was interpreted by me.    MDM  Number of Diagnoses or Management Options  Dyspnea, unspecified type  Diagnosis management comments: Patient is afebrile nontoxic-appearing.  Vital signs are stable.  At time of evaluation is lying bed in no acute distress.  Lungs are clear on exam.  O2 sat upper 90s on room air.  Labs show no significant abnormality.  Chest x-ray showed no acute finding.  EKG showed sinus rhythm no acute ST elevation.  He has no lower extremity edema.  No history of DVT/PE.  Discussed all these findings with the patient.  Recommend that he follows up closely with his primary physician.  Discussed return precautions, discharge instructions and answered all his questions.       Amount and/or Complexity of Data Reviewed  Clinical lab tests: reviewed  Tests in the radiology section of CPT®: reviewed  Review and summarize past medical records: yes  Independent visualization of images, tracings, or specimens: yes    Risk of Complications, Morbidity, and/or Mortality  Presenting problems: moderate  Management options: moderate    Patient Progress  Patient progress: stable           Patient Care Considerations:    CT CHEST: I considered ordering a CT scan of the chest, however O2 sat upper 90s. no lower extremity edema. no signs on exam concerning for DVT/PE. Patient is on anticoagulation      Consultants/Shared Management Plan:    None    Social Determinants of Health:    Patient is independent, reliable, and has access to care.       Disposition and Care Coordination:    Discharged: I considered escalation of care by admitting this patient for observation, however the patient has improved and is suitable and  stable for discharge.    I have explained the patient´s condition, diagnoses and treatment plan based on the information available to me at this time. I have answered questions and addressed any concerns. The patient  has a good  understanding of the patient´s diagnosis, condition, and treatment plan as can be expected at this point. The vital signs have been stable. The patient´s condition is stable and appropriate for discharge from the emergency department.      The patient will pursue further outpatient evaluation with the primary care physician or other designated or consulting physician as outlined in the discharge instructions. They are agreeable to this plan of care and follow-up instructions have been explained in detail. The patient has received these instructions in written format and have expressed an understanding of the discharge instructions. The patient is aware that any significant change in condition or worsening of symptoms should prompt an immediate return to this or the closest emergency department or call to [facility].  I have explained discharge medications and the need for follow up with the patient/caretakers. This was also printed in the discharge instructions. Patient was discharged with the following medications and follow up:      Medication List      No changes were made to your prescriptions during this visit.      Edith Marcelo, DAV  [address] [facility]  [address] [place] [id number] [phone]    In 2 days         Final diagnoses:   Dyspnea, unspecified type        ED Disposition     ED Disposition   Discharge    Condition   Stable    Comment   --             This medical record created using voice recognition software.      Documentation assistance provided by Jaden almaraz, acting as scribe for Arnoldo Colvin MD. Information recorded by the scribe was done at my direction and has been verified and validated by me.       Jaden Almaraz  02/15/23 0132       Jaden Almaraz  02/15/23 0132       Arnoldo Colvin MD  02/15/23 0636       Arnoldo Colvin MD  02/15/23 0637

## 2023-02-15 NOTE — ED NOTES
"Patient reports for the past 2 weeks, having \"smothering spells\" early in the morning and every night when he gets home from work.  States his chest feels heavy and he feels like he cannot breathe.     Patient reports having these several weeks ago then being admitted for treatment at [facility] in [place] for two weeks.  He states they changed his medications around and \"I felt great.\"  He then stopped taking his morning dose of Venlafaxine 2 weeks ago, and has been having these \"spells\" for two weeks.     Patient reports \"being down and out\" for the past few weeks.  Also admits history of anxiety.   "

## 2023-03-21 ENCOUNTER — HOSPITAL ENCOUNTER (EMERGENCY)
Facility: HOSPITAL | Age: 73
Discharge: HOME | End: 2023-03-21
Attending: EMERGENCY MEDICINE | Admitting: EMERGENCY MEDICINE
Payer: MEDICARE

## 2023-03-21 VITALS
DIASTOLIC BLOOD PRESSURE: 83 MMHG | OXYGEN SATURATION: 97 % | HEART RATE: 54 BPM | BODY MASS INDEX: 27.99 KG/M2 | SYSTOLIC BLOOD PRESSURE: 141 MMHG | WEIGHT: 211.2 LBS | RESPIRATION RATE: 18 BRPM | TEMPERATURE: 98 F | HEIGHT: 73 IN

## 2023-03-21 DIAGNOSIS — I10 HYPERTENSION, UNSPECIFIED TYPE: Primary | ICD-10-CM

## 2023-03-21 LAB
ALBUMIN SERPL-MCNC: 4.6 G/DL (ref 3.5–5.2)
ALBUMIN/GLOB SERPL: 2 G/DL
ALP SERPL-CCNC: 132 U/L (ref 39–117)
ALT SERPL W P-5'-P-CCNC: 51 U/L (ref 1–41)
ANION GAP SERPL CALCULATED.3IONS-SCNC: 7.9 MMOL/L (ref 5–15)
AST SERPL-CCNC: 36 U/L (ref 1–40)
BASOPHILS # BLD AUTO: 0 10*3/MM3 (ref 0–0.2)
BASOPHILS NFR BLD AUTO: 0 % (ref 0–1.5)
BILIRUB SERPL-MCNC: 0.6 MG/DL (ref 0–1.2)
BUN SERPL-MCNC: 20 MG/DL (ref 8–23)
BUN/CREAT SERPL: 19.8 (ref 7–25)
CALCIUM SPEC-SCNC: 9.5 MG/DL (ref 8.6–10.5)
CHLORIDE SERPL-SCNC: 102 MMOL/L (ref 98–107)
CO2 SERPL-SCNC: 27.1 MMOL/L (ref 22–29)
CREAT SERPL-MCNC: 1.01 MG/DL (ref 0.76–1.27)
DEPRECATED RDW RBC AUTO: 47.7 FL (ref 37–54)
EGFRCR SERPLBLD CKD-EPI 2021: 78.5 ML/MIN/1.73
EOSINOPHIL # BLD AUTO: 0.06 10*3/MM3 (ref 0–0.4)
EOSINOPHIL NFR BLD AUTO: 1.1 % (ref 0.3–6.2)
ERYTHROCYTE [DISTWIDTH] IN BLOOD BY AUTOMATED COUNT: 14 % (ref 12.3–15.4)
GLOBULIN UR ELPH-MCNC: 2.3 GM/DL
GLUCOSE SERPL-MCNC: 108 MG/DL (ref 65–99)
HCT VFR BLD AUTO: 35.3 % (ref 37.5–51)
HGB BLD-MCNC: 11.9 G/DL (ref 13–17.7)
IMM GRANULOCYTES # BLD AUTO: 0.01 10*3/MM3 (ref 0–0.05)
IMM GRANULOCYTES NFR BLD AUTO: 0.2 % (ref 0–0.5)
LYMPHOCYTES # BLD AUTO: 0.87 10*3/MM3 (ref 0.7–3.1)
LYMPHOCYTES NFR BLD AUTO: 16.2 % (ref 19.6–45.3)
MCH RBC QN AUTO: 31.2 PG (ref 26.6–33)
MCHC RBC AUTO-ENTMCNC: 33.7 G/DL (ref 31.5–35.7)
MCV RBC AUTO: 92.7 FL (ref 79–97)
MONOCYTES # BLD AUTO: 0.57 10*3/MM3 (ref 0.1–0.9)
MONOCYTES NFR BLD AUTO: 10.6 % (ref 5–12)
NEUTROPHILS NFR BLD AUTO: 3.85 10*3/MM3 (ref 1.7–7)
NEUTROPHILS NFR BLD AUTO: 71.9 % (ref 42.7–76)
NRBC BLD AUTO-RTO: 0 /100 WBC (ref 0–0.2)
PLATELET # BLD AUTO: 222 10*3/MM3 (ref 140–450)
PMV BLD AUTO: 9.4 FL (ref 6–12)
POTASSIUM SERPL-SCNC: 4.6 MMOL/L (ref 3.5–5.2)
PROT SERPL-MCNC: 6.9 G/DL (ref 6–8.5)
RBC # BLD AUTO: 3.81 10*6/MM3 (ref 4.14–5.8)
SODIUM SERPL-SCNC: 137 MMOL/L (ref 136–145)
WBC NRBC COR # BLD: 5.36 10*3/MM3 (ref 3.4–10.8)

## 2023-03-21 PROCEDURE — 85025 COMPLETE CBC W/AUTO DIFF WBC: CPT

## 2023-03-21 PROCEDURE — 99283 EMERGENCY DEPT VISIT LOW MDM: CPT

## 2023-03-21 PROCEDURE — 80053 COMPREHEN METABOLIC PANEL: CPT

## 2023-03-21 PROCEDURE — 36415 COLL VENOUS BLD VENIPUNCTURE: CPT

## 2023-03-21 NOTE — ED PROVIDER NOTES
"Time: 5:48 PM EDT  Date of encounter:  3/21/2023  Independent Historian/Clinical History and Information was obtained by:   Patient  Chief Complaint   Patient presents with   • Hypertension     Pt reports high blood pressure readings at home (200s/100s), reports feeling dizzy. Takes BP meds every day       History is limited by: N/A    History of Present Illness:  Patient is a 72 y.o. year old male who presents to the emergency department for evaluation of hypertension.  Patient states today when he got home from work his blood pressure was in the 200s over 100s.  Patient has taken his blood pressure medication today.  Patient does admit to headache.  Patient denies chest pain or shortness of breath.  (Provider in triage, Avni Gonzales PA-C)    Patient states normally when he does work a lot or does a lot of activity he does find his blood pressure gets high but normally he will just take his medicine and it will go down but he did not have his medication with him while he was at work and he got concerned because is never had such a high reading and he was worried he would \"have a stroke\".  Once patient returned home from right prior to leaving to come to the emergency department patient did take his medicine and blood pressure has improved on arrival.  Patient reports his headache is gone as well now          Patient Care Team  Primary Care Provider: Edith Marcelo APRN    Past Medical History:     No Known Allergies  Past Medical History:   Diagnosis Date   • A-fib (HCC)    • Abnormal ECG    • Anxiety    • Arrhythmia    • Arthritis    • Atrial fibrillation (HCC)    • Bladder disorder    • BPH (benign prostatic hyperplasia)    • CAD (coronary artery disease)    • Cervical spondylosis without myelopathy 01/15/2020   • Cervicalgia    • Chest pain    • Colitis    • Condition not found HERNIA   • Depression    • Diverticulitis    • Diverticulosis of colon    • GERD (gastroesophageal reflux disease)    • H/O " degenerative disc disease    • Heart attack (HCC)    • Heart disease    • Hemorrhoids    • High cholesterol    • Hypertension    • IBS (irritable bowel syndrome)    • Mood disorder (HCC)    • Muscle cramps    • Myocardial infarction (HCC)    • Osteoarthritis    • Paroxysmal atrial fibrillation 12/4/2021   • Prostate disorder    • S/P lumpectomy, right breast     CYST 2016     Past Surgical History:   Procedure Laterality Date   • APPENDECTOMY     • BREAST MASS EXCISION  2016   • CARDIAC CATHETERIZATION  2016   • CHOLECYSTECTOMY     • COLONOSCOPY  2008,2014,2021   • CORONARY ANGIOPLASTY WITH STENT PLACEMENT  2010   • ENDOSCOPY  2010,2019   • ENDOSCOPY N/A 7/20/2022    Procedure: ESOPHAGOGASTRODUODENOSCOPY WITH BIOPSY;  Surgeon: Nigel Haas MD;  Location: [facility] ENDOSCOPY;  Service: Gastroenterology;  Laterality: N/A;  HIATAL HERNIA   • HEMORRHOIDECTOMY  2019   • INGUINAL HERNIA REPAIR  2019   • TURP / TRANSURETHRAL INCISION / DRAINAGE PROSTATE  01/16/2020    BUTTON TURP W/ DR TAM   • UMBILICAL HERNIA REPAIR  2019   • UPPER GASTROINTESTINAL ENDOSCOPY       Family History   Problem Relation Age of Onset   • Other Mother         bladder stones   • Arthritis Mother    • Heart disease Mother    • Heart failure Father    • Stroke Father    • Colon cancer Paternal Grandfather         60's   • Malig Hyperthermia Neg Hx        Home Medications:  Prior to Admission medications    Medication Sig Start Date End Date Taking? Authorizing Provider   ARIPiprazole (ABILIFY) 5 MG tablet Take 5 mg by mouth Daily. 12/22/22   Elian Merrill MD   atorvastatin (LIPITOR) 80 MG tablet Take 1 tablet by mouth Daily. 12/27/22   Sigrid Iqbal APRN   clonazePAM (KlonoPIN) 0.5 MG tablet Take 1 tablet by mouth every 6 to 8 hours as needed for Anxiety. 4/1/22   Elian Merrill MD   clopidogrel (PLAVIX) 75 MG tablet Take 1 tablet by mouth Daily. 12/27/22   Sigrid Iqbal APRN   desvenlafaxine (PRISTIQ) 100 MG  24 hr tablet Take 100 mg by mouth Daily. 9/7/22 9/8/23  Elian Merrill MD   Eliquis 5 MG tablet tablet TAKE ONE TABLET BY MOUTH EVERY TWELVE HOURS 1/4/23   Mayank Sheehan MD   lisinopril (PRINIVIL,ZESTRIL) 5 MG tablet Take 5 mg by mouth Daily. 6/18/21   Elian Merrill MD   metoprolol succinate XL (TOPROL-XL) 25 MG 24 hr tablet Take 1 tablet by mouth 2 (Two) Times a Day. 12/27/22   Sigrid Iqbal APRN   multivitamin with minerals tablet tablet Take 1 tablet by mouth Daily.    Elian Merrill MD   nitroglycerin (NITROSTAT) 0.4 MG SL tablet Place 1 tablet under the tongue Every 5 (Five) Minutes As Needed for Chest Pain (no more than 3 doses in 15 minutes). 12/10/21   Mayank Sheehan MD   nitroglycerin (NITROSTAT) 0.4 MG SL tablet Place 0.4 mg under the tongue. 6/28/22 6/29/23  Elian Merrill MD   tadalafil (CIALIS) 5 MG tablet Take 1 tablet by mouth Daily for 360 days. 10/12/22 10/7/23  Mallorie Matt MD   tamsulosin (FLOMAX) 0.4 MG capsule 24 hr capsule Take 1 capsule by mouth Daily for 360 days. 10/12/22 10/7/23  Mallorie Matt MD   traMADol (ULTRAM) 50 MG tablet Take 50 mg by mouth Every 6 (Six) Hours As Needed for Moderate Pain .    Emergency, Nurse Epic, RN   triamcinolone (KENALOG) 0.5 % cream Apply 1 application topically to the appropriate area as directed 2 (Two) Times a Day.    Elian Merrill MD        Social History:   Social History     Tobacco Use   • Smoking status: Never     Passive exposure: Yes   • Smokeless tobacco: Never   Vaping Use   • Vaping Use: Never used   Substance Use Topics   • Alcohol use: Never   • Drug use: Never         Review of Systems:  Review of Systems   Constitutional: Negative for chills and fever.   HENT: Negative for congestion, ear pain and sore throat.    Eyes: Negative for pain and visual disturbance.   Respiratory: Negative for cough, chest tightness and shortness of breath.    Cardiovascular: Negative for palpitations and leg  "swelling.   Gastrointestinal: Negative for abdominal pain, diarrhea, nausea and vomiting.   Genitourinary: Negative for flank pain and hematuria.   Musculoskeletal: Negative for joint swelling.   Skin: Negative for pallor.   Neurological: Positive for headaches. Negative for dizziness, seizures, syncope, speech difficulty, weakness, light-headedness and numbness.   Hematological: Negative.    Psychiatric/Behavioral: Negative.    All other systems reviewed and are negative.       Physical Exam:  /83 (BP Location: Left arm, Patient Position: Lying)   Pulse 54   Temp 98 °F (36.7 °C) (Oral)   Resp 18   Ht 185.4 cm (73\")   Wt 95.8 kg (211 lb 3.2 oz)   SpO2 97%   BMI 27.86 kg/m²     Physical Exam  Vitals and nursing note reviewed.   Constitutional:       General: He is not in acute distress.     Appearance: Normal appearance. He is not toxic-appearing.   HENT:      Head: Normocephalic and atraumatic.      Nose: Nose normal.      Mouth/Throat:      Mouth: Mucous membranes are moist.   Eyes:      General: No scleral icterus.     Extraocular Movements: Extraocular movements intact.      Conjunctiva/sclera: Conjunctivae normal.      Pupils: Pupils are equal, round, and reactive to light.   Cardiovascular:      Rate and Rhythm: Normal rate and regular rhythm.      Pulses: Normal pulses.      Heart sounds: Normal heart sounds.   Pulmonary:      Effort: Pulmonary effort is normal. No respiratory distress.      Breath sounds: Normal breath sounds.   Abdominal:      General: Abdomen is flat. Bowel sounds are normal. There is no distension.      Palpations: Abdomen is soft.      Tenderness: There is no abdominal tenderness. There is no right CVA tenderness or left CVA tenderness.   Musculoskeletal:         General: Normal range of motion.      Cervical back: Normal range of motion and neck supple.      Right lower leg: No edema.      Left lower leg: No edema.   Skin:     General: Skin is warm and dry.      Coloration: " Skin is not cyanotic.   Neurological:      Mental Status: He is alert and oriented to person, place, and time. Mental status is at baseline.      Cranial Nerves: No cranial nerve deficit.      Sensory: No sensory deficit.      Motor: No weakness.   Psychiatric:         Attention and Perception: Attention and perception normal.         Mood and Affect: Mood normal.         Behavior: Behavior normal.                  Procedures:  Procedures      Medical Decision Making:      Comorbidities that affect care:    Coronary Artery Disease, Hypertension    External Notes reviewed:    None      The following orders were placed and all results were independently analyzed by me:  Orders Placed This Encounter   Procedures   • Comprehensive Metabolic Panel   • CBC Auto Differential   • CBC & Differential       Medications Given in the Emergency Department:  Medications - No data to display     ED Course:    The patient was initially evaluated in the triage area where orders were placed. The patient was later dispositioned by DAV Hawkins.      The patient was advised to stay for completion of workup which includes but is not limited to communication of labs and radiological results, reassessment and plan. The patient was advised that leaving prior to disposition by a provider could result in critical findings that are not communicated to the patient.     ED Course as of 03/22/23 0535   Tue Mar 21, 2023   1748 PROVIDER IN TRIAGE  Patient was evaluated by me in triage, Avni Gonzales PA-C.  Orders were placed and patient is currently awaiting final results and disposition.  [MD]      ED Course User Index  [MD] Avni Gonzales PA-C       Labs:    Lab Results (last 24 hours)     Procedure Component Value Units Date/Time    CBC & Differential [[id number]]  (Abnormal) Collected: 03/21/23 1756    Specimen: Blood Updated: 03/21/23 1816    Narrative:      The following orders were created for panel order CBC &  Differential.  Procedure                               Abnormality         Status                     ---------                               -----------         ------                     CBC Auto Differential[[id number]]        Abnormal            Final result                 Please view results for these tests on the individual orders.    Comprehensive Metabolic Panel [[id number]]  (Abnormal) Collected: 03/21/23 1756    Specimen: Blood Updated: 03/21/23 1840     Glucose 108 mg/dL      BUN 20 mg/dL      Creatinine 1.01 mg/dL      Sodium 137 mmol/L      Potassium 4.6 mmol/L      Chloride 102 mmol/L      CO2 27.1 mmol/L      Calcium 9.5 mg/dL      Total Protein 6.9 g/dL      Albumin 4.6 g/dL      ALT (SGPT) 51 U/L      AST (SGOT) 36 U/L      Alkaline Phosphatase 132 U/L      Total Bilirubin 0.6 mg/dL      Globulin 2.3 gm/dL      A/G Ratio 2.0 g/dL      BUN/Creatinine Ratio 19.8     Anion Gap 7.9 mmol/L      eGFR 78.5 mL/min/1.73     Narrative:      GFR Normal >60  Chronic Kidney Disease <60  Kidney Failure <15    The GFR formula is only valid for adults with stable renal function between ages 18 and 70.    CBC Auto Differential [[id number]]  (Abnormal) Collected: 03/21/23 1756    Specimen: Blood Updated: 03/21/23 1816     WBC 5.36 10*3/mm3      RBC 3.81 10*6/mm3      Hemoglobin 11.9 g/dL      Hematocrit 35.3 %      MCV 92.7 fL      MCH 31.2 pg      MCHC 33.7 g/dL      RDW 14.0 %      RDW-SD 47.7 fl      MPV 9.4 fL      Platelets 222 10*3/mm3      Neutrophil % 71.9 %      Lymphocyte % 16.2 %      Monocyte % 10.6 %      Eosinophil % 1.1 %      Basophil % 0.0 %      Immature Grans % 0.2 %      Neutrophils, Absolute 3.85 10*3/mm3      Lymphocytes, Absolute 0.87 10*3/mm3      Monocytes, Absolute 0.57 10*3/mm3      Eosinophils, Absolute 0.06 10*3/mm3      Basophils, Absolute 0.00 10*3/mm3      Immature Grans, Absolute 0.01 10*3/mm3      nRBC 0.0 /100 WBC            Imaging:    No Radiology Exams Resulted Within Past 24  Hours      Differential Diagnosis and Discussion:      Metabolic: Differential diagnosis includes but is not limited to hypertension, hyperglycemia, hyperkalemia, hypocalcemia, metabolic acidosis, hypokalemia, hypoglycemia, malnutrition, hypothyroidism, hyperthyroidism, and adrenal insufficiency.     All labs were reviewed and interpreted by me.    MDM  Number of Diagnoses or Management Options  Hypertension, unspecified type  Diagnosis management comments: I have explained the patient´s condition, diagnoses and treatment plan based on the information available to me at this time. I have answered questions and addressed any concerns. The patient has a good  understanding of the patient´s diagnosis, condition, and treatment plan as can be expected at this point. The vital signs have been stable. The patient´s condition is stable and appropriate for discharge from the emergency department.      The patient will pursue further outpatient evaluation with the primary care physician or other designated or consulting physician as outlined in the discharge instructions. They are agreeable to this plan of care and follow-up instructions have been explained in detail. The patient has received these instructions in written format and have expressed an understanding of the discharge instructions. The patient is aware that any significant change in condition or worsening of symptoms should prompt an immediate return to this or the closest emergency department or call to 911.         Amount and/or Complexity of Data Reviewed  Clinical lab tests: ordered and reviewed    Risk of Complications, Morbidity, and/or Mortality  Presenting problems: low  Diagnostic procedures: low  Management options: low    Patient Progress  Patient progress: stable       Patient Care Considerations:    CT HEAD: I considered ordering a noncontrast CT of the head, however Tach resolved after blood pressure improved patient has no  neurodeficit      Consultants/Shared Management Plan:    None    Social Determinants of Health:    Patient is independent, reliable, and has access to care.       Disposition and Care Coordination:    Discharged: The patient is suitable and stable for discharge with no need for consideration of observation or admission.    I have explained the patient´s condition, diagnoses and treatment plan based on the information available to me at this time. I have answered questions and addressed any concerns. The patient has a good  understanding of the patient´s diagnosis, condition, and treatment plan as can be expected at this point. The vital signs have been stable. The patient´s condition is stable and appropriate for discharge from the emergency department.      The patient will pursue further outpatient evaluation with the primary care physician or other designated or consulting physician as outlined in the discharge instructions. They are agreeable to this plan of care and follow-up instructions have been explained in detail. The patient has received these instructions in written format and have expressed an understanding of the discharge instructions. The patient is aware that any significant change in condition or worsening of symptoms should prompt an immediate return to this or the closest emergency department or call to 911.    Final diagnoses:   Hypertension, unspecified type        ED Disposition     ED Disposition   Discharge    Condition   Stable    Comment   --             This medical record created using voice recognition software.           Chasidy Pope, DAV  03/22/23 4795

## 2023-07-26 ENCOUNTER — OFFICE VISIT (OUTPATIENT)
Dept: CARDIOLOGY | Facility: CLINIC | Age: 73
End: 2023-07-26
Payer: MEDICARE

## 2023-07-26 VITALS
HEART RATE: 62 BPM | HEIGHT: 73 IN | WEIGHT: 218.6 LBS | DIASTOLIC BLOOD PRESSURE: 63 MMHG | SYSTOLIC BLOOD PRESSURE: 118 MMHG | BODY MASS INDEX: 28.97 KG/M2

## 2023-07-26 DIAGNOSIS — I25.10 CAD S/P PERCUTANEOUS CORONARY ANGIOPLASTY: Primary | ICD-10-CM

## 2023-07-26 DIAGNOSIS — I48.0 PAF (PAROXYSMAL ATRIAL FIBRILLATION): ICD-10-CM

## 2023-07-26 DIAGNOSIS — I10 ESSENTIAL HYPERTENSION: ICD-10-CM

## 2023-07-26 DIAGNOSIS — Z98.61 CAD S/P PERCUTANEOUS CORONARY ANGIOPLASTY: Primary | ICD-10-CM

## 2023-07-26 DIAGNOSIS — E78.2 MIXED HYPERLIPIDEMIA: ICD-10-CM

## 2023-07-26 PROCEDURE — 3074F SYST BP LT 130 MM HG: CPT | Performed by: INTERNAL MEDICINE

## 2023-07-26 PROCEDURE — 99214 OFFICE O/P EST MOD 30 MIN: CPT | Performed by: INTERNAL MEDICINE

## 2023-07-26 PROCEDURE — 3078F DIAST BP <80 MM HG: CPT | Performed by: INTERNAL MEDICINE

## 2023-07-26 NOTE — ASSESSMENT & PLAN NOTE
Patient with normal range blood pressure elevation with work still for the average overall trend is at goal range continue with 2-week log to record and follow back for the time being continue lisinopril 5 and Toprol 25 once a day dosing

## 2023-07-26 NOTE — PROGRESS NOTES
Chief Complaint  Paroxysmal Atrial Fibrillation, Hypertension, and Follow-up    Subjective    Patient doing well denies any changes breathing Passey no anginal chest pain.  He has noticed that his blood pressure spikes up when he is out in the hot sun working doing some vigorous symptoms given the 180s  Past Medical History:   Diagnosis Date    A-fib     Abnormal ECG     Anxiety     Arrhythmia     Arthritis     Atrial fibrillation     Bladder disorder     BPH (benign prostatic hyperplasia)     CAD (coronary artery disease)     Cervical spondylosis without myelopathy 01/15/2020    Cervicalgia     Chest pain     Colitis     Condition not found HERNIA    Depression     Diverticulitis     Diverticulosis of colon     GERD (gastroesophageal reflux disease)     H/O degenerative disc disease     Heart attack     Heart disease     Hemorrhoids     High cholesterol     Hypertension     IBS (irritable bowel syndrome)     Mood disorder     Muscle cramps     Myocardial infarction     Osteoarthritis     Paroxysmal atrial fibrillation 12/4/2021    Prostate disorder     S/P lumpectomy, right breast     CYST 2016         Current Outpatient Medications:     ARIPiprazole (ABILIFY) 5 MG tablet, Take 1 tablet by mouth Daily., Disp: , Rfl:     atorvastatin (LIPITOR) 80 MG tablet, Take 1 tablet by mouth Daily., Disp: 90 tablet, Rfl: 3    clonazePAM (KlonoPIN) 0.5 MG tablet, Take 1 tablet by mouth every 6 to 8 hours as needed for Anxiety., Disp: , Rfl:     clopidogrel (PLAVIX) 75 MG tablet, Take 1 tablet by mouth Daily., Disp: 180 tablet, Rfl: 3    desvenlafaxine (PRISTIQ) 100 MG 24 hr tablet, Take 1 tablet by mouth Daily., Disp: , Rfl:     Eliquis 5 MG tablet tablet, TAKE ONE TABLET BY MOUTH EVERY TWELVE HOURS, Disp: 180 tablet, Rfl: 3    lisinopril (PRINIVIL,ZESTRIL) 5 MG tablet, Take 1 tablet by mouth Daily., Disp: , Rfl:     metoprolol succinate XL (TOPROL-XL) 25 MG 24 hr tablet, TAKE ONE TABLET BY MOUTH TWICE DAILY, Disp: 180 tablet,  "Rfl: 1    multivitamin with minerals tablet tablet, Take 1 tablet by mouth Daily., Disp: , Rfl:     nitroglycerin (NITROSTAT) 0.4 MG SL tablet, Place 1 tablet under the tongue Every 5 (Five) Minutes As Needed for Chest Pain (no more than 3 doses in 15 minutes)., Disp: 25 tablet, Rfl: 2    tamsulosin (FLOMAX) 0.4 MG capsule 24 hr capsule, Take 1 capsule by mouth Daily for 360 days., Disp: 90 capsule, Rfl: 3    traMADol (ULTRAM) 50 MG tablet, Take 1 tablet by mouth Every 6 (Six) Hours As Needed for Moderate Pain., Disp: , Rfl:     triamcinolone (KENALOG) 0.5 % cream, Apply 1 application  topically to the appropriate area as directed 2 (Two) Times a Day., Disp: , Rfl:     nitroglycerin (NITROSTAT) 0.4 MG SL tablet, Place 0.4 mg under the tongue., Disp: , Rfl:     Medications Discontinued During This Encounter   Medication Reason    tadalafil (CIALIS) 5 MG tablet *Therapy completed     No Known Allergies     Social History     Tobacco Use    Smoking status: Never     Passive exposure: Yes    Smokeless tobacco: Never   Vaping Use    Vaping Use: Never used   Substance Use Topics    Alcohol use: Never    Drug use: Never       Family History   Problem Relation Age of Onset    Other Mother         bladder stones    Arthritis Mother     Heart disease Mother     Heart failure Father     Stroke Father     Colon cancer Paternal Grandfather         60's    Malig Hyperthermia Neg Hx         Objective     /63   Pulse 62   Ht 185.4 cm (73\")   Wt 99.2 kg (218 lb 9.6 oz)   BMI 28.84 kg/m²       Physical Exam    General Appearance:   no acute distress  Alert and oriented x3  HENT:   lips not cyanotic  Atraumatic  Neck:  No jvd   supple  Respiratory:  no respiratory distress  normal breath sounds  no rales  Cardiovascular:  Regular rate and rhythm  no S3, no S4   no murmur  no rub  Extremities  No cyanosis  lower extremity edema: none    Skin:   warm, dry  No rashes      Result Review :     proBNP   Date Value Ref Range Status "   02/14/2023 57.0 0.0 - 900.0 pg/mL Final     CMP          12/13/2022    19:55 2/14/2023    20:36 3/21/2023    17:56   CMP   Glucose 106  109  108    BUN 13  18  20    Creatinine 1.07  1.09  1.01    EGFR 73.3  71.7  78.5    Sodium 138  140  137    Potassium 4.0  4.0  4.6    Chloride 101  105  102    Calcium 9.2  9.7  9.5    Total Protein 7.2  7.0  6.9    Albumin 4.70  4.4  4.6    Globulin 2.5  2.6  2.3    Total Bilirubin 0.7  0.5  0.6    Alkaline Phosphatase 122  130  132    AST (SGOT) 31  23  36    ALT (SGPT) 27  22  51    Albumin/Globulin Ratio 1.9  1.7  2.0    BUN/Creatinine Ratio 12.1  16.5  19.8    Anion Gap 9.5  9.3  7.9      CBC w/diff          12/13/2022    19:55 2/14/2023    20:36 3/21/2023    17:56   CBC w/Diff   WBC 6.61  5.15  5.36    RBC 3.84  3.81  3.81    Hemoglobin 11.9  12.0  11.9    Hematocrit 35.9  34.9  35.3    MCV 93.5  91.6  92.7    MCH 31.0  31.5  31.2    MCHC 33.1  34.4  33.7    RDW 14.0  13.9  14.0    Platelets 226  206  222    Neutrophil Rel % 71.6  66.6  71.9    Immature Granulocyte Rel % 0.2  0.2  0.2    Lymphocyte Rel % 14.7  21.0  16.2    Monocyte Rel % 11.2  9.9  10.6    Eosinophil Rel % 2.3  2.1  1.1    Basophil Rel % 0.0  0.2  0.0       Lab Results   Component Value Date    TSH 4.320 (H) 10/20/2022      Lab Results   Component Value Date    FREET4 1.09 10/20/2022      No results found for: DDIMERQUANT  Magnesium   Date Value Ref Range Status   08/01/2022 2.1 1.6 - 2.4 mg/dL Final      No results found for: DIGOXIN   Lab Results   Component Value Date    TROPONINT 14 02/14/2023             Lab Results   Component Value Date    POCTROP 0.00 08/01/2022       Results for orders placed in visit on 12/29/21    Adult Transthoracic Echo Complete W/ Cont if Necessary Per Protocol    Interpretation Summary  · Left ventricular ejection fraction appears to be 51 - 55%. Left ventricular systolic function is low normal.  · Left ventricular diastolic function was normal.  · Significant valvular  disease.                 Diagnoses and all orders for this visit:    1. CAD S/P percutaneous coronary angioplasty (Primary)  Assessment & Plan:  Patient is doing well no ongoing angina continue with chronic a Plavix 75 mg daily        2. Paroxysmal atrial fibrillation  Assessment & Plan:  Maintain normal sinus rhythm continue with Eliquis 5 twice daily      3. Essential hypertension  Assessment & Plan:  Patient with normal range blood pressure elevation with work still for the average overall trend is at goal range continue with 2-week log to record and follow back for the time being continue lisinopril 5 and Toprol 25 once a day dosing      4. Mixed hyperlipidemia  Assessment & Plan:  Continue Lipitor 80 nightly repeat lipids and LFTs              Follow Up     Return in about 6 months (around 1/26/2024) for Follow with Sigrid Iqbal, EKG with F/U.          Patient was given instructions and counseling regarding his condition or for health maintenance advice. Please see specific information pulled into the AVS if appropriate.

## 2023-08-25 ENCOUNTER — TELEPHONE (OUTPATIENT)
Dept: CARDIOLOGY | Facility: CLINIC | Age: 73
End: 2023-08-25
Payer: MEDICARE

## 2023-08-25 NOTE — TELEPHONE ENCOUNTER
----- Message from DAV Luong sent at 8/24/2023  2:21 PM EDT -----  BP log looks good, continue current meds  ----- Message -----  From: Janet Mendoza RegSched Rep  Sent: 8/24/2023   2:14 PM EDT  To: DAV Luong       [General Appearance - Well Developed] : well developed [Normal Appearance] : normal appearance [Well Groomed] : well groomed [General Appearance - Well Nourished] : well nourished [General Appearance - In No Acute Distress] : no acute distress [Normal Conjunctiva] : the conjunctiva exhibited no abnormalities [Eyelids - No Xanthelasma] : the eyelids demonstrated no xanthelasmas [Normal Oral Mucosa] : normal oral mucosa [No Oral Pallor] : no oral pallor [No Oral Cyanosis] : no oral cyanosis [Respiration, Rhythm And Depth] : normal respiratory rhythm and effort [Exaggerated Use Of Accessory Muscles For Inspiration] : no accessory muscle use [Auscultation Breath Sounds / Voice Sounds] : lungs were clear to auscultation bilaterally [Heart Rate And Rhythm] : heart rate and rhythm were normal [Heart Sounds] : normal S1 and S2 [Murmurs] : no murmurs present [Arterial Pulses Normal] : the arterial pulses were normal [Edema] : no peripheral edema present [Bowel Sounds] : normal bowel sounds [Abdomen Soft] : soft [Abdomen Tenderness] : non-tender [Nail Clubbing] : no clubbing of the fingernails [Cyanosis, Localized] : no localized cyanosis [Skin Turgor] : normal skin turgor [] : no rash [Impaired Insight] : insight and judgment were intact [Affect] : the affect was normal [Memory Recent] : recent memory was not impaired [FreeTextEntry1] : ambulates with a cane.

## 2023-10-11 ENCOUNTER — LAB (OUTPATIENT)
Dept: LAB | Facility: HOSPITAL | Age: 73
End: 2023-10-11
Payer: MEDICARE

## 2023-10-11 DIAGNOSIS — Z12.5 PROSTATE CANCER SCREENING: ICD-10-CM

## 2023-10-11 DIAGNOSIS — E78.2 MIXED HYPERLIPIDEMIA: ICD-10-CM

## 2023-10-11 LAB
ALBUMIN SERPL-MCNC: 4.5 G/DL (ref 3.5–5.2)
ALP SERPL-CCNC: 109 U/L (ref 39–117)
ALT SERPL W P-5'-P-CCNC: 20 U/L (ref 1–41)
AST SERPL-CCNC: 24 U/L (ref 1–40)
BILIRUB CONJ SERPL-MCNC: <0.2 MG/DL (ref 0–0.3)
BILIRUB INDIRECT SERPL-MCNC: NORMAL MG/DL
BILIRUB SERPL-MCNC: 0.6 MG/DL (ref 0–1.2)
CHOLEST SERPL-MCNC: 123 MG/DL (ref 0–200)
HDLC SERPL-MCNC: 52 MG/DL (ref 40–60)
LDLC SERPL CALC-MCNC: 51 MG/DL (ref 0–100)
LDLC/HDLC SERPL: 0.94 {RATIO}
PROT SERPL-MCNC: 7.1 G/DL (ref 6–8.5)
PSA SERPL-MCNC: 4.67 NG/ML (ref 0–4)
TRIGL SERPL-MCNC: 111 MG/DL (ref 0–150)
VLDLC SERPL-MCNC: 20 MG/DL (ref 5–40)

## 2023-10-11 PROCEDURE — 36415 COLL VENOUS BLD VENIPUNCTURE: CPT

## 2023-10-11 PROCEDURE — 80076 HEPATIC FUNCTION PANEL: CPT

## 2023-10-11 PROCEDURE — 80061 LIPID PANEL: CPT

## 2023-10-11 PROCEDURE — G0103 PSA SCREENING: HCPCS

## 2023-10-12 ENCOUNTER — TELEPHONE (OUTPATIENT)
Dept: CARDIOLOGY | Facility: CLINIC | Age: 73
End: 2023-10-12
Payer: MEDICARE

## 2023-10-12 NOTE — TELEPHONE ENCOUNTER
----- Message from DAV Luong sent at 10/11/2023  5:40 PM EDT -----  Liver enzymes are in normal range and lipid panel looks great, continue current meds

## 2023-10-16 ENCOUNTER — OFFICE VISIT (OUTPATIENT)
Dept: UROLOGY | Facility: CLINIC | Age: 73
End: 2023-10-16
Payer: MEDICARE

## 2023-10-16 VITALS
HEART RATE: 64 BPM | SYSTOLIC BLOOD PRESSURE: 125 MMHG | DIASTOLIC BLOOD PRESSURE: 77 MMHG | WEIGHT: 222 LBS | BODY MASS INDEX: 29.42 KG/M2 | HEIGHT: 73 IN

## 2023-10-16 DIAGNOSIS — N52.9 ERECTILE DYSFUNCTION, UNSPECIFIED ERECTILE DYSFUNCTION TYPE: ICD-10-CM

## 2023-10-16 DIAGNOSIS — R97.20 ELEVATED PROSTATE SPECIFIC ANTIGEN (PSA): ICD-10-CM

## 2023-10-16 DIAGNOSIS — N40.0 BENIGN PROSTATIC HYPERPLASIA, UNSPECIFIED WHETHER LOWER URINARY TRACT SYMPTOMS PRESENT: Primary | ICD-10-CM

## 2023-10-16 LAB
BILIRUB BLD-MCNC: NEGATIVE MG/DL
CLARITY, POC: CLEAR
COLOR UR: YELLOW
EXPIRATION DATE: ABNORMAL
GLUCOSE UR STRIP-MCNC: NEGATIVE MG/DL
KETONES UR QL: NEGATIVE
LEUKOCYTE EST, POC: NEGATIVE
Lab: ABNORMAL
NITRITE UR-MCNC: NEGATIVE MG/ML
PH UR: 5.5 [PH] (ref 5–8)
PROT UR STRIP-MCNC: NEGATIVE MG/DL
RBC # UR STRIP: ABNORMAL /UL
SP GR UR: 1.01 (ref 1–1.03)
UROBILINOGEN UR QL: ABNORMAL

## 2023-10-16 RX ORDER — LANSOPRAZOLE 30 MG/1
CAPSULE, DELAYED RELEASE ORAL
COMMUNITY

## 2023-10-16 RX ORDER — TAMSULOSIN HYDROCHLORIDE 0.4 MG/1
1 CAPSULE ORAL DAILY
Qty: 90 CAPSULE | Refills: 3 | Status: CANCELLED | OUTPATIENT
Start: 2023-10-16 | End: 2024-10-10

## 2023-10-16 RX ORDER — SILDENAFIL CITRATE 20 MG/1
TABLET ORAL
COMMUNITY
End: 2023-10-19 | Stop reason: SDUPTHER

## 2023-10-16 RX ORDER — SERTRALINE HYDROCHLORIDE 100 MG/1
TABLET, FILM COATED ORAL
COMMUNITY

## 2023-10-16 RX ORDER — TADALAFIL 5 MG/1
5 TABLET ORAL DAILY
Qty: 90 TABLET | Refills: 3 | Status: CANCELLED | OUTPATIENT
Start: 2023-10-16 | End: 2024-10-10

## 2023-10-16 RX ORDER — TAMSULOSIN HYDROCHLORIDE 0.4 MG/1
CAPSULE ORAL
COMMUNITY
End: 2023-10-19 | Stop reason: SDUPTHER

## 2023-10-16 NOTE — PROGRESS NOTES
"Chief Complaint  Benign Prostatic Hypertrophy    Subjective          Ari Olea presents to [facility] UROLOGY    History of Present Illness  The patient is a very pleasant 71-year-old male that has several urological problems.       He has also had some problems with BPH symptoms. He is currently on Flomax and tadalafil is stable with that.     He had a button TURP in January 2020.       He is using sildenafil and is doing well on it. He takes 60mg and it works well for him.       Since I saw him last he has been on tadalafil 5mg once a day and it is working really well.  He has no new complaints.         Objective   Vital Signs:   /77 (BP Location: Left arm, Patient Position: Sitting, Cuff Size: Adult)   Pulse 64   Ht 185.4 cm (73\")   Wt 101 kg (222 lb)   BMI 29.29 kg/m²       Physical Exam  Vitals and nursing note reviewed.   Constitutional:       Appearance: Normal appearance. He is well-developed.   Pulmonary:      Effort: Pulmonary effort is normal.      Breath sounds: Normal air entry.   Neurological:      Mental Status: He is alert and oriented to person, place, and time.      Motor: Motor function is intact.   Psychiatric:         Mood and Affect: Mood normal.         Behavior: Behavior normal.          Result Review :   The following data was reviewed by: Mallorie Matt MD on 10/16/2023:    Results for orders placed or performed in visit on 10/16/23   POC Urinalysis Dipstick, Automated    Specimen: Urine   Result Value Ref Range    Color Yellow Yellow, Straw, Dark Yellow, Nano    Clarity, UA Clear Clear    Specific Gravity  1.015 1.005 - 1.030    pH, Urine 5.5 5.0 - 8.0    Leukocytes Negative Negative    Nitrite, UA Negative Negative    Protein, POC Negative Negative mg/dL    Glucose, UA Negative Negative mg/dL    Ketones, UA Negative Negative    Urobilinogen, UA 0.2 E.U./dL Normal, 0.2 E.U./dL    Bilirubin Negative Negative    Blood, UA Trace (A) Negative    Lot Number " [id number]     Expiration Date 9/2,024        PSA          10/11/2023    12:15   PSA   PSA 4.670                 Assessment and Plan    Diagnoses and all orders for this visit:    1. Benign prostatic hyperplasia, unspecified whether lower urinary tract symptoms present (Primary)  -     POC Urinalysis Dipstick, Automated  -     tamsulosin (FLOMAX) 0.4 MG capsule 24 hr capsule; Take 1 capsule by mouth Daily for 360 days.  Dispense: 90 capsule; Refill: 3    2. Erectile dysfunction, unspecified erectile dysfunction type  -     POC Urinalysis Dipstick, Automated  -     sildenafil (REVATIO) 20 MG tablet; Take 3 tablets by mouth As Needed (ED).  Dispense: 40 tablet; Refill: 3    3. Elevated prostate specific antigen (PSA)  -     POC Urinalysis Dipstick, Automated  -     PSA DIAGNOSTIC; Future    Continue sildenafil as needed.  Continue Flomax.  Follow-up in 1 year.  PSA in 1 year          Medical Decision Making Visit complexity attestation:      Complexity of Visit: Moderate  0 acute problems and 3 chronic problems addressed today  I reviewed 1 tests today.  I ordered 1 tests.    I sent in medications.  Risk: Moderate      Follow Up       Return in about 1 year (around 10/16/2024) for Annual Visit, PSA prior to visit.  Patient was given instructions and counseling regarding his condition or for health maintenance advice. Please see specific information pulled into the AVS if appropriate.

## 2023-10-19 PROBLEM — N40.0 BENIGN PROSTATIC HYPERPLASIA: Chronic | Status: ACTIVE | Noted: 2022-01-12

## 2023-10-19 PROBLEM — N40.1 BPH ASSOCIATED WITH NOCTURIA: Chronic | Status: ACTIVE | Noted: 2021-10-11

## 2023-10-19 PROBLEM — N52.9 ERECTILE DYSFUNCTION: Chronic | Status: ACTIVE | Noted: 2022-10-12

## 2023-10-19 PROBLEM — R35.1 BPH ASSOCIATED WITH NOCTURIA: Chronic | Status: ACTIVE | Noted: 2021-10-11

## 2023-10-19 RX ORDER — SILDENAFIL CITRATE 20 MG/1
60 TABLET ORAL AS NEEDED
Qty: 40 TABLET | Refills: 3 | Status: SHIPPED | OUTPATIENT
Start: 2023-10-19

## 2023-10-19 RX ORDER — TAMSULOSIN HYDROCHLORIDE 0.4 MG/1
1 CAPSULE ORAL DAILY
Qty: 90 CAPSULE | Refills: 3 | Status: SHIPPED | OUTPATIENT
Start: 2023-10-19 | End: 2024-10-13

## 2023-12-08 ENCOUNTER — HOSPITAL ENCOUNTER (EMERGENCY)
Facility: HOSPITAL | Age: 73
Discharge: HOME | End: 2023-12-08
Attending: EMERGENCY MEDICINE
Payer: MEDICARE

## 2023-12-08 ENCOUNTER — APPOINTMENT (OUTPATIENT)
Dept: GENERAL RADIOLOGY | Facility: HOSPITAL | Age: 73
End: 2023-12-08
Payer: MEDICARE

## 2023-12-08 VITALS
DIASTOLIC BLOOD PRESSURE: 82 MMHG | OXYGEN SATURATION: 96 % | BODY MASS INDEX: 30.04 KG/M2 | TEMPERATURE: 98.2 F | HEART RATE: 61 BPM | SYSTOLIC BLOOD PRESSURE: 126 MMHG | HEIGHT: 73 IN | WEIGHT: 226.63 LBS | RESPIRATION RATE: 18 BRPM

## 2023-12-08 DIAGNOSIS — R35.0 URINARY FREQUENCY: ICD-10-CM

## 2023-12-08 DIAGNOSIS — I10 PRIMARY HYPERTENSION: Primary | ICD-10-CM

## 2023-12-08 LAB
ALBUMIN SERPL-MCNC: 4.6 G/DL (ref 3.5–5.2)
ALBUMIN/GLOB SERPL: 2 G/DL
ALP SERPL-CCNC: 118 U/L (ref 39–117)
ALT SERPL W P-5'-P-CCNC: 33 U/L (ref 1–41)
ANION GAP SERPL CALCULATED.3IONS-SCNC: 11.5 MMOL/L (ref 5–15)
AST SERPL-CCNC: 29 U/L (ref 1–40)
BACTERIA UR QL AUTO: NORMAL /HPF
BASOPHILS # BLD AUTO: 0.01 10*3/MM3 (ref 0–0.2)
BASOPHILS NFR BLD AUTO: 0.2 % (ref 0–1.5)
BILIRUB SERPL-MCNC: 0.5 MG/DL (ref 0–1.2)
BILIRUB UR QL STRIP: NEGATIVE
BUN SERPL-MCNC: 20 MG/DL (ref 8–23)
BUN/CREAT SERPL: 15.7 (ref 7–25)
CALCIUM SPEC-SCNC: 9.5 MG/DL (ref 8.6–10.5)
CHLORIDE SERPL-SCNC: 103 MMOL/L (ref 98–107)
CLARITY UR: CLEAR
CO2 SERPL-SCNC: 23.5 MMOL/L (ref 22–29)
COLOR UR: YELLOW
CREAT SERPL-MCNC: 1.27 MG/DL (ref 0.76–1.27)
DEPRECATED RDW RBC AUTO: 47.8 FL (ref 37–54)
EGFRCR SERPLBLD CKD-EPI 2021: 59.3 ML/MIN/1.73
EOSINOPHIL # BLD AUTO: 0.22 10*3/MM3 (ref 0–0.4)
EOSINOPHIL NFR BLD AUTO: 3.9 % (ref 0.3–6.2)
ERYTHROCYTE [DISTWIDTH] IN BLOOD BY AUTOMATED COUNT: 13.8 % (ref 12.3–15.4)
GLOBULIN UR ELPH-MCNC: 2.3 GM/DL
GLUCOSE SERPL-MCNC: 133 MG/DL (ref 65–99)
GLUCOSE UR STRIP-MCNC: NEGATIVE MG/DL
HCT VFR BLD AUTO: 37 % (ref 37.5–51)
HGB BLD-MCNC: 12 G/DL (ref 13–17.7)
HGB UR QL STRIP.AUTO: ABNORMAL
HOLD SPECIMEN: NORMAL
HOLD SPECIMEN: NORMAL
HYALINE CASTS UR QL AUTO: NORMAL /LPF
IMM GRANULOCYTES # BLD AUTO: 0.02 10*3/MM3 (ref 0–0.05)
IMM GRANULOCYTES NFR BLD AUTO: 0.4 % (ref 0–0.5)
KETONES UR QL STRIP: NEGATIVE
LEUKOCYTE ESTERASE UR QL STRIP.AUTO: NEGATIVE
LYMPHOCYTES # BLD AUTO: 1.11 10*3/MM3 (ref 0.7–3.1)
LYMPHOCYTES NFR BLD AUTO: 19.9 % (ref 19.6–45.3)
MAGNESIUM SERPL-MCNC: 2.1 MG/DL (ref 1.6–2.4)
MCH RBC QN AUTO: 30.4 PG (ref 26.6–33)
MCHC RBC AUTO-ENTMCNC: 32.4 G/DL (ref 31.5–35.7)
MCV RBC AUTO: 93.7 FL (ref 79–97)
MONOCYTES # BLD AUTO: 0.54 10*3/MM3 (ref 0.1–0.9)
MONOCYTES NFR BLD AUTO: 9.7 % (ref 5–12)
NEUTROPHILS NFR BLD AUTO: 3.68 10*3/MM3 (ref 1.7–7)
NEUTROPHILS NFR BLD AUTO: 65.9 % (ref 42.7–76)
NITRITE UR QL STRIP: NEGATIVE
NRBC BLD AUTO-RTO: 0 /100 WBC (ref 0–0.2)
PH UR STRIP.AUTO: 6 [PH] (ref 5–8)
PLATELET # BLD AUTO: 208 10*3/MM3 (ref 140–450)
PMV BLD AUTO: 9.6 FL (ref 6–12)
POTASSIUM SERPL-SCNC: 3.8 MMOL/L (ref 3.5–5.2)
PROT SERPL-MCNC: 6.9 G/DL (ref 6–8.5)
PROT UR QL STRIP: NEGATIVE
RBC # BLD AUTO: 3.95 10*6/MM3 (ref 4.14–5.8)
RBC # UR STRIP: NORMAL /HPF
REF LAB TEST METHOD: NORMAL
SODIUM SERPL-SCNC: 138 MMOL/L (ref 136–145)
SP GR UR STRIP: 1.01 (ref 1–1.03)
SQUAMOUS #/AREA URNS HPF: NORMAL /HPF
TROPONIN T SERPL HS-MCNC: 15 NG/L
UROBILINOGEN UR QL STRIP: ABNORMAL
WBC # UR STRIP: NORMAL /HPF
WBC NRBC COR # BLD AUTO: 5.58 10*3/MM3 (ref 3.4–10.8)
WHOLE BLOOD HOLD COAG: NORMAL
WHOLE BLOOD HOLD SPECIMEN: NORMAL

## 2023-12-08 PROCEDURE — 99284 EMERGENCY DEPT VISIT MOD MDM: CPT

## 2023-12-08 PROCEDURE — 84484 ASSAY OF TROPONIN QUANT: CPT | Performed by: EMERGENCY MEDICINE

## 2023-12-08 PROCEDURE — 81001 URINALYSIS AUTO W/SCOPE: CPT | Performed by: EMERGENCY MEDICINE

## 2023-12-08 PROCEDURE — 80053 COMPREHEN METABOLIC PANEL: CPT | Performed by: EMERGENCY MEDICINE

## 2023-12-08 PROCEDURE — 83735 ASSAY OF MAGNESIUM: CPT | Performed by: EMERGENCY MEDICINE

## 2023-12-08 PROCEDURE — 85025 COMPLETE CBC W/AUTO DIFF WBC: CPT | Performed by: EMERGENCY MEDICINE

## 2023-12-08 PROCEDURE — 71045 X-RAY EXAM CHEST 1 VIEW: CPT

## 2023-12-08 PROCEDURE — 93005 ELECTROCARDIOGRAM TRACING: CPT | Performed by: EMERGENCY MEDICINE

## 2023-12-08 RX ORDER — SODIUM CHLORIDE 0.9 % (FLUSH) 0.9 %
10 SYRINGE (ML) INJECTION AS NEEDED
Status: DISCONTINUED | OUTPATIENT
Start: 2023-12-08 | End: 2023-12-09 | Stop reason: HOSPADM

## 2023-12-09 NOTE — DISCHARGE INSTRUCTIONS
Rest, drink plenty of fluids.  Continue to watch your daily dietary salt intake.  Continue with your home medications.  When monitoring your blood pressures at home write down the date and the reading to review with Dr. Sheehan when you follow-up with him in January.  Watch for any increasing urinary symptoms such as painful urination, any low back pain, or any fevers.  DAV Hanley on Monday advised her of your ER visit and follow-up with her next week for reevaluation and further treatment.  Keep your appointment with Dr. Sheehan in January to have your aneurysm reevaluated and further treatment as necessary.  Return to the emergency department immediately if you develop any chest pain, any shortness of breath, any persistent blood pressure readings of 200/110 or any new or worse concerns.

## 2023-12-09 NOTE — ED PROVIDER NOTES
Time: 7:43 PM EST  Date of encounter:  12/8/2023  Independent Historian/Clinical History and Information was obtained by:   Patient and Family    History is limited by: N/A    Chief Complaint   Patient presents with    Hypertension         History of Present Illness:  The patient is a 73 y.o. year old male who presents to the emergency department for evaluation of high blood pressure.  The PT states his BP at home was 200/100. He is concerned because he has a AAA and does not want it to rupture. His BP in triage was 137/76, it was repeated and remained WNL.  Previous encounters reviewed and the patient aneurysm is 4 cm and has been at 4 cm for the past 2 consecutive years.  Additionally, he has another scan in January, at this time he is asymptomatic other than a recent increase in urination.  He has no headache, no chest pain, no abdominal pain, no palpitations, and skin is pink warm and dry.  He states that he checks his blood pressure normally twice daily but states that he was feeling weaker than normal this morning.  Patient states that he has had no increase in swelling to his ankles or feet.    Patient Care Team  Primary Care Provider: Edith Marcelo APRN    Past Medical History:     No Known Allergies  Past Medical History:   Diagnosis Date    A-fib     Abnormal ECG     Anxiety     Arrhythmia     Arthritis     Atrial fibrillation     Bladder disorder     BPH (benign prostatic hyperplasia)     CAD (coronary artery disease)     Cervical spondylosis without myelopathy 01/15/2020    Cervicalgia     Chest pain     Colitis     Condition not found HERNIA    Depression     Diverticulitis     Diverticulosis of colon     GERD (gastroesophageal reflux disease)     H/O degenerative disc disease     Heart attack     Heart disease     Hemorrhoids     High cholesterol     Hypertension     IBS (irritable bowel syndrome)     Mood disorder     Muscle cramps     Myocardial infarction     Osteoarthritis     Paroxysmal  atrial fibrillation 12/4/2021    Prostate disorder     S/P lumpectomy, right breast     CYST 2016     Past Surgical History:   Procedure Laterality Date    APPENDECTOMY      BREAST MASS EXCISION  2016    CARDIAC CATHETERIZATION  2016    CHOLECYSTECTOMY      COLONOSCOPY  2008,2014,2021    CORONARY ANGIOPLASTY WITH STENT PLACEMENT  2010    ENDOSCOPY  2010,2019    ENDOSCOPY N/A 7/20/2022    Procedure: ESOPHAGOGASTRODUODENOSCOPY WITH BIOPSY;  Surgeon: Nigel Haas MD;  Location: [facility] ENDOSCOPY;  Service: Gastroenterology;  Laterality: N/A;  HIATAL HERNIA    HEMORRHOIDECTOMY  2019    INGUINAL HERNIA REPAIR  2019    TURP / TRANSURETHRAL INCISION / DRAINAGE PROSTATE  01/16/2020    BUTTON TURP W/ DR TAM    UMBILICAL HERNIA REPAIR  2019    UPPER GASTROINTESTINAL ENDOSCOPY       Family History   Problem Relation Age of Onset    Other Mother         bladder stones    Arthritis Mother     Heart disease Mother     Heart failure Father     Stroke Father     Colon cancer Paternal Grandfather         60's    Malig Hyperthermia Neg Hx        Home Medications:  Prior to Admission medications    Medication Sig Start Date End Date Taking? Authorizing Provider   ARIPiprazole (ABILIFY) 5 MG tablet Take 1 tablet by mouth Daily. 12/22/22   Elian Merrill MD   atorvastatin (LIPITOR) 80 MG tablet Take 1 tablet by mouth Daily. 12/27/22   Sigrid Iqbal APRN   clonazePAM (KlonoPIN) 0.5 MG tablet Take 1 tablet by mouth every 6 to 8 hours as needed for Anxiety. 4/1/22   Elian Merrill MD   clopidogrel (PLAVIX) 75 MG tablet Take 1 tablet by mouth Daily. 12/27/22   Sigrid Iqbal APRN   Eliquis 5 MG tablet tablet TAKE ONE TABLET BY MOUTH EVERY TWELVE HOURS 1/4/23   Mayank Sheehan MD   lansoprazole (PREVACID) 30 MG capsule     Elian Merrill MD   lisinopril (PRINIVIL,ZESTRIL) 5 MG tablet Take 1 tablet by mouth Daily. 6/18/21   Elian Merrill MD   metoprolol succinate XL (TOPROL-XL) 25 MG 24 hr  tablet TAKE ONE TABLET BY MOUTH TWICE DAILY 7/14/23   Rasheed SigridDAV Berger   multivitamin with minerals tablet tablet Take 1 tablet by mouth Daily.    ProviderElian MD   nitroglycerin (NITROSTAT) 0.4 MG SL tablet Place 1 tablet under the tongue Every 5 (Five) Minutes As Needed for Chest Pain (no more than 3 doses in 15 minutes). 12/10/21   Mayank Sheehan MD   sertraline (ZOLOFT) 100 MG tablet     ProviderElian MD   sildenafil (REVATIO) 20 MG tablet Take 3 tablets by mouth As Needed (ED). 10/19/23   Mallorie Matt MD   tamsulosin (FLOMAX) 0.4 MG capsule 24 hr capsule Take 1 capsule by mouth Daily for 360 days. 10/19/23 10/13/24  Mallorie Matt MD   traMADol (ULTRAM) 50 MG tablet Take 1 tablet by mouth Every 6 (Six) Hours As Needed for Moderate Pain.    Emergency, Nurse Epic, RN   triamcinolone (KENALOG) 0.5 % cream Apply 1 application  topically to the appropriate area as directed 2 (Two) Times a Day.    Provider, MD Elian        Social History:   Social History     Tobacco Use    Smoking status: Never     Passive exposure: Yes    Smokeless tobacco: Never   Vaping Use    Vaping Use: Never used   Substance Use Topics    Alcohol use: Never    Drug use: Never         Review of Systems:  Review of Systems   Constitutional:  Negative for chills and fever.   HENT:  Negative for congestion, ear pain and sore throat.    Eyes:  Negative for pain.   Respiratory:  Negative for cough, chest tightness and shortness of breath.    Cardiovascular:  Negative for chest pain.   Gastrointestinal:  Negative for abdominal pain, diarrhea, nausea and vomiting.   Genitourinary:  Positive for frequency. Negative for flank pain and hematuria.   Musculoskeletal:  Negative for back pain, joint swelling, neck pain and neck stiffness.   Skin:  Negative for pallor and rash.   Neurological:  Positive for weakness. Negative for seizures and headaches.   All other systems reviewed and are negative.       Physical  "Exam:  /82 (Patient Position: Standing)   Pulse 61   Temp 98.2 °F (36.8 °C) (Oral)   Resp 18   Ht 185.4 cm (73\")   Wt 103 kg (226 lb 10.1 oz)   SpO2 96%   BMI 29.90 kg/m²         Physical Exam  Vitals and nursing note reviewed.   Constitutional:       General: He is not in acute distress.     Appearance: Normal appearance. He is not ill-appearing or toxic-appearing.   HENT:      Head: Normocephalic and atraumatic.   Eyes:      General: No scleral icterus.     Conjunctiva/sclera: Conjunctivae normal.      Pupils: Pupils are equal, round, and reactive to light.   Cardiovascular:      Rate and Rhythm: Normal rate and regular rhythm.      Pulses: Normal pulses.   Pulmonary:      Effort: Pulmonary effort is normal. No respiratory distress.      Breath sounds: Normal breath sounds. No wheezing.   Abdominal:      General: Abdomen is flat.      Palpations: Abdomen is soft.      Tenderness: There is no abdominal tenderness. There is no guarding or rebound.   Musculoskeletal:         General: No swelling or tenderness. Normal range of motion.      Cervical back: Normal range of motion and neck supple. No rigidity or tenderness.      Right lower leg: No edema.      Left lower leg: No edema.   Lymphadenopathy:      Cervical: No cervical adenopathy.   Skin:     General: Skin is warm and dry.      Capillary Refill: Capillary refill takes less than 2 seconds.      Findings: No rash.   Neurological:      General: No focal deficit present.      Mental Status: He is alert and oriented to person, place, and time. Mental status is at baseline.   Psychiatric:         Mood and Affect: Mood normal.         Behavior: Behavior normal.                Procedures:  Procedures      Medical Decision Making:      Comorbidities that affect care:    Anxiety, bladder disorder, coronary artery disease, cervical neck pain, colitis, depression, diverticulosis, heart attack, hemorrhoids, cholesterol, IBS, muscle cramps, osteoarthritis, " right breast lump ectomy, arrhythmia, A-fib, arthritis, BPH, chest pain, degenerative disc disease, diverticulitis, GERD, hypertension, mood disorder, myocardial infarction, prostate disorder, abnormal ECG, paroxysmal , AAA    External Notes reviewed:    Previous Labs: Previous labs from office visits      The following orders were placed and all results were independently analyzed by me:  Orders Placed This Encounter   Procedures    XR Chest 1 View    Urinalysis With Microscopic If Indicated (No Culture) - Urine, Clean Catch    Urinalysis, Microscopic Only - Urine, Clean Catch    [place] Draw    Comprehensive Metabolic Panel    Single High Sensitivity Troponin T    Magnesium    CBC Auto Differential    NPO Diet NPO Type: Strict NPO    Undress & Gown    Continuous Pulse Oximetry    Vital Signs    Orthostatic Blood Pressure    Oxygen Therapy- Nasal Cannula; Titrate 1-6 LPM Per SpO2; 90 - 95%    POC Glucose Once    ECG 12 Lead ED Triage Standing Order; Weak / Dizzy / AMS    Insert Peripheral IV    Fall Precautions    CBC & Differential    Green Top (Gel)    Lavender Top    Gold Top - SST    Light Blue Top       Medications Given in the Emergency Department:  Medications   sodium chloride 0.9 % flush 10 mL (has no administration in time range)        ED Course:    The patient was initially evaluated in the triage area where orders were placed. The patient was later dispositioned by DAV Cruz.      The patient was advised to stay for completion of workup which includes but is not limited to communication of labs and radiological results, reassessment and plan. The patient was advised that leaving prior to disposition by a provider could result in critical findings that are not communicated to the patient.     ED Course as of 12/08/23 2241   Fri Dec 08, 2023   1945   --- PROVIDER IN TRIAGE NOTE ---    Patient was seen and evaluated in triage by DAV Butler.  Orders were written and the patient is  currently awaiting disposition.   [MS]      ED Course User Index  [MS] Drew Kya Mallorie, DAV       Labs:    Lab Results (last 24 hours)       Procedure Component Value Units Date/Time    Urinalysis With Microscopic If Indicated (No Culture) - Urine, Clean Catch [[id number]]  (Abnormal) Collected: 12/08/23 1951    Specimen: Urine, Clean Catch Updated: 12/08/23 2020     Color, UA Yellow     Appearance, UA Clear     pH, UA 6.0     Specific Gravity, UA 1.012     Glucose, UA Negative     Ketones, UA Negative     Bilirubin, UA Negative     Blood, UA Trace     Protein, UA Negative     Leuk Esterase, UA Negative     Nitrite, UA Negative     Urobilinogen, UA 0.2 E.U./dL    Urinalysis, Microscopic Only - Urine, Clean Catch [[id number]] Collected: 12/08/23 1951    Specimen: Urine, Clean Catch Updated: 12/08/23 2020     RBC, UA 0-2 /HPF      WBC, UA 0-2 /HPF      Bacteria, UA None Seen /HPF      Squamous Epithelial Cells, UA 0-2 /HPF      Hyaline Casts, UA None Seen /LPF      Methodology Automated Microscopy    CBC & Differential [[id number]]  (Abnormal) Collected: 12/08/23 2131    Specimen: Blood Updated: 12/08/23 2150    Narrative:      The following orders were created for panel order CBC & Differential.  Procedure                               Abnormality         Status                     ---------                               -----------         ------                     CBC Auto Differential[[id number]]        Abnormal            Final result                 Please view results for these tests on the individual orders.    Comprehensive Metabolic Panel [[id number]]  (Abnormal) Collected: 12/08/23 2131    Specimen: Blood Updated: 12/08/23 2211     Glucose 133 mg/dL      BUN 20 mg/dL      Creatinine 1.27 mg/dL      Sodium 138 mmol/L      Potassium 3.8 mmol/L      Chloride 103 mmol/L      CO2 23.5 mmol/L      Calcium 9.5 mg/dL      Total Protein 6.9 g/dL      Albumin 4.6 g/dL      ALT (SGPT) 33 U/L      AST (SGOT) 29 U/L       Alkaline Phosphatase 118 U/L      Total Bilirubin 0.5 mg/dL      Globulin 2.3 gm/dL      A/G Ratio 2.0 g/dL      BUN/Creatinine Ratio 15.7     Anion Gap 11.5 mmol/L      eGFR 59.3 mL/min/1.73     Narrative:      GFR Normal >60  Chronic Kidney Disease <60  Kidney Failure <15    The GFR formula is only valid for adults with stable renal function between ages 18 and 70.    Single High Sensitivity Troponin T [[id number]]  (Normal) Collected: 12/08/23 2131    Specimen: Blood Updated: 12/08/23 2209     HS Troponin T 15 ng/L     Narrative:      High Sensitive Troponin T Reference Range:  <14.0 ng/L- Negative Female for AMI  <22.0 ng/L- Negative Male for AMI  >=14 - Abnormal Female indicating possible myocardial injury.  >=22 - Abnormal Male indicating possible myocardial injury.   Clinicians would have to utilize clinical acumen, EKG, Troponin, and serial changes to determine if it is an Acute Myocardial Infarction or myocardial injury due to an underlying chronic condition.         Magnesium [[id number]]  (Normal) Collected: 12/08/23 2131    Specimen: Blood Updated: 12/08/23 2211     Magnesium 2.1 mg/dL     CBC Auto Differential [[id number]]  (Abnormal) Collected: 12/08/23 2131    Specimen: Blood Updated: 12/08/23 2150     WBC 5.58 10*3/mm3      RBC 3.95 10*6/mm3      Hemoglobin 12.0 g/dL      Hematocrit 37.0 %      MCV 93.7 fL      MCH 30.4 pg      MCHC 32.4 g/dL      RDW 13.8 %      RDW-SD 47.8 fl      MPV 9.6 fL      Platelets 208 10*3/mm3      Neutrophil % 65.9 %      Lymphocyte % 19.9 %      Monocyte % 9.7 %      Eosinophil % 3.9 %      Basophil % 0.2 %      Immature Grans % 0.4 %      Neutrophils, Absolute 3.68 10*3/mm3      Lymphocytes, Absolute 1.11 10*3/mm3      Monocytes, Absolute 0.54 10*3/mm3      Eosinophils, Absolute 0.22 10*3/mm3      Basophils, Absolute 0.01 10*3/mm3      Immature Grans, Absolute 0.02 10*3/mm3      nRBC 0.0 /100 WBC              Imaging:    XR Chest 1 View    Result Date:  12/8/2023  PROCEDURE: XR CHEST 1 VW  COMPARISON: [facility], CR, XR CHEST 1 VW, 12/13/2022, 22:29.  [facility], CR, XR CHEST 1 VW, 2/15/2023, 1:58.  INDICATIONS: Weak/Dizzy/AMS triage protocol  FINDINGS:  No new consolidations or pleural effusions are observed. The cardiac silhouette and mediastinum are unchanged. No definitive acute osseous abnormalities are seen on this single view.        1. No change from the previous study with no evidence for acute cardiopulmonary process.         DOMINIC GUALLPA MD       Electronically Signed and Approved By: DOMINIC GUALLPA MD on 12/08/2023 at 21:46                Differential Diagnosis and Discussion:      Metabolic: Differential diagnosis includes but is not limited to hypertension, hyperglycemia, hyperkalemia, hypocalcemia, metabolic acidosis, hypokalemia, hypoglycemia, malnutrition, hypothyroidism, hyperthyroidism, and adrenal insufficiency.     All labs were reviewed and interpreted by me.  All X-rays impressions were independently interpreted by me.  EKG was interpreted by supervising attending.    MDM     Amount and/or Complexity of Data Reviewed  Clinical lab tests: reviewed  Tests in the radiology section of CPT®: reviewed  Tests in the medicine section of CPT®: reviewed  Decide to obtain previous medical records or to obtain history from someone other than the patient: yes           Patient Care Considerations:    ANTIBIOTICS: I considered prescribing antibiotics as an outpatient however in the absence of any bacterial infection I did not feel it was warranted.      Consultants/Shared Management Plan:    None    Social Determinants of Health:    Patient is independent, reliable, and has access to care.       Disposition and Care Coordination:    Discharged: The patient is suitable and stable for discharge with no need for consideration of observation or admission.    I have explained the patient´s condition, diagnoses and treatment plan  based on the information available to me at this time. I have answered questions and addressed any concerns. The patient has a good  understanding of the patient´s diagnosis, condition, and treatment plan as can be expected at this point. The vital signs have been stable. The patient´s condition is stable and appropriate for discharge from the emergency department.      The patient will pursue further outpatient evaluation with the primary care physician or other designated or consulting physician as outlined in the discharge instructions. They are agreeable to this plan of care and follow-up instructions have been explained in detail. The patient has received these instructions in written format and have expressed an understanding of the discharge instructions. The patient is aware that any significant change in condition or worsening of symptoms should prompt an immediate return to this or the closest emergency department or call to 911.  I have explained discharge medications and the need for follow up with the patient/caretakers. This was also printed in the discharge instructions. Patient was discharged with the following medications and follow up:      Medication List      No changes were made to your prescriptions during this visit.      Edith Marcelo, DAV  [address]  [address]  [place] [id number] [phone]    Call   AS SCHEDULED    Mayank Sheehan MD  [address]  [address]  [place] [address]  [phone]      AS SCHEDULED, FOR FOLLOW UP       Final diagnoses:   Primary hypertension   Urinary frequency        ED Disposition       ED Disposition   Discharge    Condition   Stable    Comment   --               This medical record created using voice recognition software.             Sheela Pereira APRN  12/08/23 [id number]

## 2023-12-10 LAB
QT INTERVAL: 443 MS
QTC INTERVAL: 423 MS

## 2023-12-21 ENCOUNTER — APPOINTMENT (OUTPATIENT)
Dept: GENERAL RADIOLOGY | Facility: HOSPITAL | Age: 73
End: 2023-12-21
Payer: MEDICARE

## 2023-12-21 LAB
ALBUMIN SERPL-MCNC: 4.3 G/DL (ref 3.5–5.2)
ALBUMIN/GLOB SERPL: 1.5 G/DL
ALP SERPL-CCNC: 116 U/L (ref 39–117)
ALT SERPL W P-5'-P-CCNC: 39 U/L (ref 1–41)
ANION GAP SERPL CALCULATED.3IONS-SCNC: 12.7 MMOL/L (ref 5–15)
AST SERPL-CCNC: 41 U/L (ref 1–40)
BASOPHILS # BLD AUTO: 0.01 10*3/MM3 (ref 0–0.2)
BASOPHILS NFR BLD AUTO: 0.1 % (ref 0–1.5)
BILIRUB SERPL-MCNC: 0.6 MG/DL (ref 0–1.2)
BUN SERPL-MCNC: 16 MG/DL (ref 8–23)
BUN/CREAT SERPL: 13 (ref 7–25)
CALCIUM SPEC-SCNC: 9.8 MG/DL (ref 8.6–10.5)
CHLORIDE SERPL-SCNC: 103 MMOL/L (ref 98–107)
CO2 SERPL-SCNC: 23.3 MMOL/L (ref 22–29)
CREAT SERPL-MCNC: 1.23 MG/DL (ref 0.76–1.27)
DEPRECATED RDW RBC AUTO: 47.3 FL (ref 37–54)
EGFRCR SERPLBLD CKD-EPI 2021: 61.6 ML/MIN/1.73
EOSINOPHIL # BLD AUTO: 0.24 10*3/MM3 (ref 0–0.4)
EOSINOPHIL NFR BLD AUTO: 3.2 % (ref 0.3–6.2)
ERYTHROCYTE [DISTWIDTH] IN BLOOD BY AUTOMATED COUNT: 13.8 % (ref 12.3–15.4)
GLOBULIN UR ELPH-MCNC: 2.9 GM/DL
GLUCOSE SERPL-MCNC: 104 MG/DL (ref 65–99)
HCT VFR BLD AUTO: 37.3 % (ref 37.5–51)
HGB BLD-MCNC: 12.2 G/DL (ref 13–17.7)
IMM GRANULOCYTES # BLD AUTO: 0.02 10*3/MM3 (ref 0–0.05)
IMM GRANULOCYTES NFR BLD AUTO: 0.3 % (ref 0–0.5)
LYMPHOCYTES # BLD AUTO: 1.17 10*3/MM3 (ref 0.7–3.1)
LYMPHOCYTES NFR BLD AUTO: 15.7 % (ref 19.6–45.3)
MCH RBC QN AUTO: 30.4 PG (ref 26.6–33)
MCHC RBC AUTO-ENTMCNC: 32.7 G/DL (ref 31.5–35.7)
MCV RBC AUTO: 93 FL (ref 79–97)
MONOCYTES # BLD AUTO: 0.73 10*3/MM3 (ref 0.1–0.9)
MONOCYTES NFR BLD AUTO: 9.8 % (ref 5–12)
NEUTROPHILS NFR BLD AUTO: 5.3 10*3/MM3 (ref 1.7–7)
NEUTROPHILS NFR BLD AUTO: 70.9 % (ref 42.7–76)
NRBC BLD AUTO-RTO: 0 /100 WBC (ref 0–0.2)
PLATELET # BLD AUTO: 242 10*3/MM3 (ref 140–450)
PMV BLD AUTO: 9.6 FL (ref 6–12)
POTASSIUM SERPL-SCNC: 4.1 MMOL/L (ref 3.5–5.2)
PROT SERPL-MCNC: 7.2 G/DL (ref 6–8.5)
QT INTERVAL: 422 MS
QTC INTERVAL: 441 MS
RBC # BLD AUTO: 4.01 10*6/MM3 (ref 4.14–5.8)
SODIUM SERPL-SCNC: 139 MMOL/L (ref 136–145)
WBC NRBC COR # BLD AUTO: 7.47 10*3/MM3 (ref 3.4–10.8)

## 2023-12-21 PROCEDURE — 85025 COMPLETE CBC W/AUTO DIFF WBC: CPT

## 2023-12-21 PROCEDURE — 36415 COLL VENOUS BLD VENIPUNCTURE: CPT

## 2023-12-21 PROCEDURE — 80053 COMPREHEN METABOLIC PANEL: CPT

## 2023-12-21 PROCEDURE — 99284 EMERGENCY DEPT VISIT MOD MDM: CPT

## 2023-12-21 PROCEDURE — 93005 ELECTROCARDIOGRAM TRACING: CPT

## 2023-12-21 PROCEDURE — 72100 X-RAY EXAM L-S SPINE 2/3 VWS: CPT

## 2023-12-22 ENCOUNTER — HOSPITAL ENCOUNTER (EMERGENCY)
Facility: HOSPITAL | Age: 73
Discharge: HOME | End: 2023-12-22
Attending: EMERGENCY MEDICINE
Payer: MEDICARE

## 2023-12-22 VITALS
OXYGEN SATURATION: 99 % | RESPIRATION RATE: 18 BRPM | BODY MASS INDEX: 29.83 KG/M2 | HEART RATE: 57 BPM | DIASTOLIC BLOOD PRESSURE: 80 MMHG | TEMPERATURE: 98 F | HEIGHT: 73 IN | WEIGHT: 225.09 LBS | SYSTOLIC BLOOD PRESSURE: 118 MMHG

## 2023-12-22 DIAGNOSIS — M54.50 ACUTE MIDLINE LOW BACK PAIN WITHOUT SCIATICA: ICD-10-CM

## 2023-12-22 DIAGNOSIS — I10 PRIMARY HYPERTENSION: Primary | ICD-10-CM

## 2023-12-22 RX ORDER — OXYCODONE HYDROCHLORIDE AND ACETAMINOPHEN 5; 325 MG/1; MG/1
1 TABLET ORAL ONCE
Status: COMPLETED | OUTPATIENT
Start: 2023-12-22 | End: 2023-12-22

## 2023-12-22 RX ADMIN — OXYCODONE HYDROCHLORIDE AND ACETAMINOPHEN 1 TABLET: 5; 325 TABLET ORAL at 04:18

## 2023-12-22 NOTE — DISCHARGE INSTRUCTIONS
Rest, drink plenty of fluids.  Watch your daily dietary salt intake.  Continue with your home pain medications as prescribed.  Monitor your blood pressure in the mornings and record the reading.  Monitor your blood pressure in the afternoon or evening and write that reading down as well.  Do that until you follow-up with DAV Hanley and also until you follow-up with Dr. Sheehan in January.  This way they can determine whether they need to adjust your medications or add to work to keep your blood pressure lower in the evenings.  You may ice to the lower back several times a day with gentle range of motion stretches.  You may also take over-the-counter acetaminophen with these medications as needed for pain.  Call DAV Hanley's office today and follow-up with her next week or soon as possible to discuss your blood pressure readings and your increase in your back pain.  Follow-up with your pain management as scheduled for possible nerve ablations as needed.  Follow-up with Dr. Sheehan as scheduled for further evaluation and treatment.  Return to the emergency department immediately for any acutely developing neurological symptoms, any chest pain shortness of breath, any bladder or bowel incontinence or any new or worse concerns.

## 2023-12-22 NOTE — ED PROVIDER NOTES
Time: 10:15 PM EST  Date of encounter:  12/21/2023  Independent Historian/Clinical History and Information was obtained by:   Patient    History is limited by: N/A    Chief Complaint   Patient presents with    Hypertension    Back Pain         History of Present Illness:  Patient is a 73 y.o. year old male who presents to the emergency department for evaluation of hypertension and back pain.  Patient states he has history of hypertension and takes daily nightly blood pressure medications which she has taken today.  Patient states prior to coming to the emergency department his blood pressure at home was 200s over 110s.  Patient denies associated chest pain or shortness of breath.  He does admit to headache.  Patient has secondary complaint of severe low back pain.  Patient states he has chronic back pain.  Patient denies any new injury.  (Provider in triage, Avni Gonzales PA-C)    Patient Care Team  Primary Care Provider: Edith Marcelo APRN    Past Medical History:     No Known Allergies  Past Medical History:   Diagnosis Date    A-fib     Abnormal ECG     Anxiety     Arrhythmia     Arthritis     Atrial fibrillation     Bladder disorder     BPH (benign prostatic hyperplasia)     CAD (coronary artery disease)     Cervical spondylosis without myelopathy 01/15/2020    Cervicalgia     Chest pain     Colitis     Condition not found HERNIA    Depression     Diverticulitis     Diverticulosis of colon     GERD (gastroesophageal reflux disease)     H/O degenerative disc disease     Heart attack     Heart disease     Hemorrhoids     High cholesterol     Hypertension     IBS (irritable bowel syndrome)     Mood disorder     Muscle cramps     Myocardial infarction     Osteoarthritis     Paroxysmal atrial fibrillation 12/4/2021    Prostate disorder     S/P lumpectomy, right breast     CYST 2016     Past Surgical History:   Procedure Laterality Date    APPENDECTOMY      BREAST MASS EXCISION  2016    CARDIAC CATHETERIZATION   2016    CHOLECYSTECTOMY      COLONOSCOPY  2008,2014,2021    CORONARY ANGIOPLASTY WITH STENT PLACEMENT  2010    ENDOSCOPY  2010,2019    ENDOSCOPY N/A 7/20/2022    Procedure: ESOPHAGOGASTRODUODENOSCOPY WITH BIOPSY;  Surgeon: Nigel Haas MD;  Location: [facility] ENDOSCOPY;  Service: Gastroenterology;  Laterality: N/A;  HIATAL HERNIA    HEMORRHOIDECTOMY  2019    INGUINAL HERNIA REPAIR  2019    TURP / TRANSURETHRAL INCISION / DRAINAGE PROSTATE  01/16/2020    BUTTON TURP W/ DR TAM    UMBILICAL HERNIA REPAIR  2019    UPPER GASTROINTESTINAL ENDOSCOPY       Family History   Problem Relation Age of Onset    Other Mother         bladder stones    Arthritis Mother     Heart disease Mother     Heart failure Father     Stroke Father     Colon cancer Paternal Grandfather         60's    Malig Hyperthermia Neg Hx        Home Medications:  Prior to Admission medications    Medication Sig Start Date End Date Taking? Authorizing Provider   ARIPiprazole (ABILIFY) 5 MG tablet Take 1 tablet by mouth Daily. 12/22/22   Elian Merrill MD   atorvastatin (LIPITOR) 80 MG tablet Take 1 tablet by mouth Daily. 12/27/22   Sigrid Iqbal APRN   clonazePAM (KlonoPIN) 0.5 MG tablet Take 1 tablet by mouth every 6 to 8 hours as needed for Anxiety. 4/1/22   Elian Merrill MD   clopidogrel (PLAVIX) 75 MG tablet Take 1 tablet by mouth Daily. 12/27/22   Sigrid Iqbal APRN   Eliquis 5 MG tablet tablet TAKE ONE TABLET BY MOUTH EVERY TWELVE HOURS 1/4/23   Mayank Sheehan MD   lansoprazole (PREVACID) 30 MG capsule     Elian Merrill MD   lisinopril (PRINIVIL,ZESTRIL) 5 MG tablet Take 1 tablet by mouth Daily. 6/18/21   Elian Merrill MD   metoprolol succinate XL (TOPROL-XL) 25 MG 24 hr tablet TAKE ONE TABLET BY MOUTH TWICE DAILY 7/14/23   Sigrid Iqbal APRN   multivitamin with minerals tablet tablet Take 1 tablet by mouth Daily.    Elian Merrill MD   nitroglycerin (NITROSTAT) 0.4 MG SL tablet  "Place 1 tablet under the tongue Every 5 (Five) Minutes As Needed for Chest Pain (no more than 3 doses in 15 minutes). 12/10/21   Mayank Sheehan MD   sertraline (ZOLOFT) 100 MG tablet     ProviderElian MD   sildenafil (REVATIO) 20 MG tablet Take 3 tablets by mouth As Needed (ED). 10/19/23   Mallorie Matt MD   tamsulosin (FLOMAX) 0.4 MG capsule 24 hr capsule Take 1 capsule by mouth Daily for 360 days. 10/19/23 10/13/24  Mallorie Matt MD   traMADol (ULTRAM) 50 MG tablet Take 1 tablet by mouth Every 6 (Six) Hours As Needed for Moderate Pain.    Emergency, Nurse Epic, RN   triamcinolone (KENALOG) 0.5 % cream Apply 1 application  topically to the appropriate area as directed 2 (Two) Times a Day.    Provider, MD Elian        Social History:   Social History     Tobacco Use    Smoking status: Never     Passive exposure: Yes    Smokeless tobacco: Never   Vaping Use    Vaping Use: Never used   Substance Use Topics    Alcohol use: Never    Drug use: Never         Review of Systems:  Review of Systems   Constitutional:  Negative for chills and fever.   HENT:  Negative for congestion, ear pain and sore throat.    Eyes:  Negative for pain.   Respiratory:  Negative for cough, chest tightness and shortness of breath.    Cardiovascular:  Negative for chest pain.   Gastrointestinal:  Negative for abdominal pain, diarrhea, nausea and vomiting.   Genitourinary:  Negative for flank pain and hematuria.   Musculoskeletal:  Positive for back pain. Negative for gait problem, joint swelling, neck pain and neck stiffness.   Skin:  Positive for rash. Negative for pallor.   Neurological:  Positive for headaches. Negative for seizures.   All other systems reviewed and are negative.       Physical Exam:  /80   Pulse 57   Temp 98 °F (36.7 °C)   Resp 18   Ht 185.4 cm (73\")   Wt 102 kg (225 lb 1.4 oz)   SpO2 99%   BMI 29.70 kg/m²         Physical Exam  Vitals and nursing note reviewed.   Constitutional:       " General: He is not in acute distress.     Appearance: Normal appearance. He is not ill-appearing or toxic-appearing.   HENT:      Head: Normocephalic and atraumatic.   Eyes:      General: No scleral icterus.     Conjunctiva/sclera: Conjunctivae normal.      Pupils: Pupils are equal, round, and reactive to light.   Cardiovascular:      Rate and Rhythm: Normal rate and regular rhythm.      Pulses: Normal pulses.   Pulmonary:      Effort: Pulmonary effort is normal. No respiratory distress.      Breath sounds: Normal breath sounds. No wheezing.   Abdominal:      General: Abdomen is flat. There is no distension.      Palpations: Abdomen is soft.      Tenderness: There is no abdominal tenderness. There is no guarding or rebound.   Musculoskeletal:         General: Normal range of motion.      Cervical back: Normal range of motion.   Skin:     General: Skin is warm and dry.      Capillary Refill: Capillary refill takes less than 2 seconds.      Coloration: Skin is not cyanotic.      Findings: No rash.   Neurological:      General: No focal deficit present.      Mental Status: He is alert and oriented to person, place, and time. Mental status is at baseline.   Psychiatric:         Attention and Perception: Attention and perception normal.         Mood and Affect: Mood normal.         Behavior: Behavior normal.                  Procedures:  Procedures      Medical Decision Making:      Comorbidities that affect care:    Anxiety, bladder disorder, coronary artery disease, colitis, depression, diverticulosis, heart attack, hemorrhoids, high cholesterol, IBS, muscle cramps, also your arthritis, right breast lump ectomy, arrhythmias, A-fib, arthritis, BPH, degenerative disc disease, diverticulitis, GERD, hypertension, mood disorder, myocardial infarction, prostate disorder, abnormal ECG, paroxysmal atrial fibs    External Notes reviewed:    None      The following orders were placed and all results were independently analyzed  by me:  Orders Placed This Encounter   Procedures    XR Spine Lumbar 2 or 3 View    Comprehensive Metabolic Panel    CBC Auto Differential    ECG 12 Lead Other; HTN    CBC & Differential       Medications Given in the Emergency Department:  Medications   oxyCODONE-acetaminophen (PERCOCET) 5-325 MG per tablet 1 tablet (1 tablet Oral Given 12/22/23 0418)        ED Course:    The patient was initially evaluated in the triage area where orders were placed. The patient was later dispositioned by DAV Cruz.      The patient was advised to stay for completion of workup which includes but is not limited to communication of labs and radiological results, reassessment and plan. The patient was advised that leaving prior to disposition by a provider could result in critical findings that are not communicated to the patient.     ED Course as of 12/22/23 0651   Thu Dec 21, 2023   2216 PROVIDER IN TRIAGE  Patient was evaluated by me in triage, Avni Gonzales PA-C.  Orders were placed and patient is currently awaiting final results and disposition.  [MD]      ED Course User Index  [MD] Avni Gonzales PA-C       Labs:    Lab Results (last 24 hours)       Procedure Component Value Units Date/Time    CBC & Differential [[id number]]  (Abnormal) Collected: 12/21/23 2225    Specimen: Blood Updated: 12/21/23 2253    Narrative:      The following orders were created for panel order CBC & Differential.  Procedure                               Abnormality         Status                     ---------                               -----------         ------                     CBC Auto Differential[[id number]]        Abnormal            Final result                 Please view results for these tests on the individual orders.    Comprehensive Metabolic Panel [[id number]]  (Abnormal) Collected: 12/21/23 2225    Specimen: Blood Updated: 12/21/23 2324     Glucose 104 mg/dL      BUN 16 mg/dL      Creatinine 1.23 mg/dL      Sodium 139  mmol/L      Potassium 4.1 mmol/L      Chloride 103 mmol/L      CO2 23.3 mmol/L      Calcium 9.8 mg/dL      Total Protein 7.2 g/dL      Albumin 4.3 g/dL      ALT (SGPT) 39 U/L      AST (SGOT) 41 U/L      Alkaline Phosphatase 116 U/L      Total Bilirubin 0.6 mg/dL      Globulin 2.9 gm/dL      A/G Ratio 1.5 g/dL      BUN/Creatinine Ratio 13.0     Anion Gap 12.7 mmol/L      eGFR 61.6 mL/min/1.73     Narrative:      GFR Normal >60  Chronic Kidney Disease <60  Kidney Failure <15    The GFR formula is only valid for adults with stable renal function between ages 18 and 70.    CBC Auto Differential [[id number]]  (Abnormal) Collected: 12/21/23 2225    Specimen: Blood Updated: 12/21/23 2253     WBC 7.47 10*3/mm3      RBC 4.01 10*6/mm3      Hemoglobin 12.2 g/dL      Hematocrit 37.3 %      MCV 93.0 fL      MCH 30.4 pg      MCHC 32.7 g/dL      RDW 13.8 %      RDW-SD 47.3 fl      MPV 9.6 fL      Platelets 242 10*3/mm3      Neutrophil % 70.9 %      Lymphocyte % 15.7 %      Monocyte % 9.8 %      Eosinophil % 3.2 %      Basophil % 0.1 %      Immature Grans % 0.3 %      Neutrophils, Absolute 5.30 10*3/mm3      Lymphocytes, Absolute 1.17 10*3/mm3      Monocytes, Absolute 0.73 10*3/mm3      Eosinophils, Absolute 0.24 10*3/mm3      Basophils, Absolute 0.01 10*3/mm3      Immature Grans, Absolute 0.02 10*3/mm3      nRBC 0.0 /100 WBC              Imaging:    XR Spine Lumbar 2 or 3 View    Result Date: 12/22/2023  PROCEDURE: XR SPINE LUMBAR 2 OR 3 VW  COMPARISON: None.  That is, none of the prior relevant comparison studies is able to be retrieved from the Imaging Archive during the time of this interpretation for correlation.  INDICATIONS: LOWER BACK PAIN; NO INJURY.  FINDINGS:  3 non-standing views of the lumbar spine were obtained.  No acute fracture or acute malalignment is identified.  Moderate-to-severe degenerative changes are seen at multiple levels.  There are arterial calcifications.  Chronic anterolisthesis is seen at L4-5,  estimated at 1 cm.  There may be chronic retrolisthesis at several levels more cranially.  The patient has undergone cholecystectomy.  Degenerative changes involve the bilateral sacroiliac (SI) joints.  If symptoms or clinical concerns persist, consider imaging follow-up.        No acute fracture or acute malalignment is identified.    Please note that portions of this note were completed with a voice recognition program.  SHIRLEY POOL JR, MD       Electronically Signed and Approved By: SHIRLEY POOL JR, MD on 12/22/2023 at 0:22                 Differential Diagnosis and Discussion:      Back Pain: The patient presents with back pain. My differential diagnosis includes but is not limited to acute spinal epidural abscess, acute spinal epidural bleed, cauda equina syndrome, abdominal aortic aneurysm, aortic dissection, kidney stone, pyelonephritis, musculoskeletal back pain, spinal fracture, and osteoarthritis.   Metabolic: Differential diagnosis includes but is not limited to hypertension, hyperglycemia, hyperkalemia, hypocalcemia, metabolic acidosis, hypokalemia, hypoglycemia, malnutrition, hypothyroidism, hyperthyroidism, and adrenal insufficiency.     All labs were reviewed and interpreted by me.    MDM  Number of Diagnoses or Management Options  Acute midline low back pain without sciatica: established and worsening  Primary hypertension: established and worsening     Amount and/or Complexity of Data Reviewed  Clinical lab tests: reviewed  Tests in the radiology section of CPT®: reviewed  Tests in the medicine section of CPT®: reviewed    Risk of Complications, Morbidity, and/or Mortality  Presenting problems: low  Diagnostic procedures: low  Management options: low    Patient Progress  Patient progress: stable         Patient Care Considerations:    NARCOTICS: I considered prescribing opiate pain medication as an outpatient, however patient has Ultram at home for pain.      Consultants/Shared Management  Plan:    None    Social Determinants of Health:    Patient is independent, reliable, and has access to care.       Disposition and Care Coordination:    Discharged: The patient is suitable and stable for discharge with no need for consideration of observation or admission.    I have explained the patient´s condition, diagnoses and treatment plan based on the information available to me at this time. I have answered questions and addressed any concerns. The patient has a good  understanding of the patient´s diagnosis, condition, and treatment plan as can be expected at this point. The vital signs have been stable. The patient´s condition is stable and appropriate for discharge from the emergency department.      The patient will pursue further outpatient evaluation with the primary care physician or other designated or consulting physician as outlined in the discharge instructions. They are agreeable to this plan of care and follow-up instructions have been explained in detail. The patient has received these instructions in written format and have expressed an understanding of the discharge instructions. The patient is aware that any significant change in condition or worsening of symptoms should prompt an immediate return to this or the closest emergency department or call to 911.  I have explained discharge medications and the need for follow up with the patient/caretakers. This was also printed in the discharge instructions. Patient was discharged with the following medications and follow up:      Medication List      No changes were made to your prescriptions during this visit.      Edith Marcelo, DAV  [address]  [address]  [place] [address] [phone]    Call today  FOR FOLLOW UP    Mayank Sheehan MD  [address]   [address]  [place] [id number] [phone]      AS SCHEDULED JAN 30, 2024 FOR FOLLOW UP       Final diagnoses:   Primary hypertension   Acute midline low back pain without  sciatica        ED Disposition       ED Disposition   Discharge    Condition   Stable    Comment   --               This medical record created using voice recognition software.             Sheela Pereira, APRN  12/22/23 0637

## 2023-12-22 NOTE — Clinical Note
[facility] EMERGENCY ROOM  [address]  [place] [address]  Phone: [phone]    Ari Olea was seen and treated in our emergency department on 12/21/2023.  He may return to work on 12/26/2023.         Thank you for choosing [facility].    Sheela Pereira APRN

## 2023-12-22 NOTE — ED TRIAGE NOTES
Low back pain and blood pressure 210/1098 when I left the house.  Took BP meds today.  Has Headache but denies light headed.  Denies vomiting but has nausea.

## 2023-12-27 RX ORDER — APIXABAN 5 MG/1
TABLET, FILM COATED ORAL
Qty: 180 TABLET | Refills: 3 | Status: SHIPPED | OUTPATIENT
Start: 2023-12-27

## 2024-01-01 LAB
QT INTERVAL: 422 MS
QTC INTERVAL: 441 MS

## 2024-01-04 RX ORDER — ATORVASTATIN CALCIUM 80 MG/1
80 TABLET, FILM COATED ORAL DAILY
Qty: 90 TABLET | Refills: 1 | Status: SHIPPED | OUTPATIENT
Start: 2024-01-04

## 2024-01-12 RX ORDER — METOPROLOL SUCCINATE 25 MG/1
TABLET, EXTENDED RELEASE ORAL
Qty: 180 TABLET | Refills: 1 | Status: SHIPPED | OUTPATIENT
Start: 2024-01-12

## 2024-01-22 ENCOUNTER — LAB (OUTPATIENT)
Dept: LAB | Facility: HOSPITAL | Age: 74
End: 2024-01-22
Payer: MEDICARE

## 2024-01-22 DIAGNOSIS — R97.20 ELEVATED PROSTATE SPECIFIC ANTIGEN (PSA): ICD-10-CM

## 2024-01-23 ENCOUNTER — TELEPHONE (OUTPATIENT)
Dept: CARDIOLOGY | Facility: CLINIC | Age: 74
End: 2024-01-23
Payer: MEDICARE

## 2024-01-23 DIAGNOSIS — E78.2 MIXED HYPERLIPIDEMIA: Primary | ICD-10-CM

## 2024-01-23 DIAGNOSIS — I10 ESSENTIAL HYPERTENSION: ICD-10-CM

## 2024-01-29 ENCOUNTER — TELEPHONE (OUTPATIENT)
Dept: CARDIOLOGY | Facility: CLINIC | Age: 74
End: 2024-01-29

## 2024-01-29 NOTE — TELEPHONE ENCOUNTER
Procedure: Lumbar Nerve Ablation    Med Directive: Eliquis 3 days prior 24H post    PMH: CAD prior stent, PAF, HTN, HLD    Last Seen: 1/30/24      Patient was seen in office. Having symptoms. Will address after testing completed

## 2024-01-29 NOTE — TELEPHONE ENCOUNTER
[facility] received a fax that requires your attention. The document has been indexed to the patient’s chart for your review.      Reason for sending: EXTERNAL MEDICAL RECORD NOTIFICATION     Documents Description: PHYS ORD-ALLIED ANESTHESIA CARDIAC CLEARANCE REQ-1.29.24    Name of Sender: ALLIED ANESTHESIA [facility]     Date Indexed: 1.29.24

## 2024-01-30 ENCOUNTER — OFFICE VISIT (OUTPATIENT)
Dept: CARDIOLOGY | Facility: CLINIC | Age: 74
End: 2024-01-30
Payer: MEDICARE

## 2024-01-30 VITALS
HEIGHT: 73 IN | WEIGHT: 231 LBS | HEART RATE: 68 BPM | SYSTOLIC BLOOD PRESSURE: 122 MMHG | DIASTOLIC BLOOD PRESSURE: 69 MMHG | BODY MASS INDEX: 30.62 KG/M2

## 2024-01-30 DIAGNOSIS — I25.10 CAD S/P PERCUTANEOUS CORONARY ANGIOPLASTY: Primary | ICD-10-CM

## 2024-01-30 DIAGNOSIS — I10 ESSENTIAL HYPERTENSION: ICD-10-CM

## 2024-01-30 DIAGNOSIS — I71.21 ANEURYSM OF ASCENDING AORTA WITHOUT RUPTURE: ICD-10-CM

## 2024-01-30 DIAGNOSIS — I48.0 PAF (PAROXYSMAL ATRIAL FIBRILLATION): ICD-10-CM

## 2024-01-30 DIAGNOSIS — Z98.61 CAD S/P PERCUTANEOUS CORONARY ANGIOPLASTY: Primary | ICD-10-CM

## 2024-01-30 DIAGNOSIS — E78.2 MIXED HYPERLIPIDEMIA: ICD-10-CM

## 2024-01-30 PROBLEM — K64.9 HEMORRHOIDS: Status: ACTIVE | Noted: 2024-01-30

## 2024-01-30 PROBLEM — F41.9 ANXIETY: Status: RESOLVED | Noted: 2024-01-30 | Resolved: 2024-01-30

## 2024-01-30 PROBLEM — F41.9 ANXIETY: Status: ACTIVE | Noted: 2024-01-30

## 2024-01-30 PROBLEM — K58.9 IRRITABLE BOWEL SYNDROME: Status: ACTIVE | Noted: 2024-01-30

## 2024-01-30 PROBLEM — R45.851 SUICIDAL THOUGHTS: Status: RESOLVED | Noted: 2024-01-30 | Resolved: 2024-01-30

## 2024-01-30 PROBLEM — Z82.3 FAMILY HISTORY OF STROKE: Status: RESOLVED | Noted: 2024-01-30 | Resolved: 2024-01-30

## 2024-01-30 PROBLEM — N40.0 BENIGN PROSTATIC HYPERPLASIA: Chronic | Status: RESOLVED | Noted: 2022-01-12 | Resolved: 2024-01-30

## 2024-01-30 PROBLEM — M71.38 SYNOVIAL CYST OF LUMBAR SPINE: Status: ACTIVE | Noted: 2024-01-30

## 2024-01-30 PROBLEM — K46.9 ABDOMINAL HERNIA: Status: ACTIVE | Noted: 2024-01-30

## 2024-01-30 RX ORDER — DESVENLAFAXINE 100 MG/1
TABLET, EXTENDED RELEASE ORAL
COMMUNITY

## 2024-01-30 NOTE — PROGRESS NOTES
Chief Complaint  Follow-up    Subjective            History of Present Illness  Ari Olea is a 73-year-old male patient who presents to the office today for follow-up.  He has CAD with prior stenting, atrial fibrillation, hypertension, and hyperlipidemia.  He is compliant with medication. He reports intermittent episodes of shortness of breath, lightheadedness, and palpitations.  These episodes are coinciding with increased levels of anxiety, blood pressure and heart rate typically come down into normal range within 30 minutes of taking clonazepam.  He admits he only has to take the clonazepam 2-3 times per month.  He denies any chest pain or edema.    PMH  Past Medical History:   Diagnosis Date    Anxiety     BPH (benign prostatic hyperplasia)     CAD (coronary artery disease)     Cervical spondylosis without myelopathy 01/15/2020    Colitis     Depression     Diverticulitis     Diverticulosis of colon     Family history of stroke 01/30/2024    GERD (gastroesophageal reflux disease)     Hemorrhoids     High cholesterol     Hypertension     IBS (irritable bowel syndrome)     Mood disorder     Myocardial infarction     Osteoarthritis     Paroxysmal atrial fibrillation 12/04/2021    S/P lumpectomy, right breast     CYST 2016    Suicidal thoughts 01/30/2024         ALLERGY  No Known Allergies       SURGICALHX  Past Surgical History:   Procedure Laterality Date    APPENDECTOMY      BREAST MASS EXCISION  2016    CARDIAC CATHETERIZATION  2016    CHOLECYSTECTOMY      COLONOSCOPY  2008,2014,2021    CORONARY ANGIOPLASTY WITH STENT PLACEMENT  2010    ENDOSCOPY  2010,2019    ENDOSCOPY N/A 7/20/2022    Procedure: ESOPHAGOGASTRODUODENOSCOPY WITH BIOPSY;  Surgeon: Nigel Haas MD;  Location: [facility] ENDOSCOPY;  Service: Gastroenterology;  Laterality: N/A;  HIATAL HERNIA    HEMORRHOIDECTOMY  2019    INGUINAL HERNIA REPAIR  2019    TURP / TRANSURETHRAL INCISION / DRAINAGE PROSTATE  01/16/2020    BUTTON TURP W/   ANEL    UMBILICAL HERNIA REPAIR  2019    UPPER GASTROINTESTINAL ENDOSCOPY            SOC  Social History     Socioeconomic History    Marital status:    Tobacco Use    Smoking status: Never     Passive exposure: Yes    Smokeless tobacco: Never   Vaping Use    Vaping Use: Never used   Substance and Sexual Activity    Alcohol use: Never    Drug use: Never    Sexual activity: Defer         FAMHX  Family History   Problem Relation Age of Onset    Other Mother         bladder stones    Arthritis Mother     Heart disease Mother     Heart failure Father     Stroke Father     Colon cancer Paternal Grandfather         60's    Malig Hyperthermia Neg Hx           MEDSIGONLY  Current Outpatient Medications on File Prior to Visit   Medication Sig    ARIPiprazole (ABILIFY) 5 MG tablet Take 1 tablet by mouth Daily.    atorvastatin (LIPITOR) 80 MG tablet TAKE ONE TABLET BY MOUTH EVERY DAY    clonazePAM (KlonoPIN) 0.5 MG tablet Take 1 tablet by mouth every 6 to 8 hours as needed for Anxiety.    clopidogrel (PLAVIX) 75 MG tablet Take 1 tablet by mouth Daily.    Desvenlafaxine  MG tablet sustained-release 24 hour Take 1 tablet every day by oral route.    Eliquis 5 MG tablet tablet TAKE ONE TABLET BY MOUTH EVERY TWELVE HOURS    lansoprazole (PREVACID) 30 MG capsule     lisinopril (PRINIVIL,ZESTRIL) 5 MG tablet Take 1 tablet by mouth Daily.    metoprolol succinate XL (TOPROL-XL) 25 MG 24 hr tablet TAKE ONE TABLET BY MOUTH TWICE DAILY    multivitamin with minerals tablet tablet Take 1 tablet by mouth Daily.    nitroglycerin (NITROSTAT) 0.4 MG SL tablet Place 1 tablet under the tongue Every 5 (Five) Minutes As Needed for Chest Pain (no more than 3 doses in 15 minutes).    sertraline (ZOLOFT) 100 MG tablet     sildenafil (REVATIO) 20 MG tablet Take 3 tablets by mouth As Needed (ED).    tamsulosin (FLOMAX) 0.4 MG capsule 24 hr capsule Take 1 capsule by mouth Daily for 360 days.    traMADol (ULTRAM) 50 MG tablet Take 1 tablet  "by mouth Every 6 (Six) Hours As Needed for Moderate Pain.    triamcinolone (KENALOG) 0.5 % cream Apply 1 Application topically to the appropriate area as directed 2 (Two) Times a Day.     No current facility-administered medications on file prior to visit.         Objective   /69   Pulse 68   Ht 185.4 cm (73\")   Wt 105 kg (231 lb)   BMI 30.48 kg/m²       Physical Exam  HENT:      Head: Normocephalic.   Neck:      Vascular: No carotid bruit.   Cardiovascular:      Rate and Rhythm: Normal rate and regular rhythm.      Pulses: Normal pulses.      Heart sounds: Normal heart sounds. No murmur heard.  Pulmonary:      Effort: Pulmonary effort is normal.      Breath sounds: Normal breath sounds.   Musculoskeletal:      Cervical back: Neck supple.      Right lower leg: No edema.      Left lower leg: No edema.   Skin:     General: Skin is dry.   Neurological:      Mental Status: He is alert and oriented to person, place, and time.   Psychiatric:         Behavior: Behavior normal.       ECG 12 Lead    Date/Time: 1/30/2024 1:29 PM  Performed by: Sigrid Iqbal APRN    Authorized by: Sigrid Iqbal APRN  Comparison: compared with previous ECG from 12/21/2023  Similar to previous ECG  Rhythm: sinus rhythm  Rate: normal  BPM: 61  Conduction: non-specific intraventricular conduction delay  ST Segments: ST segments normal  T Waves: T waves normal  QRS axis: normal  Other: no other findings    Clinical impression: abnormal EKG        Result Review :   The following data was reviewed by: DAV Duncan on 01/30/2024:  proBNP   Date Value Ref Range Status   02/14/2023 57.0 0.0 - 900.0 pg/mL Final     CMP          12/21/2023    22:25   CMP   Glucose 104    BUN 16    Creatinine 1.23    EGFR 61.6    Sodium 139    Potassium 4.1    Chloride 103    Calcium 9.8    Total Protein 7.2    Albumin 4.3    Globulin 2.9    Total Bilirubin 0.6    Alkaline Phosphatase 116    AST (SGOT) 41    ALT (SGPT) 39  " "  Albumin/Globulin Ratio 1.5    BUN/Creatinine Ratio 13.0    Anion Gap 12.7      CBC w/diff          12/21/2023    22:25   CBC w/Diff   WBC 7.47    RBC 4.01    Hemoglobin 12.2    Hematocrit 37.3    MCV 93.0    MCH 30.4    MCHC 32.7    RDW 13.8    Platelets 242    Neutrophil Rel % 70.9    Immature Granulocyte Rel % 0.3    Lymphocyte Rel % 15.7    Monocyte Rel % 9.8    Eosinophil Rel % 3.2    Basophil Rel % 0.1       Lab Results   Component Value Date    TSH 4.320 (H) 10/20/2022      Lab Results   Component Value Date    FREET4 1.09 10/20/2022      No results found for: \"DDIMERQUANT\"  Magnesium   Date Value Ref Range Status   12/08/2023 2.1 1.6 - 2.4 mg/dL Final      No results found for: \"DIGOXIN\"   Lab Results   Component Value Date    TROPONINT 15 12/08/2023               10/11/2023    12:15   Lipid Panel   Total Cholesterol 123    Triglycerides 111    HDL Cholesterol 52    VLDL Cholesterol 20    LDL Cholesterol  51    LDL/HDL Ratio 0.94        Results for orders placed in visit on 12/29/21    Adult Transthoracic Echo Complete W/ Cont if Necessary Per Protocol    Interpretation Summary  · Left ventricular ejection fraction appears to be 51 - 55%. Left ventricular systolic function is low normal.  · Left ventricular diastolic function was normal.  · Significant valvular disease.    [id number] Score: 3        Assessment and Plan    Diagnoses and all orders for this visit:    1. CAD S/P percutaneous coronary angioplasty (Primary)  He denies any anginal symptoms, continue Plavix 75 mg daily.    2. Paroxysmal atrial fibrillation  He is having increased palpitations and elevated heart rate with anxiety episodes.  EKG in office today shows normal sinus rhythm with controlled rate.  Continue metoprolol 25 mg twice daily.  Continue Eliquis for CVA prevention.  Check echocardiogram to assess left ventricular systolic function and valvular function.  -     Adult Transthoracic Echo Complete W/ Cont if Necessary Per Protocol; " Future    3. Essential hypertension  Currently controlled and without adverse effects from medication, continue lisinopril 5 mg daily.    4. Mixed hyperlipidemia  Last lipid panel was 10/11/2023 with LDL 51 which is within goal range, continue atorvastatin 80 mg daily.    5. Aneurysm of ascending aorta without rupture  Last CT chest was 1/30/2023 and showed aneurysm measuring 4.1 cm.  Repeat CT chest to reassess aneurysm size.  -     CT Chest Without Contrast; Future    Other orders  -     ECG 12 Lead            Follow Up   Return in about 6 months (around 7/30/2024) for Follow up with Dr Sheehan.    Patient was given instructions and counseling regarding his condition or for health maintenance advice. Please see specific information pulled into the AVS if appropriate.     Ari HARMAN Olea  reports that he has never smoked. He has been exposed to tobacco smoke. He has never used smokeless tobacco.           Sigrid Iqbal, APRN  01/30/24  13:21 EST    Dictated Utilizing Dragon Dictation

## 2024-02-17 ENCOUNTER — HOSPITAL ENCOUNTER (EMERGENCY)
Facility: HOSPITAL | Age: 74
Discharge: HOME | End: 2024-02-17
Attending: EMERGENCY MEDICINE
Payer: MEDICARE

## 2024-02-17 ENCOUNTER — APPOINTMENT (OUTPATIENT)
Dept: GENERAL RADIOLOGY | Facility: HOSPITAL | Age: 74
End: 2024-02-17
Payer: MEDICARE

## 2024-02-17 VITALS
WEIGHT: 229.94 LBS | RESPIRATION RATE: 18 BRPM | TEMPERATURE: 98.3 F | DIASTOLIC BLOOD PRESSURE: 83 MMHG | BODY MASS INDEX: 30.47 KG/M2 | SYSTOLIC BLOOD PRESSURE: 141 MMHG | HEIGHT: 73 IN | OXYGEN SATURATION: 95 % | HEART RATE: 64 BPM

## 2024-02-17 DIAGNOSIS — R04.2 BLOOD-TINGED SPUTUM: Primary | ICD-10-CM

## 2024-02-17 DIAGNOSIS — R04.2 HEMOPTYSIS: ICD-10-CM

## 2024-02-17 LAB
FLUAV SUBTYP SPEC NAA+PROBE: NOT DETECTED
FLUBV RNA ISLT QL NAA+PROBE: NOT DETECTED
RSV RNA NPH QL NAA+NON-PROBE: NOT DETECTED
SARS-COV-2 RNA RESP QL NAA+PROBE: NOT DETECTED

## 2024-02-17 PROCEDURE — 99283 EMERGENCY DEPT VISIT LOW MDM: CPT

## 2024-02-17 PROCEDURE — 87637 SARSCOV2&INF A&B&RSV AMP PRB: CPT | Performed by: EMERGENCY MEDICINE

## 2024-02-17 PROCEDURE — 71045 X-RAY EXAM CHEST 1 VIEW: CPT

## 2024-02-17 NOTE — DISCHARGE INSTRUCTIONS
Call your primary care provider first thing Monday morning.  Return to the emergency department immediately for chest pain, fever, difficulty breathing, uncontrolled episodes of coughing up blood clots.

## 2024-02-17 NOTE — ED PROVIDER NOTES
Time: 3:34 PM EST  Date of encounter:  2/17/2024  Independent Historian/Clinical History and Information was obtained by:   Patient    History is limited by: N/A    Chief Complaint: Hemoptysis       History of Present Illness:  Patient is a 73 y.o. year old male who presents to the emergency department for evaluation of hemoptysis    HPI    Patient Care Team  Primary Care Provider: Edith Marcelo APRN    Past Medical History:     No Known Allergies  Past Medical History:   Diagnosis Date    Anxiety     BPH (benign prostatic hyperplasia)     CAD (coronary artery disease)     Cervical spondylosis without myelopathy 01/15/2020    Colitis     Depression     Diverticulitis     Diverticulosis of colon     Family history of stroke 01/30/2024    GERD (gastroesophageal reflux disease)     Hemorrhoids     High cholesterol     Hypertension     IBS (irritable bowel syndrome)     Mood disorder     Myocardial infarction     Osteoarthritis     Paroxysmal atrial fibrillation 12/04/2021    S/P lumpectomy, right breast     CYST 2016    Suicidal thoughts 01/30/2024     Past Surgical History:   Procedure Laterality Date    APPENDECTOMY      BREAST MASS EXCISION  2016    CARDIAC CATHETERIZATION  2016    CHOLECYSTECTOMY      COLONOSCOPY  2008,2014,2021    CORONARY ANGIOPLASTY WITH STENT PLACEMENT  2010    ENDOSCOPY  2010,2019    ENDOSCOPY N/A 7/20/2022    Procedure: ESOPHAGOGASTRODUODENOSCOPY WITH BIOPSY;  Surgeon: Nigel Haas MD;  Location: [facility] ENDOSCOPY;  Service: Gastroenterology;  Laterality: N/A;  HIATAL HERNIA    HEMORRHOIDECTOMY  2019    INGUINAL HERNIA REPAIR  2019    TURP / TRANSURETHRAL INCISION / DRAINAGE PROSTATE  01/16/2020    BUTTON TURP W/ DR TAM    UMBILICAL HERNIA REPAIR  2019    UPPER GASTROINTESTINAL ENDOSCOPY       Family History   Problem Relation Age of Onset    Other Mother         bladder stones    Arthritis Mother     Heart disease Mother     Heart failure Father     Stroke Father     Colon  cancer Paternal Grandfather         60's    Malig Hyperthermia Neg Hx        Home Medications:  Prior to Admission medications    Medication Sig Start Date End Date Taking? Authorizing Provider   ARIPiprazole (ABILIFY) 5 MG tablet Take 1 tablet by mouth Daily. 12/22/22   Elian Merrill MD   atorvastatin (LIPITOR) 80 MG tablet TAKE ONE TABLET BY MOUTH EVERY DAY 1/4/24   Sigrid Iqbal APRN   clonazePAM (KlonoPIN) 0.5 MG tablet Take 1 tablet by mouth every 6 to 8 hours as needed for Anxiety. 4/1/22   Elian Merrill MD   clopidogrel (PLAVIX) 75 MG tablet Take 1 tablet by mouth Daily. 12/27/22   Sigrid Iqbal APRN   Desvenlafaxine  MG tablet sustained-release 24 hour Take 1 tablet every day by oral route.    Elian Merrill MD   Eliquis 5 MG tablet tablet TAKE ONE TABLET BY MOUTH EVERY TWELVE HOURS 12/27/23   Mayank Sheehan MD   lansoprazole (PREVACID) 30 MG capsule     Elian Merrill MD   lisinopril (PRINIVIL,ZESTRIL) 5 MG tablet Take 1 tablet by mouth Daily. 6/18/21   Elian Merrill MD   metoprolol succinate XL (TOPROL-XL) 25 MG 24 hr tablet TAKE ONE TABLET BY MOUTH TWICE DAILY 1/12/24   Sigrid Iqbal APRN   multivitamin with minerals tablet tablet Take 1 tablet by mouth Daily.    Elian Merrill MD   nitroglycerin (NITROSTAT) 0.4 MG SL tablet Place 1 tablet under the tongue Every 5 (Five) Minutes As Needed for Chest Pain (no more than 3 doses in 15 minutes). 12/10/21   Mayank Sheehan MD   sertraline (ZOLOFT) 100 MG tablet     Elian Merrill MD   sildenafil (REVATIO) 20 MG tablet Take 3 tablets by mouth As Needed (ED). 10/19/23   Mallorie Matt MD   tamsulosin (FLOMAX) 0.4 MG capsule 24 hr capsule Take 1 capsule by mouth Daily for 360 days. 10/19/23 10/13/24  Mallorie Matt MD   traMADol (ULTRAM) 50 MG tablet Take 1 tablet by mouth Every 6 (Six) Hours As Needed for Moderate Pain.    Emergency, Nurse Epic, RN   triamcinolone (KENALOG) 0.5  "% cream Apply 1 Application topically to the appropriate area as directed 2 (Two) Times a Day.    Provider, Elian, MD        Social History:   Social History     Tobacco Use    Smoking status: Never     Passive exposure: Yes    Smokeless tobacco: Never   Vaping Use    Vaping Use: Never used   Substance Use Topics    Alcohol use: Never    Drug use: Never         Review of Systems:  Review of Systems     Physical Exam:  /87   Pulse 76   Temp 98.3 °F (36.8 °C)   Resp 18   Ht 185.4 cm (73\")   Wt 104 kg (229 lb 15 oz)   SpO2 99%   BMI 30.34 kg/m²     Physical Exam   ***          Procedures:  Procedures      Medical Decision Making:      Comorbidities that affect care:    Coronary Artery Disease, hemorrhoids, IBS, hx of MI, Afib, hx of suicidal thoughts, depression, GERD    External Notes reviewed:    Previous Clinic Note: ***      The following orders were placed and all results were independently analyzed by me:  Orders Placed This Encounter   Procedures    XR Chest 1 View       Medications Given in the Emergency Department:  Medications - No data to display     ED Course:         Labs:    Lab Results (last 24 hours)       ** No results found for the last 24 hours. **             Imaging:    No Radiology Exams Resulted Within Past 24 Hours      Differential Diagnosis and Discussion:    Cough: Differential diagnosis includes but is not limited to pneumonia, acute bronchitis, upper respiratory infection, ACE inhibitor use, allergic reaction, epiglottitis, seasonal allergies, chemical irritants, exercise-induced asthma, viral syndrome.  GI Bleeding: Differential diagnosis includes but is not limited to gastritis, gastric ulcer, stress ulcer, duodenitis, Aruna-Carlson tears, esophageal varices, angiodysplasia, aortic enteric fistula, hematologic issues including thrombocytopenia, GI neoplasm, ulcerative colitis, Crohn's disease, diverticulosis, diverticulitis, hemorrhoids, aortic aneurysm, and " polyps    {Independent Review of (Optional):98670}    MDM  Number of Diagnoses or Management Options       {Critical Care:89394}    {SEPSIS RECOGNITION:97467}    Patient Care Considerations:    {Considerations (Optional):43710}      Consultants/Shared Management Plan:    {Shared Management Plan (Optional):41153}    Social Determinants of Health:    {Social Determinants of Health (Optional):25779}      Disposition and Care Coordination:    {Admission consideration:14752}    {Discharge (Optional):41625}    Final diagnoses:   None        ED Disposition       None            This medical record created using voice recognition software.

## 2024-02-17 NOTE — ED PROVIDER NOTES
Time: 3:03 PM EST  Date of encounter:  2/17/2024  Independent Historian/Clinical History and Information was obtained by:   Patient    History is limited by: N/A    Chief Complaint   Patient presents with    Coughing Up Blood         History of Present Illness:  Patient is a 73 y.o. year old male who presents to the emergency department for evaluation of blood mixed in with mucus that he is coughing up. This began today around noon. Denies shortness of breath.  Patient has no chest pain.  He states that he has not coughed any further blood since being here in the emergency departments for a few hours.  He does take Eliquis and has not missed any doses recently.  He denies fevers, body aches, chills or any other infectious symptoms.      Patient Care Team  Primary Care Provider: Edith Marcelo APRN    Past Medical History:     No Known Allergies  Past Medical History:   Diagnosis Date    Anxiety     BPH (benign prostatic hyperplasia)     CAD (coronary artery disease)     Cervical spondylosis without myelopathy 01/15/2020    Colitis     Depression     Diverticulitis     Diverticulosis of colon     Family history of stroke 01/30/2024    GERD (gastroesophageal reflux disease)     Hemorrhoids     High cholesterol     Hypertension     IBS (irritable bowel syndrome)     Mood disorder     Myocardial infarction     Osteoarthritis     Paroxysmal atrial fibrillation 12/04/2021    S/P lumpectomy, right breast     CYST 2016    Suicidal thoughts 01/30/2024     Past Surgical History:   Procedure Laterality Date    APPENDECTOMY      BREAST MASS EXCISION  2016    CARDIAC CATHETERIZATION  2016    CHOLECYSTECTOMY      COLONOSCOPY  2008,2014,2021    CORONARY ANGIOPLASTY WITH STENT PLACEMENT  2010    ENDOSCOPY  2010,2019    ENDOSCOPY N/A 7/20/2022    Procedure: ESOPHAGOGASTRODUODENOSCOPY WITH BIOPSY;  Surgeon: Nigel Haas MD;  Location: [facility] ENDOSCOPY;  Service: Gastroenterology;  Laterality: N/A;  HIATAL HERNIA     HEMORRHOIDECTOMY  2019    INGUINAL HERNIA REPAIR  2019    TURP / TRANSURETHRAL INCISION / DRAINAGE PROSTATE  01/16/2020    BUTTON TURP W/ DR TAM    UMBILICAL HERNIA REPAIR  2019    UPPER GASTROINTESTINAL ENDOSCOPY       Family History   Problem Relation Age of Onset    Other Mother         bladder stones    Arthritis Mother     Heart disease Mother     Heart failure Father     Stroke Father     Colon cancer Paternal Grandfather         60's    Malig Hyperthermia Neg Hx        Home Medications:  Prior to Admission medications    Medication Sig Start Date End Date Taking? Authorizing Provider   ARIPiprazole (ABILIFY) 5 MG tablet Take 1 tablet by mouth Daily. 12/22/22   Elian Merrill MD   atorvastatin (LIPITOR) 80 MG tablet TAKE ONE TABLET BY MOUTH EVERY DAY 1/4/24   Sigrid Iqbal APRN   clonazePAM (KlonoPIN) 0.5 MG tablet Take 1 tablet by mouth every 6 to 8 hours as needed for Anxiety. 4/1/22   Elian Merrill MD   clopidogrel (PLAVIX) 75 MG tablet Take 1 tablet by mouth Daily. 12/27/22   Sigrid Iqbal APRN   Desvenlafaxine  MG tablet sustained-release 24 hour Take 1 tablet every day by oral route.    Elian Merrill MD   Eliquis 5 MG tablet tablet TAKE ONE TABLET BY MOUTH EVERY TWELVE HOURS 12/27/23   Mayank Sheehan MD   lansoprazole (PREVACID) 30 MG capsule     Elian Merrill MD   lisinopril (PRINIVIL,ZESTRIL) 5 MG tablet Take 1 tablet by mouth Daily. 6/18/21   Elian Merrill MD   metoprolol succinate XL (TOPROL-XL) 25 MG 24 hr tablet TAKE ONE TABLET BY MOUTH TWICE DAILY 1/12/24   Sigrid Iqbal APRN   multivitamin with minerals tablet tablet Take 1 tablet by mouth Daily.    Elian Merrill MD   nitroglycerin (NITROSTAT) 0.4 MG SL tablet Place 1 tablet under the tongue Every 5 (Five) Minutes As Needed for Chest Pain (no more than 3 doses in 15 minutes). 12/10/21   Mayank Sheehan MD   sertraline (ZOLOFT) 100 MG tablet     Elian Merrill MD  "  sildenafil (REVATIO) 20 MG tablet Take 3 tablets by mouth As Needed (ED). 10/19/23   Mallorie Matt MD   tamsulosin (FLOMAX) 0.4 MG capsule 24 hr capsule Take 1 capsule by mouth Daily for 360 days. 10/19/23 10/13/24  Mallorie Matt MD   traMADol (ULTRAM) 50 MG tablet Take 1 tablet by mouth Every 6 (Six) Hours As Needed for Moderate Pain.    Emergency, Nurse Epic, RN   triamcinolone (KENALOG) 0.5 % cream Apply 1 Application topically to the appropriate area as directed 2 (Two) Times a Day.    Provider, MD Elian        Social History:   Social History     Tobacco Use    Smoking status: Never     Passive exposure: Yes    Smokeless tobacco: Never   Vaping Use    Vaping Use: Never used   Substance Use Topics    Alcohol use: Never    Drug use: Never         Review of Systems:  Review of Systems   Constitutional:  Negative for chills and fever.   HENT:  Negative for congestion, rhinorrhea and sore throat.    Eyes:  Negative for photophobia.   Respiratory:  Positive for cough. Negative for apnea, chest tightness and shortness of breath.    Cardiovascular:  Negative for chest pain and palpitations.   Gastrointestinal:  Negative for abdominal pain, diarrhea, nausea and vomiting.   Endocrine: Negative.    Genitourinary:  Negative for difficulty urinating and dysuria.   Musculoskeletal:  Negative for back pain, joint swelling and myalgias.   Skin:  Negative for color change and wound.   Allergic/Immunologic: Negative.    Neurological:  Negative for seizures and headaches.   Psychiatric/Behavioral: Negative.     All other systems reviewed and are negative.       Physical Exam:  /83   Pulse 64   Temp 98.3 °F (36.8 °C)   Resp 18   Ht 185.4 cm (73\")   Wt 104 kg (229 lb 15 oz)   SpO2 95%   BMI 30.34 kg/m²         Physical Exam  Vitals and nursing note reviewed.   Constitutional:       General: He is awake.      Appearance: Normal appearance.   HENT:      Head: Normocephalic and atraumatic.      Nose: " Nose normal.      Mouth/Throat:      Mouth: Mucous membranes are moist.   Eyes:      Extraocular Movements: Extraocular movements intact.      Conjunctiva/sclera: Conjunctivae normal.      Pupils: Pupils are equal, round, and reactive to light.   Cardiovascular:      Rate and Rhythm: Normal rate and regular rhythm.      Heart sounds: Normal heart sounds.   Pulmonary:      Effort: Pulmonary effort is normal. No respiratory distress.      Breath sounds: Normal breath sounds. No wheezing, rhonchi or rales.   Abdominal:      General: Bowel sounds are normal. There is no distension.      Palpations: Abdomen is soft.      Tenderness: There is no abdominal tenderness. There is no guarding or rebound.      Comments: No rigidity   Musculoskeletal:         General: No tenderness. Normal range of motion.      Cervical back: Normal range of motion and neck supple.   Skin:     General: Skin is warm and dry.      Coloration: Skin is not cyanotic or jaundiced.   Neurological:      General: No focal deficit present.      Mental Status: He is alert and oriented to person, place, and time. Mental status is at baseline.   Psychiatric:         Attention and Perception: Attention and perception normal.         Mood and Affect: Mood normal.                      Procedures:  Procedures      Medical Decision Making:      Comorbidities that affect care:    GERD, diverticulitis, CAD, anxiety, hypertension, paroxysmal atrial fibrillation, mood disorder    External Notes reviewed:    Previous Clinic Note: Cardiology office visit 1/30/2024.  Description: CAD status post angioplasty      The following orders were placed and all results were independently analyzed by me:  Orders Placed This Encounter   Procedures    COVID-19, FLU A/B, RSV PCR 1 HR TAT - Swab, Nasopharynx    XR Chest 1 View       Medications Given in the Emergency Department:  Medications - No data to display     ED Course:    The patient was initially evaluated in the triage area  where orders were placed. The patient was later dispositioned by Isai Mills MD.      The patient was advised to stay for completion of workup which includes but is not limited to communication of labs and radiological results, reassessment and plan. The patient was advised that leaving prior to disposition by a provider could result in critical findings that are not communicated to the patient.          Labs:    Lab Results (last 24 hours)       Procedure Component Value Units Date/Time    COVID-19, FLU A/B, RSV PCR 1 HR TAT - Swab, Nasopharynx [[id number]]  (Normal) Collected: 02/17/24 1600    Specimen: Swab from Nasopharynx Updated: 02/17/24 1647     COVID19 Not Detected     Influenza A PCR Not Detected     Influenza B PCR Not Detected     RSV, PCR Not Detected    Narrative:      Fact sheet for providers: https://www.fda.gov/media/791236/download    Fact sheet for patients: https://www.fda.gov/media/505607/download    Test performed by PCR.             Imaging:    XR Chest 1 View    Result Date: 2/17/2024  PROCEDURE: XR CHEST 1 VW  COMPARISON: [facility], , XR CHEST 1 VW, 12/08/2023, 21:44.  INDICATIONS: Coughing up blood  FINDINGS:  The lungs are clear bilaterally.  The cardiac and mediastinal silhouettes appear normal.  No effusion is seen.        1. No acute cardiopulmonary disease       Toni Gomez M.D.       Electronically Signed and Approved By: Toni Gomez M.D. on 2/17/2024 at 16:10                Differential Diagnosis and Discussion:      Cough: Differential diagnosis includes but is not limited to pneumonia, acute bronchitis, upper respiratory infection, ACE inhibitor use, allergic reaction, epiglottitis, seasonal allergies, chemical irritants, exercise-induced asthma, viral syndrome.    All labs were reviewed and interpreted by me.  All X-rays impressions were independently interpreted by me.    MDM               Patient Care Considerations:    CT CHEST: I considered ordering a  CT scan of the chest, however patient has not missed any doses of Eliquis.  He denies chest pain or difficulty breathing.  His hemoptysis has resolved here in the emergency department.      Consultants/Shared Management Plan:    None    Social Determinants of Health:    Patient is independent, reliable, and has access to care.       Disposition and Care Coordination:    Discharged: The patient is suitable and stable for discharge with no need for consideration of admission.    I have explained the patient´s condition, diagnoses and treatment plan based on the information available to me at this time. I have answered questions and addressed any concerns. The patient has a good  understanding of the patient´s diagnosis, condition, and treatment plan as can be expected at this point. The vital signs have been stable. The patient´s condition is stable and appropriate for discharge from the emergency department.      The patient will pursue further outpatient evaluation with the primary care physician or other designated or consulting physician as outlined in the discharge instructions. They are agreeable to this plan of care and follow-up instructions have been explained in detail. The patient has received these instructions in written format and has expressed an understanding of the discharge instructions. The patient is aware that any significant change in condition or worsening of symptoms should prompt an immediate return to this or the closest emergency department or call to 911.    Final diagnoses:   Blood-tinged sputum   Hemoptysis        ED Disposition       ED Disposition   Discharge    Condition   Stable    Comment   --               This medical record created using voice recognition software.             Isai Mills MD  02/17/24 2261

## 2024-02-20 ENCOUNTER — TELEPHONE (OUTPATIENT)
Dept: CARDIOLOGY | Facility: CLINIC | Age: 74
End: 2024-02-20
Payer: MEDICARE

## 2024-02-20 ENCOUNTER — HOSPITAL ENCOUNTER (OUTPATIENT)
Dept: CT IMAGING | Facility: HOSPITAL | Age: 74
Discharge: HOME | End: 2024-02-20
Admitting: NURSE PRACTITIONER
Payer: MEDICARE

## 2024-02-20 DIAGNOSIS — I71.21 ANEURYSM OF ASCENDING AORTA WITHOUT RUPTURE: ICD-10-CM

## 2024-02-20 DIAGNOSIS — I71.21 ANEURYSM OF ASCENDING AORTA WITHOUT RUPTURE: Primary | ICD-10-CM

## 2024-02-20 PROCEDURE — 71250 CT THORAX DX C-: CPT

## 2024-02-20 NOTE — TELEPHONE ENCOUNTER
----- Message from DAV Luong sent at 2/20/2024  2:07 PM EST -----  CT Chest shows Stable 4.1 cm dilatation of the ascending thoracic aorta, repeat CT Chest in one year

## 2024-02-27 RX ORDER — CLOPIDOGREL BISULFATE 75 MG/1
75 TABLET ORAL DAILY
Qty: 90 TABLET | Refills: 3 | Status: SHIPPED | OUTPATIENT
Start: 2024-02-27

## 2024-03-19 ENCOUNTER — HOSPITAL ENCOUNTER (OUTPATIENT)
Dept: CARDIOLOGY | Facility: HOSPITAL | Age: 74
Discharge: HOME | End: 2024-03-19
Admitting: NURSE PRACTITIONER
Payer: MEDICARE

## 2024-03-19 ENCOUNTER — APPOINTMENT (OUTPATIENT)
Dept: CT IMAGING | Facility: HOSPITAL | Age: 74
End: 2024-03-19
Payer: MEDICARE

## 2024-03-19 DIAGNOSIS — I48.0 PAF (PAROXYSMAL ATRIAL FIBRILLATION): ICD-10-CM

## 2024-03-19 LAB
BH CV ECHO MEAS - AO ROOT DIAM: 3.4 CM
BH CV ECHO MEAS - EDV(CUBED): 95 ML
BH CV ECHO MEAS - EDV(MOD-SP2): 71.1 ML
BH CV ECHO MEAS - EDV(MOD-SP4): 64.7 ML
BH CV ECHO MEAS - EF(MOD-BP): 64.7 %
BH CV ECHO MEAS - EF(MOD-SP2): 67.7 %
BH CV ECHO MEAS - EF(MOD-SP4): 64.1 %
BH CV ECHO MEAS - ESV(CUBED): 35.8 ML
BH CV ECHO MEAS - ESV(MOD-SP2): 23 ML
BH CV ECHO MEAS - ESV(MOD-SP4): 23.2 ML
BH CV ECHO MEAS - FS: 27.8 %
BH CV ECHO MEAS - IVS/LVPW: 0.75 CM
BH CV ECHO MEAS - IVSD: 1.14 CM
BH CV ECHO MEAS - LA DIMENSION: 4.2 CM
BH CV ECHO MEAS - LAT PEAK E' VEL: 7.9 CM/SEC
BH CV ECHO MEAS - LV DIASTOLIC VOL/BSA (35-75): 28.6 CM2
BH CV ECHO MEAS - LV MASS(C)D: 234.7 GRAMS
BH CV ECHO MEAS - LV SYSTOLIC VOL/BSA (12-30): 10.3 CM2
BH CV ECHO MEAS - LVIDD: 4.6 CM
BH CV ECHO MEAS - LVIDS: 3.3 CM
BH CV ECHO MEAS - LVPWD: 1.52 CM
BH CV ECHO MEAS - MED PEAK E' VEL: 6.2 CM/SEC
BH CV ECHO MEAS - MV A MAX VEL: 62.1 CM/SEC
BH CV ECHO MEAS - MV DEC SLOPE: 269.7 CM/SEC2
BH CV ECHO MEAS - MV DEC TIME: 0.19 SEC
BH CV ECHO MEAS - MV E MAX VEL: 50.6 CM/SEC
BH CV ECHO MEAS - MV E/A: 0.81
BH CV ECHO MEAS - RVDD: 3.2 CM
BH CV ECHO MEAS - SI(MOD-SP2): 21.3 ML/M2
BH CV ECHO MEAS - SI(MOD-SP4): 18.3 ML/M2
BH CV ECHO MEAS - SV(MOD-SP2): 48.1 ML
BH CV ECHO MEAS - SV(MOD-SP4): 41.5 ML
BH CV ECHO MEASUREMENTS AVERAGE E/E' RATIO: 7.18
LEFT ATRIUM VOLUME INDEX: 26.4 ML/M2

## 2024-03-19 PROCEDURE — 93306 TTE W/DOPPLER COMPLETE: CPT

## 2024-03-20 ENCOUNTER — TELEPHONE (OUTPATIENT)
Dept: CARDIOLOGY | Facility: CLINIC | Age: 74
End: 2024-03-20
Payer: MEDICARE

## 2024-03-20 NOTE — TELEPHONE ENCOUNTER
----- Message from DAV Luong sent at 3/20/2024  5:32 AM EDT -----  Echo shows normal heart muscle function and no significant valve disease noted, follow up as scheduled

## 2024-06-26 RX ORDER — ATORVASTATIN CALCIUM 80 MG/1
80 TABLET, FILM COATED ORAL DAILY
Qty: 90 TABLET | Refills: 1 | Status: SHIPPED | OUTPATIENT
Start: 2024-06-26

## 2024-07-16 RX ORDER — METOPROLOL SUCCINATE 25 MG/1
TABLET, EXTENDED RELEASE ORAL
Qty: 180 TABLET | Refills: 1 | Status: SHIPPED | OUTPATIENT
Start: 2024-07-16

## 2024-07-30 ENCOUNTER — LAB (OUTPATIENT)
Dept: LAB | Facility: HOSPITAL | Age: 74
End: 2024-07-30
Payer: MEDICARE

## 2024-07-30 DIAGNOSIS — I10 ESSENTIAL HYPERTENSION: ICD-10-CM

## 2024-07-30 DIAGNOSIS — E78.2 MIXED HYPERLIPIDEMIA: ICD-10-CM

## 2024-07-30 LAB
CHOLEST SERPL-MCNC: 125 MG/DL (ref 0–200)
HDLC SERPL-MCNC: 56 MG/DL (ref 40–60)
LDLC SERPL CALC-MCNC: 55 MG/DL (ref 0–100)
LDLC/HDLC SERPL: 1 {RATIO}
TRIGL SERPL-MCNC: 64 MG/DL (ref 0–150)
VLDLC SERPL-MCNC: 14 MG/DL (ref 5–40)

## 2024-07-30 PROCEDURE — 80061 LIPID PANEL: CPT

## 2024-07-30 PROCEDURE — 36415 COLL VENOUS BLD VENIPUNCTURE: CPT

## 2024-07-31 ENCOUNTER — TELEPHONE (OUTPATIENT)
Dept: CARDIOLOGY | Facility: CLINIC | Age: 74
End: 2024-07-31
Payer: MEDICARE

## 2024-07-31 NOTE — TELEPHONE ENCOUNTER
----- Message from Sigrid Iqbal sent at 7/30/2024  9:44 PM EDT -----  Lipid panel is good, continue current meds

## 2024-08-07 ENCOUNTER — TELEPHONE (OUTPATIENT)
Dept: CARDIOLOGY | Facility: CLINIC | Age: 74
End: 2024-08-07

## 2024-08-07 ENCOUNTER — OFFICE VISIT (OUTPATIENT)
Dept: CARDIOLOGY | Facility: CLINIC | Age: 74
End: 2024-08-07
Payer: MEDICARE

## 2024-08-07 VITALS
HEART RATE: 67 BPM | SYSTOLIC BLOOD PRESSURE: 131 MMHG | WEIGHT: 224 LBS | DIASTOLIC BLOOD PRESSURE: 76 MMHG | BODY MASS INDEX: 29.69 KG/M2 | HEIGHT: 73 IN

## 2024-08-07 DIAGNOSIS — Z98.61 CAD S/P PERCUTANEOUS CORONARY ANGIOPLASTY: Primary | ICD-10-CM

## 2024-08-07 DIAGNOSIS — I25.10 CAD S/P PERCUTANEOUS CORONARY ANGIOPLASTY: Primary | ICD-10-CM

## 2024-08-07 DIAGNOSIS — E78.2 MIXED HYPERLIPIDEMIA: ICD-10-CM

## 2024-08-07 DIAGNOSIS — I48.0 PAF (PAROXYSMAL ATRIAL FIBRILLATION): ICD-10-CM

## 2024-08-07 DIAGNOSIS — I10 ESSENTIAL HYPERTENSION: ICD-10-CM

## 2024-08-07 PROCEDURE — 3078F DIAST BP <80 MM HG: CPT | Performed by: INTERNAL MEDICINE

## 2024-08-07 PROCEDURE — 3075F SYST BP GE 130 - 139MM HG: CPT | Performed by: INTERNAL MEDICINE

## 2024-08-07 PROCEDURE — 99214 OFFICE O/P EST MOD 30 MIN: CPT | Performed by: INTERNAL MEDICINE

## 2024-08-07 NOTE — TELEPHONE ENCOUNTER
Tried calling patient to reschedule appt due to Dr Sheehan leaving early and his voice mailbox was full and I could not leave a message.

## 2024-08-07 NOTE — PROGRESS NOTES
Chief Complaint  Follow-up, Atrial Fibrillation, Coronary Artery Disease, Hypertension, and Hyperlipidemia    Subjective    Patient has been having issues with fatique and lack on energy, No issues with increased sob or edema. No anginal chest pain problems  Past Medical History:   Diagnosis Date    Anxiety     BPH (benign prostatic hyperplasia)     CAD (coronary artery disease)     Cervical spondylosis without myelopathy 01/15/2020    Colitis     Depression     Diverticulitis     Diverticulosis of colon     Family history of stroke 01/30/2024    GERD (gastroesophageal reflux disease)     Hemorrhoids     High cholesterol     Hypertension     IBS (irritable bowel syndrome)     Mood disorder     Myocardial infarction     Osteoarthritis     Paroxysmal atrial fibrillation 12/04/2021    S/P lumpectomy, right breast     CYST 2016    Suicidal thoughts 01/30/2024         Current Outpatient Medications:     ARIPiprazole (ABILIFY) 5 MG tablet, Take 1 tablet by mouth Daily., Disp: , Rfl:     atorvastatin (LIPITOR) 80 MG tablet, TAKE ONE TABLET BY MOUTH EVERY DAY, Disp: 90 tablet, Rfl: 1    clonazePAM (KlonoPIN) 0.5 MG tablet, Take 1 tablet by mouth every 6 to 8 hours as needed for Anxiety., Disp: , Rfl:     clopidogrel (PLAVIX) 75 MG tablet, TAKE ONE TABLET BY MOUTH EVERY DAY, Disp: 90 tablet, Rfl: 3    Desvenlafaxine  MG tablet sustained-release 24 hour, Take 1 tablet every day by oral route., Disp: , Rfl:     Eliquis 5 MG tablet tablet, TAKE ONE TABLET BY MOUTH EVERY TWELVE HOURS, Disp: 180 tablet, Rfl: 3    lansoprazole (PREVACID) 30 MG capsule, , Disp: , Rfl:     lisinopril (PRINIVIL,ZESTRIL) 5 MG tablet, Take 1 tablet by mouth Daily., Disp: , Rfl:     metoprolol succinate XL (TOPROL-XL) 25 MG 24 hr tablet, TAKE ONE TABLET BY MOUTH TWICE DAILY, Disp: 180 tablet, Rfl: 1    multivitamin with minerals tablet tablet, Take 1 tablet by mouth Daily., Disp: , Rfl:     nitroglycerin (NITROSTAT) 0.4 MG SL tablet, Place 1  "tablet under the tongue Every 5 (Five) Minutes As Needed for Chest Pain (no more than 3 doses in 15 minutes)., Disp: 25 tablet, Rfl: 2    sertraline (ZOLOFT) 100 MG tablet, , Disp: , Rfl:     sildenafil (REVATIO) 20 MG tablet, Take 3 tablets by mouth As Needed (ED)., Disp: 40 tablet, Rfl: 3    tamsulosin (FLOMAX) 0.4 MG capsule 24 hr capsule, Take 1 capsule by mouth Daily for 360 days., Disp: 90 capsule, Rfl: 3    traMADol (ULTRAM) 50 MG tablet, Take 1 tablet by mouth Every 6 (Six) Hours As Needed for Moderate Pain., Disp: , Rfl:     triamcinolone (KENALOG) 0.5 % cream, Apply 1 Application topically to the appropriate area as directed 2 (Two) Times a Day., Disp: , Rfl:     There are no discontinued medications.  No Known Allergies     Social History     Tobacco Use    Smoking status: Never     Passive exposure: Yes    Smokeless tobacco: Never   Vaping Use    Vaping status: Never Used   Substance Use Topics    Alcohol use: Never    Drug use: Never       Family History   Problem Relation Age of Onset    Other Mother         bladder stones    Arthritis Mother     Heart disease Mother     Heart failure Father     Stroke Father     Colon cancer Paternal Grandfather         60's    Malig Hyperthermia Neg Hx         Objective     /76   Pulse 67   Ht 185.4 cm (73\")   Wt 102 kg (224 lb)   BMI 29.55 kg/m²       Physical Exam    General Appearance:   no acute distress  Alert and oriented x3  HENT:   lips not cyanotic  Atraumatic  Neck:  No jvd   supple  Respiratory:  no respiratory distress  normal breath sounds  no rales  Cardiovascular:  RRR  no S3, no S4   no murmur  no rub  Extremities  No cyanosis  lower extremity edema: none    Skin:   warm, dry  No rashes      Result Review :     proBNP   Date Value Ref Range Status   02/14/2023 57.0 0.0 - 900.0 pg/mL Final     CMP          10/11/2023    12:15 12/8/2023    21:31 12/21/2023    22:25   CMP   Glucose  133  104    BUN  20  16    Creatinine  1.27  1.23    EGFR  " "59.3  61.6    Sodium  138  139    Potassium  3.8  4.1    Chloride  103  103    Calcium  9.5  9.8    Total Protein 7.1  6.9  7.2    Albumin 4.5  4.6  4.3    Globulin  2.3  2.9    Total Bilirubin 0.6  0.5  0.6    Alkaline Phosphatase 109  118  116    AST (SGOT) 24  29  41    ALT (SGPT) 20  33  39    Albumin/Globulin Ratio  2.0  1.5    BUN/Creatinine Ratio  15.7  13.0    Anion Gap  11.5  12.7      CBC w/diff          12/8/2023    21:31 12/21/2023    22:25   CBC w/Diff   WBC 5.58  7.47    RBC 3.95  4.01    Hemoglobin 12.0  12.2    Hematocrit 37.0  37.3    MCV 93.7  93.0    MCH 30.4  30.4    MCHC 32.4  32.7    RDW 13.8  13.8    Platelets 208  242    Neutrophil Rel % 65.9  70.9    Immature Granulocyte Rel % 0.4  0.3    Lymphocyte Rel % 19.9  15.7    Monocyte Rel % 9.7  9.8    Eosinophil Rel % 3.9  3.2    Basophil Rel % 0.2  0.1       Lab Results   Component Value Date    TSH 4.320 (H) 10/20/2022      Lab Results   Component Value Date    FREET4 1.09 10/20/2022      No results found for: \"DDIMERQUANT\"  Magnesium   Date Value Ref Range Status   12/08/2023 2.1 1.6 - 2.4 mg/dL Final      No results found for: \"DIGOXIN\"   Lab Results   Component Value Date    TROPONINT 15 12/08/2023           Lipid Panel          10/11/2023    12:15 7/30/2024    09:47   Lipid Panel   Total Cholesterol 123  125    Triglycerides 111  64    HDL Cholesterol 52  56    VLDL Cholesterol 20  14    LDL Cholesterol  51  55    LDL/HDL Ratio 0.94  1.00      Lab Results   Component Value Date    POCTROP 0.00 08/01/2022       Results for orders placed during the hospital encounter of 03/19/24    Adult Transthoracic Echo Complete W/ Cont if Necessary Per Protocol    Interpretation Summary    Left ventricular systolic function is normal. Calculated left ventricular EF = 64.7%    Left ventricular wall thickness is consistent with mild concentric hypertrophy.    Left ventricular diastolic function was normal.                 Diagnoses and all orders for this " visit:    1. CAD S/P percutaneous coronary angioplasty (Primary)  Assessment & Plan:  Patient is doing well no ongoing angina continue with chronic Plavix 75 mg daily        2. Paroxysmal atrial fibrillation  Assessment & Plan:  Continue with Eliquis 5 twice daily for severe prevention      3. Essential hypertension  Assessment & Plan:  Well-controlled on lisinopril 5 once a day and Toprol 25 daily dosing       4. Mixed hyperlipidemia            Follow Up     Return in about 6 months (around 2/7/2025) for Follow with Sigrid Iqabl, EKG with F/U.          Patient was given instructions and counseling regarding his condition or for health maintenance advice. Please see specific information pulled into the AVS if appropriate.     I am leaving [facility]

## 2024-10-16 ENCOUNTER — LAB (OUTPATIENT)
Dept: LAB | Facility: HOSPITAL | Age: 74
End: 2024-10-16
Payer: MEDICARE

## 2024-10-16 ENCOUNTER — TRANSCRIBE ORDERS (OUTPATIENT)
Dept: UROLOGY | Facility: CLINIC | Age: 74
End: 2024-10-16
Payer: MEDICARE

## 2024-10-16 DIAGNOSIS — R97.20 ELEVATED PSA: Primary | ICD-10-CM

## 2024-10-16 DIAGNOSIS — R97.20 ELEVATED PSA: ICD-10-CM

## 2024-10-16 LAB — PSA SERPL-MCNC: 4.63 NG/ML (ref 0–4)

## 2024-10-16 PROCEDURE — 84153 ASSAY OF PSA TOTAL: CPT

## 2024-10-21 ENCOUNTER — OFFICE VISIT (OUTPATIENT)
Dept: UROLOGY | Facility: CLINIC | Age: 74
End: 2024-10-21
Payer: MEDICARE

## 2024-10-21 VITALS
HEIGHT: 73 IN | WEIGHT: 224 LBS | DIASTOLIC BLOOD PRESSURE: 79 MMHG | BODY MASS INDEX: 29.69 KG/M2 | SYSTOLIC BLOOD PRESSURE: 134 MMHG

## 2024-10-21 DIAGNOSIS — N52.9 ERECTILE DYSFUNCTION, UNSPECIFIED ERECTILE DYSFUNCTION TYPE: ICD-10-CM

## 2024-10-21 DIAGNOSIS — N40.0 BENIGN PROSTATIC HYPERPLASIA, UNSPECIFIED WHETHER LOWER URINARY TRACT SYMPTOMS PRESENT: Primary | ICD-10-CM

## 2024-10-21 DIAGNOSIS — R97.20 ELEVATED PROSTATE SPECIFIC ANTIGEN (PSA): ICD-10-CM

## 2024-10-21 LAB
BILIRUB BLD-MCNC: NEGATIVE MG/DL
CLARITY, POC: CLEAR
COLOR UR: YELLOW
EXPIRATION DATE: NORMAL
GLUCOSE UR STRIP-MCNC: NEGATIVE MG/DL
KETONES UR QL: NEGATIVE
LEUKOCYTE EST, POC: NEGATIVE
Lab: NORMAL
NITRITE UR-MCNC: NEGATIVE MG/ML
PH UR: 7.5 [PH] (ref 5–8)
PROT UR STRIP-MCNC: NEGATIVE MG/DL
RBC # UR STRIP: NEGATIVE /UL
SP GR UR: 1.02 (ref 1–1.03)
UROBILINOGEN UR QL: NORMAL

## 2024-10-21 PROCEDURE — 99214 OFFICE O/P EST MOD 30 MIN: CPT | Performed by: UROLOGY

## 2024-10-21 PROCEDURE — 1160F RVW MEDS BY RX/DR IN RCRD: CPT | Performed by: UROLOGY

## 2024-10-21 PROCEDURE — 3075F SYST BP GE 130 - 139MM HG: CPT | Performed by: UROLOGY

## 2024-10-21 PROCEDURE — 3078F DIAST BP <80 MM HG: CPT | Performed by: UROLOGY

## 2024-10-21 PROCEDURE — 81003 URINALYSIS AUTO W/O SCOPE: CPT | Performed by: UROLOGY

## 2024-10-21 PROCEDURE — 1159F MED LIST DOCD IN RCRD: CPT | Performed by: UROLOGY

## 2024-10-21 RX ORDER — TAMSULOSIN HYDROCHLORIDE 0.4 MG/1
1 CAPSULE ORAL DAILY
Qty: 90 CAPSULE | Refills: 3 | Status: SHIPPED | OUTPATIENT
Start: 2024-10-21 | End: 2025-10-16

## 2024-10-21 RX ORDER — SILDENAFIL CITRATE 20 MG/1
60 TABLET ORAL AS NEEDED
Qty: 40 TABLET | Refills: 3 | Status: SHIPPED | OUTPATIENT
Start: 2024-10-21

## 2024-10-21 NOTE — PROGRESS NOTES
"Chief Complaint  Benign Prostatic Hypertrophy    Subjective          Ari Olea presents to [facility] UR[facility]  History of Present Illness  The patient is a very pleasant 71-year-old male that has several urological problems.       He has also had some problems with BPH symptoms. He is currently on Flomax and tadalafil is stable with that.     He had a button TURP in January 2020.       He is using sildenafil and is doing well on it. He takes 60mg and it works well for him.       Since I saw him last he has been on tadalafil 5mg once a day and it is working really well.  He has no new complaints.         Objective   Vital Signs:   /79   Ht 185.4 cm (73\")   Wt 102 kg (224 lb)   BMI 29.55 kg/m²       Physical Exam  Vitals and nursing note reviewed.   Constitutional:       Appearance: Normal appearance. He is well-developed.   Pulmonary:      Effort: Pulmonary effort is normal.      Breath sounds: Normal air entry.   Neurological:      Mental Status: He is alert and oriented to person, place, and time.      Motor: Motor function is intact.   Psychiatric:         Mood and Affect: Mood normal.         Behavior: Behavior normal.          Result Review :   The following data was reviewed by: Mallorie Matt MD on 10/21/2024:    Results for orders placed or performed in visit on 10/21/24   POC Urinalysis Dipstick, Automated    Collection Time: 10/21/24 10:43 AM    Specimen: Urine   Result Value Ref Range    Color Yellow Yellow, Straw, Dark Yellow, Nano    Clarity, UA Clear Clear    Specific Gravity  1.020 1.005 - 1.030    pH, Urine 7.5 5.0 - 8.0    Leukocytes Negative Negative    Nitrite, UA Negative Negative    Protein, POC Negative Negative mg/dL    Glucose, UA Negative Negative mg/dL    Ketones, UA Negative Negative    Urobilinogen, UA 0.2 E.U./dL Normal, 0.2 E.U./dL    Bilirubin Negative Negative    Blood, UA Negative Negative    Lot Number [id number]     Expiration Date 82,025  "         PSA          10/16/2024    12:45   PSA   PSA 4.630               Assessment and Plan    Diagnoses and all orders for this visit:    1. Benign prostatic hyperplasia, unspecified whether lower urinary tract symptoms present (Primary)  -     POC Urinalysis Dipstick, Automated  -     tamsulosin (FLOMAX) 0.4 MG capsule 24 hr capsule; Take 1 capsule by mouth Daily for 360 days.  Dispense: 90 capsule; Refill: 3    2. Erectile dysfunction, unspecified erectile dysfunction type  -     sildenafil (REVATIO) 20 MG tablet; Take 3 tablets by mouth As Needed (ED).  Dispense: 40 tablet; Refill: 3    3. Elevated prostate specific antigen (PSA)  -     PSA DIAGNOSTIC; Future    Other orders  -     Bladder Scan; Future    Continue Flomax.  I refilled his sildenafil as well.  I will see him back in 1 year or sooner if needed.  PVR at that time as well as PSA.  PSA is currently stable at 4.6.        Follow Up       No follow-ups on file.  Patient was given instructions and counseling regarding his condition or for health maintenance advice. Please see specific information pulled into the AVS if appropriate.

## 2024-11-14 ENCOUNTER — TELEPHONE (OUTPATIENT)
Dept: CARDIOLOGY | Facility: CLINIC | Age: 74
End: 2024-11-14
Payer: MEDICARE

## 2024-11-14 NOTE — TELEPHONE ENCOUNTER
[facility] received a fax that requires your attention. The document has been indexed to the patient’s chart for your review.      Reason for sending: EXTERNAL MEDICAL RECORD NOTIFICATION     Documents Description: CARDIAC CLEARANCE [id number]    Name of Sender: [employer]IED     Date Indexed: 11.14.24

## 2024-11-15 NOTE — TELEPHONE ENCOUNTER
Procedure: Lumbar Nerve Ablation    Med Directive: Eliquis 3 days prior 24H post, Plavix    PMH: PAF, HTN, HLD, CAD prior PCI    Last Seen: 8/7/24

## 2024-12-27 RX ORDER — ATORVASTATIN CALCIUM 80 MG/1
80 TABLET, FILM COATED ORAL DAILY
Qty: 90 TABLET | Refills: 1 | Status: SHIPPED | OUTPATIENT
Start: 2024-12-27

## 2025-01-08 RX ORDER — APIXABAN 5 MG/1
TABLET, FILM COATED ORAL
Qty: 180 TABLET | Refills: 3 | Status: SHIPPED | OUTPATIENT
Start: 2025-01-08

## 2025-01-09 RX ORDER — METOPROLOL SUCCINATE 25 MG/1
TABLET, EXTENDED RELEASE ORAL
Qty: 180 TABLET | Refills: 1 | Status: SHIPPED | OUTPATIENT
Start: 2025-01-09

## 2025-02-06 ENCOUNTER — OFFICE VISIT (OUTPATIENT)
Dept: CARDIOLOGY | Facility: CLINIC | Age: 75
End: 2025-02-06
Payer: MEDICARE

## 2025-02-06 VITALS
OXYGEN SATURATION: 98 % | WEIGHT: 223.4 LBS | HEIGHT: 73 IN | HEART RATE: 60 BPM | DIASTOLIC BLOOD PRESSURE: 73 MMHG | BODY MASS INDEX: 29.61 KG/M2 | SYSTOLIC BLOOD PRESSURE: 134 MMHG

## 2025-02-06 DIAGNOSIS — I71.21 ANEURYSM OF ASCENDING AORTA WITHOUT RUPTURE: ICD-10-CM

## 2025-02-06 DIAGNOSIS — I48.0 PAF (PAROXYSMAL ATRIAL FIBRILLATION): ICD-10-CM

## 2025-02-06 DIAGNOSIS — E78.2 MIXED HYPERLIPIDEMIA: ICD-10-CM

## 2025-02-06 DIAGNOSIS — I10 ESSENTIAL HYPERTENSION: ICD-10-CM

## 2025-02-06 DIAGNOSIS — I25.10 CAD S/P PERCUTANEOUS CORONARY ANGIOPLASTY: Primary | ICD-10-CM

## 2025-02-06 DIAGNOSIS — Z98.61 CAD S/P PERCUTANEOUS CORONARY ANGIOPLASTY: Primary | ICD-10-CM

## 2025-02-06 PROBLEM — I71.20 THORACIC AORTIC ANEURYSM (TAA): Status: ACTIVE | Noted: 2025-02-06

## 2025-02-06 RX ORDER — DESVENLAFAXINE 100 MG/1
1 TABLET, EXTENDED RELEASE ORAL DAILY
COMMUNITY
Start: 2024-11-29

## 2025-02-06 NOTE — ASSESSMENT & PLAN NOTE
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

## 2025-02-06 NOTE — ASSESSMENT & PLAN NOTE
No symptomatic recurrences of tachycardic spells patient is on Eliquis 5 twice daily for CVA prevention

## 2025-02-06 NOTE — PROGRESS NOTES
Chief Complaint  CAD S/P percutaneous coronary angioplasty (6 month)    Subjective    Patient has been doing well does report occasional brief left-sided chest discomfort lasting for a second or 2 nonexertional in nature.  Past Medical History:   Diagnosis Date    Anxiety     BPH (benign prostatic hyperplasia)     CAD (coronary artery disease)     Cervical spondylosis without myelopathy 01/15/2020    Colitis     Depression     Diverticulitis     Diverticulosis of colon     Family history of stroke 01/30/2024    GERD (gastroesophageal reflux disease)     Hemorrhoids     High cholesterol     Hypertension     IBS (irritable bowel syndrome)     Mood disorder     Myocardial infarction     Osteoarthritis     Paroxysmal atrial fibrillation 12/04/2021    S/P lumpectomy, right breast     CYST 2016    Suicidal thoughts 01/30/2024         Current Outpatient Medications:     ARIPiprazole (ABILIFY) 5 MG tablet, Take 1 tablet by mouth Daily., Disp: , Rfl:     atorvastatin (LIPITOR) 80 MG tablet, TAKE ONE TABLET BY MOUTH EVERY DAY, Disp: 90 tablet, Rfl: 1    clonazePAM (KlonoPIN) 0.5 MG tablet, Take 1 tablet by mouth every 6 to 8 hours as needed for Anxiety., Disp: , Rfl:     clopidogrel (PLAVIX) 75 MG tablet, TAKE ONE TABLET BY MOUTH EVERY DAY, Disp: 90 tablet, Rfl: 3    desvenlafaxine (PRISTIQ) 100 MG 24 hr tablet, Take 1 tablet by mouth Daily., Disp: , Rfl:     Desvenlafaxine  MG tablet sustained-release 24 hour, Take 1 tablet every day by oral route., Disp: , Rfl:     Eliquis 5 MG tablet tablet, TAKE ONE TABLET BY MOUTH EVERY TWELVE HOURS, Disp: 180 tablet, Rfl: 3    lansoprazole (PREVACID) 30 MG capsule, , Disp: , Rfl:     lisinopril (PRINIVIL,ZESTRIL) 5 MG tablet, Take 1 tablet by mouth Daily., Disp: , Rfl:     metoprolol succinate XL (TOPROL-XL) 25 MG 24 hr tablet, TAKE ONE TABLET BY MOUTH TWICE DAILY, Disp: 180 tablet, Rfl: 1    multivitamin with minerals tablet tablet, Take 1 tablet by mouth Daily., Disp: , Rfl:      "nitroglycerin (NITROSTAT) 0.4 MG SL tablet, Place 1 tablet under the tongue Every 5 (Five) Minutes As Needed for Chest Pain (no more than 3 doses in 15 minutes)., Disp: 25 tablet, Rfl: 2    sertraline (ZOLOFT) 100 MG tablet, , Disp: , Rfl:     sildenafil (REVATIO) 20 MG tablet, Take 3 tablets by mouth As Needed (ED)., Disp: 40 tablet, Rfl: 3    tamsulosin (FLOMAX) 0.4 MG capsule 24 hr capsule, Take 1 capsule by mouth Daily for 360 days., Disp: 90 capsule, Rfl: 3    traMADol (ULTRAM) 50 MG tablet, Take 1 tablet by mouth Every 6 (Six) Hours As Needed for Moderate Pain., Disp: , Rfl:     triamcinolone (KENALOG) 0.5 % cream, Apply 1 Application topically to the appropriate area as directed 2 (Two) Times a Day., Disp: , Rfl:     There are no discontinued medications.  No Known Allergies     Social History     Tobacco Use    Smoking status: Never     Passive exposure: Yes    Smokeless tobacco: Never   Vaping Use    Vaping status: Never Used   Substance Use Topics    Alcohol use: Never    Drug use: Never       Family History   Problem Relation Age of Onset    Other Mother         bladder stones    Arthritis Mother     Heart disease Mother     Heart failure Father     Stroke Father     Colon cancer Paternal Grandfather         60's    Malig Hyperthermia Neg Hx         Objective     /73 (BP Location: Left arm, Patient Position: Sitting, Cuff Size: Large Adult)   Pulse 60   Ht 185.4 cm (73\")   Wt 101 kg (223 lb 6.4 oz)   SpO2 98%   BMI 29.47 kg/m²       Physical Exam    General Appearance:   no acute distress  Alert and oriented x3  HENT:   lips not cyanotic  Atraumatic  Neck:  No jvd   supple  Respiratory:  no respiratory distress  normal breath sounds  no rales  Cardiovascular:  Regular rate and rhythm  no S3, no S4   no murmur  no rub  Extremities  No cyanosis  lower extremity edema: none    Skin:   warm, dry  No rashes      Result Review :     proBNP   Date Value Ref Range Status   02/14/2023 57.0 0.0 - 900.0 " "pg/mL Final          Lab Results   Component Value Date    TSH 4.320 (H) 10/20/2022      Lab Results   Component Value Date    FREET4 1.09 10/20/2022      No results found for: \"DDIMERQUANT\"  Magnesium   Date Value Ref Range Status   12/08/2023 2.1 1.6 - 2.4 mg/dL Final      No results found for: \"DIGOXIN\"   Lab Results   Component Value Date    TROPONINT 15 12/08/2023           Lipid Panel          7/30/2024    09:47   Lipid Panel   Total Cholesterol 125    Triglycerides 64    HDL Cholesterol 56    VLDL Cholesterol 14    LDL Cholesterol  55    LDL/HDL Ratio 1.00      Lab Results   Component Value Date    POCTROP 0.00 08/01/2022       Results for orders placed during the hospital encounter of 03/19/24    Adult Transthoracic Echo Complete W/ Cont if Necessary Per Protocol    Interpretation Summary    Left ventricular systolic function is normal. Calculated left ventricular EF = 64.7%    Left ventricular wall thickness is consistent with mild concentric hypertrophy.    Left ventricular diastolic function was normal.                 Diagnoses and all orders for this visit:    1. CAD S/P percutaneous coronary angioplasty (Primary)  Assessment & Plan:  Patient is doing well no ongoing angina continue with chronic Plavix 75 mg daily.  Patient still with continued atypical brief chest pain likely muscle skeletal in nature        2. Paroxysmal atrial fibrillation  Assessment & Plan:  No symptomatic recurrences of tachycardic spells patient is on Eliquis 5 twice daily for CVA prevention      3. Essential hypertension    4. Mixed hyperlipidemia  Assessment & Plan:  Continue with his Lipitor 80 nightly LDL is currently at goal       5. Aneurysm of ascending aorta without rupture  Assessment & Plan:  Patient with mild 4.1 aneurysm seen on previous chest CT would just continue with 3 years serial monitoring              Follow Up     Return in about 6 months (around 8/6/2025) for Follow with Sigrid Iqbal, EKG with " F/U.          Patient was given instructions and counseling regarding his condition or for health maintenance advice. Please see specific information pulled into the AVS if appropriate.

## 2025-02-06 NOTE — ASSESSMENT & PLAN NOTE
Patient is doing well no ongoing angina continue with chronic Plavix 75 mg daily.  Patient still with continued atypical brief chest pain likely muscle skeletal in nature

## 2025-02-25 RX ORDER — CLOPIDOGREL BISULFATE 75 MG/1
75 TABLET ORAL DAILY
Qty: 90 TABLET | Refills: 3 | Status: SHIPPED | OUTPATIENT
Start: 2025-02-25

## 2025-02-25 NOTE — TELEPHONE ENCOUNTER
Rx Refill Note  Requested Prescriptions     Pending Prescriptions Disp Refills    clopidogrel (PLAVIX) 75 MG tablet [Pharmacy Med Name: clopidogrel 75 mg tablet] 90 tablet 3     Sig: TAKE ONE TABLET BY MOUTH EVERY DAY        LAST OFFICE VISIT:  02/06/2025     NEXT OFFICE VISIT:  NO F/U SCHEDULED     Does the medication requests match the last office note:    [x] Yes   [] No    Does this refill request meet protocol details for MA to approve:     [] Yes   [x] No   No conflicting PPI on medication list    Applied

## 2025-04-14 ENCOUNTER — TELEPHONE (OUTPATIENT)
Dept: CARDIOLOGY | Facility: CLINIC | Age: 75
End: 2025-04-14
Payer: MEDICARE

## 2025-04-14 NOTE — TELEPHONE ENCOUNTER
Procedure: Bilateral Lumbar Radio Frequency Ablation     Med Directive: Eliquis 3 days prior 24H post     PMH: PAF, HTN, HLD, CAD prior PCI     Last Seen: 2/6/2025

## 2025-06-04 ENCOUNTER — APPOINTMENT (OUTPATIENT)
Dept: GENERAL RADIOLOGY | Facility: HOSPITAL | Age: 75
End: 2025-06-04
Payer: MEDICARE

## 2025-06-04 ENCOUNTER — HOSPITAL ENCOUNTER (EMERGENCY)
Facility: HOSPITAL | Age: 75
Discharge: HOME | End: 2025-06-04
Attending: EMERGENCY MEDICINE | Admitting: EMERGENCY MEDICINE
Payer: MEDICARE

## 2025-06-04 VITALS
RESPIRATION RATE: 18 BRPM | SYSTOLIC BLOOD PRESSURE: 126 MMHG | HEART RATE: 59 BPM | BODY MASS INDEX: 29.86 KG/M2 | HEIGHT: 73 IN | OXYGEN SATURATION: 94 % | DIASTOLIC BLOOD PRESSURE: 72 MMHG | WEIGHT: 225.31 LBS | TEMPERATURE: 98.2 F

## 2025-06-04 DIAGNOSIS — R10.13 EPIGASTRIC PAIN: Primary | ICD-10-CM

## 2025-06-04 LAB
ALBUMIN SERPL-MCNC: 4.6 G/DL (ref 3.5–5.2)
ALBUMIN/GLOB SERPL: 1.8 G/DL
ALP SERPL-CCNC: 136 U/L (ref 39–117)
ALT SERPL W P-5'-P-CCNC: 23 U/L (ref 1–41)
ANION GAP SERPL CALCULATED.3IONS-SCNC: 12.4 MMOL/L (ref 5–15)
AST SERPL-CCNC: 22 U/L (ref 1–40)
BASOPHILS # BLD AUTO: 0.01 10*3/MM3 (ref 0–0.2)
BASOPHILS NFR BLD AUTO: 0.2 % (ref 0–1.5)
BILIRUB SERPL-MCNC: 0.7 MG/DL (ref 0–1.2)
BUN SERPL-MCNC: 15.8 MG/DL (ref 8–23)
BUN/CREAT SERPL: 13.6 (ref 7–25)
CALCIUM SPEC-SCNC: 9.1 MG/DL (ref 8.6–10.5)
CHLORIDE SERPL-SCNC: 103 MMOL/L (ref 98–107)
CO2 SERPL-SCNC: 22.6 MMOL/L (ref 22–29)
CREAT SERPL-MCNC: 1.16 MG/DL (ref 0.76–1.27)
DEPRECATED RDW RBC AUTO: 49.9 FL (ref 37–54)
EGFRCR SERPLBLD CKD-EPI 2021: 65.7 ML/MIN/1.73
EOSINOPHIL # BLD AUTO: 0.09 10*3/MM3 (ref 0–0.4)
EOSINOPHIL NFR BLD AUTO: 1.7 % (ref 0.3–6.2)
ERYTHROCYTE [DISTWIDTH] IN BLOOD BY AUTOMATED COUNT: 14.5 % (ref 12.3–15.4)
GLOBULIN UR ELPH-MCNC: 2.5 GM/DL
GLUCOSE SERPL-MCNC: 103 MG/DL (ref 65–99)
HCT VFR BLD AUTO: 38.5 % (ref 37.5–51)
HGB BLD-MCNC: 12.6 G/DL (ref 13–17.7)
HOLD SPECIMEN: NORMAL
HOLD SPECIMEN: NORMAL
IMM GRANULOCYTES # BLD AUTO: 0.03 10*3/MM3 (ref 0–0.05)
IMM GRANULOCYTES NFR BLD AUTO: 0.6 % (ref 0–0.5)
LIPASE SERPL-CCNC: 20 U/L (ref 13–60)
LYMPHOCYTES # BLD AUTO: 1.17 10*3/MM3 (ref 0.7–3.1)
LYMPHOCYTES NFR BLD AUTO: 22 % (ref 19.6–45.3)
MAGNESIUM SERPL-MCNC: 1.9 MG/DL (ref 1.6–2.4)
MCH RBC QN AUTO: 30.7 PG (ref 26.6–33)
MCHC RBC AUTO-ENTMCNC: 32.7 G/DL (ref 31.5–35.7)
MCV RBC AUTO: 93.7 FL (ref 79–97)
MONOCYTES # BLD AUTO: 0.5 10*3/MM3 (ref 0.1–0.9)
MONOCYTES NFR BLD AUTO: 9.4 % (ref 5–12)
NEUTROPHILS NFR BLD AUTO: 3.53 10*3/MM3 (ref 1.7–7)
NEUTROPHILS NFR BLD AUTO: 66.1 % (ref 42.7–76)
NRBC BLD AUTO-RTO: 0 /100 WBC (ref 0–0.2)
NT-PROBNP SERPL-MCNC: 92 PG/ML (ref 0–1800)
PLATELET # BLD AUTO: 219 10*3/MM3 (ref 140–450)
PMV BLD AUTO: 9.6 FL (ref 6–12)
POTASSIUM SERPL-SCNC: 4 MMOL/L (ref 3.5–5.2)
PROT SERPL-MCNC: 7.1 G/DL (ref 6–8.5)
QT INTERVAL: 416 MS
QTC INTERVAL: 428 MS
RBC # BLD AUTO: 4.11 10*6/MM3 (ref 4.14–5.8)
SODIUM SERPL-SCNC: 138 MMOL/L (ref 136–145)
TROPONIN T SERPL HS-MCNC: 18 NG/L
WBC NRBC COR # BLD AUTO: 5.33 10*3/MM3 (ref 3.4–10.8)
WHOLE BLOOD HOLD COAG: NORMAL
WHOLE BLOOD HOLD SPECIMEN: NORMAL

## 2025-06-04 PROCEDURE — 93005 ELECTROCARDIOGRAM TRACING: CPT

## 2025-06-04 PROCEDURE — 83880 ASSAY OF NATRIURETIC PEPTIDE: CPT | Performed by: EMERGENCY MEDICINE

## 2025-06-04 PROCEDURE — 83690 ASSAY OF LIPASE: CPT | Performed by: EMERGENCY MEDICINE

## 2025-06-04 PROCEDURE — 85025 COMPLETE CBC W/AUTO DIFF WBC: CPT | Performed by: EMERGENCY MEDICINE

## 2025-06-04 PROCEDURE — 93005 ELECTROCARDIOGRAM TRACING: CPT | Performed by: EMERGENCY MEDICINE

## 2025-06-04 PROCEDURE — 99284 EMERGENCY DEPT VISIT MOD MDM: CPT

## 2025-06-04 PROCEDURE — 80053 COMPREHEN METABOLIC PANEL: CPT | Performed by: EMERGENCY MEDICINE

## 2025-06-04 PROCEDURE — 71045 X-RAY EXAM CHEST 1 VIEW: CPT

## 2025-06-04 PROCEDURE — 83735 ASSAY OF MAGNESIUM: CPT | Performed by: EMERGENCY MEDICINE

## 2025-06-04 PROCEDURE — 84484 ASSAY OF TROPONIN QUANT: CPT | Performed by: EMERGENCY MEDICINE

## 2025-06-04 RX ORDER — PANTOPRAZOLE SODIUM 40 MG/1
40 TABLET, DELAYED RELEASE ORAL DAILY
Qty: 30 TABLET | Refills: 0 | Status: SHIPPED | OUTPATIENT
Start: 2025-06-04

## 2025-06-04 RX ORDER — SUCRALFATE 1 G/1
1 TABLET ORAL
Qty: 28 TABLET | Refills: 0 | Status: SHIPPED | OUTPATIENT
Start: 2025-06-04

## 2025-06-04 RX ORDER — SODIUM CHLORIDE 0.9 % (FLUSH) 0.9 %
10 SYRINGE (ML) INJECTION AS NEEDED
Status: DISCONTINUED | OUTPATIENT
Start: 2025-06-04 | End: 2025-06-04 | Stop reason: HOSPADM

## 2025-06-04 NOTE — ED PROVIDER NOTES
Time: 4:34 PM EDT  Date of encounter:  6/4/2025  Independent Historian/Clinical History and Information was obtained by:   Patient    History is limited by: N/A    Chief Complaint: Chest pain      History of Present Illness:  Patient is a 74 y.o. year old male who presents to the emergency department for evaluation of chest pain/epigastric pain.  Chronic intermittent issue.  Patient states this happens 2-3 times per week.  Symptoms are never exertional.  Complains of upper abdominal bloating, nausea and decreased appetite.  Denies vomiting.  Afebrile.  No reported GI bleeding.  Patient does note that he drinks 1 to 2 cups of coffee every day along with sweet tea essentially the rest of the day.  He states he drinks very little water.      Patient Care Team  Primary Care Provider: Edith Marcelo APRN    Past Medical History:     No Known Allergies  Past Medical History:   Diagnosis Date    Anxiety     BPH (benign prostatic hyperplasia)     CAD (coronary artery disease)     Cervical spondylosis without myelopathy 01/15/2020    Colitis     Depression     Diverticulitis     Diverticulosis of colon     Family history of stroke 01/30/2024    GERD (gastroesophageal reflux disease)     Hemorrhoids     High cholesterol     Hypertension     IBS (irritable bowel syndrome)     Mood disorder     Myocardial infarction     Osteoarthritis     Paroxysmal atrial fibrillation 12/04/2021    S/P lumpectomy, right breast     CYST 2016    Suicidal thoughts 01/30/2024     Past Surgical History:   Procedure Laterality Date    APPENDECTOMY      BREAST MASS EXCISION  2016    CARDIAC CATHETERIZATION  2016    CHOLECYSTECTOMY      COLONOSCOPY  2008,2014,2021    CORONARY ANGIOPLASTY WITH STENT PLACEMENT  2010    ENDOSCOPY  2010,2019    ENDOSCOPY N/A 7/20/2022    Procedure: ESOPHAGOGASTRODUODENOSCOPY WITH BIOPSY;  Surgeon: Nigel Haas MD;  Location: [facility] ENDOSCOPY;  Service: Gastroenterology;  Laterality: N/A;  HIATAL HERNIA     HEMORRHOIDECTOMY  2019    INGUINAL HERNIA REPAIR  2019    TURP / TRANSURETHRAL INCISION / DRAINAGE PROSTATE  01/16/2020    BUTTON TURP W/ DR TAM    UMBILICAL HERNIA REPAIR  2019    UPPER GASTROINTESTINAL ENDOSCOPY       Family History   Problem Relation Age of Onset    Other Mother         bladder stones    Arthritis Mother     Heart disease Mother     Heart failure Father     Stroke Father     Colon cancer Paternal Grandfather         60's    Malig Hyperthermia Neg Hx        Home Medications:  Prior to Admission medications    Medication Sig Start Date End Date Taking? Authorizing Provider   ARIPiprazole (ABILIFY) 5 MG tablet Take 1 tablet by mouth Daily. 12/22/22   Elian Merrill MD   atorvastatin (LIPITOR) 80 MG tablet TAKE ONE TABLET BY MOUTH EVERY DAY 12/27/24   Sigrid Iqbal APRN   clonazePAM (KlonoPIN) 0.5 MG tablet Take 1 tablet by mouth every 6 to 8 hours as needed for Anxiety. 4/1/22   Elian Merrill MD   clopidogrel (PLAVIX) 75 MG tablet TAKE ONE TABLET BY MOUTH EVERY DAY 2/25/25   Sigrid Iqbal APRN   desvenlafaxine (PRISTIQ) 100 MG 24 hr tablet Take 1 tablet by mouth Daily. 11/29/24   Elian Merrill MD   Desvenlafaxine  MG tablet sustained-release 24 hour Take 1 tablet every day by oral route.    Elian Merrill MD   Eliquis 5 MG tablet tablet TAKE ONE TABLET BY MOUTH EVERY TWELVE HOURS 1/8/25   Sigrid Iqbal APRN   lansoprazole (PREVACID) 30 MG capsule     Elian Merrill MD   lisinopril (PRINIVIL,ZESTRIL) 5 MG tablet Take 1 tablet by mouth Daily. 6/18/21   Elian Merrill MD   metoprolol succinate XL (TOPROL-XL) 25 MG 24 hr tablet TAKE ONE TABLET BY MOUTH TWICE DAILY 1/9/25   Sigrid Iqbal APRN   multivitamin with minerals tablet tablet Take 1 tablet by mouth Daily.    Elian Merrill MD   nitroglycerin (NITROSTAT) 0.4 MG SL tablet Place 1 tablet under the tongue Every 5 (Five) Minutes As Needed for Chest Pain (no  "more than 3 doses in 15 minutes). 12/10/21   Mayank Sheehan MD   sertraline (ZOLOFT) 100 MG tablet     Provider, MD Elian   sildenafil (REVATIO) 20 MG tablet Take 3 tablets by mouth As Needed (ED). 10/21/24   Mallorie Matt MD   tamsulosin (FLOMAX) 0.4 MG capsule 24 hr capsule Take 1 capsule by mouth Daily for 360 days. 10/21/24 10/16/25  Mallorie Matt MD   traMADol (ULTRAM) 50 MG tablet Take 1 tablet by mouth Every 6 (Six) Hours As Needed for Moderate Pain.    Emergency, Nurse Epic, RN   triamcinolone (KENALOG) 0.5 % cream Apply 1 Application topically to the appropriate area as directed 2 (Two) Times a Day.    Provider, MD Elian        Social History:   Social History     Tobacco Use    Smoking status: Never     Passive exposure: Yes    Smokeless tobacco: Never   Vaping Use    Vaping status: Never Used   Substance Use Topics    Alcohol use: Never    Drug use: Never         Review of Systems:  Review of Systems   Constitutional:  Negative for chills and fever.   HENT:  Negative for congestion, rhinorrhea and sore throat.    Eyes:  Negative for photophobia.   Respiratory:  Negative for apnea, cough, chest tightness and shortness of breath.    Cardiovascular:  Positive for chest pain. Negative for palpitations.   Gastrointestinal:  Positive for abdominal distention, abdominal pain and nausea. Negative for blood in stool, diarrhea and vomiting.   Endocrine: Negative.    Genitourinary:  Negative for difficulty urinating and dysuria.   Musculoskeletal:  Negative for back pain, joint swelling and myalgias.   Skin:  Negative for color change and wound.   Allergic/Immunologic: Negative.    Neurological:  Negative for seizures and headaches.   Psychiatric/Behavioral: Negative.     All other systems reviewed and are negative.       Physical Exam:  /81 (BP Location: Right arm, Patient Position: Sitting)   Pulse 65   Temp 98.2 °F (36.8 °C) (Oral)   Resp 18   Ht 185.4 cm (73\")   Wt 102 kg (225 lb " 5 oz)   SpO2 99%   BMI 29.73 kg/m²     Physical Exam  Vitals and nursing note reviewed.   Constitutional:       General: He is awake.      Appearance: Normal appearance. He is well-developed.   HENT:      Head: Normocephalic and atraumatic.      Nose: Nose normal.      Mouth/Throat:      Mouth: Mucous membranes are moist.   Eyes:      Extraocular Movements: Extraocular movements intact.      Pupils: Pupils are equal, round, and reactive to light.   Cardiovascular:      Rate and Rhythm: Normal rate and regular rhythm.      Heart sounds: Normal heart sounds.   Pulmonary:      Effort: Pulmonary effort is normal. No respiratory distress.      Breath sounds: Normal breath sounds. No wheezing, rhonchi or rales.   Abdominal:      General: Bowel sounds are normal.      Palpations: Abdomen is soft.      Tenderness: There is no abdominal tenderness. There is no guarding or rebound.      Comments: No rigidity   Musculoskeletal:         General: No tenderness. Normal range of motion.      Cervical back: Normal range of motion and neck supple.   Skin:     General: Skin is warm and dry.      Coloration: Skin is not jaundiced.   Neurological:      General: No focal deficit present.      Mental Status: He is alert. Mental status is at baseline.   Psychiatric:         Mood and Affect: Mood normal.                    Medical Decision Making:      Comorbidities that affect care:    CAD, A-fib, anxiety, depression, GERD, hypertension, hyperlipidemia    External Notes reviewed:    Previous Clinic Note: Cardiology office visit with Dr. Sheehan on 2/6/2025.  Description: CAD status post PCA, paroxysmal atrial fibrillation      The following orders were placed and all results were independently analyzed by me:  Orders Placed This Encounter   Procedures    XR Chest 1 View    [place] Draw    High Sensitivity Troponin T    Comprehensive Metabolic Panel    Lipase    BNP    Magnesium    CBC Auto Differential    NPO Diet NPO Type: Strict NPO     Undress & Gown    Continuous Pulse Oximetry    Oxygen Therapy- Nasal Cannula; Titrate 1-6 LPM Per SpO2; 90 - 95%    ECG 12 Lead ED Triage Standing Order; Chest Pain    ECG 12 Lead ED Triage Standing Order; Chest Pain    Insert Peripheral IV    CBC & Differential    Green Top (Gel)    Lavender Top    Gold Top - SST    Light Blue Top       Medications Given in the Emergency Department:  Medications   sodium chloride 0.9 % flush 10 mL (has no administration in time range)        ED Course:    ED Course as of 06/04/25 1801 Wed Jun 04, 2025   1636 I have personally interpreted the EKG today and it shows no evidence of any acute ischemia or heart arrhythmia. [RP]      ED Course User Index  [RP] Isai Mills MD       Labs:    Lab Results (last 24 hours)       Procedure Component Value Units Date/Time    High Sensitivity Troponin T [[id number]]  (Normal) Collected: 06/04/25 1621    Specimen: Blood Updated: 06/04/25 1656     HS Troponin T 18 ng/L     Narrative:      High Sensitive Troponin T Reference Range:  <14.0 ng/L- Negative Female for AMI  <22.0 ng/L- Negative Male for AMI  >=14 - Abnormal Female indicating possible myocardial injury.  >=22 - Abnormal Male indicating possible myocardial injury.   Clinicians would have to utilize clinical acumen, EKG, Troponin, and serial changes to determine if it is an Acute Myocardial Infarction or myocardial injury due to an underlying chronic condition.         CBC & Differential [[id number]]  (Abnormal) Collected: 06/04/25 1621    Specimen: Blood Updated: 06/04/25 1633    Narrative:      The following orders were created for panel order CBC & Differential.  Procedure                               Abnormality         Status                     ---------                               -----------         ------                     CBC Auto Differential[[id number]]        Abnormal            Final result                 Please view results for these tests on the individual  orders.    Comprehensive Metabolic Panel [[id number]]  (Abnormal) Collected: 06/04/25 1621    Specimen: Blood Updated: 06/04/25 1656     Glucose 103 mg/dL      BUN 15.8 mg/dL      Creatinine 1.16 mg/dL      Sodium 138 mmol/L      Potassium 4.0 mmol/L      Chloride 103 mmol/L      CO2 22.6 mmol/L      Calcium 9.1 mg/dL      Total Protein 7.1 g/dL      Albumin 4.6 g/dL      ALT (SGPT) 23 U/L      AST (SGOT) 22 U/L      Alkaline Phosphatase 136 U/L      Total Bilirubin 0.7 mg/dL      Globulin 2.5 gm/dL      A/G Ratio 1.8 g/dL      BUN/Creatinine Ratio 13.6     Anion Gap 12.4 mmol/L      eGFR 65.7 mL/min/1.73     Narrative:      GFR Categories in Chronic Kidney Disease (CKD)              GFR Category          GFR (mL/min/1.73)    Interpretation  G1                    90 or greater        Normal or high (1)  G2                    60-89                Mild decrease (1)  G3a                   45-59                Mild to moderate decrease  G3b                   30-44                Moderate to severe decrease  G4                    15-29                Severe decrease  G5                    14 or less           Kidney failure    (1)In the absence of evidence of kidney disease, neither GFR category G1 or G2 fulfill the criteria for CKD.    eGFR calculation 2021 CKD-EPI creatinine equation, which does not include race as a factor    Lipase [[id number]]  (Normal) Collected: 06/04/25 1621    Specimen: Blood Updated: 06/04/25 1656     Lipase 20 U/L     BNP [[id number]]  (Normal) Collected: 06/04/25 1621    Specimen: Blood Updated: 06/04/25 1652     proBNP 92.0 pg/mL     Narrative:      This assay is used as an aid in the diagnosis of individuals suspected of having heart failure. It can be used as an aid in the diagnosis of acute decompensated heart failure (ADHF) in patients presenting with signs and symptoms of ADHF to the emergency department (ED). In addition, NT-proBNP of <300 pg/mL indicates ADHF is not likely.    Age  Range Result Interpretation  NT-proBNP Concentration (pg/mL:      <50             Positive            >450                   Gray                 300-450                    Negative             <300    50-75           Positive            >900                  Gray                300-900                  Negative            <300      >75             Positive            >1800                  Gray                300-1800                  Negative            <300    Magnesium [[id number]]  (Normal) Collected: 06/04/25 1621    Specimen: Blood Updated: 06/04/25 1656     Magnesium 1.9 mg/dL     CBC Auto Differential [[id number]]  (Abnormal) Collected: 06/04/25 1621    Specimen: Blood Updated: 06/04/25 1633     WBC 5.33 10*3/mm3      RBC 4.11 10*6/mm3      Hemoglobin 12.6 g/dL      Hematocrit 38.5 %      MCV 93.7 fL      MCH 30.7 pg      MCHC 32.7 g/dL      RDW 14.5 %      RDW-SD 49.9 fl      MPV 9.6 fL      Platelets 219 10*3/mm3      Neutrophil % 66.1 %      Lymphocyte % 22.0 %      Monocyte % 9.4 %      Eosinophil % 1.7 %      Basophil % 0.2 %      Immature Grans % 0.6 %      Neutrophils, Absolute 3.53 10*3/mm3      Lymphocytes, Absolute 1.17 10*3/mm3      Monocytes, Absolute 0.50 10*3/mm3      Eosinophils, Absolute 0.09 10*3/mm3      Basophils, Absolute 0.01 10*3/mm3      Immature Grans, Absolute 0.03 10*3/mm3      nRBC 0.0 /100 WBC              Imaging:    XR Chest 1 View  Result Date: 6/4/2025  XR CHEST 1 VW Date of Exam: 6/4/2025 4:24 PM EDT Indication: Chest Pain Triage Protocol Comparison: Chest CT dated 2/20/2024 Findings: The cardiomediastinal silhouette is within normal limits. There are low lung volumes. There is streaky left basilar atelectasis. There is no consolidation or pleural effusion. There is no evidence of pneumothorax. There are degenerative changes of the thoracic spine.     Impression: Low lung volumes with streaky left basilar atelectasis. Electronically Signed: Luis Miguel Moran MD  6/4/2025 4:46  PM EDT  Workstation ID: [id number]        Differential Diagnosis and Discussion:    Abdominal Pain: Based on the patient's signs and symptoms, I considered abdominal aortic aneurysm, small bowel obstruction, pancreatitis, acute cholecystitis, acute appendecitis, peptic ulcer disease, gastritis, colitis, endocrine disorders, irritable bowel syndrome and other differential diagnosis an etiology of the patient's abdominal pain.  Chest Pain:  Based on the patient's signs and symptoms, I considered aortic dissection, myocardial infaction, pulmonary embolism, cardiac tamponade, pericarditis, pneumothorax, musculoskeletal chest pain and other differential diagnosis as an etiology of the patient's chest pain.     PROCEDURES:    Labs were collected in the emergency department and all labs were reviewed and interpreted by me.  X-ray were performed in the emergency department and all X-ray impressions were independently interpreted by me.  An EKG was performed and the EKG was interpreted by me.    ECG 12 Lead ED Triage Standing Order; Chest Pain   Preliminary Result   HEART RATE=64  bpm   RR Yojzzyad=087  ms   MD Nujqruvf=096  ms   P Horizontal Axis=28  deg   P Front Axis=38  deg   QRSD Gfnuqtth=362  ms   QT Zumbnbib=642  ms   QKhL=470  ms   QRS Axis=-17  deg   T Wave Axis=23  deg   - ABNORMAL ECG -   Sinus rhythm   IVCD, consider RBBB   Date and Time of Study:2025-06-04 16:08:28          Procedures    MDM                     Patient Care Considerations:    CT ABDOMEN AND PELVIS: I considered ordering a CT scan of the abdomen and pelvis however patient is nontender to palpation      Consultants/Shared Management Plan:    None    Social Determinants of Health:    Patient is independent, reliable, and has access to care.       Disposition and Care Coordination:    Discharged: I considered escalation of care by admitting this patient to the hospital, however patient's description of chest pain sounds more GI in nature.  He is  nontender.  His troponin and EKG are reassuring.  He is hemodynamically stable with normal vital signs at the time of my discharge examination.    I have explained the patient´s condition, diagnoses and treatment plan based on the information available to me at this time. I have answered questions and addressed any concerns. The patient has a good  understanding of the patient´s diagnosis, condition, and treatment plan as can be expected at this point. The vital signs have been stable. The patient´s condition is stable and appropriate for discharge from the emergency department.      The patient will pursue further outpatient evaluation with the primary care physician or other designated or consulting physician as outlined in the discharge instructions. They are agreeable to this plan of care and follow-up instructions have been explained in detail. The patient has received these instructions in written format and has expressed an understanding of the discharge instructions. The patient is aware that any significant change in condition or worsening of symptoms should prompt an immediate return to this or the closest emergency department or call to 911.    Final diagnoses:   Epigastric pain        ED Disposition       ED Disposition   Discharge    Condition   Stable    Comment   --               This medical record created using voice recognition software.             Isai Mills MD  06/04/25 1808

## 2025-06-04 NOTE — DISCHARGE INSTRUCTIONS
Return to the emergency department immediately for worsening of symptoms.  As we discussed, I think this is more of a GI issue and I would strongly recommend you try to drink a lot more water or at the very least Gatorade/electrolyte-based sports drink.  Please try to avoid coffee and sweet tea as both of these can be causing you stomach and esophageal inflammation and worsening reflux issues.  Eat a very bland diet and follow-up with your family doctor tomorrow.

## 2025-06-19 LAB
QT INTERVAL: 416 MS
QTC INTERVAL: 428 MS

## 2025-07-01 RX ORDER — METOPROLOL SUCCINATE 25 MG/1
25 TABLET, EXTENDED RELEASE ORAL EVERY 12 HOURS SCHEDULED
Qty: 180 TABLET | Refills: 3 | Status: SHIPPED | OUTPATIENT
Start: 2025-07-01

## 2025-07-01 NOTE — TELEPHONE ENCOUNTER
Rx Refill Note  Requested Prescriptions     Pending Prescriptions Disp Refills    metoprolol succinate XL (TOPROL-XL) 25 MG 24 hr tablet [Pharmacy Med Name: metoprolol succinate ER 25 mg tablet,extended release 24 hr] 180 tablet 1     Sig: TAKE ONE TABLET BY MOUTH TWICE DAILY        LAST OFFICE VISIT:  02/06/2025     NEXT OFFICE VISIT:  8/8/2025     Does the medication requests match the last office note:    [x] Yes   [] No    Does this refill request meet protocol details for MA to approve:     [x] Yes   [] No   [] No Protocols Provided

## 2025-07-07 RX ORDER — ATORVASTATIN CALCIUM 80 MG/1
80 TABLET, FILM COATED ORAL DAILY
Qty: 90 TABLET | Refills: 1 | Status: SHIPPED | OUTPATIENT
Start: 2025-07-07

## 2025-08-08 ENCOUNTER — OFFICE VISIT (OUTPATIENT)
Dept: CARDIOLOGY | Facility: CLINIC | Age: 75
End: 2025-08-08
Payer: MEDICARE

## 2025-08-08 VITALS
BODY MASS INDEX: 29.37 KG/M2 | DIASTOLIC BLOOD PRESSURE: 72 MMHG | WEIGHT: 221.6 LBS | HEIGHT: 73 IN | SYSTOLIC BLOOD PRESSURE: 117 MMHG | HEART RATE: 57 BPM

## 2025-08-08 DIAGNOSIS — I25.10 CAD S/P PERCUTANEOUS CORONARY ANGIOPLASTY: Primary | ICD-10-CM

## 2025-08-08 DIAGNOSIS — I10 ESSENTIAL HYPERTENSION: ICD-10-CM

## 2025-08-08 DIAGNOSIS — I48.0 PAF (PAROXYSMAL ATRIAL FIBRILLATION): ICD-10-CM

## 2025-08-08 DIAGNOSIS — Z98.61 CAD S/P PERCUTANEOUS CORONARY ANGIOPLASTY: Primary | ICD-10-CM

## 2025-08-08 DIAGNOSIS — E78.2 MIXED HYPERLIPIDEMIA: ICD-10-CM

## 2025-08-08 DIAGNOSIS — I71.21 ANEURYSM OF ASCENDING AORTA WITHOUT RUPTURE: ICD-10-CM

## 2025-08-08 PROCEDURE — 3074F SYST BP LT 130 MM HG: CPT | Performed by: NURSE PRACTITIONER

## 2025-08-08 PROCEDURE — 3078F DIAST BP <80 MM HG: CPT | Performed by: NURSE PRACTITIONER

## 2025-08-08 PROCEDURE — 1159F MED LIST DOCD IN RCRD: CPT | Performed by: NURSE PRACTITIONER

## 2025-08-08 PROCEDURE — 99214 OFFICE O/P EST MOD 30 MIN: CPT | Performed by: NURSE PRACTITIONER

## 2025-08-08 PROCEDURE — 1160F RVW MEDS BY RX/DR IN RCRD: CPT | Performed by: NURSE PRACTITIONER

## (undated) DEVICE — Device: Brand: DEFENDO AIR/WATER/SUCTION AND BIOPSY VALVE

## (undated) DEVICE — SOL IRRG H2O PL/BG 1000ML STRL

## (undated) DEVICE — EGD OR ERCP KIT: Brand: MEDLINE INDUSTRIES, INC.